# Patient Record
Sex: MALE | Race: WHITE | NOT HISPANIC OR LATINO | Employment: FULL TIME | URBAN - METROPOLITAN AREA
[De-identification: names, ages, dates, MRNs, and addresses within clinical notes are randomized per-mention and may not be internally consistent; named-entity substitution may affect disease eponyms.]

---

## 2022-01-24 ENCOUNTER — OFFICE VISIT (OUTPATIENT)
Dept: FAMILY MEDICINE CLINIC | Facility: CLINIC | Age: 57
End: 2022-01-24
Payer: COMMERCIAL

## 2022-01-24 VITALS
DIASTOLIC BLOOD PRESSURE: 90 MMHG | TEMPERATURE: 98.8 F | HEART RATE: 69 BPM | WEIGHT: 164.4 LBS | BODY MASS INDEX: 23.02 KG/M2 | HEIGHT: 71 IN | OXYGEN SATURATION: 96 % | SYSTOLIC BLOOD PRESSURE: 156 MMHG

## 2022-01-24 DIAGNOSIS — F32.1 CURRENT MODERATE EPISODE OF MAJOR DEPRESSIVE DISORDER WITHOUT PRIOR EPISODE (HCC): ICD-10-CM

## 2022-01-24 DIAGNOSIS — Z11.59 ENCOUNTER FOR HEPATITIS C SCREENING TEST FOR LOW RISK PATIENT: ICD-10-CM

## 2022-01-24 DIAGNOSIS — Z76.89 ENCOUNTER TO ESTABLISH CARE: Primary | ICD-10-CM

## 2022-01-24 DIAGNOSIS — N52.9 ERECTILE DYSFUNCTION, UNSPECIFIED ERECTILE DYSFUNCTION TYPE: ICD-10-CM

## 2022-01-24 DIAGNOSIS — Z13.0 SCREENING FOR DEFICIENCY ANEMIA: ICD-10-CM

## 2022-01-24 DIAGNOSIS — Z12.11 SCREENING FOR COLON CANCER: ICD-10-CM

## 2022-01-24 DIAGNOSIS — Z12.5 SCREENING FOR PROSTATE CANCER: ICD-10-CM

## 2022-01-24 DIAGNOSIS — Z11.4 SCREENING FOR HIV (HUMAN IMMUNODEFICIENCY VIRUS): ICD-10-CM

## 2022-01-24 DIAGNOSIS — Z13.29 SCREENING FOR THYROID DISORDER: ICD-10-CM

## 2022-01-24 DIAGNOSIS — Z13.1 SCREENING FOR DIABETES MELLITUS: ICD-10-CM

## 2022-01-24 DIAGNOSIS — Z13.220 SCREENING FOR HYPERLIPIDEMIA: ICD-10-CM

## 2022-01-24 PROCEDURE — 99203 OFFICE O/P NEW LOW 30 MIN: CPT | Performed by: NURSE PRACTITIONER

## 2022-01-24 RX ORDER — BUPROPION HYDROCHLORIDE 150 MG/1
150 TABLET, EXTENDED RELEASE ORAL 2 TIMES DAILY
Qty: 180 TABLET | Refills: 1 | Status: SHIPPED | OUTPATIENT
Start: 2022-01-24

## 2022-01-24 RX ORDER — BUPROPION HYDROCHLORIDE 150 MG/1
150 TABLET, EXTENDED RELEASE ORAL 2 TIMES DAILY
COMMUNITY
End: 2022-01-24 | Stop reason: SDUPTHER

## 2022-01-24 NOTE — PATIENT INSTRUCTIONS
Blood Pressure Diary    Check blood pressures at home and keep a log  Bring log to your follow up or call if the average home BP is greater than 458 systolic and or 90 diastolic before your follow up  Scott Wilson   Tues  Wed Thurs Fri Sat  COMMENTS                                                                            Keep a log of your blood pressure readings at home  Please take blood pressure at same time of day  Please record date and time blood pressure was taken  Make sure to take your blood pressure when you are seated and resting for about 3 minutes  Please call office if Blood Pressure is <110/<60  You can send these to me via [employer] or by calling the office  This will help me manage your blood pressure better  Erectile Dysfunction   AMBULATORY CARE:   Erectile dysfunction (ED) , or impotence, is when you cannot get or keep an erection for sexual activity  Call your doctor if:   · You have chest pain, dizziness, or nausea after you take ED medicines or during or after sex  · You have an erection for more than 4 hours after you take your ED medicine  · You see blood in your urine  · You have changes in your vision, headaches, or back pain after you take ED medicine  · You have a painful erection  · You have questions or concerns about your condition or care  Treatment  depends on the cause of your ED  You may need any of the following:  · ED medicines  help you have an erection  These medicines are taken before you have sex  Follow instructions on how to use these medicines  You may have a life-threatening reaction if you mix ED medicines with medicines that contain nitrates  Medicines with nitrates include nitroglycerin and other heart medicines  · Testosterone  may be given to increase the levels in your blood and improve your ED  You may need to use a skin cream or wear a patch  Testosterone is also given as an injection      · Penis injections  may be done to help improve your blood flow  · A vacuum device  is a tube that is placed over the penis  A hand pump is connected to the tube and acts as a vacuum  This may help increase blood flow to the penis  · Therapy  may be needed to treat emotional or relationship problems that may be causing your ED  · Surgery  may be recommended if other treatments do not work  Surgery includes a penile implant or prosthesis  Surgery may also be done to improve blood flow  Ask for more information about surgeries for ED  Decrease your risk for ED:   · Do not smoke  Smoking can increase your risk for ED  Nicotine and other chemicals in cigarettes and cigars can also cause lung damage  Ask your healthcare provider for information if you currently smoke and need help to quit  E-cigarettes or smokeless tobacco still contain nicotine  · Control your blood sugar levels if you have diabetes  Over time, high blood sugar levels can increase your risk for ED  · Limit alcohol  Men should limit alcohol to 2 drinks a day  A drink of alcohol is 12 ounces of beer, 5 ounces of wine, or 1½ ounces of liquor  · Manage your medical conditions  Eat a variety of healthy foods and stay physically active  Take your medicines as directed  They can help control conditions that may cause ED  · Manage stress  Learn ways to relax, such as deep breathing, meditation, and listening to music  · Do not use illegal drugs  They increase your risk for ED  Follow up with your doctor as directed:  Write down your questions so you remember to ask them during your visits  © Copyright [employer] 2021 Information is for End User's use only and may not be sold, redistributed or otherwise used for commercial purposes  All illustrations and images included in CareNotes® are the copyrighted property of [employer]  or [employer]   The above information is an  only   It is not intended as medical advice for individual conditions or treatments  Talk to your doctor, nurse or pharmacist before following any medical regimen to see if it is safe and effective for you

## 2022-02-17 ENCOUNTER — APPOINTMENT (OUTPATIENT)
Dept: LAB | Facility: CLINIC | Age: 57
End: 2022-02-17
Payer: COMMERCIAL

## 2022-02-17 DIAGNOSIS — Z12.5 SCREENING FOR PROSTATE CANCER: ICD-10-CM

## 2022-02-17 DIAGNOSIS — Z11.4 SCREENING FOR HIV (HUMAN IMMUNODEFICIENCY VIRUS): ICD-10-CM

## 2022-02-17 DIAGNOSIS — Z13.220 SCREENING FOR HYPERLIPIDEMIA: ICD-10-CM

## 2022-02-17 DIAGNOSIS — N52.9 ERECTILE DYSFUNCTION, UNSPECIFIED ERECTILE DYSFUNCTION TYPE: ICD-10-CM

## 2022-02-17 DIAGNOSIS — Z13.1 SCREENING FOR DIABETES MELLITUS: ICD-10-CM

## 2022-02-17 DIAGNOSIS — Z13.29 SCREENING FOR THYROID DISORDER: ICD-10-CM

## 2022-02-17 DIAGNOSIS — Z13.0 SCREENING FOR DEFICIENCY ANEMIA: ICD-10-CM

## 2022-02-17 LAB
ALBUMIN SERPL BCP-MCNC: 4.1 G/DL (ref 3.5–5)
ALP SERPL-CCNC: 66 U/L (ref 46–116)
ALT SERPL W P-5'-P-CCNC: 114 U/L (ref 12–78)
ANION GAP SERPL CALCULATED.3IONS-SCNC: 4 MMOL/L (ref 4–13)
AST SERPL W P-5'-P-CCNC: 84 U/L (ref 5–45)
BASOPHILS # BLD AUTO: 0.04 THOUSANDS/ΜL (ref 0–0.1)
BASOPHILS NFR BLD AUTO: 1 % (ref 0–1)
BILIRUB SERPL-MCNC: 0.7 MG/DL (ref 0.2–1)
BUN SERPL-MCNC: 15 MG/DL (ref 5–25)
CALCIUM SERPL-MCNC: 9.5 MG/DL (ref 8.3–10.1)
CHLORIDE SERPL-SCNC: 108 MMOL/L (ref 100–108)
CHOLEST SERPL-MCNC: 167 MG/DL
CO2 SERPL-SCNC: 26 MMOL/L (ref 21–32)
CREAT SERPL-MCNC: 0.88 MG/DL (ref 0.6–1.3)
EOSINOPHIL # BLD AUTO: 0.06 THOUSAND/ΜL (ref 0–0.61)
EOSINOPHIL NFR BLD AUTO: 1 % (ref 0–6)
ERYTHROCYTE [DISTWIDTH] IN BLOOD BY AUTOMATED COUNT: 12.2 % (ref 11.6–15.1)
GFR SERPL CREATININE-BSD FRML MDRD: 96 ML/MIN/1.73SQ M
GLUCOSE P FAST SERPL-MCNC: 115 MG/DL (ref 65–99)
HCT VFR BLD AUTO: 44.8 % (ref 36.5–49.3)
HDLC SERPL-MCNC: 61 MG/DL
HGB BLD-MCNC: 15.5 G/DL (ref 12–17)
IMM GRANULOCYTES # BLD AUTO: 0.02 THOUSAND/UL (ref 0–0.2)
IMM GRANULOCYTES NFR BLD AUTO: 0 % (ref 0–2)
LDLC SERPL CALC-MCNC: 89 MG/DL (ref 0–100)
LYMPHOCYTES # BLD AUTO: 2.63 THOUSANDS/ΜL (ref 0.6–4.47)
LYMPHOCYTES NFR BLD AUTO: 39 % (ref 14–44)
MCH RBC QN AUTO: 32.1 PG (ref 26.8–34.3)
MCHC RBC AUTO-ENTMCNC: 34.6 G/DL (ref 31.4–37.4)
MCV RBC AUTO: 93 FL (ref 82–98)
MONOCYTES # BLD AUTO: 0.55 THOUSAND/ΜL (ref 0.17–1.22)
MONOCYTES NFR BLD AUTO: 8 % (ref 4–12)
NEUTROPHILS # BLD AUTO: 3.44 THOUSANDS/ΜL (ref 1.85–7.62)
NEUTS SEG NFR BLD AUTO: 51 % (ref 43–75)
NONHDLC SERPL-MCNC: 106 MG/DL
NRBC BLD AUTO-RTO: 0 /100 WBCS
PLATELET # BLD AUTO: 142 THOUSANDS/UL (ref 149–390)
PMV BLD AUTO: 11.1 FL (ref 8.9–12.7)
POTASSIUM SERPL-SCNC: 4.4 MMOL/L (ref 3.5–5.3)
PROT SERPL-MCNC: 7.5 G/DL (ref 6.4–8.2)
PSA SERPL-MCNC: 1.8 NG/ML (ref 0–4)
RBC # BLD AUTO: 4.83 MILLION/UL (ref 3.88–5.62)
SODIUM SERPL-SCNC: 138 MMOL/L (ref 136–145)
TRIGL SERPL-MCNC: 86 MG/DL
TSH SERPL DL<=0.05 MIU/L-ACNC: 3.2 UIU/ML (ref 0.36–3.74)
WBC # BLD AUTO: 6.74 THOUSAND/UL (ref 4.31–10.16)

## 2022-02-17 PROCEDURE — 87389 HIV-1 AG W/HIV-1&-2 AB AG IA: CPT

## 2022-02-17 PROCEDURE — 36415 COLL VENOUS BLD VENIPUNCTURE: CPT

## 2022-02-17 PROCEDURE — 85025 COMPLETE CBC W/AUTO DIFF WBC: CPT

## 2022-02-17 PROCEDURE — 84403 ASSAY OF TOTAL TESTOSTERONE: CPT

## 2022-02-17 PROCEDURE — G0103 PSA SCREENING: HCPCS

## 2022-02-17 PROCEDURE — 84402 ASSAY OF FREE TESTOSTERONE: CPT

## 2022-02-17 PROCEDURE — 80053 COMPREHEN METABOLIC PANEL: CPT

## 2022-02-17 PROCEDURE — 80061 LIPID PANEL: CPT

## 2022-02-17 PROCEDURE — 84443 ASSAY THYROID STIM HORMONE: CPT

## 2022-02-18 LAB
HIV 1+2 AB+HIV1 P24 AG SERPL QL IA: NORMAL
TESTOST FREE SERPL-MCNC: 5.7 PG/ML (ref 7.2–24)
TESTOST SERPL-MCNC: >1500 NG/DL (ref 264–916)

## 2022-02-28 ENCOUNTER — OFFICE VISIT (OUTPATIENT)
Dept: FAMILY MEDICINE CLINIC | Facility: CLINIC | Age: 57
End: 2022-02-28
Payer: COMMERCIAL

## 2022-02-28 VITALS
BODY MASS INDEX: 23.32 KG/M2 | SYSTOLIC BLOOD PRESSURE: 132 MMHG | WEIGHT: 166.6 LBS | DIASTOLIC BLOOD PRESSURE: 82 MMHG | TEMPERATURE: 98 F | HEIGHT: 71 IN | OXYGEN SATURATION: 98 % | HEART RATE: 68 BPM

## 2022-02-28 DIAGNOSIS — Z12.11 SCREENING FOR COLORECTAL CANCER: ICD-10-CM

## 2022-02-28 DIAGNOSIS — I10 PRIMARY HYPERTENSION: ICD-10-CM

## 2022-02-28 DIAGNOSIS — Z80.0 FAMILY HISTORY OF COLON CANCER IN FATHER: ICD-10-CM

## 2022-02-28 DIAGNOSIS — Z00.00 ANNUAL PHYSICAL EXAM: Primary | ICD-10-CM

## 2022-02-28 DIAGNOSIS — Z12.12 SCREENING FOR COLORECTAL CANCER: ICD-10-CM

## 2022-02-28 DIAGNOSIS — R74.8 ELEVATED LIVER ENZYMES: ICD-10-CM

## 2022-02-28 PROCEDURE — 99396 PREV VISIT EST AGE 40-64: CPT | Performed by: NURSE PRACTITIONER

## 2022-02-28 RX ORDER — LISINOPRIL 10 MG/1
10 TABLET ORAL DAILY
Qty: 30 TABLET | Refills: 1 | Status: SHIPPED | OUTPATIENT
Start: 2022-02-28 | End: 2022-03-28 | Stop reason: SDUPTHER

## 2022-02-28 NOTE — PATIENT INSTRUCTIONS
Blood Pressure Diary    Check blood pressures at home and keep a log  Bring log to your follow up or call if the average home BP is greater than 162 systolic and or 90 diastolic before your follow up  Mirian Slaughter   Tues  Wed Thurs Fri Sat  COMMENTS                                                                            Keep a log of your blood pressure readings at home  Please take blood pressure at same time of day  Please record date and time blood pressure was taken  Make sure to take your blood pressure when you are seated and resting for about 3 minutes  Please call office if Blood Pressure is <110/<60  You can send these to me via [employer] or by calling the office  This will help me manage your blood pressure better  Wellness Visit for Adults   AMBULATORY CARE:   A wellness visit  is when you see your healthcare provider to get screened for health problems  Your healthcare provider will also give you advice on how to stay healthy  Write down your questions so you remember to ask them  Ask your healthcare provider how often you should have a wellness visit  What happens at a wellness visit:  Your healthcare provider will ask about your health, and your family history of health problems  This includes high blood pressure, heart disease, and cancer  He or she will ask if you have symptoms that concern you, if you smoke, and about your mood  You may also be asked about your intake of medicines, supplements, food, and alcohol  Any of the following may be done:  · Your weight  will be checked  Your height may also be checked so your body mass index (BMI) can be calculated  Your BMI shows if you are at a healthy weight  · Your blood pressure  and heart rate will be checked  Your temperature may also be checked  · Blood and urine tests  may be done  Blood tests may be done to check your cholesterol levels  Abnormal cholesterol levels increase your risk for heart disease and stroke   You may also need a blood or urine test to check for diabetes if you are at increased risk  Urine tests may be done to look for signs of an infection or kidney disease  · A physical exam  includes checking your heartbeat and lungs with a stethoscope  Your healthcare provider may also check your skin to look for sun damage  · Screening tests  may be recommended  A screening test is done to check for diseases that may not cause symptoms  The screening tests you may need depend on your age, gender, family history, and lifestyle habits  For example, colorectal screening may be recommended if you are 48years old or older  Screening tests you need if you are a woman:   · A Pap smear  is used to screen for cervical cancer  Pap smears are usually done every 3 to 5 years depending on your age  You may need them more often if you have had abnormal Pap smear test results in the past  Ask your healthcare provider how often you should have a Pap smear  · A mammogram  is an x-ray of your breasts to screen for breast cancer  Experts recommend mammograms every 2 years starting at age 48 years  You may need a mammogram at age 52 years or younger if you have an increased risk for breast cancer  Talk to your healthcare provider about when you should start having mammograms and how often you need them  Vaccines you may need:   · Get an influenza vaccine  every year  The influenza vaccine protects you from the flu  Several types of viruses cause the flu  The viruses change over time, so new vaccines are made each year  · Get a tetanus-diphtheria (Td) booster vaccine  every 10 years  This vaccine protects you against tetanus and diphtheria  Tetanus is a severe infection that may cause painful muscle spasms and lockjaw  Diphtheria is a severe bacterial infection that causes a thick covering in the back of your mouth and throat      · Get a human papillomavirus (HPV) vaccine  if you are female and aged 23 to 32 or male 23 to 24 and never received it  This vaccine protects you from HPV infection  HPV is the most common infection spread by sexual contact  HPV may also cause vaginal, penile, and anal cancers  · Get a pneumococcal vaccine  if you are aged 72 years or older  The pneumococcal vaccine is an injection given to protect you from pneumococcal disease  Pneumococcal disease is an infection caused by pneumococcal bacteria  The infection may cause pneumonia, meningitis, or an ear infection  · Get a shingles vaccine  if you are 60 or older, even if you have had shingles before  The shingles vaccine is an injection to protect you from the varicella-zoster virus  This is the same virus that causes chickenpox  Shingles is a painful rash that develops in people who had chickenpox or have been exposed to the virus  How to eat healthy:  My Plate is a model for planning healthy meals  It shows the types and amounts of foods that should go on your plate  Fruits and vegetables make up about half of your plate, and grains and protein make up the other half  A serving of dairy is included on the side of your plate  The amount of calories and serving sizes you need depends on your age, gender, weight, and height  Examples of healthy foods are listed below:  · Eat a variety of vegetables  such as dark green, red, and orange vegetables  You can also include canned vegetables low in sodium (salt) and frozen vegetables without added butter or sauces  · Eat a variety of fresh fruits , canned fruit in 100% juice, frozen fruit, and dried fruit  · Include whole grains  At least half of the grains you eat should be whole grains  Examples include whole-wheat bread, wheat pasta, brown rice, and whole-grain cereals such as oatmeal     · Eat a variety of protein foods such as seafood (fish and shellfish), lean meat, and poultry without skin (turkey and chicken)   Examples of lean meats include pork leg, shoulder, or tenderloin, and beef round, sirloin, tenderloin, and extra lean ground beef  Other protein foods include eggs and egg substitutes, beans, peas, soy products, nuts, and seeds  · Choose low-fat dairy products such as skim or 1% milk or low-fat yogurt, cheese, and cottage cheese  · Limit unhealthy fats  such as butter, hard margarine, and shortening  Exercise:  Exercise at least 30 minutes per day on most days of the week  Some examples of exercise include walking, biking, dancing, and swimming  You can also fit in more physical activity by taking the stairs instead of the elevator or parking farther away from stores  Include muscle strengthening activities 2 days each week  Regular exercise provides many health benefits  It helps you manage your weight, and decreases your risk for type 2 diabetes, heart disease, stroke, and high blood pressure  Exercise can also help improve your mood  Ask your healthcare provider about the best exercise plan for you  General health and safety guidelines:   · Do not smoke  Nicotine and other chemicals in cigarettes and cigars can cause lung damage  Ask your healthcare provider for information if you currently smoke and need help to quit  E-cigarettes or smokeless tobacco still contain nicotine  Talk to your healthcare provider before you use these products  · Limit alcohol  A drink of alcohol is 12 ounces of beer, 5 ounces of wine, or 1½ ounces of liquor  · Lose weight, if needed  Being overweight increases your risk of certain health conditions  These include heart disease, high blood pressure, type 2 diabetes, and certain types of cancer  · Protect your skin  Do not sunbathe or use tanning beds  Use sunscreen with a SPF 15 or higher  Apply sunscreen at least 15 minutes before you go outside  Reapply sunscreen every 2 hours  Wear protective clothing, hats, and sunglasses when you are outside  · Drive safely  Always wear your seatbelt   Make sure everyone in your car wears a seatbelt  A seatbelt can save your life if you are in an accident  Do not use your cell phone when you are driving  This could distract you and cause an accident  Pull over if you need to make a call or send a text message  · Practice safe sex  Use latex condoms if are sexually active and have more than one partner  Your healthcare provider may recommend screening tests for sexually transmitted infections (STIs)  · Wear helmets, lifejackets, and protective gear  Always wear a helmet when you ride a bike or motorcycle, go skiing, or play sports that could cause a head injury  Wear protective equipment when you play sports  Wear a lifejacket when you are on a boat or doing water sports  © Copyright [employer] 2022 Information is for End User's use only and may not be sold, redistributed or otherwise used for commercial purposes  All illustrations and images included in CareNotes® are the copyrighted property of A D A M , Inc  or [facility] Naseem Reeves   The above information is an  only  It is not intended as medical advice for individual conditions or treatments  Talk to your doctor, nurse or pharmacist before following any medical regimen to see if it is safe and effective for you

## 2022-02-28 NOTE — PROGRESS NOTES
[facility] CARE    NAME: Nedra Westfall  AGE: 64 y o  SEX: male  : 1965     DATE: 2022     Assessment and Plan:     Problem List Items Addressed This Visit     Elevated liver enzymes    Primary hypertension    Relevant Medications    lisinopril (ZESTRIL) 10 mg tablet      Other Visit Diagnoses     Annual physical exam    -  Primary    Screening for colorectal cancer        Relevant Orders    Ambulatory referral to Gastroenterology    Family history of colon cancer in father        Relevant Orders    Ambulatory referral to Gastroenterology          Immunizations and preventive care screenings were discussed with patient today  Appropriate education was printed on patient's after visit summary  Counseling:  Alcohol/drug use: discussed moderation in alcohol intake, the recommendations for healthy alcohol use, and avoidance of illicit drug use  Dental Health: discussed importance of regular tooth brushing, flossing, and dental visits  Injury prevention: discussed safety/seat belts, safety helmets, smoke detectors, carbon dioxide detectors, and smoking near bedding or upholstery  Sexual health: discussed sexually transmitted diseases, partner selection, use of condoms, avoidance of unintended pregnancy, and contraceptive alternatives  · Exercise: the importance of regular exercise/physical activity was discussed  Recommend exercise 3-5 times per week for at least 30 minutes  Return in about 6 months (around 2022)  Chief Complaint:     Chief Complaint   Patient presents with    Annual Exam      History of Present Illness:     Adult Annual Physical   Patient here for a comprehensive physical exam  The patient reports no problems  Diet and Physical Activity  · Diet/Nutrition: well balanced diet, limited junk food and consuming 3-5 servings of fruits/vegetables daily     · Exercise: moderate cardiovascular exercise, 3-4 times a week on average and 30-60 minutes on average  Depression Screening  PHQ-2/9 Depression Screening         General Health  · Sleep: gets 4-6 hours of sleep on average  · Hearing: decreased - bilateral and tinnitus  · Vision: no vision problems and most recent eye exam <1 year ago  · Dental: no dental visits for >1 year   Health  · Symptoms include: erectile dysfunction     Review of Systems:     Review of Systems   Constitutional: Negative  HENT: Negative  Eyes: Negative  Respiratory: Negative  Cardiovascular: Negative  Gastrointestinal: Negative  Endocrine: Negative  Genitourinary: Negative  Musculoskeletal: Negative  Skin: Negative  Allergic/Immunologic: Negative  Neurological: Negative  Hematological: Negative  Psychiatric/Behavioral: Negative         Past Medical History:     Past Medical History:   Diagnosis Date    History of hepatitis C     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[facility] [address] )     Toxoplasmosis chorioretinitis of left eye       Past Surgical History:     Past Surgical History:   Procedure Laterality Date    CARPAL TUNNEL RELEASE Left     CATARACT EXTRACTION Left     HERNIA REPAIR      PLANTAR FASCIA SURGERY Left       Family History:     Family History   Problem Relation Age of Onset    Lung cancer Mother     Kidney disease Mother         drug induced    Dementia Father       Social History:     Social History     Socioeconomic History    Marital status: /Civil Union     Spouse name: None    Number of children: None    Years of education: None    Highest education level: None   Occupational History    None   Tobacco Use    Smoking status: Former Smoker     Years: 10 00     Types: Cigarettes     Quit date: 2020     Years since quittin 1    Smokeless tobacco: Never Used   Vaping Use    Vaping Use: Never used   Substance and Sexual Activity    Alcohol use: Not Currently    Drug use: Not Currently    Sexual activity: None   Other Topics Concern    None   Social History Narrative    None     Social Determinants of Health     Financial Resource Strain: Not on file   Food Insecurity: Not on file   Transportation Needs: Not on file   Physical Activity: Not on file   Stress: Not on file   Social Connections: Not on file   Intimate Partner Violence: Not on file   Housing Stability: Not on file      Current Medications:     Current Outpatient Medications   Medication Sig Dispense Refill    buPROPion (WELLBUTRIN SR) 150 mg 12 hr tablet Take 1 tablet (150 mg total) by mouth 2 (two) times a day 180 tablet 1    lisinopril (ZESTRIL) 10 mg tablet Take 1 tablet (10 mg total) by mouth daily 30 tablet 1     No current facility-administered medications for this visit  Allergies:     No Known Allergies   Physical Exam:     /82 (BP Location: Left arm, Patient Position: Sitting, Cuff Size: Standard)   Pulse 68   Temp 98 °F (36 7 °C) (Tympanic)   Ht 5' 11" (1 803 m)   Wt 75 6 kg (166 lb 9 6 oz) Comment: with work boots  SpO2 98%   BMI 23 24 kg/m²     Physical Exam  Vitals and nursing note reviewed  Constitutional:       Appearance: Normal appearance  He is well-developed  Interventions: Face mask in place  HENT:      Head: Normocephalic and atraumatic  Right Ear: External ear normal       Left Ear: External ear normal       Nose: Nose normal    Eyes:      Conjunctiva/sclera: Conjunctivae normal       Pupils: Pupils are equal, round, and reactive to light  Cardiovascular:      Rate and Rhythm: Normal rate and regular rhythm  Heart sounds: Normal heart sounds  Pulmonary:      Effort: Pulmonary effort is normal       Breath sounds: Normal breath sounds  Abdominal:      General: Bowel sounds are normal       Palpations: Abdomen is soft  Genitourinary:     Comments: Deferred  Musculoskeletal:         General: Normal range of motion        Cervical back: Normal range of motion and neck supple  Skin:     General: Skin is warm and dry  Capillary Refill: Capillary refill takes less than 2 seconds  Neurological:      Mental Status: He is alert and oriented to person, place, and time  Psychiatric:         Behavior: Behavior normal          Thought Content:  Thought content normal          Judgment: Judgment normal           Appointment on 02/17/2022   Component Date Value Ref Range Status    HIV-1/HIV-2 Ab 02/17/2022 Non-Reactive  Non-Reactive Final    WBC 02/17/2022 6 74  4 31 - 10 16 Thousand/uL Final    RBC 02/17/2022 4 83  3 88 - 5 62 Million/uL Final    Hemoglobin 02/17/2022 15 5  12 0 - 17 0 g/dL Final    Hematocrit 02/17/2022 44 8  36 5 - 49 3 % Final    MCV 02/17/2022 93  82 - 98 fL Final    MCH 02/17/2022 32 1  26 8 - 34 3 pg Final    MCHC 02/17/2022 34 6  31 4 - 37 4 g/dL Final    RDW 02/17/2022 12 2  11 6 - 15 1 % Final    MPV 02/17/2022 11 1  8 9 - 12 7 fL Final    Platelets 97/07/2004 142* 149 - 390 Thousands/uL Final    nRBC 02/17/2022 0  /100 WBCs Final    Neutrophils Relative 02/17/2022 51  43 - 75 % Final    Immat GRANS % 02/17/2022 0  0 - 2 % Final    Lymphocytes Relative 02/17/2022 39  14 - 44 % Final    Monocytes Relative 02/17/2022 8  4 - 12 % Final    Eosinophils Relative 02/17/2022 1  0 - 6 % Final    Basophils Relative 02/17/2022 1  0 - 1 % Final    Neutrophils Absolute 02/17/2022 3 44  1 85 - 7 62 Thousands/µL Final    Immature Grans Absolute 02/17/2022 0 02  0 00 - 0 20 Thousand/uL Final    Lymphocytes Absolute 02/17/2022 2 63  0 60 - 4 47 Thousands/µL Final    Monocytes Absolute 02/17/2022 0 55  0 17 - 1 22 Thousand/µL Final    Eosinophils Absolute 02/17/2022 0 06  0 00 - 0 61 Thousand/µL Final    Basophils Absolute 02/17/2022 0 04  0 00 - 0 10 Thousands/µL Final    Sodium 02/17/2022 138  136 - 145 mmol/L Final    Potassium 02/17/2022 4 4  3 5 - 5 3 mmol/L Final    Chloride 02/17/2022 108  100 - 108 mmol/L Final    CO2 02/17/2022 26  21 - 32 mmol/L Final    ANION GAP 02/17/2022 4  4 - 13 mmol/L Final    BUN 02/17/2022 15  5 - 25 mg/dL Final    Creatinine 02/17/2022 0 88  0 60 - 1 30 mg/dL Final    Standardized to IDMS reference method    Glucose, Fasting 02/17/2022 115* 65 - 99 mg/dL Final    Specimen collection should occur prior to Sulfasalazine administration due to the potential for falsely depressed results  Specimen collection should occur prior to Sulfapyridine administration due to the potential for falsely elevated results   Calcium 02/17/2022 9 5  8 3 - 10 1 mg/dL Final    AST 02/17/2022 84* 5 - 45 U/L Final    Specimen collection should occur prior to Sulfasalazine administration due to the potential for falsely depressed results   ALT 02/17/2022 114* 12 - 78 U/L Final    Specimen collection should occur prior to Sulfasalazine and/or Sulfapyridine administration due to the potential for falsely depressed results   Alkaline Phosphatase 02/17/2022 66  46 - 116 U/L Final    Total Protein 02/17/2022 7 5  6 4 - 8 2 g/dL Final    Albumin 02/17/2022 4 1  3 5 - 5 0 g/dL Final    Total Bilirubin 02/17/2022 0 70  0 20 - 1 00 mg/dL Final    Use of this assay is not recommended for patients undergoing treatment with eltrombopag due to the potential for falsely elevated results      eGFR 02/17/2022 96  ml/min/1 73sq m Final    Cholesterol 02/17/2022 167  See Comment mg/dL Final    Cholesterol:         Pediatric <18 Years        Desirable          <170 mg/dL      Borderline High    170-199 mg/dL      High               >=200 mg/dL        Adult >=18 Years            Desirable         <200 mg/dL      Borderline High   200-239 mg/dL      High              >239 mg/dL      Triglycerides 02/17/2022 86  See Comment mg/dL Final    Triglyceride:     0-9Y            <75mg/dL     10Y-17Y         <90 mg/dL       >=18Y     Normal          <150 mg/dL     Borderline High 150-199 mg/dL     High            200-499 mg/dL        Very High       >499 mg/dL    Specimen collection should occur prior to N-Acetylcysteine or Metamizole administration due to the potential for falsely depressed results   HDL, Direct 02/17/2022 61  >=40 mg/dL Final    Specimen collection should occur prior to Metamizole administration due to the potential for falsley depressed results   LDL Calculated 02/17/2022 89  0 - 100 mg/dL Final    LDL Cholesterol:     Optimal           <100 mg/dl     Near Optimal      100-129 mg/dl     Above Optimal       Borderline High 130-159 mg/dl       High            160-189 mg/dl       Very High       >189 mg/dl         This screening LDL is a calculated result  It does not have the accuracy of the Direct Measured LDL in the monitoring of patients with hyperlipidemia and/or statin therapy  Direct Measure LDL (XEU551) must be ordered separately in these patients   Non-HDL-Chol (CHOL-HDL) 02/17/2022 106  mg/dl Final    TSH 3RD GENERATON 02/17/2022 3 200  0 358 - 3 740 uIU/mL Final    PSA 02/17/2022 1 8  0 0 - 4 0 ng/mL Final    American Urological Association Guidelines define biochemical recurrence of prostate cancer as a detectable or rising PSA value post-radical prostatectomy that is greater than or equal to 0 2 ng/mL with a second confirmatory level of greater than or equal to 0 2 ng/mL   Testosterone, Free 02/17/2022 5 7* 7 2 - 24 0 pg/mL Final    TESTOSTERONE TOTAL 02/17/2022 >1500* 264 - 916 ng/dL Final    Adult male reference interval is based on a population of  healthy nonobese males (BMI <30) between 23and 44years old  [address] [facility], [address] [facility] [phone]  PMID: [id number]           Suze Dseir, [address] [place] [address]

## 2022-03-28 ENCOUNTER — OFFICE VISIT (OUTPATIENT)
Dept: FAMILY MEDICINE CLINIC | Facility: CLINIC | Age: 57
End: 2022-03-28
Payer: COMMERCIAL

## 2022-03-28 VITALS
TEMPERATURE: 97.9 F | SYSTOLIC BLOOD PRESSURE: 128 MMHG | BODY MASS INDEX: 22.26 KG/M2 | OXYGEN SATURATION: 98 % | HEIGHT: 71 IN | DIASTOLIC BLOOD PRESSURE: 86 MMHG | WEIGHT: 159 LBS | HEART RATE: 66 BPM

## 2022-03-28 DIAGNOSIS — Z23 ENCOUNTER FOR IMMUNIZATION: Primary | ICD-10-CM

## 2022-03-28 DIAGNOSIS — I10 PRIMARY HYPERTENSION: ICD-10-CM

## 2022-03-28 DIAGNOSIS — N52.9 ERECTILE DYSFUNCTION, UNSPECIFIED ERECTILE DYSFUNCTION TYPE: ICD-10-CM

## 2022-03-28 PROCEDURE — 99214 OFFICE O/P EST MOD 30 MIN: CPT | Performed by: NURSE PRACTITIONER

## 2022-03-28 RX ORDER — SILDENAFIL 50 MG/1
50 TABLET, FILM COATED ORAL DAILY PRN
Qty: 30 TABLET | Refills: 0 | Status: SHIPPED | OUTPATIENT
Start: 2022-03-28

## 2022-03-28 RX ORDER — CHLORAL HYDRATE 500 MG
1000 CAPSULE ORAL DAILY
COMMUNITY

## 2022-03-28 RX ORDER — NIACIN 100 MG
100 TABLET ORAL
COMMUNITY

## 2022-03-28 RX ORDER — ASPIRIN 81 MG/1
81 TABLET, CHEWABLE ORAL DAILY
COMMUNITY

## 2022-03-28 RX ORDER — LISINOPRIL 10 MG/1
10 TABLET ORAL DAILY
Qty: 90 TABLET | Refills: 1 | Status: SHIPPED | OUTPATIENT
Start: 2022-03-28 | End: 2022-04-27 | Stop reason: SDUPTHER

## 2022-03-28 RX ORDER — MULTIVITAMIN
1 TABLET ORAL DAILY
COMMUNITY

## 2022-03-28 RX ORDER — MULTIVITAMIN WITH IRON
1 TABLET ORAL DAILY
COMMUNITY

## 2022-03-28 NOTE — PROGRESS NOTES
Assessment/Plan:    Problem List Items Addressed This Visit     Primary hypertension    Relevant Medications    lisinopril (ZESTRIL) 10 mg tablet      Other Visit Diagnoses     Encounter for immunization    -  Primary    Erectile dysfunction, unspecified erectile dysfunction type        Relevant Medications    sildenafil (VIAGRA) 50 MG tablet           Diagnoses and all orders for this visit:    Encounter for immunization    Primary hypertension  -     lisinopril (ZESTRIL) 10 mg tablet; Take 1 tablet (10 mg total) by mouth daily    Erectile dysfunction, unspecified erectile dysfunction type  -     sildenafil (VIAGRA) 50 MG tablet; Take 1 tablet (50 mg total) by mouth daily as needed for erectile dysfunction    Other orders  -     aspirin 81 mg chewable tablet; Chew 81 mg daily  -     B Complex-C (B-complex with vitamin C) tablet; Take 1 tablet by mouth daily  -     Omega-3 Fatty Acids (fish oil) 1,000 mg; Take 1,000 mg by mouth daily  -     Multiple Vitamin (multivitamin) tablet; Take 1 tablet by mouth daily  -     niacin 100 mg tablet; Take 100 mg by mouth daily with breakfast  -     Magnesium 100 MG CAPS; Take by mouth  -     Glucosamine-Chondroitin (GLUCOSAMINE CHONDR COMPLEX PO); Take by mouth        No problem-specific Assessment & Plan notes found for this encounter  Subjective:      Patient ID: Carrie Kaplan is a 64 y o  male  Patient with past medical history depression and hypertension presents for a blood pressure follow-up  Hypertension  This is a chronic problem  The problem is controlled  There are no associated agents to hypertension  Risk factors for coronary artery disease include male gender  Past treatments include ACE inhibitors  The current treatment provides moderate improvement  There are no compliance problems  Erectile Dysfunction  This is a chronic problem  The current episode started more than 1 month ago  The nature of his difficulty is maintaining erection   He reports no anxiety, decreased libido or performance anxiety  He reports his erection duration to be 5 to 10 minutes  The symptoms are aggravated by stress  Treatments tried: niacin  The treatment provided mild relief  Risk factors include hypertension  The following portions of the patient's history were reviewed and updated as appropriate:   He has a past medical history of History of hepatitis C, Hypertension, 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[address] ), and Toxoplasmosis chorioretinitis of left eye ,  does not have any pertinent problems on file  ,   has a past surgical history that includes Cataract extraction (Left); Hernia repair; Carpal tunnel release (Left); and Plantar fascia surgery (Left)  ,  family history includes COPD in his mother; Dementia in his father; Kidney disease in his mother; Lung cancer in his mother  ,   reports that he quit smoking about 2 years ago  His smoking use included cigarettes  He quit after 10 00 years of use  He has never used smokeless tobacco  He reports previous alcohol use  He reports previous drug use ,  has No Known Allergies     Current Outpatient Medications   Medication Sig Dispense Refill    aspirin 81 mg chewable tablet Chew 81 mg daily      B Complex-C (B-complex with vitamin C) tablet Take 1 tablet by mouth daily      buPROPion (WELLBUTRIN SR) 150 mg 12 hr tablet Take 1 tablet (150 mg total) by mouth 2 (two) times a day 180 tablet 1    Glucosamine-Chondroitin (GLUCOSAMINE CHONDR COMPLEX PO) Take by mouth      lisinopril (ZESTRIL) 10 mg tablet Take 1 tablet (10 mg total) by mouth daily 90 tablet 1    Magnesium 100 MG CAPS Take by mouth      Multiple Vitamin (multivitamin) tablet Take 1 tablet by mouth daily      niacin 100 mg tablet Take 100 mg by mouth daily with breakfast      Omega-3 Fatty Acids (fish oil) 1,000 mg Take 1,000 mg by mouth daily      sildenafil (VIAGRA) 50 MG tablet Take 1 tablet (50 mg total) by mouth daily as needed for erectile dysfunction 30 tablet 0     No current facility-administered medications for this visit  Review of Systems   Constitutional: Negative  HENT: Negative  Eyes: Negative  Respiratory: Negative  Cardiovascular: Negative  Gastrointestinal: Negative  Endocrine: Negative  Genitourinary: Negative  Negative for decreased libido  Musculoskeletal: Negative  Skin: Negative  Allergic/Immunologic: Negative  Neurological: Negative  Hematological: Negative  Psychiatric/Behavioral: Negative  The patient is not nervous/anxious  Objective:  Vitals:    03/28/22 1422   BP: 128/86   BP Location: Left arm   Patient Position: Sitting   Cuff Size: Standard   Pulse: 66   Temp: 97 9 °F (36 6 °C)   TempSrc: Tympanic   SpO2: 98%   Weight: 72 1 kg (159 lb)   Height: 5' 11" (1 803 m)     Body mass index is 22 18 kg/m²  Physical Exam  Vitals and nursing note reviewed  Constitutional:       Appearance: Normal appearance  He is well-developed  Interventions: Face mask in place  HENT:      Head: Normocephalic and atraumatic  Right Ear: Tympanic membrane, ear canal and external ear normal       Left Ear: Tympanic membrane, ear canal and external ear normal       Nose: Nose normal       Mouth/Throat:      Mouth: Mucous membranes are moist       Pharynx: Uvula midline  Eyes:      General: Lids are normal       Conjunctiva/sclera: Conjunctivae normal       Pupils: Pupils are equal, round, and reactive to light  Neck:      Thyroid: No thyroid mass  Vascular: No JVD  Trachea: Trachea and phonation normal    Cardiovascular:      Rate and Rhythm: Normal rate and regular rhythm  Pulses: Normal pulses  Heart sounds: Normal heart sounds, S1 normal and S2 normal  No murmur heard  No friction rub  No gallop  Pulmonary:      Effort: Pulmonary effort is normal       Breath sounds: Normal breath sounds  Abdominal:      General: Bowel sounds are normal       Palpations: Abdomen is soft  Tenderness: There is no abdominal tenderness  Genitourinary:     Comments: Deferred  Musculoskeletal:         General: Normal range of motion  Cervical back: Full passive range of motion without pain, normal range of motion and neck supple  Right lower leg: No edema  Left lower leg: No edema  Lymphadenopathy:      Head:      Right side of head: No submental, submandibular, tonsillar, preauricular, posterior auricular or occipital adenopathy  Left side of head: No submental, submandibular, tonsillar, preauricular, posterior auricular or occipital adenopathy  Cervical: No cervical adenopathy  Skin:     General: Skin is warm and dry  Capillary Refill: Capillary refill takes less than 2 seconds  Neurological:      General: No focal deficit present  Mental Status: He is alert and oriented to person, place, and time  Cranial Nerves: Cranial nerves are intact  Sensory: Sensation is intact  Motor: Motor function is intact  Coordination: Coordination is intact  Gait: Gait is intact  Psychiatric:         Attention and Perception: Attention and perception normal          Mood and Affect: Mood and affect normal          Speech: Speech normal          Behavior: Behavior normal  Behavior is cooperative  Thought Content:  Thought content normal          Cognition and Memory: Cognition normal          Judgment: Judgment normal

## 2022-04-21 ENCOUNTER — APPOINTMENT (OUTPATIENT)
Dept: RADIOLOGY | Facility: CLINIC | Age: 57
End: 2022-04-21
Payer: COMMERCIAL

## 2022-04-21 DIAGNOSIS — M25.551 RIGHT HIP PAIN: ICD-10-CM

## 2022-04-21 PROCEDURE — 72170 X-RAY EXAM OF PELVIS: CPT

## 2022-04-27 DIAGNOSIS — I10 PRIMARY HYPERTENSION: ICD-10-CM

## 2022-04-27 RX ORDER — LISINOPRIL 10 MG/1
10 TABLET ORAL DAILY
Qty: 90 TABLET | Refills: 1 | Status: SHIPPED | OUTPATIENT
Start: 2022-04-27 | End: 2022-07-18 | Stop reason: SDUPTHER

## 2022-04-27 NOTE — TELEPHONE ENCOUNTER
Pt called in  Said he had tingling in his Left arm and was feeling winded doing activities that wouldn't usually make him winded  After speaking with my MA's they advised he go to the ER  I told pt we suggest he go to the ER and said that he was not going today, he has a chiropractor appointment today

## 2022-05-12 ENCOUNTER — TELEPHONE (OUTPATIENT)
Dept: FAMILY MEDICINE CLINIC | Facility: CLINIC | Age: 57
End: 2022-05-12

## 2022-05-12 ENCOUNTER — TELEPHONE (OUTPATIENT)
Dept: GASTROENTEROLOGY | Facility: CLINIC | Age: 57
End: 2022-05-12

## 2022-05-12 DIAGNOSIS — Z12.11 SCREEN FOR COLON CANCER: Primary | ICD-10-CM

## 2022-05-12 DIAGNOSIS — Z12.11 SCREEN FOR COLON CANCER: ICD-10-CM

## 2022-05-12 RX ORDER — SODIUM, POTASSIUM,MAG SULFATES 17.5-3.13G
SOLUTION, RECONSTITUTED, ORAL ORAL
Qty: 354 ML | Refills: 0 | Status: SHIPPED | OUTPATIENT
Start: 2022-05-12 | End: 2022-09-02

## 2022-05-12 RX ORDER — SODIUM, POTASSIUM,MAG SULFATES 17.5-3.13G
SOLUTION, RECONSTITUTED, ORAL ORAL
Qty: 354 ML | Refills: 0 | Status: SHIPPED | OUTPATIENT
Start: 2022-05-12 | End: 2022-05-12 | Stop reason: SDUPTHER

## 2022-05-12 NOTE — TELEPHONE ENCOUNTER
Scheduled date of colonoscopy (as of today):08 05 22  Physician performing colonoscopy:DR Baldwin 26  Location of colonoscopy:[facility]  Bowel prep reviewed with patient:SUPREP (per pt request)  Instructions reviewed with patient by:JOSE EDUARDO VERBALLY/MAILED  Clearances: N/A    05/12/22  Screened by: Karen Farrell    Referring Provider Rui Bowling    Pre- Screening: Body mass index is 22 18 kg/m²  Has patient been referred for a routine screening Colonoscopy? yes  Is the patient between 39-70 years old? yes      Previous Colonoscopy yes   If yes:    Date: [facility] now)                  Pt will get report    Reason:       SCHEDULING STAFF: If the patient is between 45yrs-49yrs, please advise patient to confirm benefits/coverage with their insurance company for a routine screening colonoscopy, some insurance carriers will only cover at [address] or older  If the patient is over 66years old, please schedule an office visit  Does the patient want to see a Gastroenterologist prior to their procedure OR are they having any GI symptoms? no    Has the patient been hospitalized or had abdominal surgery in the past 6 months? no    Does the patient use supplemental oxygen? no    Does the patient take Coumadin, Lovenox, Plavix, Elliquis, Xarelto, or other blood thinning medication? no    Has the patient had a stroke, cardiac event, or stent placed in the past year? no    SCHEDULING STAFF: If patient answers NO to above questions, then schedule procedure  If patient answers YES to above questions, then schedule office appointment  If patient is between 45yrs - 49yrs, please advise patient that we will have to confirm benefits & coverage with their insurance company for a routine screening colonoscopy

## 2022-05-12 NOTE — TELEPHONE ENCOUNTER
Pt called in  He wanted to report he tested pos for COVID this AM  Last night he had a fever and today he just has sinus drainage  He understands the quarantine protocol

## 2022-05-24 ENCOUNTER — HOSPITAL ENCOUNTER (OUTPATIENT)
Dept: RADIOLOGY | Facility: HOSPITAL | Age: 57
Discharge: HOME | End: 2022-05-24
Payer: COMMERCIAL

## 2022-05-24 ENCOUNTER — APPOINTMENT (OUTPATIENT)
Dept: LAB | Facility: CLINIC | Age: 57
End: 2022-05-24
Payer: COMMERCIAL

## 2022-05-24 ENCOUNTER — OFFICE VISIT (OUTPATIENT)
Dept: FAMILY MEDICINE CLINIC | Facility: CLINIC | Age: 57
End: 2022-05-24
Payer: COMMERCIAL

## 2022-05-24 VITALS
SYSTOLIC BLOOD PRESSURE: 138 MMHG | HEIGHT: 71 IN | OXYGEN SATURATION: 98 % | HEART RATE: 76 BPM | WEIGHT: 147 LBS | RESPIRATION RATE: 18 BRPM | DIASTOLIC BLOOD PRESSURE: 84 MMHG | BODY MASS INDEX: 20.58 KG/M2 | TEMPERATURE: 97.7 F

## 2022-05-24 DIAGNOSIS — E83.52 HYPERCALCEMIA: ICD-10-CM

## 2022-05-24 DIAGNOSIS — R25.1 SHAKY: ICD-10-CM

## 2022-05-24 DIAGNOSIS — E83.39 HYPOPHOSPHATEMIA: ICD-10-CM

## 2022-05-24 DIAGNOSIS — R63.4 UNEXPLAINED WEIGHT LOSS: ICD-10-CM

## 2022-05-24 DIAGNOSIS — E16.2 LOW BLOOD SUGAR: ICD-10-CM

## 2022-05-24 DIAGNOSIS — R79.89 LOW SERUM PARATHYROID HORMONE (PTH): ICD-10-CM

## 2022-05-24 DIAGNOSIS — I10 PRIMARY HYPERTENSION: Primary | ICD-10-CM

## 2022-05-24 DIAGNOSIS — R73.01 ELEVATED FASTING GLUCOSE: ICD-10-CM

## 2022-05-24 LAB
ALBUMIN SERPL BCP-MCNC: 4.1 G/DL (ref 3.5–5)
ALP SERPL-CCNC: 66 U/L (ref 46–116)
ALT SERPL W P-5'-P-CCNC: 104 U/L (ref 12–78)
ANION GAP SERPL CALCULATED.3IONS-SCNC: 1 MMOL/L (ref 4–13)
AST SERPL W P-5'-P-CCNC: 78 U/L (ref 5–45)
BASOPHILS # BLD AUTO: 0.06 THOUSANDS/ΜL (ref 0–0.1)
BASOPHILS NFR BLD AUTO: 1 % (ref 0–1)
BILIRUB SERPL-MCNC: 1.04 MG/DL (ref 0.2–1)
BUN SERPL-MCNC: 14 MG/DL (ref 5–25)
CALCIUM SERPL-MCNC: 10.5 MG/DL (ref 8.3–10.1)
CHLORIDE SERPL-SCNC: 102 MMOL/L (ref 100–108)
CO2 SERPL-SCNC: 34 MMOL/L (ref 21–32)
CREAT SERPL-MCNC: 0.89 MG/DL (ref 0.6–1.3)
EOSINOPHIL # BLD AUTO: 0.05 THOUSAND/ΜL (ref 0–0.61)
EOSINOPHIL NFR BLD AUTO: 1 % (ref 0–6)
ERYTHROCYTE [DISTWIDTH] IN BLOOD BY AUTOMATED COUNT: 12.8 % (ref 11.6–15.1)
GFR SERPL CREATININE-BSD FRML MDRD: 94 ML/MIN/1.73SQ M
GLUCOSE SERPL-MCNC: 81 MG/DL (ref 65–140)
HCT VFR BLD AUTO: 46.2 % (ref 36.5–49.3)
HGB BLD-MCNC: 15.7 G/DL (ref 12–17)
IMM GRANULOCYTES # BLD AUTO: 0.06 THOUSAND/UL (ref 0–0.2)
IMM GRANULOCYTES NFR BLD AUTO: 1 % (ref 0–2)
LYMPHOCYTES # BLD AUTO: 2.21 THOUSANDS/ΜL (ref 0.6–4.47)
LYMPHOCYTES NFR BLD AUTO: 26 % (ref 14–44)
MCH RBC QN AUTO: 32.2 PG (ref 26.8–34.3)
MCHC RBC AUTO-ENTMCNC: 34 G/DL (ref 31.4–37.4)
MCV RBC AUTO: 95 FL (ref 82–98)
MONOCYTES # BLD AUTO: 0.83 THOUSAND/ΜL (ref 0.17–1.22)
MONOCYTES NFR BLD AUTO: 10 % (ref 4–12)
NEUTROPHILS # BLD AUTO: 5.22 THOUSANDS/ΜL (ref 1.85–7.62)
NEUTS SEG NFR BLD AUTO: 61 % (ref 43–75)
NRBC BLD AUTO-RTO: 0 /100 WBCS
PHOSPHATE SERPL-MCNC: 2.4 MG/DL (ref 2.7–4.5)
PLATELET # BLD AUTO: 248 THOUSANDS/UL (ref 149–390)
PMV BLD AUTO: 9.6 FL (ref 8.9–12.7)
POTASSIUM SERPL-SCNC: 4.6 MMOL/L (ref 3.5–5.3)
PROT SERPL-MCNC: 8.1 G/DL (ref 6.4–8.2)
RBC # BLD AUTO: 4.88 MILLION/UL (ref 3.88–5.62)
SL AMB POCT GLUCOSE BLD: 197
SL AMB POCT HEMOGLOBIN AIC: 5.5 (ref ?–6.5)
SODIUM SERPL-SCNC: 137 MMOL/L (ref 136–145)
TSH SERPL DL<=0.05 MIU/L-ACNC: 2.64 UIU/ML (ref 0.45–4.5)
URATE SERPL-MCNC: 4.4 MG/DL (ref 4.2–8)
WBC # BLD AUTO: 8.43 THOUSAND/UL (ref 4.31–10.16)

## 2022-05-24 PROCEDURE — 87522 HEPATITIS C REVRS TRNSCRPJ: CPT

## 2022-05-24 PROCEDURE — 85025 COMPLETE CBC W/AUTO DIFF WBC: CPT

## 2022-05-24 PROCEDURE — 83036 HEMOGLOBIN GLYCOSYLATED A1C: CPT | Performed by: NURSE PRACTITIONER

## 2022-05-24 PROCEDURE — 84550 ASSAY OF BLOOD/URIC ACID: CPT

## 2022-05-24 PROCEDURE — 82948 REAGENT STRIP/BLOOD GLUCOSE: CPT | Performed by: NURSE PRACTITIONER

## 2022-05-24 PROCEDURE — 84443 ASSAY THYROID STIM HORMONE: CPT

## 2022-05-24 PROCEDURE — 71046 X-RAY EXAM CHEST 2 VIEWS: CPT

## 2022-05-24 PROCEDURE — 80053 COMPREHEN METABOLIC PANEL: CPT

## 2022-05-24 PROCEDURE — 83735 ASSAY OF MAGNESIUM: CPT

## 2022-05-24 PROCEDURE — 99213 OFFICE O/P EST LOW 20 MIN: CPT | Performed by: NURSE PRACTITIONER

## 2022-05-24 PROCEDURE — 36415 COLL VENOUS BLD VENIPUNCTURE: CPT

## 2022-05-24 PROCEDURE — 83970 ASSAY OF PARATHORMONE: CPT

## 2022-05-24 PROCEDURE — 84100 ASSAY OF PHOSPHORUS: CPT

## 2022-05-24 NOTE — PROGRESS NOTES
Assessment/Plan:    Problem List Items Addressed This Visit     Primary hypertension - Primary      Other Visit Diagnoses     Shaky        Relevant Orders    POCT blood glucose (Completed)    Low blood sugar        Relevant Orders    POCT blood glucose (Completed)    Elevated fasting glucose        Relevant Orders    POCT hemoglobin A1c (Completed)    Unexplained weight loss        Relevant Orders    POCT hemoglobin A1c (Completed)    CBC and differential    TSH, 3rd generation with Free T4 reflex    Occult blood 1-3, stool    UA w Reflex to Microscopic w Reflex to Culture -Lab Collect    Hepatitis C Virus (HCV) RNA, Qualitative, EFRAIN With Reflex to Quantitative PCR    XR chest pa & lateral    Comprehensive metabolic panel    Uric acid    Phosphorus           Diagnoses and all orders for this visit:    Primary hypertension    Shaky  -     POCT blood glucose    Low blood sugar  -     POCT blood glucose    Elevated fasting glucose  -     POCT hemoglobin A1c    Unexplained weight loss  -     POCT hemoglobin A1c  -     CBC and differential; Future  -     TSH, 3rd generation with Free T4 reflex; Future  -     Occult blood 1-3, stool; Future  -     UA w Reflex to Microscopic w Reflex to Culture -Lab Collect; Future  -     Hepatitis C Virus (HCV) RNA, Qualitative, EFRAIN With Reflex to Quantitative PCR; Future  -     XR chest pa & lateral; Future  -     Comprehensive metabolic panel; Future  -     Uric acid; Future  -     Phosphorus; Future      No problem-specific Assessment & Plan notes found for this encounter  Subjective:      Patient ID: Alexander Aparicio is a 62 y o  male  Pt presents with c/o IWL  He reports that in 20 days he lost 30 lbs  Weight reviews at s as follows: 2/2022; 166 lbs with work boots, 3/28 159lbs, and today 5/24 147lbs  Pt also reports feeling "shakey" periodically  Random finger stick 197  A1 c is 5 5  Pt reports last CRC screening was 2011 or 2012  report "a couple polyps"   Recommended F/U in 7 years? ? Never had endoscope  Pt reports his appetite does not feel hungry ns feels that he has to force self to eat      General breakfast includes carnation instant breakfast with 2% milk and english muffin with butter  General lunch includes skips  General dinner includes salad, protein drink  He is not able to eat a lot of meat as it does not argree with stomach  Pt did have Hep C in 1983 and was Tx with interferon  He completed 3/4 of the treatment and had to stopped 2/2 "health reasons"  Bx of liver was done but does not recall the results  Previous PCP [facility] in [place], [place]  Pt has been under a lot of stress recent 2/2 mother passing away  No Rx or OTC medications contribute to suspected of weight loss, or findings today suggest illness or contributing to IWL  Paternal grandfather had colon CA  Dx at age 48 y o  Quit smoking beginning of 2020  Pt will record diet intake until next F/U  Hypertension  This is a chronic problem  The problem is controlled  There are no associated agents to hypertension  Past treatments include ACE inhibitors  The current treatment provides moderate improvement  There are no compliance problems  The following portions of the patient's history were reviewed and updated as appropriate:   He has a past medical history of History of hepatitis C, Hypertension, 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[address] ), and Toxoplasmosis chorioretinitis of left eye ,  does not have any pertinent problems on file  ,   has a past surgical history that includes Cataract extraction (Left); Hernia repair; Carpal tunnel release (Left); and Plantar fascia surgery (Left)  ,  family history includes COPD in his mother; Dementia in his father; Kidney disease in his mother; Lung cancer in his mother  ,   reports that he quit smoking about 2 years ago  His smoking use included cigarettes  He quit after 10 00 years of use  He has never used smokeless tobacco  He reports previous alcohol use   He reports current drug use  Drug: Marijuana  ,  has No Known Allergies     Current Outpatient Medications   Medication Sig Dispense Refill    aspirin 81 mg chewable tablet Chew 81 mg daily      B Complex-C (B-complex with vitamin C) tablet Take 1 tablet by mouth daily      buPROPion (WELLBUTRIN SR) 150 mg 12 hr tablet Take 1 tablet (150 mg total) by mouth 2 (two) times a day (Patient taking differently: Take 150 mg by mouth in the morning and 150 mg in the evening  Takes 2 tabs in the am ) 180 tablet 1    Glucosamine-Chondroitin (GLUCOSAMINE CHONDR COMPLEX PO) Take by mouth      lisinopril (ZESTRIL) 10 mg tablet Take 1 tablet (10 mg total) by mouth daily 90 tablet 1    Magnesium 100 MG CAPS Take by mouth      Multiple Vitamin (multivitamin) tablet Take 1 tablet by mouth daily      Na Sulfate-K Sulfate-Mg Sulf (Suprep Bowel Prep Kit) 17 5-3 13-1 6 GM/177ML SOLN Take as directed by office 354 mL 0    niacin 100 mg tablet Take 100 mg by mouth daily with breakfast      Omega-3 Fatty Acids (fish oil) 1,000 mg Take 1,000 mg by mouth daily      sildenafil (VIAGRA) 50 MG tablet Take 1 tablet (50 mg total) by mouth daily as needed for erectile dysfunction 30 tablet 0     No current facility-administered medications for this visit  Review of Systems   Constitutional: Positive for appetite change and unexpected weight change  HENT: Negative  Eyes: Negative  Respiratory: Negative  Cardiovascular: Negative  Gastrointestinal: Negative  Negative for abdominal pain, blood in stool, constipation, diarrhea, nausea and vomiting  Endocrine: Negative  Genitourinary: Negative  Musculoskeletal: Negative  Skin: Negative  Allergic/Immunologic: Negative  Neurological: Negative  Hematological: Negative  Psychiatric/Behavioral: Negative            Objective:  Vitals:    05/24/22 0705   BP: 138/84   Pulse: 76   Resp: 18   Temp: 97 7 °F (36 5 °C)   SpO2: 98%   Weight: 66 7 kg (147 lb)   Height: 5' 11" (1 803 m)     Body mass index is 20 5 kg/m²  Physical Exam  Vitals and nursing note reviewed  Constitutional:       Appearance: Normal appearance  He is well-developed  Interventions: Face mask in place  HENT:      Head: Normocephalic and atraumatic  Right Ear: Tympanic membrane, ear canal and external ear normal       Left Ear: Tympanic membrane, ear canal and external ear normal       Nose: Nose normal       Mouth/Throat:      Mouth: Mucous membranes are moist       Pharynx: Uvula midline  Eyes:      General: Lids are normal       Conjunctiva/sclera: Conjunctivae normal       Pupils: Pupils are equal, round, and reactive to light  Neck:      Thyroid: No thyroid mass  Vascular: No JVD  Trachea: Trachea and phonation normal    Cardiovascular:      Rate and Rhythm: Normal rate and regular rhythm  Pulses: Normal pulses  Heart sounds: Normal heart sounds, S1 normal and S2 normal  No murmur heard  No friction rub  No gallop  Pulmonary:      Effort: Pulmonary effort is normal       Breath sounds: Normal breath sounds  Abdominal:      General: Bowel sounds are normal       Palpations: Abdomen is soft  Tenderness: There is no abdominal tenderness  Genitourinary:     Comments: Deferred  Musculoskeletal:         General: Normal range of motion  Cervical back: Full passive range of motion without pain, normal range of motion and neck supple  Right lower leg: No edema  Left lower leg: No edema  Lymphadenopathy:      Head:      Right side of head: No submental, submandibular, tonsillar, preauricular, posterior auricular or occipital adenopathy  Left side of head: No submental, submandibular, tonsillar, preauricular, posterior auricular or occipital adenopathy  Cervical: No cervical adenopathy  Skin:     General: Skin is warm and dry  Capillary Refill: Capillary refill takes less than 2 seconds     Neurological:      General: No focal deficit present  Mental Status: He is alert and oriented to person, place, and time  Cranial Nerves: Cranial nerves are intact  Sensory: Sensation is intact  Motor: Motor function is intact  Coordination: Coordination is intact  Gait: Gait is intact  Psychiatric:         Attention and Perception: Attention and perception normal          Mood and Affect: Mood and affect normal          Speech: Speech normal          Behavior: Behavior normal  Behavior is cooperative  Thought Content:  Thought content normal          Cognition and Memory: Cognition normal          Judgment: Judgment normal

## 2022-05-25 ENCOUNTER — APPOINTMENT (OUTPATIENT)
Dept: LAB | Facility: CLINIC | Age: 57
End: 2022-05-25
Payer: COMMERCIAL

## 2022-05-25 DIAGNOSIS — E83.39 HYPOPHOSPHATEMIA: Primary | ICD-10-CM

## 2022-05-25 DIAGNOSIS — E83.52 HYPERCALCEMIA: ICD-10-CM

## 2022-05-25 DIAGNOSIS — R63.4 UNEXPLAINED WEIGHT LOSS: ICD-10-CM

## 2022-05-25 LAB — PTH-INTACT SERPL-MCNC: 17.6 PG/ML (ref 18.4–80.1)

## 2022-05-26 DIAGNOSIS — E83.39 HYPOPHOSPHATEMIA: ICD-10-CM

## 2022-05-26 DIAGNOSIS — E83.52 HYPERCALCEMIA: ICD-10-CM

## 2022-05-26 DIAGNOSIS — R25.1 SHAKY: ICD-10-CM

## 2022-05-26 DIAGNOSIS — R79.89 LOW SERUM PARATHYROID HORMONE (PTH): Primary | ICD-10-CM

## 2022-05-26 LAB — MAGNESIUM SERPL-MCNC: 2.4 MG/DL (ref 1.6–2.6)

## 2022-07-18 ENCOUNTER — CLINICAL SUPPORT (OUTPATIENT)
Dept: FAMILY MEDICINE CLINIC | Facility: CLINIC | Age: 57
End: 2022-07-18

## 2022-07-18 VITALS — BODY MASS INDEX: 20.19 KG/M2 | HEIGHT: 71 IN | WEIGHT: 144.2 LBS

## 2022-07-18 DIAGNOSIS — I10 PRIMARY HYPERTENSION: ICD-10-CM

## 2022-07-18 DIAGNOSIS — R63.4 WEIGHT LOSS: Primary | ICD-10-CM

## 2022-07-18 RX ORDER — LISINOPRIL 10 MG/1
10 TABLET ORAL DAILY
Qty: 90 TABLET | Refills: 1 | Status: SHIPPED | OUTPATIENT
Start: 2022-07-18 | End: 2022-08-03 | Stop reason: SDUPTHER

## 2022-07-25 ENCOUNTER — TELEPHONE (OUTPATIENT)
Dept: GASTROENTEROLOGY | Facility: CLINIC | Age: 57
End: 2022-07-25

## 2022-07-28 ENCOUNTER — OFFICE VISIT (OUTPATIENT)
Dept: FAMILY MEDICINE CLINIC | Facility: CLINIC | Age: 57
End: 2022-07-28
Payer: COMMERCIAL

## 2022-07-28 VITALS
OXYGEN SATURATION: 98 % | HEIGHT: 71 IN | WEIGHT: 145 LBS | HEART RATE: 69 BPM | BODY MASS INDEX: 20.3 KG/M2 | TEMPERATURE: 97 F | SYSTOLIC BLOOD PRESSURE: 142 MMHG | DIASTOLIC BLOOD PRESSURE: 88 MMHG

## 2022-07-28 DIAGNOSIS — I10 PRIMARY HYPERTENSION: ICD-10-CM

## 2022-07-28 DIAGNOSIS — I83.812 VARICOSE VEINS OF LEFT LOWER EXTREMITY WITH PAIN: ICD-10-CM

## 2022-07-28 DIAGNOSIS — R19.09 LUMP IN THE GROIN: Primary | ICD-10-CM

## 2022-07-28 PROCEDURE — 99213 OFFICE O/P EST LOW 20 MIN: CPT | Performed by: NURSE PRACTITIONER

## 2022-07-28 NOTE — PROGRESS NOTES
Assessment/Plan:    Problem List Items Addressed This Visit     Primary hypertension      Other Visit Diagnoses     Lump in the groin    -  Primary    Relevant Orders    US groin/inguinal area    Varicose veins of left lower extremity with pain               Diagnoses and all orders for this visit:    Lump in the groin  -     US groin/inguinal area; Future    Varicose veins of left lower extremity with pain    Primary hypertension      No problem-specific Assessment & Plan notes found for this encounter  Subjective:      Patient ID: Carrie Kaplan is a 62 y o  male  Presents with c/o left groin vein tenderness  H/O varicocele  Was seeing a urologist at the time  Reports vein of the left groin is more prominent during exercise and painful if bumped  Denies other abnormalities of left groin or pain of left testicle    Groin Pain  The patient's pertinent negatives include no penile discharge, penile pain, scrotal swelling or testicular pain  This is a recurrent problem  The problem has been unchanged  Pertinent negatives include no abdominal pain or painful intercourse  There is no reported injury  The symptoms are aggravated by activity  He has tried nothing for the symptoms  He is sexually active  His past medical history is significant for an erectile aid use, erectile dysfunction and varicocele  There is no history of an inguinal hernia  The following portions of the patient's history were reviewed and updated as appropriate:   He has a past medical history of History of hepatitis C, Hypertension, 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[address] ), and Toxoplasmosis chorioretinitis of left eye ,  does not have any pertinent problems on file  ,   has a past surgical history that includes Cataract extraction (Left); Hernia repair; Carpal tunnel release (Left); and Plantar fascia surgery (Left)  ,  family history includes COPD in his mother; Dementia in his father; Kidney disease in his mother; Lung cancer in his mother  , reports that he quit smoking about 2 years ago  His smoking use included cigarettes  He quit after 10 00 years of use  He has never used smokeless tobacco  He reports previous alcohol use  He reports current drug use  Drug: Marijuana  ,  has No Known Allergies     Current Outpatient Medications   Medication Sig Dispense Refill    aspirin 81 mg chewable tablet Chew 81 mg daily      B Complex-C (B-complex with vitamin C) tablet Take 1 tablet by mouth daily      buPROPion (WELLBUTRIN SR) 150 mg 12 hr tablet Take 1 tablet (150 mg total) by mouth 2 (two) times a day (Patient taking differently: Take 150 mg by mouth 2 (two) times a day Takes 2 tabs in the am) 180 tablet 1    Glucosamine-Chondroitin (GLUCOSAMINE CHONDR COMPLEX PO) Take by mouth      lisinopril (ZESTRIL) 10 mg tablet Take 1 tablet (10 mg total) by mouth daily 90 tablet 1    Magnesium 100 MG CAPS Take by mouth      Multiple Vitamin (multivitamin) tablet Take 1 tablet by mouth daily      niacin 100 mg tablet Take 100 mg by mouth daily with breakfast      Omega-3 Fatty Acids (fish oil) 1,000 mg Take 1,000 mg by mouth daily      sildenafil (VIAGRA) 50 MG tablet Take 1 tablet (50 mg total) by mouth daily as needed for erectile dysfunction 30 tablet 0    Na Sulfate-K Sulfate-Mg Sulf (Suprep Bowel Prep Kit) 17 5-3 13-1 6 GM/177ML SOLN Take as directed by office (Patient not taking: Reported on 7/28/2022) 354 mL 0     No current facility-administered medications for this visit  Review of Systems   Gastrointestinal: Negative for abdominal pain  Genitourinary: Negative for penile discharge, penile pain, scrotal swelling and testicular pain  Painful veins of left groin   All other systems reviewed and are negative          Objective:  Vitals:    07/28/22 1026   BP: 142/88   BP Location: Left arm   Patient Position: Sitting   Cuff Size: Standard   Pulse: 69   Temp: (!) 97 °F (36 1 °C)   TempSrc: Tympanic   SpO2: 98%   Weight: 65 8 kg (145 lb)   Height: 5' 11" (1 803 m)     Body mass index is 20 22 kg/m²       Physical Exam

## 2022-08-03 DIAGNOSIS — I10 PRIMARY HYPERTENSION: ICD-10-CM

## 2022-08-03 RX ORDER — LISINOPRIL 10 MG/1
10 TABLET ORAL DAILY
Qty: 90 TABLET | Refills: 1 | Status: ON HOLD | OUTPATIENT
Start: 2022-08-03 | End: 2022-09-01 | Stop reason: SDUPTHER

## 2022-08-05 ENCOUNTER — ANESTHESIA (OUTPATIENT)
Dept: PERIOP | Facility: HOSPITAL | Age: 57
End: 2022-08-05

## 2022-08-05 ENCOUNTER — ANESTHESIA EVENT (OUTPATIENT)
Dept: PERIOP | Facility: HOSPITAL | Age: 57
End: 2022-08-05

## 2022-08-05 ENCOUNTER — HOSPITAL ENCOUNTER (OUTPATIENT)
Dept: PERIOP | Facility: HOSPITAL | Age: 57
Setting detail: OUTPATIENT SURGERY
Discharge: HOME | End: 2022-08-05
Attending: INTERNAL MEDICINE | Admitting: INTERNAL MEDICINE
Payer: COMMERCIAL

## 2022-08-05 VITALS
OXYGEN SATURATION: 100 % | RESPIRATION RATE: 18 BRPM | HEART RATE: 54 BPM | TEMPERATURE: 97.4 F | SYSTOLIC BLOOD PRESSURE: 136 MMHG | DIASTOLIC BLOOD PRESSURE: 95 MMHG

## 2022-08-05 DIAGNOSIS — Z12.11 SCREEN FOR COLON CANCER: ICD-10-CM

## 2022-08-05 PROCEDURE — G0121 COLON CA SCRN NOT HI RSK IND: HCPCS | Performed by: INTERNAL MEDICINE

## 2022-08-05 RX ORDER — ONDANSETRON 2 MG/ML
4 INJECTION INTRAMUSCULAR; INTRAVENOUS ONCE AS NEEDED
Status: DISCONTINUED | OUTPATIENT
Start: 2022-08-05 | End: 2022-08-09 | Stop reason: HOSPADM

## 2022-08-05 RX ORDER — PROPOFOL 10 MG/ML
INJECTION, EMULSION INTRAVENOUS CONTINUOUS PRN
Status: DISCONTINUED | OUTPATIENT
Start: 2022-08-05 | End: 2022-08-05

## 2022-08-05 RX ORDER — PROPOFOL 10 MG/ML
INJECTION, EMULSION INTRAVENOUS AS NEEDED
Status: DISCONTINUED | OUTPATIENT
Start: 2022-08-05 | End: 2022-08-05

## 2022-08-05 RX ORDER — FENTANYL CITRATE/PF 50 MCG/ML
25 SYRINGE (ML) INJECTION
Status: CANCELLED | OUTPATIENT
Start: 2022-08-05

## 2022-08-05 RX ORDER — FENTANYL CITRATE/PF 50 MCG/ML
25 SYRINGE (ML) INJECTION
Status: DISCONTINUED | OUTPATIENT
Start: 2022-08-05 | End: 2022-08-09 | Stop reason: HOSPADM

## 2022-08-05 RX ORDER — LIDOCAINE HYDROCHLORIDE 10 MG/ML
0.5 INJECTION, SOLUTION EPIDURAL; INFILTRATION; INTRACAUDAL; PERINEURAL ONCE AS NEEDED
Status: DISCONTINUED | OUTPATIENT
Start: 2022-08-05 | End: 2022-08-09 | Stop reason: HOSPADM

## 2022-08-05 RX ORDER — LIDOCAINE HYDROCHLORIDE 10 MG/ML
0.5 INJECTION, SOLUTION EPIDURAL; INFILTRATION; INTRACAUDAL; PERINEURAL ONCE AS NEEDED
Status: CANCELLED | OUTPATIENT
Start: 2022-08-05

## 2022-08-05 RX ORDER — HYDROMORPHONE HCL IN WATER/PF 6 MG/30 ML
0.2 PATIENT CONTROLLED ANALGESIA SYRINGE INTRAVENOUS
Status: CANCELLED | OUTPATIENT
Start: 2022-08-05

## 2022-08-05 RX ORDER — LIDOCAINE HYDROCHLORIDE 10 MG/ML
INJECTION, SOLUTION EPIDURAL; INFILTRATION; INTRACAUDAL; PERINEURAL AS NEEDED
Status: DISCONTINUED | OUTPATIENT
Start: 2022-08-05 | End: 2022-08-05

## 2022-08-05 RX ORDER — HYDROMORPHONE HCL IN WATER/PF 6 MG/30 ML
0.2 PATIENT CONTROLLED ANALGESIA SYRINGE INTRAVENOUS
Status: DISCONTINUED | OUTPATIENT
Start: 2022-08-05 | End: 2022-08-09 | Stop reason: HOSPADM

## 2022-08-05 RX ORDER — SODIUM CHLORIDE, SODIUM LACTATE, POTASSIUM CHLORIDE, CALCIUM CHLORIDE 600; 310; 30; 20 MG/100ML; MG/100ML; MG/100ML; MG/100ML
INJECTION, SOLUTION INTRAVENOUS CONTINUOUS PRN
Status: DISCONTINUED | OUTPATIENT
Start: 2022-08-05 | End: 2022-08-05

## 2022-08-05 RX ORDER — ONDANSETRON 2 MG/ML
4 INJECTION INTRAMUSCULAR; INTRAVENOUS ONCE AS NEEDED
Status: CANCELLED | OUTPATIENT
Start: 2022-08-05

## 2022-08-05 RX ADMIN — LIDOCAINE HYDROCHLORIDE 50 MG: 10 INJECTION, SOLUTION EPIDURAL; INFILTRATION; INTRACAUDAL; PERINEURAL at 13:48

## 2022-08-05 RX ADMIN — SODIUM CHLORIDE, SODIUM LACTATE, POTASSIUM CHLORIDE, AND CALCIUM CHLORIDE: .6; .31; .03; .02 INJECTION, SOLUTION INTRAVENOUS at 11:40

## 2022-08-05 RX ADMIN — PROPOFOL 80 MCG/KG/MIN: 10 INJECTION, EMULSION INTRAVENOUS at 13:51

## 2022-08-05 RX ADMIN — PROPOFOL 50 MG: 10 INJECTION, EMULSION INTRAVENOUS at 13:50

## 2022-08-05 RX ADMIN — PROPOFOL 100 MG: 10 INJECTION, EMULSION INTRAVENOUS at 13:48

## 2022-08-05 RX ADMIN — PROPOFOL 50 MG: 10 INJECTION, EMULSION INTRAVENOUS at 13:52

## 2022-08-05 NOTE — ANESTHESIA POSTPROCEDURE EVALUATION
Post-Op Assessment Note    CV Status:  Stable  Pain Score: 0    Pain management: adequate     Mental Status:  Alert and awake   Hydration Status:  Euvolemic   PONV Controlled:  Controlled   Airway Patency:  Patent      Post Op Vitals Reviewed: Yes      Staff: Anesthesiologist, CRNA         No complications documented      /81 (08/05/22 1408)    Temp (!) 97 4 °F (36 3 °C) (08/05/22 1408)    Pulse (!) 54 (08/05/22 1408)   Resp 20 (08/05/22 1408)    SpO2 100 % (08/05/22 1408) none

## 2022-08-05 NOTE — ANESTHESIA PREPROCEDURE EVALUATION
Procedure:  COLONOSCOPY    Relevant Problems   CARDIO   (+) Primary hypertension      CAD/PCI/MI/CHF -- denies  COPD/ASTHMA/KRYSTYNA -- denies  PROBS WITH PRIOR ANESTHESIA -- denies  NPO STATUS CONFIRMED    Lab Results   Component Value Date    SODIUM 137 05/24/2022    K 4 6 05/24/2022    BUN 14 05/24/2022    CREATININE 0 89 05/24/2022    EGFR 94 05/24/2022     Lab Results   Component Value Date    HGBA1C 5 5 05/24/2022       Lab Results   Component Value Date    HGB 15 7 05/24/2022    HGB 15 5 02/17/2022     05/24/2022     (L) 02/17/2022     Lab Results   Component Value Date    WBC 8 43 05/24/2022       Lab Results   Component Value Date    CREATININE 0 89 05/24/2022    CREATININE 0 88 02/17/2022       No results found for: INR  No results found for: PTT    No results found for: LACTATE                        Physical Exam    Airway    Mallampati score: II  TM Distance: >3 FB       Dental   No notable dental hx     Cardiovascular      Pulmonary      Other Findings        Anesthesia Plan  ASA Score- 2     Anesthesia Type- IV sedation with anesthesia with ASA Monitors  Additional Monitors:   Airway Plan:     Comment: LEE Malcolm , have personally seen and evaluated the patient prior to anesthetic care  I have reviewed the pre-anesthetic record, and other medical records if appropriate to the anesthetic care  If a CRNA is involved in the case, I have reviewed the CRNA assessment, if present, and agree  Risks/benefits and alternatives discussed with patient including possible PONV, sore throat, and possibility of rare anesthetic and surgical emergencies          Plan Factors-    Chart reviewed  EKG reviewed  Imaging results reviewed  Existing labs reviewed  Patient summary reviewed  Patient instructed to abstain from smoking on day of procedure  Obstructive sleep apnea risk education given perioperatively      Induction-     Postoperative Plan-     Informed Consent- Anesthetic plan and risks discussed with patient  I personally reviewed this patient with the CRNA  Discussed and agreed on the Anesthesia Plan with the CRNA  Karrie Nissen

## 2022-08-05 NOTE — H&P
History and Physical - [facility]  Marissa Moreau 62 y o  male MRN: [id number]                  HPI: Marissa Moreau is a 62y o  year old male who presents for a family history of colon cancer  REVIEW OF SYSTEMS: Per the HPI, and otherwise unremarkable      Historical Information   Past Medical History:   Diagnosis Date    History of hepatitis C     Hypertension     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[address] )     Toxoplasmosis chorioretinitis of left eye      Past Surgical History:   Procedure Laterality Date    CARPAL TUNNEL RELEASE Left     CATARACT EXTRACTION Left     COLONOSCOPY      HERNIA REPAIR      PLANTAR FASCIA SURGERY Left      Social History   Social History     Substance and Sexual Activity   Alcohol Use Not Currently     Social History     Substance and Sexual Activity   Drug Use Yes    Types: Marijuana     Social History     Tobacco Use   Smoking Status Former Smoker    Years: 10 00    Types: Cigarettes    Quit date: 2020    Years since quittin 5   Smokeless Tobacco Never Used     Family History   Problem Relation Age of Onset    Lung cancer Mother     Kidney disease Mother         drug induced    COPD Mother     Dementia Father        Meds/Allergies       Current Outpatient Medications:     aspirin 81 mg chewable tablet    B Complex-C (B-complex with vitamin C) tablet    buPROPion (WELLBUTRIN SR) 150 mg 12 hr tablet    Glucosamine-Chondroitin (GLUCOSAMINE CHONDR COMPLEX PO)    lisinopril (ZESTRIL) 10 mg tablet    Magnesium 100 MG CAPS    Multiple Vitamin (multivitamin) tablet    niacin 100 mg tablet    sildenafil (VIAGRA) 50 MG tablet    Na Sulfate-K Sulfate-Mg Sulf (Suprep Bowel Prep Kit) 17 5-3 13-1 6 GM/177ML SOLN    Omega-3 Fatty Acids (fish oil) 1,000 mg    Current Facility-Administered Medications:     lidocaine (PF) (XYLOCAINE-MPF) 1 % injection 0 5 mL, 0 5 mL, Infiltration, Once PRN    Facility-Administered Medications Ordered in Other Encounters:   lactated ringers infusion, , Intravenous, Continuous PRN, New Bag at 08/05/22 1140    Allergies   Allergen Reactions    Latex Hives       Objective     /93   Pulse (!) 54   Temp (!) 96 2 °F (35 7 °C) (Tympanic)   Resp 18   SpO2 100%       PHYSICAL EXAM    Gen: NAD  Head: NCAT  CV: RRR  CHEST: Clear  ABD: soft, NT/ND  EXT: no edema      ASSESSMENT/PLAN:  This is a 62y o  year old male here for colonoscopy, and he is stable and optimized for his procedure

## 2022-08-25 ENCOUNTER — HOSPITAL ENCOUNTER (EMERGENCY)
Facility: HOSPITAL | Age: 57
End: 2022-08-26
Attending: EMERGENCY MEDICINE
Payer: COMMERCIAL

## 2022-08-25 DIAGNOSIS — R45.851 SUICIDAL IDEATION: Primary | ICD-10-CM

## 2022-08-25 LAB
ALBUMIN SERPL BCP-MCNC: 4.6 G/DL (ref 3.5–5)
ALP SERPL-CCNC: 51 U/L (ref 34–104)
ALT SERPL W P-5'-P-CCNC: 88 U/L (ref 7–52)
AMPHETAMINES SERPL QL SCN: NEGATIVE
ANION GAP SERPL CALCULATED.3IONS-SCNC: 6 MMOL/L (ref 4–13)
AST SERPL W P-5'-P-CCNC: 82 U/L (ref 13–39)
ATRIAL RATE: 69 BPM
BARBITURATES UR QL: NEGATIVE
BASOPHILS # BLD AUTO: 0.02 THOUSANDS/ΜL (ref 0–0.1)
BASOPHILS NFR BLD AUTO: 0 % (ref 0–1)
BENZODIAZ UR QL: NEGATIVE
BILIRUB SERPL-MCNC: 0.91 MG/DL (ref 0.2–1)
BILIRUB UR QL STRIP: NEGATIVE
BUN SERPL-MCNC: 23 MG/DL (ref 5–25)
CALCIUM SERPL-MCNC: 9.7 MG/DL (ref 8.4–10.2)
CHLORIDE SERPL-SCNC: 94 MMOL/L (ref 96–108)
CLARITY UR: CLEAR
CO2 SERPL-SCNC: 29 MMOL/L (ref 21–32)
COCAINE UR QL: NEGATIVE
COLOR UR: YELLOW
CREAT SERPL-MCNC: 0.86 MG/DL (ref 0.6–1.3)
EOSINOPHIL # BLD AUTO: 0.02 THOUSAND/ΜL (ref 0–0.61)
EOSINOPHIL NFR BLD AUTO: 0 % (ref 0–6)
ERYTHROCYTE [DISTWIDTH] IN BLOOD BY AUTOMATED COUNT: 12.1 % (ref 11.6–15.1)
ETHANOL EXG-MCNC: 0 MG/DL
GFR SERPL CREATININE-BSD FRML MDRD: 96 ML/MIN/1.73SQ M
GLUCOSE SERPL-MCNC: 70 MG/DL (ref 65–140)
GLUCOSE UR STRIP-MCNC: NEGATIVE MG/DL
HCT VFR BLD AUTO: 42.1 % (ref 36.5–49.3)
HGB BLD-MCNC: 14.3 G/DL (ref 12–17)
HGB UR QL STRIP.AUTO: NEGATIVE
IMM GRANULOCYTES # BLD AUTO: 0.03 THOUSAND/UL (ref 0–0.2)
IMM GRANULOCYTES NFR BLD AUTO: 0 % (ref 0–2)
KETONES UR STRIP-MCNC: NEGATIVE MG/DL
LEUKOCYTE ESTERASE UR QL STRIP: NEGATIVE
LYMPHOCYTES # BLD AUTO: 2.01 THOUSANDS/ΜL (ref 0.6–4.47)
LYMPHOCYTES NFR BLD AUTO: 29 % (ref 14–44)
MCH RBC QN AUTO: 33 PG (ref 26.8–34.3)
MCHC RBC AUTO-ENTMCNC: 34 G/DL (ref 31.4–37.4)
MCV RBC AUTO: 97 FL (ref 82–98)
METHADONE UR QL: NEGATIVE
MONOCYTES # BLD AUTO: 0.99 THOUSAND/ΜL (ref 0.17–1.22)
MONOCYTES NFR BLD AUTO: 14 % (ref 4–12)
NEUTROPHILS # BLD AUTO: 3.94 THOUSANDS/ΜL (ref 1.85–7.62)
NEUTS SEG NFR BLD AUTO: 57 % (ref 43–75)
NITRITE UR QL STRIP: NEGATIVE
NRBC BLD AUTO-RTO: 0 /100 WBCS
OPIATES UR QL SCN: NEGATIVE
OXYCODONE+OXYMORPHONE UR QL SCN: NEGATIVE
P AXIS: 74 DEGREES
PCP UR QL: NEGATIVE
PH UR STRIP.AUTO: 6.5 [PH]
PLATELET # BLD AUTO: 150 THOUSANDS/UL (ref 149–390)
PMV BLD AUTO: 8.5 FL (ref 8.9–12.7)
POTASSIUM SERPL-SCNC: 4.1 MMOL/L (ref 3.5–5.3)
PR INTERVAL: 172 MS
PROT SERPL-MCNC: 7.5 G/DL (ref 6.4–8.4)
PROT UR STRIP-MCNC: NEGATIVE MG/DL
QRS AXIS: 48 DEGREES
QRSD INTERVAL: 86 MS
QT INTERVAL: 374 MS
QTC INTERVAL: 400 MS
RBC # BLD AUTO: 4.33 MILLION/UL (ref 3.88–5.62)
SARS-COV-2 RNA RESP QL NAA+PROBE: NEGATIVE
SODIUM SERPL-SCNC: 129 MMOL/L (ref 135–147)
SP GR UR STRIP.AUTO: 1.01 (ref 1–1.03)
T WAVE AXIS: 60 DEGREES
THC UR QL: POSITIVE
UROBILINOGEN UR QL STRIP.AUTO: 0.2 E.U./DL
VENTRICULAR RATE: 69 BPM
WBC # BLD AUTO: 7.01 THOUSAND/UL (ref 4.31–10.16)

## 2022-08-25 PROCEDURE — 80053 COMPREHEN METABOLIC PANEL: CPT | Performed by: EMERGENCY MEDICINE

## 2022-08-25 PROCEDURE — 99285 EMERGENCY DEPT VISIT HI MDM: CPT | Performed by: EMERGENCY MEDICINE

## 2022-08-25 PROCEDURE — 80307 DRUG TEST PRSMV CHEM ANLYZR: CPT | Performed by: EMERGENCY MEDICINE

## 2022-08-25 PROCEDURE — 87635 SARS-COV-2 COVID-19 AMP PRB: CPT | Performed by: EMERGENCY MEDICINE

## 2022-08-25 PROCEDURE — 36415 COLL VENOUS BLD VENIPUNCTURE: CPT | Performed by: EMERGENCY MEDICINE

## 2022-08-25 PROCEDURE — 85025 COMPLETE CBC W/AUTO DIFF WBC: CPT | Performed by: EMERGENCY MEDICINE

## 2022-08-25 PROCEDURE — 93005 ELECTROCARDIOGRAM TRACING: CPT

## 2022-08-25 PROCEDURE — 82075 ASSAY OF BREATH ETHANOL: CPT | Performed by: EMERGENCY MEDICINE

## 2022-08-25 PROCEDURE — 93010 ELECTROCARDIOGRAM REPORT: CPT | Performed by: INTERNAL MEDICINE

## 2022-08-25 PROCEDURE — 81003 URINALYSIS AUTO W/O SCOPE: CPT | Performed by: EMERGENCY MEDICINE

## 2022-08-25 PROCEDURE — 99285 EMERGENCY DEPT VISIT HI MDM: CPT

## 2022-08-25 RX ORDER — CALCIUM CARBONATE 200(500)MG
1000 TABLET,CHEWABLE ORAL 3 TIMES DAILY PRN
Status: DISCONTINUED | OUTPATIENT
Start: 2022-08-25 | End: 2022-08-26 | Stop reason: HOSPADM

## 2022-08-25 RX ORDER — ACETAMINOPHEN 325 MG/1
650 TABLET ORAL ONCE
Status: COMPLETED | OUTPATIENT
Start: 2022-08-25 | End: 2022-08-25

## 2022-08-25 RX ORDER — LISINOPRIL 10 MG/1
10 TABLET ORAL ONCE
Status: COMPLETED | OUTPATIENT
Start: 2022-08-25 | End: 2022-08-25

## 2022-08-25 RX ORDER — CLONIDINE HYDROCHLORIDE 0.1 MG/1
0.2 TABLET ORAL ONCE
Status: COMPLETED | OUTPATIENT
Start: 2022-08-25 | End: 2022-08-25

## 2022-08-25 RX ORDER — IBUPROFEN 600 MG/1
600 TABLET ORAL ONCE
Status: DISCONTINUED | OUTPATIENT
Start: 2022-08-25 | End: 2022-08-26 | Stop reason: HOSPADM

## 2022-08-25 RX ADMIN — LISINOPRIL 10 MG: 10 TABLET ORAL at 17:36

## 2022-08-25 RX ADMIN — CALCIUM CARBONATE 1000 MG: 500 TABLET, CHEWABLE ORAL at 23:57

## 2022-08-25 RX ADMIN — ACETAMINOPHEN 650 MG: 325 TABLET ORAL at 14:28

## 2022-08-25 RX ADMIN — CLONIDINE HYDROCHLORIDE 0.2 MG: 0.1 TABLET ORAL at 20:10

## 2022-08-25 NOTE — ED PROVIDER NOTES
History  Chief Complaint   Patient presents with    Behavior Problem     Arrives via EMS from home as 731 by police  Per pt he had argument w/ his wife asking her for a divorce and made a statement that if she will not give him a divorce ``he will kill himself``  Pt denies SI/HI  69-year-old male with history of bipolar disorder presents to the emergency room with a 302 warrant due to increasing agitation and threats to hurt himself  Patient notes he has been having ongoing arguments with his wife and will eventually be going through divorce  Patient notes that they are no longer the same people and have deviated over the years  Patient notes that there has been significant arguments recently and after their anniversary they started talking about divorce to which he then made suicidal statements  Apparently the patient owns more than 1 firearm and according to documentation from the 302, the patient took a firearm with him in the car today and started driving away  The patient then stop driving and remove the firearm from his car and put it back in his gun safe  Patient denies suicidal ideation and notes he has 2 grandchildren which he needs to be around 4  Police brought the patient to the emergency department for evaluation  Prior to Admission Medications   Prescriptions Last Dose Informant Patient Reported? Taking?    B Complex-C (B-complex with vitamin C) tablet 8/25/2022 at Unknown time  Yes Yes   Sig: Take 1 tablet by mouth daily   Glucosamine-Chondroitin (GLUCOSAMINE CHONDR COMPLEX PO) 8/25/2022 at Unknown time  Yes Yes   Sig: Take by mouth   Magnesium 100 MG CAPS 8/25/2022 at Unknown time  Yes Yes   Sig: Take by mouth   Multiple Vitamin (multivitamin) tablet 8/25/2022 at Unknown time  Yes Yes   Sig: Take 1 tablet by mouth daily   Na Sulfate-K Sulfate-Mg Sulf (Suprep Bowel Prep Kit) 17 5-3 13-1 6 GM/177ML SOLN   No No   Sig: Take as directed by office   Patient not taking: Reported on 2022   Omega-3 Fatty Acids (fish oil) 1,000 mg 2022 at Unknown time  Yes Yes   Sig: Take 1,000 mg by mouth daily   aspirin 81 mg chewable tablet 2022 at Unknown time  Yes Yes   Sig: Chew 81 mg daily   buPROPion (WELLBUTRIN SR) 150 mg 12 hr tablet 2022 at Unknown time  No Yes   Sig: Take 1 tablet (150 mg total) by mouth 2 (two) times a day   Patient taking differently: Take 300 mg by mouth in the morning Takes 2 tabs in the am   lisinopril (ZESTRIL) 10 mg tablet 2022 at Unknown time  No Yes   Sig: Take 1 tablet (10 mg total) by mouth daily   niacin 100 mg tablet   Yes No   Sig: Take 100 mg by mouth daily with breakfast   sildenafil (VIAGRA) 50 MG tablet   No No   Sig: Take 1 tablet (50 mg total) by mouth daily as needed for erectile dysfunction      Facility-Administered Medications: None       Past Medical History:   Diagnosis Date    DJD (degenerative joint disease)     Dyslipidemia     History of hepatitis C     Hypertension     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[facility] [address] )     Toxoplasmosis chorioretinitis of left eye        Past Surgical History:   Procedure Laterality Date    CARPAL TUNNEL RELEASE Left     CATARACT EXTRACTION Left     COLONOSCOPY      HERNIA REPAIR      PLANTAR FASCIA SURGERY Left        Family History   Problem Relation Age of Onset    Lung cancer Mother     Kidney disease Mother         drug induced    COPD Mother     Dementia Father      I have reviewed and agree with the history as documented  E-Cigarette/Vaping    E-Cigarette Use Never User      E-Cigarette/Vaping Substances    Nicotine No     THC No     CBD No     Flavoring No     Other No     Unknown No      Social History     Tobacco Use    Smoking status: Former Smoker     Years: 10 00     Types: Cigarettes     Quit date: 2020     Years since quittin 6    Smokeless tobacco: Never Used   Vaping Use    Vaping Use: Never used   Substance Use Topics    Alcohol use: Not Currently    Drug use:  Yes Types: Marijuana       Review of Systems   Constitutional: Negative for chills and fever  HENT: Negative for ear pain and sore throat  Eyes: Negative for pain and visual disturbance  Respiratory: Negative for cough and shortness of breath  Cardiovascular: Negative for chest pain and palpitations  Gastrointestinal: Negative for abdominal pain and vomiting  Genitourinary: Negative for dysuria and hematuria  Musculoskeletal: Negative for arthralgias and back pain  Skin: Negative for color change and rash  Neurological: Negative for seizures and syncope  Psychiatric/Behavioral: Positive for behavioral problems  All other systems reviewed and are negative  Physical Exam  Physical Exam  Constitutional:       General: He is not in acute distress  Appearance: Normal appearance  He is normal weight  He is not ill-appearing  HENT:      Head: Normocephalic and atraumatic  Right Ear: External ear normal       Left Ear: External ear normal       Nose: Nose normal       Mouth/Throat:      Mouth: Mucous membranes are moist    Eyes:      Conjunctiva/sclera: Conjunctivae normal    Cardiovascular:      Rate and Rhythm: Normal rate and regular rhythm  Pulses: Normal pulses  Heart sounds: Normal heart sounds  Pulmonary:      Effort: Pulmonary effort is normal       Breath sounds: Normal breath sounds  Abdominal:      General: Abdomen is flat  There is no distension  Palpations: Abdomen is soft  There is no mass  Musculoskeletal:         General: No swelling, tenderness or deformity  Normal range of motion  Cervical back: Normal range of motion  Skin:     General: Skin is warm and dry  Capillary Refill: Capillary refill takes 2 to 3 seconds  Coloration: Skin is not pale  Neurological:      General: No focal deficit present  Mental Status: He is alert and oriented to person, place, and time  Mental status is at baseline     Psychiatric:         Mood and Affect: Mood normal          Behavior: Behavior normal          Vital Signs  ED Triage Vitals [08/25/22 1354]   Temperature Pulse Respirations Blood Pressure SpO2   98 °F (36 7 °C) 72 20 (!) 176/111 99 %      Temp Source Heart Rate Source Patient Position - Orthostatic VS BP Location FiO2 (%)   Oral Monitor Lying Left arm --      Pain Score       7           Vitals:    08/25/22 1631 08/25/22 1951 08/26/22 0135 08/26/22 1130   BP: (!) 181/111 (!) 190/112 153/64 (!) 148/103   Pulse: 71 76 65 82   Patient Position - Orthostatic VS:   Lying Sitting         Visual Acuity      ED Medications  Medications   acetaminophen (TYLENOL) tablet 650 mg (650 mg Oral Given 8/25/22 1428)   lisinopril (ZESTRIL) tablet 10 mg (10 mg Oral Given 8/25/22 1736)   cloNIDine (CATAPRES) tablet 0 2 mg (0 2 mg Oral Given 8/25/22 2010)       Diagnostic Studies  Results Reviewed     Procedure Component Value Units Date/Time    COVID only [[id number]]  (Normal) Collected: 08/25/22 1527    Lab Status: Final result Specimen: Nares from Nose Updated: 08/25/22 1625     SARS-CoV-2 Negative    Narrative:      FOR PEDIATRIC PATIENTS - copy/paste COVID Guidelines URL to browser: [email or web address]    SARS-CoV-2 assay is a Nucleic Acid Amplification assay intended for the  qualitative detection of nucleic acid from SARS-CoV-2 in nasopharyngeal  swabs  Results are for the presumptive identification of SARS-CoV-2 RNA  Positive results are indicative of infection with SARS-CoV-2, the virus  causing COVID-19, but do not rule out bacterial infection or co-infection  with other viruses  Laboratories within the [place] and its  territories are required to report all positive results to the appropriate  public health authorities  Negative results do not preclude SARS-CoV-2  infection and should not be used as the sole basis for treatment or other  patient management decisions   Negative results must be combined with  clinical observations, patient history, and epidemiological information  This test has not been FDA cleared or approved  This test has been authorized by FDA under an Emergency Use Authorization  (EUA)  This test is only authorized for the duration of time the  declaration that circumstances exist justifying the authorization of the  emergency use of an in vitro diagnostic tests for detection of SARS-CoV-2  virus and/or diagnosis of COVID-19 infection under section 564(b)(1) of  the Act, 21 U  S C  994ZPZ-8(W)(2), unless the authorization is terminated  or revoked sooner  The test has been validated but independent review by FDA  and CLIA is pending  Test performed using [employer] Gene[employer]pert: This RT-PCR assay targets N2,  a region unique to SARS-CoV-2  A conserved region in the E-gene was chosen  for pan-Sarbecovirus detection which includes SARS-CoV-2      Comprehensive metabolic panel [[id number]]  (Abnormal) Collected: 08/25/22 1527    Lab Status: Final result Specimen: Blood from Arm, Right Updated: 08/25/22 1552     Sodium 129 mmol/L      Potassium 4 1 mmol/L      Chloride 94 mmol/L      CO2 29 mmol/L      ANION GAP 6 mmol/L      BUN 23 mg/dL      Creatinine 0 86 mg/dL      Glucose 70 mg/dL      Calcium 9 7 mg/dL      AST 82 U/L      ALT 88 U/L      Alkaline Phosphatase 51 U/L      Total Protein 7 5 g/dL      Albumin 4 6 g/dL      Total Bilirubin 0 91 mg/dL      eGFR 96 ml/min/1 73sq m     Narrative:      [place] guidelines for Chronic Kidney Disease (CKD):     Stage 1 with normal or high GFR (GFR > 90 mL/min/1 73 square meters)    Stage 2 Mild CKD (GFR = 60-89 mL/min/1 73 square meters)    Stage 3A Moderate CKD (GFR = 45-59 mL/min/1 73 square meters)    Stage 3B Moderate CKD (GFR = 30-44 mL/min/1 73 square meters)    Stage 4 Severe CKD (GFR = 15-29 mL/min/1 73 square meters)    Stage 5 End Stage CKD (GFR <15 mL/min/1 73 square meters)  Note: GFR calculation is accurate only with a steady state creatinine    UA (URINE) with reflex to Scope [[id number]]  (Normal) Collected: 08/25/22 1545    Lab Status: Final result Specimen: Urine Updated: 08/25/22 1547     Color, UA Yellow     Clarity, UA Clear     Specific Gravity, UA 1 010     pH, UA 6 5     Leukocytes, UA Negative     Nitrite, UA Negative     Protein, UA Negative mg/dl      Glucose, UA Negative mg/dl      Ketones, UA Negative mg/dl      Urobilinogen, UA 0 2 E U /dl      Bilirubin, UA Negative     Occult Blood, UA Negative    CBC and differential [[id number]]  (Abnormal) Collected: 08/25/22 1527    Lab Status: Final result Specimen: Blood from Arm, Right Updated: 08/25/22 1536     WBC 7 01 Thousand/uL      RBC 4 33 Million/uL      Hemoglobin 14 3 g/dL      Hematocrit 42 1 %      MCV 97 fL      MCH 33 0 pg      MCHC 34 0 g/dL      RDW 12 1 %      MPV 8 5 fL      Platelets 803 Thousands/uL      nRBC 0 /100 WBCs      Neutrophils Relative 57 %      Immat GRANS % 0 %      Lymphocytes Relative 29 %      Monocytes Relative 14 %      Eosinophils Relative 0 %      Basophils Relative 0 %      Neutrophils Absolute 3 94 Thousands/µL      Immature Grans Absolute 0 03 Thousand/uL      Lymphocytes Absolute 2 01 Thousands/µL      Monocytes Absolute 0 99 Thousand/µL      Eosinophils Absolute 0 02 Thousand/µL      Basophils Absolute 0 02 Thousands/µL     Rapid drug screen, urine [[id number]]  (Abnormal) Collected: 08/25/22 1429    Lab Status: Final result Specimen: Urine, Clean Catch Updated: 08/25/22 1448     Amph/Meth UR Negative     Barbiturate Ur Negative     Benzodiazepine Urine Negative     Cocaine Urine Negative     Methadone Urine Negative     Opiate Urine Negative     PCP Ur Negative     THC Urine Positive     Oxycodone Urine Negative    Narrative:      Presumptive report  If requested, specimen will be sent to reference lab for confirmation  FOR MEDICAL PURPOSES ONLY     IF CONFIRMATION NEEDED PLEASE CONTACT THE LAB WITHIN 5 DAYS     Drug Screen Cutoff Levels:  AMPHETAMINE/METHAMPHETAMINES  1000 ng/mL  BARBITURATES     200 ng/mL  BENZODIAZEPINES     200 ng/mL  COCAINE      300 ng/mL  METHADONE      300 ng/mL  OPIATES      300 ng/mL  PHENCYCLIDINE     25 ng/mL  THC       50 ng/mL  OXYCODONE      100 ng/mL    POCT alcohol breath test [[id number]]  (Normal) Resulted: 08/25/22 1416    Lab Status: Final result Updated: 08/25/22 1416     EXTBreath Alcohol 0 000                 No orders to display              Procedures  ECG 12 Lead Documentation Only    Date/Time: 8/25/2022 3:28 PM  Performed by: Juan F Sorto DO  Authorized by: Juan F Sorto DO     ECG reviewed by me, the ED Provider: yes    Patient location:  ED  Comments:      EKG shows a sinus rhythm at 69 per with normal axis  There is unifocal PVC noted  There is LVH by voltage criteria  There is no other definitive acute ST or T-wave changes appreciated  ED Course  ED Course as of 08/28/22 1001   Thu Aug 25, 2022   1452 [facility] URINE(!): Positive   1725 Behavior health requesting an evaluation by Psychiatry due to high risk situation with firearms in the house  1759 According to nursing, patient apparently is very compliant with his home meds  Patient is also drinking up to 2 gal water per day  This may explain his sodium level of 129  Patient will get another dose of his antihypertensive medicine while in the emergency department  Currently awaiting re-evaluation by Psychiatry  2012 Patient's blood pressure still elevated  Clonidine will be given in the emergency department  SBIRT 22yo+    Flowsheet Row Most Recent Value   SBIRT (25 yo +)    In order to provide better care to our patients, we are screening all of our patients for alcohol and drug use  Would it be okay to ask you these screening questions? No Filed at: 08/25/2022 1430   KAROLINA: How many times in the past year have you        Used an illegal drug or used a prescription medication for non-medical reasons? Never  [pt has medical [place] pres[facility]ption ] Filed at: 08/25/2022 1439                    MDM    Disposition  Final diagnoses:   Suicidal ideation     Time reflects when diagnosis was documented in both MDM as applicable and the Disposition within this note     Time User Action Codes Description Comment    8/25/2022  4:45 PM Alex Klein Add [[id number]] Suicidal ideation       ED Disposition     ED Disposition   Transfer to [address] [place] [address]   --    Date/Time   Thu Aug 25, 2022  9:44 PM    Comment   Toni Gomez been medically cleared for psychiatric evaluation              MD Documentation    Flowsheet Row Most Recent Value   Patient Condition The patient has been stabilized such that within reasonable medical probability, no material deterioration of the patient condition or the condition of the unborn child(dann) is likely to result from the transfer   Reason for Transfer Level of Care needed not available at this facility   Benefits of Transfer Other benefits (Include comment)_______________________  [facility]]   Risks of Transfer Potential for delay in receiving treatment   Accepting Physician [address] Yamil MD   Accepting Facility Name, [address]    (Name & Tel number) [facility] Stark by (Company and Unit #) [facility] Sending MD Dr Raynelle Olive   Provider Certification General risk, such as traffic hazards, adverse weather conditions, rough terrain or turbulence, possible failure of equipment (including vehicle or aircraft), or consequences of actions of persons outside the control of the transport personnel      RN Documentation    [address] Name, [address]    (Name & Tel number) [facility]   Transport Mode Ambulance   Transported by [facility] and Unit #) [facility]   Level of Care Basic life support   Transfer Date 08/26/22   Transfer Time 1500      Follow-up Information    None         Discharge Medication List as of 8/26/2022  3:22 PM      CONTINUE these medications which have NOT CHANGED    Details   aspirin 81 mg chewable tablet Chew 81 mg daily, Historical Med      B Complex-C (B-complex with vitamin C) tablet Take 1 tablet by mouth daily, Historical Med      buPROPion (WELLBUTRIN SR) 150 mg 12 hr tablet Take 1 tablet (150 mg total) by mouth 2 (two) times a day, Starting Mon 1/24/2022, Normal      Glucosamine-Chondroitin (GLUCOSAMINE CHONDR COMPLEX PO) Take by mouth, Historical Med      lisinopril (ZESTRIL) 10 mg tablet Take 1 tablet (10 mg total) by mouth daily, Starting Wed 8/3/2022, Normal      Magnesium 100 MG CAPS Take by mouth, Historical Med      Multiple Vitamin (multivitamin) tablet Take 1 tablet by mouth daily, Historical Med      niacin 100 mg tablet Take 100 mg by mouth daily with breakfast, Historical Med      Omega-3 Fatty Acids (fish oil) 1,000 mg Take 1,000 mg by mouth daily, Historical Med      Na Sulfate-K Sulfate-Mg Sulf (Suprep Bowel Prep Kit) 17 5-3 13-1 6 GM/177ML SOLN Take as directed by office, Normal      sildenafil (VIAGRA) 50 MG tablet Take 1 tablet (50 mg total) by mouth daily as needed for erectile dysfunction, Starting Mon 3/28/2022, Print             No discharge procedures on file      PDMP Review     None          ED Provider  Electronically Signed by           Luanne Islas DO  08/28/22 1001
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
## 2022-08-25 NOTE — ED NOTES
Writer called lab to add JENARO w/sedrick Mason, [facility]  08/25/22 4132

## 2022-08-25 NOTE — ED NOTES
Pt drunk multiple cup of water approximately 8 cups , pt irritable if water not given for him states that he drunk 2 gal of water a day  Provider aware  Pt also offered a sandwich only eat the ham   For he is not eating w/ carb and sugar in it  Will continue to monitor  Cammy Mason RN  08/25/22 0935       Cammy Lemos  08/25/22 6037

## 2022-08-26 ENCOUNTER — HOSPITAL ENCOUNTER (INPATIENT)
Facility: HOSPITAL | Age: 57
LOS: 7 days | Discharge: HOME | DRG: 885 | End: 2022-09-02
Attending: PSYCHIATRY & NEUROLOGY | Admitting: PSYCHIATRY & NEUROLOGY
Payer: COMMERCIAL

## 2022-08-26 VITALS
SYSTOLIC BLOOD PRESSURE: 148 MMHG | RESPIRATION RATE: 17 BRPM | DIASTOLIC BLOOD PRESSURE: 103 MMHG | TEMPERATURE: 97.5 F | OXYGEN SATURATION: 99 % | HEART RATE: 82 BPM

## 2022-08-26 DIAGNOSIS — R45.851 SUICIDAL IDEATION: ICD-10-CM

## 2022-08-26 DIAGNOSIS — I10 PRIMARY HYPERTENSION: ICD-10-CM

## 2022-08-26 DIAGNOSIS — E43 SEVERE PROTEIN-CALORIE MALNUTRITION (HCC): ICD-10-CM

## 2022-08-26 DIAGNOSIS — R74.8 ELEVATED LIVER ENZYMES: ICD-10-CM

## 2022-08-26 DIAGNOSIS — Z87.19 H/O CHRONIC HEPATITIS: ICD-10-CM

## 2022-08-26 DIAGNOSIS — F31.12 BIPOLAR DISORDER, CURR EPISODE MANIC W/O PSYCHOTIC FEATURES, MODERATE (HCC): Primary | ICD-10-CM

## 2022-08-26 PROCEDURE — 99242 OFF/OP CONSLTJ NEW/EST SF 20: CPT | Performed by: PSYCHIATRY & NEUROLOGY

## 2022-08-26 RX ORDER — CALCIUM CARBONATE 200(500)MG
1000 TABLET,CHEWABLE ORAL 3 TIMES DAILY PRN
Status: DISCONTINUED | OUTPATIENT
Start: 2022-08-26 | End: 2022-09-02 | Stop reason: HOSPADM

## 2022-08-26 RX ORDER — NIACIN 100 MG
100 TABLET ORAL
Status: CANCELLED | OUTPATIENT
Start: 2022-08-27

## 2022-08-26 RX ORDER — OLANZAPINE 5 MG/1
5 TABLET ORAL
Status: CANCELLED | OUTPATIENT
Start: 2022-08-26

## 2022-08-26 RX ORDER — CALCIUM CARBONATE 200(500)MG
1000 TABLET,CHEWABLE ORAL 3 TIMES DAILY PRN
Status: CANCELLED | OUTPATIENT
Start: 2022-08-26

## 2022-08-26 RX ORDER — MULTIVITAMIN WITH IRON
1 TABLET ORAL DAILY
Status: CANCELLED | OUTPATIENT
Start: 2022-08-26

## 2022-08-26 RX ORDER — ACETAMINOPHEN 325 MG/1
975 TABLET ORAL EVERY 6 HOURS PRN
Status: DISCONTINUED | OUTPATIENT
Start: 2022-08-26 | End: 2022-09-02 | Stop reason: HOSPADM

## 2022-08-26 RX ORDER — LISINOPRIL 10 MG/1
10 TABLET ORAL DAILY
Status: CANCELLED | OUTPATIENT
Start: 2022-08-26

## 2022-08-26 RX ORDER — ACETAMINOPHEN 325 MG/1
650 TABLET ORAL EVERY 4 HOURS PRN
Status: DISCONTINUED | OUTPATIENT
Start: 2022-08-26 | End: 2022-09-02 | Stop reason: HOSPADM

## 2022-08-26 RX ORDER — DIVALPROEX SODIUM 250 MG/1
250 TABLET, DELAYED RELEASE ORAL EVERY 8 HOURS SCHEDULED
Status: DISCONTINUED | OUTPATIENT
Start: 2022-08-26 | End: 2022-08-26 | Stop reason: HOSPADM

## 2022-08-26 RX ORDER — OLANZAPINE 5 MG/1
5 TABLET ORAL
Status: DISCONTINUED | OUTPATIENT
Start: 2022-08-26 | End: 2022-08-26 | Stop reason: HOSPADM

## 2022-08-26 RX ORDER — SILDENAFIL 50 MG/1
50 TABLET, FILM COATED ORAL DAILY PRN
Status: CANCELLED | OUTPATIENT
Start: 2022-08-26

## 2022-08-26 RX ORDER — ASPIRIN 81 MG/1
81 TABLET, CHEWABLE ORAL DAILY
Status: CANCELLED | OUTPATIENT
Start: 2022-08-26

## 2022-08-26 RX ORDER — ASPIRIN 81 MG/1
81 TABLET, CHEWABLE ORAL DAILY
Status: DISCONTINUED | OUTPATIENT
Start: 2022-08-27 | End: 2022-09-02 | Stop reason: HOSPADM

## 2022-08-26 RX ORDER — OLANZAPINE 5 MG/1
5 TABLET ORAL
Status: DISCONTINUED | OUTPATIENT
Start: 2022-08-26 | End: 2022-08-29

## 2022-08-26 RX ORDER — POLYETHYLENE GLYCOL 3350 17 G/17G
17 POWDER, FOR SOLUTION ORAL DAILY PRN
Status: DISCONTINUED | OUTPATIENT
Start: 2022-08-26 | End: 2022-09-02 | Stop reason: HOSPADM

## 2022-08-26 RX ORDER — CHLORAL HYDRATE 500 MG
1000 CAPSULE ORAL DAILY
Status: CANCELLED | OUTPATIENT
Start: 2022-08-26

## 2022-08-26 RX ORDER — DIVALPROEX SODIUM 250 MG/1
250 TABLET, DELAYED RELEASE ORAL EVERY 8 HOURS SCHEDULED
Status: CANCELLED | OUTPATIENT
Start: 2022-08-26

## 2022-08-26 RX ORDER — LISINOPRIL 10 MG/1
10 TABLET ORAL DAILY
Status: DISCONTINUED | OUTPATIENT
Start: 2022-08-27 | End: 2022-09-02 | Stop reason: HOSPADM

## 2022-08-26 RX ORDER — DIVALPROEX SODIUM 250 MG/1
250 TABLET, DELAYED RELEASE ORAL EVERY 8 HOURS SCHEDULED
Status: DISCONTINUED | OUTPATIENT
Start: 2022-08-26 | End: 2022-08-28

## 2022-08-26 RX ADMIN — DIVALPROEX SODIUM 250 MG: 250 TABLET, DELAYED RELEASE ORAL at 13:53

## 2022-08-26 RX ADMIN — ACETAMINOPHEN 325MG 975 MG: 325 TABLET ORAL at 18:17

## 2022-08-26 RX ADMIN — OLANZAPINE 5 MG: 5 TABLET, FILM COATED ORAL at 01:20

## 2022-08-26 RX ADMIN — OLANZAPINE 5 MG: 5 TABLET, FILM COATED ORAL at 22:11

## 2022-08-26 RX ADMIN — DIVALPROEX SODIUM 250 MG: 250 TABLET, DELAYED RELEASE ORAL at 08:09

## 2022-08-26 RX ADMIN — DIVALPROEX SODIUM 250 MG: 250 TABLET, DELAYED RELEASE ORAL at 22:11

## 2022-08-26 NOTE — ED NOTES
Patient requesting this RN update wife, Sunnie Hammans, with transfer information  Update given to Sunnie Hammans at this time with location and time of transfer       Sagrario Zepeda RN  08/26/22 8740

## 2022-08-26 NOTE — ED NOTES
Patient heard from another room becoming increasingly agitated  Patient stated to this RN that he is frustrated that he is still here and stated to this RN "Do you know that your  could say you want to kill yourself and 302 you even though you never said that  That is what is happening to me right now"  Patient also stated that he has been waiting several hours for psychiatry to see him and that is where his frustration is coming from  This RN explained to patient several times that he is in line to see psychiatry and when he was able to be seen he would be  Patient given a tooth brush and tooth paste per request  Will continue to monitor        Tio Dee RN  08/25/22 0102

## 2022-08-26 NOTE — EMTALA/ACUTE CARE TRANSFER
[address] [facility] [address]  Dept: [phone]      EMTALA TRANSFER CONSENT    NAME Anne Marie Bay                                         1965                              MRN [id number]    I have been informed of my rights regarding examination, treatment, and transfer   by Dr Lorena Antunez MD    Benefits: Other benefits (Include comment)_______________________ (behavioral health)    Risks: Potential for delay in receiving treatment      Consent for Transfer:  I acknowledge that my medical condition has been evaluated and explained to me by the emergency department physician or other qualified medical person and/or my attending physician, who has recommended that I be transferred to the service of  Accepting Physician: Ramu Membreno MD at [address] [place] [address] Name, [address] : [facility]  The above potential benefits of such transfer, the potential risks associated with such transfer, and the probable risks of not being transferred have been explained to me, and I fully understand them  The doctor has explained that, in my case, the benefits of transfer outweigh the risks  I agree to be transferred  I authorize the performance of emergency medical procedures and treatments upon me in both transit and upon arrival at the receiving facility  Additionally, I authorize the release of any and all medical records to the receiving facility and request they be transported with me, if possible  I understand that the safest mode of transportation during a medical emergency is an ambulance and that the Hospital advocates the use of this mode of transport  Risks of traveling to the receiving facility by car, including absence of medical control, life sustaining equipment, such as oxygen, and medical personnel has been explained to me and I fully understand them      (ROSA MARIA CORRECT BOX BELOW)  [  ]  I consent to the stated transfer and to be transported by ambulance/helicopter  [  ]  I consent to the stated transfer, but refuse transportation by ambulance and accept full responsibility for my transportation by car  I understand the risks of non-ambulance transfers and I exonerate the Hospital and its staff from any deterioration in my condition that results from this refusal     X___________________________________________    DATE  22  TIME________  Signature of patient or legally responsible individual signing on patient behalf           RELATIONSHIP TO PATIENT_________________________          Provider Certification    NAME Odessa Fregoso                                         1965                              MRN [id number]    A medical screening exam was performed on the above named patient  Based on the examination:    Condition Necessitating Transfer The encounter diagnosis was Suicidal ideation  Patient Condition: The patient has been stabilized such that within reasonable medical probability, no material deterioration of the patient condition or the condition of the unborn child(dann) is likely to result from the transfer    Reason for Transfer: Level of Care needed not available at this facility    Transfer Requirements: [address]   · Space available and qualified personnel available for treatment as acknowledged by Maryan Rodney  · Agreed to accept transfer and to provide appropriate medical treatment as acknowledged by       [facility], MD  · Appropriate medical records of the examination and treatment of the patient are provided at the time of transfer   [address] _______  · Transfer will be performed by qualified personnel from [facility] WITH [facility]  and appropriate transfer equipment as required, including the use of necessary and appropriate life support measures      Provider Certification: I have examined the patient and explained the following risks and benefits of being transferred/refusing transfer to the patient/family:  General risk, such as traffic hazards, adverse weather conditions, rough terrain or turbulence, possible failure of equipment (including vehicle or aircraft), or consequences of actions of persons outside the control of the transport personnel      Based on these reasonable risks and benefits to the patient and/or the unborn child(dann), and based upon the information available at the time of the patients examination, I certify that the medical benefits reasonably to be expected from the provision of appropriate medical treatments at another medical facility outweigh the increasing risks, if any, to the individuals medical condition, and in the case of labor to the unborn child, from effecting the transfer      X____________________________________________ DATE 08/26/22        TIME_______      ORIGINAL - SEND TO MEDICAL RECORDS   COPY - SEND WITH PATIENT DURING TRANSFER

## 2022-08-26 NOTE — ED NOTES
Escorted patient to bathroom  Patient states he wants to try to go back to sleep  Patient appears to be less anxious        Jayme Ennis RN  08/26/22 2386

## 2022-08-26 NOTE — ED NOTES
Patient friendly and cooperative with this RN  Per nurse report, patient has been cooperative throughout the morning  Patient requesting phone call to wife to check on dying dog and to check on missing bank card  It was explained to the patient that this RN will allow this at this time, however if the patient were to become agitated or aggressive, the phone would be taken  Patient agreeable and given phone with a 5-10 minute time frame       Macario Corbin RN  08/26/22 0715

## 2022-08-26 NOTE — ED CARE HANDOFF
Emergency Department Sign Out Note        Sign out and transfer of care from Dr Lenin Quiroz  See Separate Emergency Department note  The patient, Sd Ny, was evaluated by the previous provider for psych eval     Workup Completed:  Labs, UA, 302 petitioned    ED Course / Workup Pending (followup): Psych eval                                  ED Course as of 08/26/22 0115   Thu Aug 25, 2022   2143 SO: 36, denies SI or HI, wants a divorce, he has weapons in house, he took a gun from house, needs psych consult      Procedures  MDM  Number of Diagnoses or Management Options  Suicidal ideation  Diagnosis management comments: Patient medically cleared for inpatient psych    Per psych, "inpatient psychiatric treatment is indicated up held 302 for provision of precautions, further diagnostic evaluation and treatment stabilization  The patient states he had been taking Wellbutrin  mg daily at home for smoking cessation  This may be contributing to arsh at this time  Recommend replacing it with Depakote 250 mg p o  3 times daily as mood stabilizer  Additionally recommend Zyprexa 5 mg p o  q h s  for additional mood stabilization and possible underlying psychosis  Continue observation precautions are indicated  Re-consult Psychiatry as needed "    Zyprexa and Depakote ordered  Pending placement          Disposition  Final diagnoses:   Suicidal ideation     Time reflects when diagnosis was documented in both MDM as applicable and the Disposition within this note     Time User Action Codes Description Comment    8/25/2022  4:45 PM Marcelle Lewis Add [C06 671] Suicidal ideation       ED Disposition     ED Disposition   Transfer to [address] [place] [address]   --    Date/Time   Thu Aug 25, 2022  9:44 PM    Comment   Sd Ny been medically cleared for psychiatric evaluation              Follow-up Information    None       Patient's Medications   Discharge Prescriptions    No medications on file No discharge procedures on file         ED Provider  Electronically Signed by     Luis Bingham MD  08/26/22 7170

## 2022-08-26 NOTE — ED NOTES
Contacted insurance- spoke with Donyaet and logged precertification  Insurance will return phone call with authorization

## 2022-08-26 NOTE — ED NOTES
Delay in charting due to patient care  Patient seen doing jumping jacks in the hallway  Patient stated to RN that "I am anxious and I have been sitting and want to leave"  This RN explained to patient that we are currently waiting on a psychiatry consult and was unaware of what time this would be happening  Patient moved into room for decreased stimulation  This RN messaged crisis inquiring when the patient would be seen  Per Crisis, there were five people ahead of patient  This information was relayed to the patient  Patient given two pillows and lights dimmed at this time  This RN asked patient is he would like some medication to help with his anxiety  Patient declined at this time and only wishes to have his medical marijuana  Will continue to monitor        Amparo Bermudez RN  08/25/22 0014

## 2022-08-26 NOTE — ED NOTES
Pt sitting in chair in room; pt requesting to go home stating "I've seen my chart and the notes are not accurate    The psychiatrist told me I could go home"; explained to pt he was unable to leave the ED and a 302 had been petitioned and upheld at this time; pt verbalized understanding; pt cooperative; remains on virtual 1:1; will continue to monitor     Saadia Frank RN  08/26/22 [address], JAKI  08/26/22 5739

## 2022-08-26 NOTE — PROGRESS NOTES
Flip Flops  Belt  1 Blue Jeans  1 Blue T-Shirt  [employer]      RN: Liz  Cell Phone no       Please check with Security   for Extra Belongings, Bag # [id number]    Kydenia  And other important items/papers
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
## 2022-08-26 NOTE — ED NOTES
Patient is accepted at [facility]  Patient is accepted by Dr Karlo Martinez per Jordan landry  Transportation is arranged with [employer]  Transportation is scheduled for 1500  Patient may go to the floor after 1315  Nurse report is to be called to [phone] prior to patient transfer      Crisis logged transport request with Ingrid at [facility]

## 2022-08-26 NOTE — ED NOTES
Pt requesting "multiple cups of water because I drink a lot of water"; pt already has two cups of water at bedside; pt will not be provided anymore water at this time; pt's sodium level as documented and continued water consumption not recommended     Mega Casey RN  08/26/22 6866

## 2022-08-26 NOTE — ED NOTES
Patient asking for book for "mental stimulation" as he never sits around  He is bored  Patient informed this is not available at this time       Luis Eduardo Espinosa RN  08/26/22 2605

## 2022-08-26 NOTE — NURSING NOTE
Patient admitted to [facility] from [address] [place] [address] ED under 302 after having made a suicidal statement to his wife and walking out of the house with his gun  Upon arrival to the unit, patient denies suicidal thoughts or history of suicidal thoughts or behaviors  Patient reports this was all a misunderstanding due to him asking his wife for a divorce recently  Patient also reports recent stressors, such as his mother passing away in March and his dog being sick and likely needing to be put to sleep  He does endorse anxiety and depression and states he has PTSD  He denies hallucinations  Denies HI  He reports poor sleep at home and recent weight loss  States he takes Wellbutrin at home but feels it was losing its effectiveness  Was started on Depakote in the ED and states he feels this medication has been very effective in regulating his mood  He does report a history of drug abuse and alcoholism but has been sober since 2007 except for using medical marijuana for his PTSD  Patient cooperative throughout admission  Was talkative and somewhat rambling and tangential but was redirectable  He showered and skin is intact  Will continue monitoring

## 2022-08-26 NOTE — ED NOTES
All needs met at this time  Patient appears to be anxious and is stating he's angry he hasn't been seen by telepsych yet       Missy Bear RN  08/26/22 0003

## 2022-08-27 PROBLEM — F31.12 BIPOLAR DISORDER, CURR EPISODE MANIC W/O PSYCHOTIC FEATURES, MODERATE (HCC): Status: ACTIVE | Noted: 2022-08-27

## 2022-08-27 PROBLEM — Z87.19 H/O CHRONIC HEPATITIS: Status: ACTIVE | Noted: 2022-08-27

## 2022-08-27 LAB
25(OH)D3 SERPL-MCNC: 56.8 NG/ML (ref 30–100)
ALBUMIN SERPL BCP-MCNC: 4.7 G/DL (ref 3.5–5)
ALP SERPL-CCNC: 55 U/L (ref 34–104)
ALT SERPL W P-5'-P-CCNC: 96 U/L (ref 7–52)
ANION GAP SERPL CALCULATED.3IONS-SCNC: 9 MMOL/L (ref 4–13)
AST SERPL W P-5'-P-CCNC: 96 U/L (ref 13–39)
BILIRUB SERPL-MCNC: 0.79 MG/DL (ref 0.2–1)
BUN SERPL-MCNC: 18 MG/DL (ref 5–25)
CALCIUM SERPL-MCNC: 10.2 MG/DL (ref 8.4–10.2)
CHLORIDE SERPL-SCNC: 102 MMOL/L (ref 96–108)
CO2 SERPL-SCNC: 28 MMOL/L (ref 21–32)
CREAT SERPL-MCNC: 0.92 MG/DL (ref 0.6–1.3)
FOLATE SERPL-MCNC: >20 NG/ML (ref 3.1–17.5)
GFR SERPL CREATININE-BSD FRML MDRD: 91 ML/MIN/1.73SQ M
GLUCOSE P FAST SERPL-MCNC: 97 MG/DL (ref 65–99)
GLUCOSE SERPL-MCNC: 97 MG/DL (ref 65–140)
POTASSIUM SERPL-SCNC: 4.2 MMOL/L (ref 3.5–5.3)
PROT SERPL-MCNC: 7.8 G/DL (ref 6.4–8.4)
SODIUM SERPL-SCNC: 139 MMOL/L (ref 135–147)
VIT B12 SERPL-MCNC: 620 PG/ML (ref 100–900)

## 2022-08-27 PROCEDURE — 99221 1ST HOSP IP/OBS SF/LOW 40: CPT | Performed by: PSYCHIATRY & NEUROLOGY

## 2022-08-27 PROCEDURE — 82306 VITAMIN D 25 HYDROXY: CPT

## 2022-08-27 PROCEDURE — 80053 COMPREHEN METABOLIC PANEL: CPT

## 2022-08-27 PROCEDURE — 82746 ASSAY OF FOLIC ACID SERUM: CPT

## 2022-08-27 PROCEDURE — 82607 VITAMIN B-12: CPT

## 2022-08-27 RX ORDER — HYDROXYZINE 50 MG/1
50 TABLET, FILM COATED ORAL EVERY 6 HOURS PRN
Status: DISCONTINUED | OUTPATIENT
Start: 2022-08-27 | End: 2022-09-02 | Stop reason: HOSPADM

## 2022-08-27 RX ORDER — ZINC SULFATE 50(220)MG
220 CAPSULE ORAL DAILY
Status: DISCONTINUED | OUTPATIENT
Start: 2022-08-27 | End: 2022-09-02 | Stop reason: HOSPADM

## 2022-08-27 RX ORDER — NIACIN 100 MG
100 TABLET ORAL
Status: DISCONTINUED | OUTPATIENT
Start: 2022-08-28 | End: 2022-09-02 | Stop reason: HOSPADM

## 2022-08-27 RX ORDER — RISPERIDONE 1 MG/1
1 TABLET, FILM COATED ORAL 3 TIMES DAILY PRN
Status: DISCONTINUED | OUTPATIENT
Start: 2022-08-27 | End: 2022-09-02 | Stop reason: HOSPADM

## 2022-08-27 RX ORDER — TRAZODONE HYDROCHLORIDE 50 MG/1
50 TABLET ORAL
Status: DISCONTINUED | OUTPATIENT
Start: 2022-08-27 | End: 2022-09-02 | Stop reason: HOSPADM

## 2022-08-27 RX ORDER — LORAZEPAM 0.5 MG/1
0.5 TABLET ORAL EVERY 8 HOURS PRN
Status: DISCONTINUED | OUTPATIENT
Start: 2022-08-27 | End: 2022-09-02 | Stop reason: HOSPADM

## 2022-08-27 RX ORDER — RISPERIDONE 0.5 MG/1
0.5 TABLET, FILM COATED ORAL 3 TIMES DAILY PRN
Status: DISCONTINUED | OUTPATIENT
Start: 2022-08-27 | End: 2022-09-02 | Stop reason: HOSPADM

## 2022-08-27 RX ORDER — OLANZAPINE 10 MG/1
5 INJECTION, POWDER, LYOPHILIZED, FOR SOLUTION INTRAMUSCULAR 3 TIMES DAILY PRN
Status: DISCONTINUED | OUTPATIENT
Start: 2022-08-27 | End: 2022-09-02 | Stop reason: HOSPADM

## 2022-08-27 RX ORDER — HYDROXYZINE HYDROCHLORIDE 25 MG/1
25 TABLET, FILM COATED ORAL EVERY 6 HOURS PRN
Status: DISCONTINUED | OUTPATIENT
Start: 2022-08-27 | End: 2022-09-02 | Stop reason: HOSPADM

## 2022-08-27 RX ORDER — CHLORAL HYDRATE 500 MG
1000 CAPSULE ORAL DAILY
Status: DISCONTINUED | OUTPATIENT
Start: 2022-08-27 | End: 2022-09-02 | Stop reason: HOSPADM

## 2022-08-27 RX ADMIN — OLANZAPINE 5 MG: 5 TABLET, FILM COATED ORAL at 21:28

## 2022-08-27 RX ADMIN — OMEGA-3 FATTY ACIDS CAP 1000 MG 1000 MG: 1000 CAP at 14:10

## 2022-08-27 RX ADMIN — LISINOPRIL 10 MG: 10 TABLET ORAL at 09:18

## 2022-08-27 RX ADMIN — ZINC SULFATE 220 MG (50 MG) CAPSULE 220 MG: CAPSULE at 17:05

## 2022-08-27 RX ADMIN — DIVALPROEX SODIUM 250 MG: 250 TABLET, DELAYED RELEASE ORAL at 14:10

## 2022-08-27 RX ADMIN — B-COMPLEX W/ C & FOLIC ACID TAB 1 TABLET: TAB at 09:18

## 2022-08-27 RX ADMIN — DIVALPROEX SODIUM 250 MG: 250 TABLET, DELAYED RELEASE ORAL at 05:47

## 2022-08-27 RX ADMIN — ACETAMINOPHEN 325MG 975 MG: 325 TABLET ORAL at 09:19

## 2022-08-27 RX ADMIN — ASPIRIN 81 MG CHEWABLE TABLET 81 MG: 81 TABLET CHEWABLE at 09:18

## 2022-08-27 RX ADMIN — DIVALPROEX SODIUM 250 MG: 250 TABLET, DELAYED RELEASE ORAL at 21:28

## 2022-08-27 NOTE — TREATMENT PLAN
TREATMENT PLAN REVIEW - [facility] Lalo Suarez 62 y o  1965 male MRN: [id number]    [address] [facility]  Room / Bed: Naye Gomez [facility] Encounter: [id number]          Admit Date/Time:  8/26/2022  3:35 PM    Treatment Team: Attending Provider: Claudia Pope MD; Patient Care Assistant: Liz Chowdary; Patient Care Technician: Danial Mejias; Certified Nursing Assistant: Joycelyn Hackett;  Patient Care Assistant: Jorge Ellison; Registered Nurse: Mariaelena Castano RN; Consulting Physician: Vilma Cote MD    Diagnosis: Principal Problem:    Bipolar disorder, curr episode manic w/o psychotic features, moderate ([address] )  Active Problems:    Primary hypertension    H/O chronic hepatitis      Patient Strengths/Assets: cooperative, good physical health, negotiates basic needs    Patient Barriers/Limitations: marital/family conflict, patient is on an involuntary commitment, poor insight, substance abuse    Short Term Goals: decrease in anxiety symptoms, decrease in paranoid thoughts, decrease in level of agitation, ability to stay safe on the unit, ability to stay free of restraints, improvement in reasoning ability, improvement in self care, sleep improvement, improvement in appetite, mood stabilization, increase in group attendance, increase in socialization with peers on the unit, acceptance of need for psychiatric treatment, acceptance of psychiatric medications    Long Term Goals: improvement in depression, improvement in anxiety, resolution of manic symptoms, stabilization of mood, improvement in reasoning ability, improved insight, acceptance of need for psychiatric medications, acceptance of need for psychiatric treatment, adequate self care, adequate sleep, adequate appetite, adequate oral intake, appropriate interaction with peers    Progress Towards Goals: starting psychiatric medications as prescribed    Recommended Treatment: medication management, patient medication education, group therapy, milieu therapy, continued Behavioral Health psychiatric evaluation/assessment process, medical follow up with medical team    Treatment Frequency: daily medication monitoring, group and milieu therapy daily, monitoring through interdisciplinary rounds, monitoring through weekly patient care conferences    Expected Discharge Date: 7 midnight     Discharge Plan: will be determined    Treatment Plan Created/Updated By: Winston Mays MD
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
## 2022-08-27 NOTE — H&P
Aline Flaherty#  SSU:[id number] Bonnie Suárez  XMS:[id number]    [facility]:[id number]  Adm Date: 8/26/2022 9880  3:35 PM   ATT PHY: Ayesha Madrid, [address]         Chief Complaint:  Worsening depression symptoms along with suicidal statements    History of Presenting Illness: Thi Rendon is a(n) 62y o  year old male who was admitted through emergency department where he presented with worsening depression and anxiety symptoms along with suicidal statements  Patient examined at bedside  Patient denied any active suicidal homicidal ideations      Allergies   Allergen Reactions    Latex Hives       Current Facility-Administered Medications on File Prior to Encounter   Medication Dose Route Frequency Provider Last Rate Last Admin    [DISCONTINUED] calcium carbonate (TUMS) chewable tablet 1,000 mg  1,000 mg Oral TID PRN Gil Serrano MD   1,000 mg at 08/25/22 2357    [DISCONTINUED] divalproex sodium (DEPAKOTE) EC tablet 250 mg  250 mg Oral [facility] Evans Serrano MD   250 mg at 08/26/22 1353    [DISCONTINUED] ibuprofen (MOTRIN) tablet 600 mg  600 mg Oral Once Minh Shipman DO        [DISCONTINUED] OLANZapine (ZyPREXA) tablet 5 mg  5 mg Oral HS Gil Serrano MD   5 mg at 08/26/22 0120     Current Outpatient Medications on File Prior to Encounter   Medication Sig Dispense Refill    aspirin 81 mg chewable tablet Chew 81 mg daily      B Complex-C (B-complex with vitamin C) tablet Take 1 tablet by mouth daily      buPROPion (WELLBUTRIN SR) 150 mg 12 hr tablet Take 1 tablet (150 mg total) by mouth 2 (two) times a day (Patient taking differently: Take 300 mg by mouth in the morning Takes 2 tabs in the am) 180 tablet 1    Glucosamine-Chondroitin (GLUCOSAMINE CHONDR COMPLEX PO) Take by mouth      lisinopril (ZESTRIL) 10 mg tablet Take 1 tablet (10 mg total) by mouth daily 90 tablet 1    Magnesium 100 MG CAPS Take by mouth      Multiple Vitamin (multivitamin) tablet Take 1 tablet by mouth daily      niacin 100 mg tablet Take 100 mg by mouth daily with breakfast      Omega-3 Fatty Acids (fish oil) 1,000 mg Take 1,000 mg by mouth daily      Na Sulfate-K Sulfate-Mg Sulf (Suprep Bowel Prep Kit) 17 5-3 13-1 6 GM/177ML SOLN Take as directed by office (Patient not taking: Reported on 2022) 354 mL 0    sildenafil (VIAGRA) 50 MG tablet Take 1 tablet (50 mg total) by mouth daily as needed for erectile dysfunction 30 tablet 0       Active Ambulatory Problems     Diagnosis Date Noted    Elevated liver enzymes 2022    Primary hypertension 2022     Resolved Ambulatory Problems     Diagnosis Date Noted    No Resolved Ambulatory Problems     Past Medical History:   Diagnosis Date    DJD (degenerative joint disease)     Dyslipidemia     History of hepatitis C     Hypertension     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 [address] [facility]     Toxoplasmosis chorioretinitis of left eye        Past Surgical History:   Procedure Laterality Date    CARPAL TUNNEL RELEASE Left     CATARACT EXTRACTION Left     COLONOSCOPY      HERNIA REPAIR      PLANTAR FASCIA SURGERY Left        Social History:   Social History     Socioeconomic History    Marital status: /Civil Union     Spouse name: None    Number of children: None    Years of education: None    Highest education level: None   Occupational History    None   Tobacco Use    Smoking status: Former Smoker     Years: 10 00     Types: Cigarettes     Quit date: 2020     Years since quittin 6    Smokeless tobacco: Never Used   Vaping Use    Vaping Use: Never used   Substance and Sexual Activity    Alcohol use: Not Currently    Drug use: Yes     Types: Marijuana    Sexual activity: None   Other Topics Concern    None   Social History Narrative    None     Social Determinants of Health     Financial Resource Strain: Not on file   Food Insecurity: Not on file   Transportation Needs: Not on file   Physical Activity: Not on file   Stress: Not on file   Social Connections: Not on file   Intimate Partner Violence: Not on file   Housing Stability: Not on file       Family History:   Family History   Problem Relation Age of Onset    Lung cancer Mother     Kidney disease Mother         drug induced    COPD Mother     Dementia Father        Review of Systems   HENT: Positive for rhinorrhea  Respiratory: Positive for cough  Musculoskeletal: Positive for arthralgias  Neurological: Positive for headaches  All other systems reviewed and are negative  Physical Exam   Vitals: Blood pressure 136/89, pulse 80, temperature 97 5 °F (36 4 °C), temperature source Temporal, resp  rate 18, height 5' 11" (1 803 m), weight 61 2 kg (135 lb), SpO2 98 %  ,Body mass index is 18 83 kg/m²  Constitutional: Awake and Alert  Well-developed and well-nourished  No distress  HENT: PERR,EOMI, conjunctiva normal  Head: Normocephalic and atraumatic  Mouth/Throat: Oropharynx is clear and moist     Eyes: Conjunctivae and EOM are normal  Pupils are equal, round, and reactive to light  Right and left eye exhibits no discharge  Neck: Neck supple  No tracheal deviation present  No thyromegaly present  Cardiovascular: Normal rate, regular rhythm and normal heart sounds  Exam reveals no friction rub  No murmur heard  Pulmonary/Chest: Effort normal and breath sounds normal  No respiratory distress  She has no wheezes  Abdominal: Soft  Bowel sounds are normal  She exhibits no distension  There is no tenderness  There is no rebound and no guarding  Neurological: Cranial Nerves grossly intact  No sensory deficit  Coordination normal    Musculoskeletal:   Nontender spine  Skin: Skin is warm and dry  No rash noted  No diaphoresis  No erythema  No edema  No cyanosis  Assessment     Merrill  is a(n) 62y o  year old male with Bipolar Disorder    1  Cardiac with history of hypertension and dyslipidemia    Patient will be continued on lisinopril 10 mg daily, fish oil 1000 mg daily, niacin 100 mg daily and aspirin 81 mg daily  2  Arthralgia/headache  Patient may get Tylenol p r n  3  Insomnia  Patient may get trazodone p r n  4  Psych with bipolar disorder  Patient is being managed by psych  Prognosis: Fair  Discharge Plan: In progress  Advanced Directives: I have discussed in detail the patient the advanced directives  Patient has not appointed anybody as his POA and has no living will with advanced healthcare directives  Patient's 1st contact is his spouse Darion Hebert and her phone number is [phone]  When discussing cardiac and pulmonary resuscitation efforts with the patient, the patient wishes to be FULL CODE  I have spent more than 50 minutes gathering data, doing physical examination, and discussing the advanced directives, which was witnessed by caring staff

## 2022-08-27 NOTE — NURSING NOTE
Patient appeared to have slept all night  No acute behaviors observed and no concerns voiced  Will CTM  Continuous patient safety checks ongoing

## 2022-08-27 NOTE — NURSING NOTE
Patient reports mild depression and anxiety this morning but continues to report feeling better since beginning Depakote and continues to be positive and hopeful that this medication will help keep his mood level  He denies suicidal thoughts  He is appropriate in conversation  He shows good insight and has good coping skills  Reported having a headache this morning and was given 975mg Tylenol at 0919 and upon reassessment, reported relief  Will continue monitoring

## 2022-08-27 NOTE — PLAN OF CARE
Problem: Depression  Goal: Treatment Goal: Demonstrate behavioral control of depressive symptoms, verbalize feelings of improved mood/affect, and adopt new coping skills prior to discharge  Outcome: Progressing  Goal: Refrain from harming self  Description: Interventions:  - Monitor patient closely, per order   - Supervise medication ingestion, monitor effects and side effects   Outcome: Progressing  Goal: Refrain from isolation  Description: Interventions:  - Develop a trusting relationship   - Encourage socialization   Outcome: Progressing  Goal: Attend and participate in unit activities, including therapeutic, recreational, and educational groups  Description: Interventions:  - Provide therapeutic and educational activities daily, encourage attendance and participation, and document same in the medical record   Outcome: Progressing  Goal: Complete daily ADLs, including personal hygiene independently, as able  Description: Interventions:  - Observe, teach, and assist patient with ADLS  -  Monitor and promote a balance of rest/activity, with adequate nutrition and elimination   Outcome: Progressing     Problem: Anxiety  Goal: Anxiety is at manageable level  Description: Interventions:  - Assess and monitor patient's anxiety level  - Monitor for signs and symptoms (heart palpitations, chest pain, shortness of breath, headaches, nausea, feeling jumpy, restlessness, irritable, apprehensive)  - Collaborate with interdisciplinary team and initiate plan and interventions as ordered    - [place] patient to unit/surroundings  - Explain treatment plan  - Encourage participation in care  - Encourage verbalization of concerns/fears  - Identify coping mechanisms  - Assist in developing anxiety-reducing skills  - Administer/offer alternative therapies  - Limit or eliminate stimulants  Outcome: Progressing
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
## 2022-08-27 NOTE — PLAN OF CARE
Problem: Ineffective Coping  Goal: Participates in unit activities  Description: Interventions:  - Provide therapeutic environment   - Provide required programming   - Redirect inappropriate behaviors   Outcome: Progressing     Problem: Risk for Self Injury/Neglect  Goal: Verbalize thoughts and feelings  Description: Interventions:  - Assess and re-assess patient's lethality and potential for self-injury  - Engage patient in 1:1 interactions, daily, for a minimum of 15 minutes  - Encourage patient to express feelings, fears, frustrations, hopes  - Establish rapport/trust with patient   Outcome: Progressing

## 2022-08-28 LAB — FOLATE SERPL-MCNC: >20 NG/ML (ref 3.1–17.5)

## 2022-08-28 PROCEDURE — 82746 ASSAY OF FOLIC ACID SERUM: CPT | Performed by: FAMILY MEDICINE

## 2022-08-28 PROCEDURE — 99232 SBSQ HOSP IP/OBS MODERATE 35: CPT | Performed by: PSYCHIATRY & NEUROLOGY

## 2022-08-28 RX ORDER — DIVALPROEX SODIUM 250 MG/1
250 TABLET, DELAYED RELEASE ORAL DAILY
Status: DISCONTINUED | OUTPATIENT
Start: 2022-08-29 | End: 2022-09-02 | Stop reason: HOSPADM

## 2022-08-28 RX ADMIN — ACETAMINOPHEN 650 MG: 325 TABLET ORAL at 20:42

## 2022-08-28 RX ADMIN — OLANZAPINE 5 MG: 5 TABLET, FILM COATED ORAL at 21:09

## 2022-08-28 RX ADMIN — CALCIUM CARBONATE (ANTACID) CHEW TAB 500 MG 1000 MG: 500 CHEW TAB at 20:42

## 2022-08-28 RX ADMIN — DIVALPROEX SODIUM 250 MG: 250 TABLET, DELAYED RELEASE ORAL at 05:27

## 2022-08-28 RX ADMIN — OMEGA-3 FATTY ACIDS CAP 1000 MG 1000 MG: 1000 CAP at 08:57

## 2022-08-28 RX ADMIN — B-COMPLEX W/ C & FOLIC ACID TAB 1 TABLET: TAB at 08:57

## 2022-08-28 RX ADMIN — DIVALPROEX SODIUM 750 MG: 250 TABLET, DELAYED RELEASE ORAL at 21:09

## 2022-08-28 RX ADMIN — ASPIRIN 81 MG CHEWABLE TABLET 81 MG: 81 TABLET CHEWABLE at 08:57

## 2022-08-28 RX ADMIN — LISINOPRIL 10 MG: 10 TABLET ORAL at 08:57

## 2022-08-28 RX ADMIN — ZINC SULFATE 220 MG (50 MG) CAPSULE 220 MG: CAPSULE at 08:57

## 2022-08-28 NOTE — NURSING NOTE
Patient is pleasant, calm, and cooperative  Continues to show interest in his treatment plan and is positive and hopeful for the future  Denies feeling depressed, anxious, or suicidal this morning  Denies pain  Compliant with medications  Will continue monitoring

## 2022-08-28 NOTE — PROGRESS NOTES
Progress Note - Behavioral Health   This note was not shared with the patient due to reasonable likelihood of causing patient harm  Odessa Fregoso 62 y o  male MRN: [id number]   Unit/Bed#: [facility] 204-02 Encounter: [id number]    Behavior over the last 24 hours: slowly improving  Sleep: slept better  Appetite: normal  Medication side effects: no  ROS: all other systems are negative    Mental Status Evaluation:    Appearance:  age appropriate, bearded   Behavior:  cooperative   Speech:  normal rate and volume   Mood:  dysphoric   Affect:  appropriate   Thought Process:  goal directed   Associations: circumstantial associations   Thought Content:  no overt delusions   Perceptual Disturbances: denies auditory hallucinations when asked   Risk Potential: Suicidal ideation - None  Homicidal ideation - None   Sensorium:  oriented to person, place and time/date   Memory:  recent and remote memory grossly intact   Consciousness:  alert and awake   Attention: attention span and concentration are age appropriate   Insight:  limited   Judgment: fair   Gait/Station: normal gait/station   Motor Activity: no abnormal movements     Vital signs in last 24 hours:    Temp:  [97 9 °F (36 6 °C)-98 6 °F (37 °C)] 98 6 °F (37 °C)  HR:  [82-84] 84  Resp:  [16-18] 18  BP: (126-137)/(84-97) 137/87    Laboratory results:   I have personally reviewed all pertinent laboratory/tests results    Most Recent Labs:   Lab Results   Component Value Date    WBC 7 01 08/25/2022    RBC 4 33 08/25/2022    HGB 14 3 08/25/2022    HCT 42 1 08/25/2022     08/25/2022    RDW 12 1 08/25/2022    NEUTROABS 3 94 08/25/2022    SODIUM 139 08/27/2022    K 4 2 08/27/2022     08/27/2022    CO2 28 08/27/2022    BUN 18 08/27/2022    CREATININE 0 92 08/27/2022    GLUC 97 08/27/2022    GLUF 97 08/27/2022    CALCIUM 10 2 08/27/2022    AST 96 (H) 08/27/2022    ALT 96 (H) 08/27/2022    ALKPHOS 55 08/27/2022    TP 7 8 08/27/2022    ALB 4 7 08/27/2022    TBILI 0 79 08/27/2022    CHOLESTEROL 167 02/17/2022    HDL 61 02/17/2022    TRIG 86 02/17/2022    LDLCALC 89 02/17/2022    NONHDLC 106 02/17/2022    IXM2ULFCZICD 2 640 05/24/2022    HGBA1C 5 5 05/24/2022       Assessment/Plan   Principal Problem:    Bipolar disorder, curr episode manic w/o psychotic features, moderate (HCC)  Active Problems:    Primary hypertension    H/O chronic hepatitis    Recommended Treatment:     Planned medication and treatment changes:     All current active medications have been reviewed  Encourage group therapy, milieu therapy and occupational therapy  Behavioral Health checks every 7 minutes   Increase Depakote to 250 mg am + 750 mg hs    Current Facility-Administered Medications   Medication Dose Route Frequency Provider Last Rate    acetaminophen  650 mg Oral Q4H PRN Georgina L Dagnall, PA-C      acetaminophen  650 mg Oral Q4H PRN Georgina L Dagnall, PA-C      acetaminophen  975 mg Oral Q6H PRN Georgina L Dagnall, PA-C      aspirin  81 mg Oral Daily Georgina L Dagnall, PA-C      calcium carbonate  1,000 mg Oral TID PRN [facility] MD Krishna Peterson Presume ON 8/29/2022] divalproex sodium  250 mg Oral Daily Trung Blackmon MD      divalproex sodium  750 mg Oral HS Trung Blackmon MD      fish oil  1,000 mg Oral Daily Nick Ansari MD      hydrOXYzine HCL  25 mg Oral Q6H PRN Trung Blackmon MD      hydrOXYzine HCL  50 mg Oral Q6H PRN Trung Blackmon MD      lisinopril  10 mg Oral Daily Georgina Rodriguez, PA-C      LORazepam  0 5 mg Oral Q8H PRN Trung Blackmon MD      multivitamin stress formula  1 tablet Oral Daily Georgina Rodriguez, PA-C      niacin  100 mg Oral Daily With Breakfast Nick Ansari MD      OLANZapine  5 mg Intramuscular TID PRN Trung Blackmon MD      OLANZapine  5 mg Oral HS [facility] Richi Rausch MD      polyethylene glycol  17 g Oral Daily PRN Georgina Rodriguez, PA-C      risperiDONE  0 5 mg Oral TID PRN Trung Blackmon MD      risperiDONE  1 mg Oral TID PRN Trung Blackmon, MD      traZODone  50 mg Oral HS PRN Jose Low MD      zinc sulfate  220 mg Oral Daily Yfn Fenton MD         Risks / Benefits of Treatment:    Risks, benefits, and possible side effects of medications explained to patient and patient verbalizes understanding and agreement for treatment  Counseling / Coordination of Care:    Patient's progress discussed with staff in treatment team meeting  Medications, treatment progress and treatment plan reviewed with patient  Supportive therapy provided to patient  Group attendance encouraged      Sean Milligan MD 08/28/22

## 2022-08-28 NOTE — PLAN OF CARE
Problem: Risk for Self Injury/Neglect  Goal: Treatment Goal: Remain safe during length of stay, learn and adopt new coping skills, and be free of self-injurious ideation, impulses and acts at the time of discharge  Outcome: Progressing     Problem: Risk for Self Injury/Neglect  Goal: Verbalize thoughts and feelings  Description: Interventions:  - Assess and re-assess patient's lethality and potential for self-injury  - Engage patient in 1:1 interactions, daily, for a minimum of 15 minutes  - Encourage patient to express feelings, fears, frustrations, hopes  - Establish rapport/trust with patient   Outcome: Progressing     Problem: Risk for Self Injury/Neglect  Goal: Refrain from harming self  Description: Interventions:  - Monitor patient closely, per order  - Develop a trusting relationship  - Supervise medication ingestion, monitor effects and side effects   Outcome: Progressing     Problem: Risk for Self Injury/Neglect  Goal: Recognize maladaptive responses and adopt new coping mechanisms  Outcome: Progressing     Problem: Risk for Self Injury/Neglect  Goal: Complete daily ADLs, including personal hygiene independently, as able  Description: Interventions:  - Observe, teach, and assist patient with ADLS  - Monitor and promote a balance of rest/activity, with adequate nutrition and elimination  Outcome: Progressing

## 2022-08-28 NOTE — PROGRESS NOTES
Progress Note - Marcela Olszewski 62 y o  male MRN: [id number]    Unit/Bed#: [facility] 204-02 Encounter: [id number]        Subjective:   Patient seen and examined at bedside after reviewing the chart and discussing the case with the caring staff  Patient examined at bedside  Patient has no acute issues  On review of patient's labs it was found that patient's vitamin-D level was 56 8 and B12 level was 620  Physical Exam   Vitals: Blood pressure 132/97, pulse 84, temperature 98 °F (36 7 °C), temperature source Temporal, resp  rate 16, height 5' 11" (1 803 m), weight 61 2 kg (135 lb), SpO2 97 %  ,Body mass index is 18 83 kg/m²  Constitutional: He appears well-developed  HEENT: PERR, EOMI, MMM  Cardiovascular: Normal rate and regular rhythm  Pulmonary/Chest: Effort normal and breath sounds normal    Abdomen: Soft, + BS, NT    Assessment/Plan:  Marcela Olszewski is a(n) 62y o  year old male with Bipolar Disorder     1  Cardiac with history of hypertension and dyslipidemia  Patient will be continued on lisinopril 10 mg daily, fish oil 1000 mg daily, niacin 100 mg daily and aspirin 81 mg daily  2  Arthralgia/headache  Patient may get Tylenol p r n  3  Insomnia  Patient may get trazodone p r n  Henrietta Blend

## 2022-08-28 NOTE — NURSING NOTE
Patient spent the evening out of his room  He is easily engaged and pleasant to interact with  Bright, restricted affect  He denies SI/HI, anxiety, depression, and AH/VH  Reports positive mood  Hyperverbal when conversing with this RN  Expresses gratitude for "All I have at my home"  Compliant with medications  Maintained on Q 7 minute checks

## 2022-08-28 NOTE — PLAN OF CARE
Problem: Depression  Goal: Treatment Goal: Demonstrate behavioral control of depressive symptoms, verbalize feelings of improved mood/affect, and adopt new coping skills prior to discharge  Outcome: Progressing  Goal: Refrain from harming self  Description: Interventions:  - Monitor patient closely, per order   - Supervise medication ingestion, monitor effects and side effects   Outcome: Progressing  Goal: Refrain from isolation  Description: Interventions:  - Develop a trusting relationship   - Encourage socialization   Outcome: Progressing  Goal: Attend and participate in unit activities, including therapeutic, recreational, and educational groups  Description: Interventions:  - Provide therapeutic and educational activities daily, encourage attendance and participation, and document same in the medical record   Outcome: Progressing  Goal: Complete daily ADLs, including personal hygiene independently, as able  Description: Interventions:  - Observe, teach, and assist patient with ADLS  -  Monitor and promote a balance of rest/activity, with adequate nutrition and elimination   Outcome: Progressing

## 2022-08-29 ENCOUNTER — TELEPHONE (OUTPATIENT)
Dept: FAMILY MEDICINE CLINIC | Facility: CLINIC | Age: 57
End: 2022-08-29

## 2022-08-29 PROBLEM — E43 SEVERE PROTEIN-CALORIE MALNUTRITION (HCC): Status: ACTIVE | Noted: 2022-08-29

## 2022-08-29 PROCEDURE — 99232 SBSQ HOSP IP/OBS MODERATE 35: CPT | Performed by: PSYCHIATRY & NEUROLOGY

## 2022-08-29 RX ADMIN — DIVALPROEX SODIUM 250 MG: 250 TABLET, DELAYED RELEASE ORAL at 08:55

## 2022-08-29 RX ADMIN — B-COMPLEX W/ C & FOLIC ACID TAB 1 TABLET: TAB at 08:55

## 2022-08-29 RX ADMIN — CALCIUM CARBONATE (ANTACID) CHEW TAB 500 MG 1000 MG: 500 CHEW TAB at 17:15

## 2022-08-29 RX ADMIN — OLANZAPINE 7.5 MG: 5 TABLET, FILM COATED ORAL at 21:40

## 2022-08-29 RX ADMIN — DIVALPROEX SODIUM 750 MG: 250 TABLET, DELAYED RELEASE ORAL at 21:41

## 2022-08-29 RX ADMIN — Medication 100 MG: at 08:55

## 2022-08-29 RX ADMIN — OMEGA-3 FATTY ACIDS CAP 1000 MG 1000 MG: 1000 CAP at 08:54

## 2022-08-29 RX ADMIN — ZINC SULFATE 220 MG (50 MG) CAPSULE 220 MG: CAPSULE at 08:55

## 2022-08-29 RX ADMIN — ASPIRIN 81 MG CHEWABLE TABLET 81 MG: 81 TABLET CHEWABLE at 08:55

## 2022-08-29 RX ADMIN — LISINOPRIL 10 MG: 10 TABLET ORAL at 08:55

## 2022-08-29 NOTE — PLAN OF CARE
Problem: Ineffective Coping  Goal: Identifies ineffective coping skills  Outcome: Progressing  Goal: Identifies healthy coping skills  Outcome: Progressing  Goal: Free from restraint events  Description: - Utilize least restrictive measures   - Provide behavioral interventions   - Redirect inappropriate behaviors   Outcome: Progressing     Problem: Risk for Self Injury/Neglect  Goal: Verbalize thoughts and feelings  Description: Interventions:  - Assess and re-assess patient's lethality and potential for self-injury  - Engage patient in 1:1 interactions, daily, for a minimum of 15 minutes  - Encourage patient to express feelings, fears, frustrations, hopes  - Establish rapport/trust with patient   Outcome: Progressing  Goal: Refrain from harming self  Description: Interventions:  - Monitor patient closely, per order  - Develop a trusting relationship  - Supervise medication ingestion, monitor effects and side effects   Outcome: Progressing     Problem: Depression  Goal: Refrain from harming self  Description: Interventions:  - Monitor patient closely, per order   - Supervise medication ingestion, monitor effects and side effects   Outcome: Progressing  Goal: Refrain from isolation  Description: Interventions:  - Develop a trusting relationship   - Encourage socialization   Outcome: Progressing

## 2022-08-29 NOTE — ED NOTES
Precertification authorization form was completed and electronically submitted via [email or web address]  Confirmation of same was printed and will be forwarded to [facility] for that Unit

## 2022-08-29 NOTE — MALNUTRITION/BMI
This medical record reflects one or more clinical indicators suggestive of malnutrition and/or morbid obesity  Malnutrition Findings:   Adult Malnutrition type: Chronic illness  Adult Degree of Malnutrition: Other severe protein calorie malnutrition  Malnutrition Characteristics: Fat loss, Muscle loss, Inadequate energy, Weight loss              360 Statement: Malnutrition related to inadequate energy intake as evidenced by 10#(7%) weight loss from 7/28/# to 8/26/# and 31#(19%) weight loss from 2/28/# to 8/26/#, <75% energy intake compared to estimated needs  > 1 month, temporal wasting, sunken orbitals, subcutaneous fat loss orbital/cheek region and extremities  Recommend vanilla ensure enlive TID  BMI Findings: Body mass index is 18 83 kg/m²  See Nutrition note dated 08/29/2022 for additional details  Completed nutrition assessment is viewable in the nutrition documentation

## 2022-08-29 NOTE — PLAN OF CARE
Problem: Ineffective Coping  Goal: Participates in unit activities  Description: Interventions:  - Provide therapeutic environment   - Provide required programming   - Redirect inappropriate behaviors   Outcome: Progressing   Patient is appropriate and interactive with staff and peers  Patient has been working on a list of goals he wants to start working on here at hospital and upon discharge
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
## 2022-08-29 NOTE — PROGRESS NOTES
08/29/22 1030   Activity/Group Checklist   Group Wellness   Attendance Did not attend  (Pt with CM for intake)

## 2022-08-29 NOTE — PROGRESS NOTES
08/29/22 1200   Team Meeting   Meeting Type Tx Team Meeting   Team Members Present   Team Members Present Physician;Nurse;   Physician Team Member Dr Lalit Jacques MD   Nursing Team Member Martin Franklin, RN   Care Management Team Member Roland Quevedo MS, [facility], [facility] [place], [place]   Patient/Family Present   Patient Present Yes   Patient's Family Present No   Reviewed TX plan and goals, all in agreement and signed

## 2022-08-29 NOTE — PROGRESS NOTES
08/29/22 0745   Activity/Group Checklist   Group Community meeting  (Patient check in with coffee/ patients eating and drink in room for covid precautions)   Attendance Attended   Attendance Duration (min) 31-45   Interactions Interacted appropriately   Affect/Mood Appropriate   Goals Achieved Identified feelings; Able to listen to others; Able to engage in interactions; Able to self-disclose; Able to recieve feedback

## 2022-08-29 NOTE — PROGRESS NOTES
Progress Note - Behavioral Health   This note was not shared with the patient due to reasonable likelihood of causing patient harm  Marivel Stable 62 y o  male MRN: [id number]   Unit/Bed#: [facility] 204-02 Encounter: [id number]    Behavior over the last 24 hours: limited improvement  Sleep: slept better  Appetite: normal  Medication side effects: denies  ROS: all other systems are negative    Mental Status Evaluation:    Appearance:  age appropriate, bearded   Behavior:  cooperative   Speech:  increased rate   Mood:  improved   Affect:  increased in intensity   Thought Process:  increased rate of thoughts   Associations: intact associations   Thought Content:  no overt delusions   Perceptual Disturbances: denies auditory hallucinations when asked   Risk Potential: Suicidal ideation - None at present  Homicidal ideation - None at present   Sensorium:  oriented to person, place and time/date   Memory:  recent and remote memory grossly intact   Consciousness:  alert and awake   Attention: attention span and concentration are age appropriate   Insight:  limited   Judgment: limited   Gait/Station: normal gait/station   Motor Activity: no abnormal movements     Vital signs in last 24 hours:    Temp:  [97 6 °F (36 4 °C)-98 6 °F (37 °C)] 97 6 °F (36 4 °C)  HR:  [78-98] 78  Resp:  [18-19] 18  BP: (137-139)/(87-88) 139/87    Laboratory results:   I have personally reviewed all pertinent laboratory/tests results    Most Recent Labs:   Lab Results   Component Value Date    WBC 7 01 08/25/2022    RBC 4 33 08/25/2022    HGB 14 3 08/25/2022    HCT 42 1 08/25/2022     08/25/2022    RDW 12 1 08/25/2022    NEUTROABS 3 94 08/25/2022    SODIUM 139 08/27/2022    K 4 2 08/27/2022     08/27/2022    CO2 28 08/27/2022    BUN 18 08/27/2022    CREATININE 0 92 08/27/2022    GLUC 97 08/27/2022    GLUF 97 08/27/2022    CALCIUM 10 2 08/27/2022    AST 96 (H) 08/27/2022    ALT 96 (H) 08/27/2022    ALKPHOS 55 08/27/2022    TP 7 8 08/27/2022    ALB 4 7 08/27/2022    TBILI 0 79 08/27/2022    CHOLESTEROL 167 02/17/2022    HDL 61 02/17/2022    TRIG 86 02/17/2022    LDLCALC 89 02/17/2022    NONHDLC 106 02/17/2022    PBM0WRBGZPVC 2 640 05/24/2022    HGBA1C 5 5 05/24/2022       Assessment/Plan   Principal Problem:    Bipolar disorder, curr episode manic w/o psychotic features, moderate (HCC)  Active Problems:    Primary hypertension    H/O chronic hepatitis    Recommended Treatment:     Planned medication and treatment changes: All current active medications have been reviewed  Encourage group therapy, milieu therapy and occupational therapy  Behavioral Health checks every 7 minutes   Pt agreed to be adherence to meds, treatment plan and signed 201 status    Increase Zyprexa to 7 5 mg po hs    Current Facility-Administered Medications   Medication Dose Route Frequency Provider Last Rate    acetaminophen  650 mg Oral Q4H PRN Georgina L Dagnall, PA-C      acetaminophen  650 mg Oral Q4H PRN Georgina L Dagnall, PA-C      acetaminophen  975 mg Oral Q6H PRN Georginamelania Leel, PA-C      aspirin  81 mg Oral Daily Georgina Rodriguez, PA-C      calcium carbonate  1,000 mg Oral TID PRN [facility] Angelica Plaza MD      divalproex sodium  250 mg Oral Daily Ute Tsai MD      divalproex sodium  750 mg Oral HS Ute Tsai MD      fish oil  1,000 mg Oral Daily Keila Ball MD      hydrOXYzine HCL  25 mg Oral Q6H PRN Ute Tsai MD      hydrOXYzine HCL  50 mg Oral Q6H PRN Ute Tsai MD      lisinopril  10 mg Oral Daily Georgina Rodriguez, PA-C      LORazepam  0 5 mg Oral Q8H PRN Ute Tsai MD      multivitamin stress formula  1 tablet Oral Daily Georgina Rodriguez, PA-C      niacin  100 mg Oral Daily With Breakfast Keila Ball MD      OLANZapine  5 mg Intramuscular TID PRN Ute Tsai MD      OLANZapine  5 mg Oral HS [facility] Angelica Plaza MD      polyethylene glycol  17 g Oral Daily PRN Georginamelanai Rodriguez, PA-C      risperiDONE  0 5 mg Oral TID PRN Zoran Romo MD      risperiDONE  1 mg Oral TID PRN Zoran Romo MD      traZODone  50 mg Oral HS PRN Zoran Romo MD      zinc sulfate  220 mg Oral Daily Diogo Arias MD         Risks / Benefits of Treatment:    Risks, benefits, and possible side effects of medications explained to patient and patient verbalizes understanding and agreement for treatment  Counseling / Coordination of Care:    Patient's progress discussed with staff in treatment team meeting  Medications, treatment progress and treatment plan reviewed with patient  Supportive therapy provided to patient  Group attendance encouraged      Merary Rocha MD 08/29/22

## 2022-08-29 NOTE — PROGRESS NOTES
Progress Note - Carrie Kaplan 62 y o  male MRN: [id number]    Unit/Bed#: Ger Winters 204-02 Encounter: [id number]        Subjective:   Patient seen and examined at bedside after reviewing the chart and discussing the case with the caring staff  Patient examined at bedside  Patient has no acute issues  Physical Exam   Vitals: Blood pressure 139/87, pulse 78, temperature 97 6 °F (36 4 °C), temperature source Temporal, resp  rate 18, height 5' 11" (1 803 m), weight 61 2 kg (135 lb), SpO2 99 %  ,Body mass index is 18 83 kg/m²  Constitutional: He appears well-developed  HEENT: PERR, EOMI, MMM  Cardiovascular: Normal rate and regular rhythm  Pulmonary/Chest: Effort normal and breath sounds normal    Abdomen: Soft, + BS, NT    Assessment/Plan:  Carrie Kaplan is a(n) 62y o  year old male with Bipolar Disorder     1  Cardiac with history of hypertension and dyslipidemia  Patient will be continued on lisinopril 10 mg daily, fish oil 1000 mg daily, niacin 100 mg daily and aspirin 81 mg daily  2  Arthralgia/headache  Patient may get Tylenol p r n  3  Insomnia  Patient may get trazodone p r n  Adrianna Das
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
## 2022-08-29 NOTE — TELEPHONE ENCOUNTER
Sahil Lucas with [facility] called in  She wanted to let you know pt is currently admitted      Sahil Rota: [phone]
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
## 2022-08-29 NOTE — NURSING NOTE
Presents with euthymia;denies depression,anxiety,SI,HI,AH,VH  Attends groups,visible on unit,pleasant,cooperative,invested in treatment  We discussed the importance and rationale of taking medications as prescribed post DC:stated understanding and affirms he wants to live long and be around for grandchildren  Will continue to educate,monitor,and provide safe,therapeutic milieu

## 2022-08-29 NOTE — PROGRESS NOTES
08/29/22 1015   Activity/Group Checklist   Group Admission/Discharge   Attendance Attended   Attendance Duration (min) 31-45   Interactions Interacted appropriately   Affect/Mood Appropriate;Bright   Goals Achieved Identified feelings; Identified triggers; Identified relapse prevention strategies; Discussed coping strategies; Increased hopefulness; Identified distorted thoughts/beliefs; Identified resources and support systems; Able to listen to others; Able to engage in interactions; Able to reflect/comment on own behavior;Able to manage/cope with feelings; Able to self-disclose     AT met with PT to complete self assessment and relapse prevention plan  PT displayed a bright mood and affect, maintained good eye contact and was pleasant and cooperative throughout conversation  PT displayed fair insight into his illness and recovery and easily shared his thoughts and feelings  PT began the conversation by sharing his "strategy, action, goal" (please see plan attached) plan with AT and spoke in detail about the goals that he has set for himself to be proactive in his recovery journey and to set himself up with the outpatient providers and services that he feels will best suit his needs  PT reported that this is his first inpatient psychiatric stay and that his biggest stressor that led to this admission is that he feels like his medications stopped working as needed for his mood stabilization  His challenges include getting his aftercare plan in place and finding an AA Homegroup and a new sponsor  PT reported being clean and sober for approximately thirteen years now  What pt likes most about life is his family, grandchildren and the process of changing his behavior to feel more stable in his emotions  What pt likes least about life right now is that he tried to go about the grief process alone and that he hasn't been able to process his mother's death   PT reported interest in setting up grief counseling in the outpatient community and stated that many of his family members work in the medical field and his sister has offered him some contacts for grief counseling support groups in his community  PT graduated college and trade school and also obtained a degree in massage therapy that he would 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  PT is currently unemployed and 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  PT reports that he feels numerous losses recently with the passing of his mother and tending to her estate, along with his employment  PT enjoys spending time with his dogs and hiking the trails, reading, music and singing and exercise  PT would like to work on improving self esteem, being more assertive, coping with the symptoms is his illness and community support and resources here in the hospital  PT will know that he is ready for discharge when he has his aftercare plan in place, makes his appointments, gets back to [place] [id number], finds a sponsor and remains active in his recovery  PT also completed the RP plan where he identified his warning signs to reach out for help as not communicating well with others and closing his mind to better options, isolating, and not taking care of himself spiritually, mentally and physically  Pt's positive coping skills include attending AA on a regular basis, calling people and using multiple support groups, and keeping all of his appointments  PT identified [facility] staff, AA and sponsors and family and friends as a healthy support system  PT has no further questions or concerns at this time and AT offered pt support and encouragement for pt to reach out to therapy staff when needed

## 2022-08-29 NOTE — NURSING NOTE
Patient observed visible on unit, social with peers  Calm and cooperative with staff interactions  Medication compliant, with a positive attitude r/t new medication order  Denies anxiety depression SI/HI and hallucinations  Able to make needs known  PRN tylenol given for a headache of 6/10, which appears to have been effective as patient is currently sleeping at this time   Will continue to monitor via q7 minute safety checks

## 2022-08-29 NOTE — NUTRITION
08/29/22 1602   Biochemical Data,Medical Tests, and Procedures   Biochemical Data/Medical Tests/Procedures Meds reviewed;Lab values reviewed   Labs (Comment) 8/27 AST:96, ALT:96, folate:>20 0   Meds (Comment) ASA, tums, depakote, fish oil, zestril, MVI, niacin, zyprexa, risperdal, zincate   Nutrition-Focused Physical Exam   Nutrition-Focused Physical Exam Findings RN skin assessment reviewed; No skin issues documented   Medical-Related Concerns dyslipidemia, hepatitis C, HTN, manic depression   Adequacy of Intake   Nutrition Modality PO   Feeding Route   PO Independent   Current PO Intake   Current Diet Order Regular diet thin liquids   Current Meal Intake %   Estimated calorie intake compared to estimated need Currently meeting nutrient needs  PES Statement   Problem Clinical   Weight (3) Unintended weight loss NC-3 2   Related to Other (Comment); Decreased appetite  (psychiatric illness)   As evidenced by: Weight loss   Recommendations/Interventions   Malnutrition/BMI Present Yes   Adult Malnutrition type Chronic illness   Adult Degree of Malnutrition Other severe protein calorie malnutrition   Malnutrition Characteristics Fat loss;Muscle loss; Inadequate energy;Weight loss   360 Statement Malnutrition related to inadequate energy intake as evidenced by 10#(7%) weight loss from 7/28/# to 8/26/# and 31#(19%) weight loss from 2/28/# to 8/26/#, <75% energy intake compared to estimated needs  > 1 month, temporal wasting, sunken orbitals, subcutaneous fat loss orbital/cheek region and extremities  Summary Weight loss  Regular diet thin liquids  Meal completions 100%  Patient states his appetite is okay  He reports suffering weight loss and have decreased appetite PTA due to stressors in his life  He states he has been trying to make changes to his diet to promote weight gain  He states he follows a high protein diet and consumes 5 small meals per day   He also reports consuming protein shakes QID and drinks 2 gallons of water daily  He states he is very active at home and strives to complete 20,000 steps per day  He reports striving to consume 5,000-6,000 calories daily  8/26/#; 7/28/#, 10#(7%) loss in 1 month; 2/28/#, 31#(19%) weight loss in 6 months  He reports his usual weight is ~165#  Skin intact  Discussed supplements, he is agreeable to vanilla ensure enlive TID  Interventions/Recommendations Continue current diet order;Supplement initiate   Intervention Comments vanilla ensure enlive TID   Education Assessment   Education Education not indicated at this time   Patient Nutrition Goals   Goal Avoid weight loss; Adequate intake   Goal Status Initiated   Timeframe to complete goal by next f/u   Nutrition Complexity Risk   Nutrition complexity level Moderate risk   Follow up date 09/08/22

## 2022-08-30 ENCOUNTER — TELEPHONE (OUTPATIENT)
Dept: PSYCHIATRY | Facility: CLINIC | Age: 57
End: 2022-08-30

## 2022-08-30 PROCEDURE — 99232 SBSQ HOSP IP/OBS MODERATE 35: CPT | Performed by: NURSE PRACTITIONER

## 2022-08-30 RX ORDER — OLANZAPINE 10 MG/1
10 TABLET ORAL
Status: DISCONTINUED | OUTPATIENT
Start: 2022-08-30 | End: 2022-09-02 | Stop reason: HOSPADM

## 2022-08-30 RX ADMIN — B-COMPLEX W/ C & FOLIC ACID TAB 1 TABLET: TAB at 08:34

## 2022-08-30 RX ADMIN — OMEGA-3 FATTY ACIDS CAP 1000 MG 1000 MG: 1000 CAP at 08:35

## 2022-08-30 RX ADMIN — ASPIRIN 81 MG CHEWABLE TABLET 81 MG: 81 TABLET CHEWABLE at 08:34

## 2022-08-30 RX ADMIN — OLANZAPINE 10 MG: 10 TABLET, FILM COATED ORAL at 21:48

## 2022-08-30 RX ADMIN — DIVALPROEX SODIUM 250 MG: 250 TABLET, DELAYED RELEASE ORAL at 08:34

## 2022-08-30 RX ADMIN — LISINOPRIL 10 MG: 10 TABLET ORAL at 08:34

## 2022-08-30 RX ADMIN — Medication 100 MG: at 08:34

## 2022-08-30 RX ADMIN — DIVALPROEX SODIUM 750 MG: 250 TABLET, DELAYED RELEASE ORAL at 21:47

## 2022-08-30 RX ADMIN — ZINC SULFATE 220 MG (50 MG) CAPSULE 220 MG: CAPSULE at 08:35

## 2022-08-30 NOTE — PROGRESS NOTES
08/30/22    Team Meeting   Meeting Type Daily Rounds   Team Members Present   Team Members Present Physician;Nurse;   Physician Team Member Dr Lina Mina MD   Nursing Team Member Marah Washburn, RN   Care Management Team Member Connie Murphy , [facility] [place] [facility], [facility]   Social Work Team Member Grecia Campos, [place]   Patient/Family Present   Patient Present No   Patient's Family Present No   PT reports he is doing well and ready for D/C  Inquired about 67 but agreed to speak with provider first  PT will D/C Friday to home, psych referral to be made  Goal oriented  Depakote level due Thursday am  Medication adjustment

## 2022-08-30 NOTE — PROGRESS NOTES
Progress Note - Tio Carranza 62 y o  male MRN: [id number]    Unit/Bed#: [facility] 204-02 Encounter: [id number]        Subjective:   Patient seen and examined at bedside after reviewing the chart and discussing the case with the caring staff  Patient examined at bedside  Patient has no acute issues  Physical Exam   Vitals: Blood pressure 127/95, pulse 93, temperature (!) 97 3 °F (36 3 °C), temperature source Temporal, resp  rate 18, height 5' 11" (1 803 m), weight 61 2 kg (135 lb), SpO2 97 %  ,Body mass index is 18 83 kg/m²  Constitutional: He appears well-developed  HEENT: PERR, EOMI, MMM  Cardiovascular: Normal rate and regular rhythm  Pulmonary/Chest: Effort normal and breath sounds normal    Abdomen: Soft, + BS, NT    Assessment/Plan:  Tio Carranza is a(n) 62y o  year old male with Bipolar Disorder     1  Cardiac with history of hypertension and dyslipidemia  Patient will be continued on lisinopril 10 mg daily, fish oil 1000 mg daily, niacin 100 mg daily and aspirin 81 mg daily  2  Arthralgia/headache  Patient may get Tylenol p r n  3  Insomnia  Patient may get trazodone p r n  Davonte Barone

## 2022-08-30 NOTE — SOCIAL WORK
Cm received message back via [employer] via [address] [place] [address] outpatient psych indicated that PT is scheduled for 9/15 at 9 am to see Dr Yanira Suárez for medication management appointment at our [place] location; with a 4 week follow up on 10/13 at 9 am

## 2022-08-30 NOTE — PROGRESS NOTES
08/30/22 1000   Activity/Group Checklist   Group   (Creative Expression of Emotions Art Therapy Processing)   Attendance Attended   Attendance Duration (min) Greater than 60   Interactions Interacted appropriately   Affect/Mood Appropriate;Bright;Calm   Goals Achieved Identified feelings; Identified relapse prevention strategies; Discussed coping strategies; Discussed discharge plans; Discussed self-esteem issues; Increased hopefulness; Displayed empathy;Identified resources and support systems; Able to listen to others; Able to engage in interactions; Able to reflect/comment on own behavior;Able to manage/cope with feelings

## 2022-08-30 NOTE — PLAN OF CARE
Problem: Ineffective Coping  Goal: Identifies healthy coping skills  Outcome: Progressing  Goal: Participates in unit activities  Description: Interventions:  - Provide therapeutic environment   - Provide required programming   - Redirect inappropriate behaviors   Outcome: Progressing     Problem: Risk for Self Injury/Neglect  Goal: Verbalize thoughts and feelings  Description: Interventions:  - Assess and re-assess patient's lethality and potential for self-injury  - Engage patient in 1:1 interactions, daily, for a minimum of 15 minutes  - Encourage patient to express feelings, fears, frustrations, hopes  - Establish rapport/trust with patient   Outcome: Progressing  Goal: Refrain from harming self  Description: Interventions:  - Monitor patient closely, per order  - Develop a trusting relationship  - Supervise medication ingestion, monitor effects and side effects   Outcome: Progressing  Goal: Attend and participate in unit activities, including therapeutic, recreational, and educational groups  Description: Interventions:  - Provide therapeutic and educational activities daily, encourage attendance and participation, and document same in the medical record  - Obtain collateral information, encourage visitation and family involvement in care   Outcome: Progressing     Problem: Anxiety  Goal: Anxiety is at manageable level  Description: Interventions:  - Assess and monitor patient's anxiety level  - Monitor for signs and symptoms (heart palpitations, chest pain, shortness of breath, headaches, nausea, feeling jumpy, restlessness, irritable, apprehensive)  - Collaborate with interdisciplinary team and initiate plan and interventions as ordered    - [place] patient to unit/surroundings  - Explain treatment plan  - Encourage participation in care  - Encourage verbalization of concerns/fears  - Identify coping mechanisms  - Assist in developing anxiety-reducing skills  - Administer/offer alternative therapies  - Limit or eliminate stimulants  Outcome: Progressing

## 2022-08-30 NOTE — TELEPHONE ENCOUNTER
Patient discharge date 9/2     Patient has been scheduled w   Dr Anisa Albright for med mgmt appointment on 9/15 @ 9 am w  4 wk f/u appointment 10/ 13 @ 9a

## 2022-08-30 NOTE — SOCIAL WORK
Patient sent message via [employer] to [facility] referral for psych follow up  Pending response

## 2022-08-30 NOTE — PROGRESS NOTES
Progress Note - Behavioral Health     Virgil Bean 62 y o  male MRN: [id number]   Unit/Bed#: [facility] 204-02 Encounter: [id number]    Behavior over the last 24 hours: improved  Zain Bocanegra is seen in the community room present  Presents fairly bright and hyperverbal, but responds to redirection  Some improved insight and reports now he is glad he came to the hospital, now sees that he needed it  Feels the Depakote is helping his arsh  He is for Valproic acid level on Thursday and will increase Zyprexa to 10 mg for adjunct mood stabilizing  Participates appropriately in milieu  Sleep: slept off and on  Appetite: fair  Medication side effects: No   ROS: all other systems are negative    Mental Status Evaluation:    Appearance:  age appropriate   Behavior:  pleasant, cooperative   Speech:  hypertalkative   Mood:  euphoric   Affect:  overbright   Thought Process:  tangential   Associations: circumstantial associations   Thought Content:  no overt delusions   Perceptual Disturbances: none   Risk Potential: Suicidal ideation - None  Homicidal ideation - None  Potential for aggression - No   Sensorium:  oriented to person, place and time/date   Memory:  recent and remote memory grossly intact   Consciousness:  alert and awake   Attention: decreased concentration and decreased attention span   Insight:  limited   Judgment: limited   Gait/Station: normal gait/station, normal balance   Motor Activity: no abnormal movements     Vital signs in last 24 hours:    Temp:  [97 3 °F (36 3 °C)-98 9 °F (37 2 °C)] 97 3 °F (36 3 °C)  HR:  [72-93] 93  Resp:  [18] 18  BP: (127-135)/(84-95) 127/95    Laboratory results: I have personally reviewed all pertinent laboratory/tests results        Progress Toward Goals: continues to improve    Assessment/Plan   Principal Problem:    Bipolar disorder, curr episode manic w/o psychotic features, moderate (HCC)  Active Problems:    Primary hypertension    H/O chronic hepatitis    Severe protein-calorie malnutrition ([facility] [address] )    Recommended Treatment:     Planned medication and treatment changes: All current active medications have been reviewed  Encourage group therapy, milieu therapy and occupational therapy  Behavioral Health checks every 7 minutes    Requires continued inpatient treatment due to chronic illness and high risk of decompensation if discharged before long term stability is achieved      Increase Zyprexa 10 mg  Check Depakote level in 2 days  Current Facility-Administered Medications   Medication Dose Route Frequency Provider Last Rate    acetaminophen  650 mg Oral Q4H PRN Georgina L Dagnall, PA-C      acetaminophen  650 mg Oral Q4H PRN Georgina L Dagnall, PA-C      acetaminophen  975 mg Oral Q6H PRN Georgina L Dagnall, PA-C      aspirin  81 mg Oral Daily Georgina TANI Dagnall, PA-C      calcium carbonate  1,000 mg Oral TID PRN [facility] Cortez Dominguez MD      divalproex sodium  250 mg Oral Daily Karen Hall MD      divalproex sodium  750 mg Oral HS Karen Hall MD      fish oil  1,000 mg Oral Daily Fernanda Thrasher MD      hydrOXYzine HCL  25 mg Oral Q6H PRN Karen Hall MD      hydrOXYzine HCL  50 mg Oral Q6H PRN Karen Hall MD      lisinopril  10 mg Oral Daily Georgina Rodriguez, PA-C      LORazepam  0 5 mg Oral Q8H PRN Karen Hall MD      multivitamin stress formula  1 tablet Oral Daily Georgina Rodriguez, PA-C      niacin  100 mg Oral Daily With Breakfast Fernanda Thrasher MD      OLANZapine  5 mg Intramuscular TID PRN Karen Hall MD      OLANZapine  7 5 mg Oral HS Karen Hall MD      polyethylene glycol  17 g Oral Daily PRN Georgina Rodriguez, PA-C      risperiDONE  0 5 mg Oral TID PRN Karen Hall MD      risperiDONE  1 mg Oral TID PRN Karen Hall MD      traZODone  50 mg Oral HS PRN Karen Hall MD      zinc sulfate  220 mg Oral Daily Fernanda Thrasher MD         Risks / Benefits of Treatment:    Risks, benefits, and possible side effects of medications explained to patient and patient verbalizes understanding and agreement for treatment  Counseling / Coordination of Care:    Patient's progress discussed with staff in treatment team meeting  Medications, treatment progress and treatment plan reviewed with patient      LESLY Zamora 08/30/22

## 2022-08-30 NOTE — NURSING NOTE
Pt was sitting in dayroom interacting with staff and peers  Pt was pleasant, calm, and corporative with care  Pt denies SI/HI, anxiety, and AVH  Pt was medication compliant and able to make needs known  Pt slept all night  Pt was not in acute distress and safety checks were completed and maintained

## 2022-08-30 NOTE — NURSING NOTE
Patient visible in milieu, pleasant and cooperative in interaction  Social with staff and peers  Brightens upon approach  Patient denies anxiety/depression, SI/HI, hallucinations  Remains medication compliant and on 7" checks for safety and behaviors

## 2022-08-30 NOTE — PROGRESS NOTES
08/30/22 0730   Activity/Group Checklist   Group   (Morning Wake-Up and Daily Check-In with Covid Precautions)   Attendance Attended   Attendance Duration (min) 31-45   Interactions Interacted appropriately   Affect/Mood Appropriate;Bright;Calm   Goals Achieved Identified feelings; Identified triggers; Identified relapse prevention strategies; Discussed coping strategies; Increased hopefulness; Identified resources and support systems; Able to listen to others; Able to engage in interactions; Able to reflect/comment on own behavior;Able to manage/cope with feelings

## 2022-08-30 NOTE — SOCIAL WORK
SW met with pt for check in  Pt d/c focused and discussed taking accountability of MH  Pt denies all psych symptoms and states medications are working well  Pt reports feeling calm  Pt does report some future stress related to having to move from [employer]  Pt states sleep has been broken due to not sleeping in his home  Sw reviewed d/c planning with SL OP

## 2022-08-31 PROCEDURE — 99232 SBSQ HOSP IP/OBS MODERATE 35: CPT | Performed by: NURSE PRACTITIONER

## 2022-08-31 RX ADMIN — B-COMPLEX W/ C & FOLIC ACID TAB 1 TABLET: TAB at 08:04

## 2022-08-31 RX ADMIN — DIVALPROEX SODIUM 750 MG: 250 TABLET, DELAYED RELEASE ORAL at 21:46

## 2022-08-31 RX ADMIN — ASPIRIN 81 MG CHEWABLE TABLET 81 MG: 81 TABLET CHEWABLE at 08:04

## 2022-08-31 RX ADMIN — LISINOPRIL 10 MG: 10 TABLET ORAL at 08:04

## 2022-08-31 RX ADMIN — OLANZAPINE 10 MG: 10 TABLET, FILM COATED ORAL at 21:46

## 2022-08-31 RX ADMIN — CALCIUM CARBONATE (ANTACID) CHEW TAB 500 MG 1000 MG: 500 CHEW TAB at 21:51

## 2022-08-31 RX ADMIN — Medication 100 MG: at 08:06

## 2022-08-31 RX ADMIN — OMEGA-3 FATTY ACIDS CAP 1000 MG 1000 MG: 1000 CAP at 08:04

## 2022-08-31 RX ADMIN — DIVALPROEX SODIUM 250 MG: 250 TABLET, DELAYED RELEASE ORAL at 08:03

## 2022-08-31 RX ADMIN — ZINC SULFATE 220 MG (50 MG) CAPSULE 220 MG: CAPSULE at 08:04

## 2022-08-31 NOTE — PLAN OF CARE
Problem: Ineffective Coping  Goal: Participates in unit activities  Description: Interventions:  - Provide therapeutic environment   - Provide required programming   - Redirect inappropriate behaviors   Outcome: Progressing   Patient joins groups and openly shares in groups setting, he has insight and has been focused on achieving his goals toward discharge

## 2022-08-31 NOTE — PROGRESS NOTES
08/31/22 0780   Activity/Group Checklist   Group Community meeting  (Patient check in with coffee/ patients eating and drink in room for covid precautions)   Attendance Attended   Attendance Duration (min) 31-45   Interactions Interacted appropriately   Affect/Mood Appropriate;Bright   Goals Achieved Identified feelings; Able to listen to others; Able to engage in interactions; Able to self-disclose; Able to recieve feedback

## 2022-08-31 NOTE — PROGRESS NOTES
08/31/22 1045   Activity/Group Checklist   Group Personal control   Attendance Attended   Attendance Duration (min) 46-60   Interactions Interacted appropriately   Affect/Mood Appropriate   Goals Achieved Identified feelings; Identified triggers; Discussed coping strategies; Able to listen to others; Able to engage in interactions; Able to reflect/comment on own behavior;Able to manage/cope with feelings; Able to self-disclose; Able to recieve feedback

## 2022-08-31 NOTE — NURSING NOTE
Patient spent the evening out of his room  Easily engaged  Bright affect  Denies SI/HI, depression and AH/VH  Endorses mild anxiety 2/10 related to plans for discharge back to home  Also verbalizes hopefulness about his future and plans to begin marriage counseling  Identifies support that he has from his family  Demonstrates good insight  Compliant with medications  Maintained on Q 7 minute checks

## 2022-08-31 NOTE — PROGRESS NOTES
Progress Note - Agueda Castellanos 62 y o  male MRN: [id number]    Unit/Bed#: [facility] 204-02 Encounter: [id number]        Subjective:   Patient seen and examined at bedside after reviewing the chart and discussing the case with the caring staff  Patient examined at bedside  Patient has no acute complaints  Physical Exam   Vitals: Blood pressure 135/79, pulse 90, temperature 98 2 °F (36 8 °C), temperature source Temporal, resp  rate 16, height 5' 11" (1 803 m), weight 61 2 kg (135 lb), SpO2 99 %  ,Body mass index is 18 83 kg/m²  Constitutional:  Patient in no acute distress  HEENT: PERR, EOMI, MMM  Cardiovascular: Normal rate and regular rhythm  Pulmonary/Chest: Effort normal and breath sounds normal    Abdomen:  Nondistended  Assessment/Plan:  Agueda Castellanos is a(n) 62y o  year old male with bipolar disorder      1  Cardiac with history of HTN, dyslipidemia  Continue lisinopril 10 mg daily, fish oil 1000 mg daily, niacin 100 mg daily and ASA 81 mg daily  2  DJD/OA/headache  Tylenol as needed  3  Insomnia  Trazodone as needed  The patient was discussed with Dr Jonnie Hancock and he is in agreement with the above note

## 2022-08-31 NOTE — PROGRESS NOTES
08/31/22   Team Meeting   Meeting Type Daily Rounds   Team Members Present   Team Members Present Physician;Nurse;;; Occupational Therapist   Physician Team Member Dr Layla Cristobal MD   Nursing Team Member Rose Mary Starks RN   Care Management Team Member Connie Nicholson [facility], [facility], [facility]   Social Work Team Member Queenie Quinonez [facility]   OT Team Member Merlene Hugo [place]   Patient/Family Present   Patient Present No   Patient's Family Present No     Visible on unit; less euphoric than yesterday; denies SI/HI/AH/VH/dep; endorses anx related to DC

## 2022-08-31 NOTE — PLAN OF CARE
Problem: Ineffective Coping  Goal: Patient/Family participate in treatment and DC plans  Description: Interventions:  - Provide therapeutic environment  Outcome: Progressing  Goal: Patient/Family verbalizes awareness of resources  Outcome: Progressing     Problem: Risk for Self Injury/Neglect  Goal: Treatment Goal: Remain safe during length of stay, learn and adopt new coping skills, and be free of self-injurious ideation, impulses and acts at the time of discharge  Outcome: Progressing  Goal: Attend and participate in unit activities, including therapeutic, recreational, and educational groups  Description: Interventions:  - Provide therapeutic and educational activities daily, encourage attendance and participation, and document same in the medical record  - Obtain collateral information, encourage visitation and family involvement in care   Outcome: Progressing  Goal: Recognize maladaptive responses and adopt new coping mechanisms  Outcome: Progressing     Problem: Depression  Goal: Treatment Goal: Demonstrate behavioral control of depressive symptoms, verbalize feelings of improved mood/affect, and adopt new coping skills prior to discharge  Outcome: Progressing  Goal: Attend and participate in unit activities, including therapeutic, recreational, and educational groups  Description: Interventions:  - Provide therapeutic and educational activities daily, encourage attendance and participation, and document same in the medical record   Outcome: Progressing     Problem: Anxiety  Goal: Anxiety is at manageable level  Description: Interventions:  - Assess and monitor patient's anxiety level  - Monitor for signs and symptoms (heart palpitations, chest pain, shortness of breath, headaches, nausea, feeling jumpy, restlessness, irritable, apprehensive)  - Collaborate with interdisciplinary team and initiate plan and interventions as ordered    - [place] patient to unit/surroundings  - Explain treatment plan  - Encourage participation in care  - Encourage verbalization of concerns/fears  - Identify coping mechanisms  - Assist in developing anxiety-reducing skills  - Administer/offer alternative therapies  - Limit or eliminate stimulants  Outcome: Progressing     Problem: DISCHARGE PLANNING - CARE MANAGEMENT  Goal: Discharge to post-acute care or home with appropriate resources  Description: INTERVENTIONS:  - Conduct assessment to determine patient/family and health care team treatment goals, and need for post-acute services based on payer coverage, community resources, and patient preferences, and barriers to discharge  - Address psychosocial, clinical, and financial barriers to discharge as identified in assessment in conjunction with the patient/family and health care team  - Arrange appropriate level of post-acute services according to patients   needs and preference and payer coverage in collaboration with the physician and health care team  - Communicate with and update the patient/family, physician, and health care team regarding progress on the discharge plan  - Arrange appropriate transportation to post-acute venues  Outcome: Progressing

## 2022-08-31 NOTE — NURSING NOTE
Patient is present in milieu for group and meals  Social with peers  Affect bright and mood is anxious  Pt denies SI/HI/AH/VH  No behaviors noted  Q 7 min checks maintained

## 2022-08-31 NOTE — PLAN OF CARE
Problem: Ineffective Coping  Goal: Identifies ineffective coping skills  Outcome: Adequate for Discharge  Goal: Identifies healthy coping skills  Outcome: Adequate for Discharge  Goal: Demonstrates healthy coping skills  Outcome: Adequate for Discharge  Goal: Participates in unit activities  Description: Interventions:  - Provide therapeutic environment   - Provide required programming   - Redirect inappropriate behaviors   Outcome: Adequate for Discharge  Goal: Understands least restrictive measures  Description: Interventions:  - Utilize least restrictive behavior  Outcome: Adequate for Discharge  Goal: Free from restraint events  Description: - Utilize least restrictive measures   - Provide behavioral interventions   - Redirect inappropriate behaviors   Outcome: Adequate for Discharge     Problem: Risk for Self Injury/Neglect  Goal: Verbalize thoughts and feelings  Description: Interventions:  - Assess and re-assess patient's lethality and potential for self-injury  - Engage patient in 1:1 interactions, daily, for a minimum of 15 minutes  - Encourage patient to express feelings, fears, frustrations, hopes  - Establish rapport/trust with patient   Outcome: Adequate for Discharge  Goal: Refrain from harming self  Description: Interventions:  - Monitor patient closely, per order  - Develop a trusting relationship  - Supervise medication ingestion, monitor effects and side effects   Outcome: Adequate for Discharge  Goal: Complete daily ADLs, including personal hygiene independently, as able  Description: Interventions:  - Observe, teach, and assist patient with ADLS  - Monitor and promote a balance of rest/activity, with adequate nutrition and elimination  Outcome: Adequate for Discharge     Problem: Depression  Goal: Refrain from harming self  Description: Interventions:  - Monitor patient closely, per order   - Supervise medication ingestion, monitor effects and side effects   Outcome: Adequate for Discharge  Goal: Refrain from isolation  Description: Interventions:  - Develop a trusting relationship   - Encourage socialization   Outcome: Adequate for Discharge  Goal: Complete daily ADLs, including personal hygiene independently, as able  Description: Interventions:  - Observe, teach, and assist patient with ADLS  -  Monitor and promote a balance of rest/activity, with adequate nutrition and elimination   Outcome: Adequate for Discharge     Problem: Nutrition/Hydration-ADULT  Goal: Nutrient/Hydration intake appropriate for improving, restoring or maintaining nutritional needs  Description: Monitor and assess patient's nutrition/hydration status for malnutrition  Collaborate with interdisciplinary team and initiate plan and interventions as ordered  Monitor patient's weight and dietary intake as ordered or per policy  Utilize nutrition screening tool and intervene as necessary  Determine patient's food preferences and provide high-protein, high-caloric foods as appropriate       INTERVENTIONS:  - Monitor oral intake, urinary output, labs, and treatment plans  - Assess nutrition and hydration status and recommend course of action  - Evaluate amount of meals eaten  - Assist patient with eating if necessary   - Allow adequate time for meals  - Recommend/ encourage appropriate diets, oral nutritional supplements, and vitamin/mineral supplements  - Order, calculate, and assess calorie counts as needed  - Recommend, monitor, and adjust tube feedings and TPN/PPN based on assessed needs  - Assess need for intravenous fluids  - Provide specific nutrition/hydration education as appropriate  - Include patient/family/caregiver in decisions related to nutrition  Outcome: Adequate for Discharge

## 2022-08-31 NOTE — PROGRESS NOTES
Progress Note - Behavioral Health     Tara Rowan 62 y o  male MRN: [id number]   Unit/Bed#: [facility] 204-02 Encounter: [id number]    Behavior over the last 24 hours: unchanged  Jose Lezama is seen in his room eating lunch  He continues overly bright and can be rambling/tangential in conversation  He reports mood continues to stabilize and is grateful for hospitalization  Hypomanic, but cooperative and pleasant  For Valproic Acid level and liver enzymes in AM     Participates appropriately in milieu  Sleep: slept off and on  Appetite: fair  Medication side effects: No   ROS: all other systems are negative    Mental Status Evaluation:    Appearance:  marginal hygiene   Behavior:  cooperative   Speech:  hypertalkative   Mood:  euphoric   Affect:  overbright   Thought Process:  circumstantial   Associations: circumstantial associations   Thought Content:  no overt delusions   Perceptual Disturbances: none   Risk Potential: Suicidal ideation - None  Homicidal ideation - None  Potential for aggression - No   Sensorium:  oriented to person, place and time/date   Memory:  recent and remote memory grossly intact   Consciousness:  alert and awake   Attention: decreased concentration and decreased attention span   Insight:  limited   Judgment: improving   Gait/Station: normal gait/station, normal balance   Motor Activity: no abnormal movements     Vital signs in last 24 hours:    Temp:  [98 2 °F (36 8 °C)-99 3 °F (37 4 °C)] 98 2 °F (36 8 °C)  HR:  [82-90] 90  Resp:  [16-18] 16  BP: (126-143)/(79-83) 135/79    Laboratory results: I have personally reviewed all pertinent laboratory/tests results        Progress Toward Goals: continues to improve, mood is stabilizing    Assessment/Plan   Principal Problem:    Bipolar disorder, curr episode manic w/o psychotic features, moderate (HCC)  Active Problems:    Primary hypertension    H/O chronic hepatitis    Severe protein-calorie malnutrition ([facility] [address] )    Recommended Treatment: Planned medication and treatment changes: All current active medications have been reviewed  Encourage group therapy, milieu therapy and occupational therapy  Behavioral Health checks every 7 minutes    Requires continued inpatient treatment due to chronic illness and high risk of decompensation if discharged before long term stability is achieved      Check Depakote level in the morning  Check CMP in the morning  Continue current medications:  Current Facility-Administered Medications   Medication Dose Route Frequency Provider Last Rate    acetaminophen  650 mg Oral Q4H PRN Georgina L Dagnall, PA-C      acetaminophen  650 mg Oral Q4H PRN Georgina L Dagnall, PA-C      acetaminophen  975 mg Oral Q6H PRN Georgina L Dagnall, PA-C      aspirin  81 mg Oral Daily Georgina L Dagnall, PA-C      calcium carbonate  1,000 mg Oral TID PRN [facility] Maren Rea MD      divalproex sodium  250 mg Oral Daily Anna Wood MD      divalproex sodium  750 mg Oral HS Anna Wood MD      fish oil  1,000 mg Oral Daily Suzanne Dai MD      hydrOXYzine HCL  25 mg Oral Q6H PRN Anna Wood MD      hydrOXYzine HCL  50 mg Oral Q6H PRN Anna Wood MD      lisinopril  10 mg Oral Daily Georgina Rodriguez, PA-C      LORazepam  0 5 mg Oral Q8H PRN Anna Wood MD      multivitamin stress formula  1 tablet Oral Daily Georgina Rodriguez, PA-C      niacin  100 mg Oral Daily With Breakfast Suzanne Dai MD      OLANZapine  5 mg Intramuscular TID PRN Anna Wood MD      OLANZapine  10 mg Oral HS LESLY Man      polyethylene glycol  17 g Oral Daily PRN Georgina Rodriguez, PA-C      risperiDONE  0 5 mg Oral TID PRN Anna Wood MD      risperiDONE  1 mg Oral TID PRN Anna Wood MD      traZODone  50 mg Oral HS PRN Anna Wood MD      zinc sulfate  220 mg Oral Daily Suzanne Dai MD         Risks / Benefits of Treatment:    Risks, benefits, and possible side effects of medications explained to patient and patient verbalizes understanding and agreement for treatment  Counseling / Coordination of Care:    Patient's progress discussed with staff in treatment team meeting  Medications, treatment progress and treatment plan reviewed with patient      LESLY Francois 08/31/22

## 2022-09-01 LAB
ALBUMIN SERPL BCP-MCNC: 4.3 G/DL (ref 3.5–5)
ALP SERPL-CCNC: 55 U/L (ref 34–104)
ALT SERPL W P-5'-P-CCNC: 83 U/L (ref 7–52)
ANION GAP SERPL CALCULATED.3IONS-SCNC: 9 MMOL/L (ref 4–13)
AST SERPL W P-5'-P-CCNC: 77 U/L (ref 13–39)
BILIRUB SERPL-MCNC: 0.73 MG/DL (ref 0.2–1)
BUN SERPL-MCNC: 17 MG/DL (ref 5–25)
CALCIUM SERPL-MCNC: 10 MG/DL (ref 8.4–10.2)
CHLORIDE SERPL-SCNC: 102 MMOL/L (ref 96–108)
CO2 SERPL-SCNC: 28 MMOL/L (ref 21–32)
CREAT SERPL-MCNC: 0.86 MG/DL (ref 0.6–1.3)
GFR SERPL CREATININE-BSD FRML MDRD: 96 ML/MIN/1.73SQ M
GLUCOSE P FAST SERPL-MCNC: 90 MG/DL (ref 65–99)
GLUCOSE SERPL-MCNC: 90 MG/DL (ref 65–140)
POTASSIUM SERPL-SCNC: 4.3 MMOL/L (ref 3.5–5.3)
PROT SERPL-MCNC: 6.7 G/DL (ref 6.4–8.4)
SODIUM SERPL-SCNC: 139 MMOL/L (ref 135–147)
VALPROATE SERPL-MCNC: 104 UG/ML (ref 50–100)

## 2022-09-01 PROCEDURE — 80164 ASSAY DIPROPYLACETIC ACD TOT: CPT | Performed by: NURSE PRACTITIONER

## 2022-09-01 PROCEDURE — 99232 SBSQ HOSP IP/OBS MODERATE 35: CPT | Performed by: PSYCHIATRY & NEUROLOGY

## 2022-09-01 PROCEDURE — 80053 COMPREHEN METABOLIC PANEL: CPT | Performed by: NURSE PRACTITIONER

## 2022-09-01 RX ORDER — MULTIVITAMIN
1 TABLET ORAL DAILY
Qty: 30 TABLET | Refills: 0 | Status: SHIPPED | OUTPATIENT
Start: 2022-09-01

## 2022-09-01 RX ORDER — OLANZAPINE 10 MG/1
10 TABLET ORAL
Qty: 30 TABLET | Refills: 0 | Status: SHIPPED | OUTPATIENT
Start: 2022-09-01 | End: 2022-09-15 | Stop reason: SDUPTHER

## 2022-09-01 RX ORDER — ASPIRIN 81 MG/1
81 TABLET, CHEWABLE ORAL DAILY
Qty: 30 TABLET | Refills: 0 | Status: SHIPPED | OUTPATIENT
Start: 2022-09-01

## 2022-09-01 RX ORDER — DIVALPROEX SODIUM 250 MG/1
750 TABLET, DELAYED RELEASE ORAL
Qty: 90 TABLET | Refills: 0 | Status: SHIPPED | OUTPATIENT
Start: 2022-09-01 | End: 2022-09-15 | Stop reason: SDUPTHER

## 2022-09-01 RX ORDER — DIVALPROEX SODIUM 250 MG/1
250 TABLET, DELAYED RELEASE ORAL DAILY
Qty: 30 TABLET | Refills: 0 | Status: SHIPPED | OUTPATIENT
Start: 2022-09-02 | End: 2022-09-15 | Stop reason: SINTOL

## 2022-09-01 RX ORDER — LISINOPRIL 10 MG/1
10 TABLET ORAL DAILY
Qty: 30 TABLET | Refills: 0 | Status: SHIPPED | OUTPATIENT
Start: 2022-09-01 | End: 2022-09-09 | Stop reason: SDUPTHER

## 2022-09-01 RX ADMIN — B-COMPLEX W/ C & FOLIC ACID TAB 1 TABLET: TAB at 08:30

## 2022-09-01 RX ADMIN — DIVALPROEX SODIUM 250 MG: 250 TABLET, DELAYED RELEASE ORAL at 08:30

## 2022-09-01 RX ADMIN — ACETAMINOPHEN 650 MG: 325 TABLET ORAL at 15:44

## 2022-09-01 RX ADMIN — OLANZAPINE 10 MG: 10 TABLET, FILM COATED ORAL at 21:44

## 2022-09-01 RX ADMIN — OMEGA-3 FATTY ACIDS CAP 1000 MG 1000 MG: 1000 CAP at 08:30

## 2022-09-01 RX ADMIN — Medication 100 MG: at 08:30

## 2022-09-01 RX ADMIN — ZINC SULFATE 220 MG (50 MG) CAPSULE 220 MG: CAPSULE at 08:30

## 2022-09-01 RX ADMIN — ASPIRIN 81 MG CHEWABLE TABLET 81 MG: 81 TABLET CHEWABLE at 08:30

## 2022-09-01 RX ADMIN — LISINOPRIL 10 MG: 10 TABLET ORAL at 08:30

## 2022-09-01 RX ADMIN — DIVALPROEX SODIUM 750 MG: 250 TABLET, DELAYED RELEASE ORAL at 21:44

## 2022-09-01 NOTE — PROGRESS NOTES
Progress Note - Behavioral Health     Carollee Olszewski 62 y o  male MRN: [id number]   Unit/Bed#: [facility] 204-02 Encounter: [id number]    Behavior over the last 24 hours: improved  Kylee Myers continues bright and pleasant, appropriate on the unit  Continues with symptoms of hypomania, but overall significantly improved  States he feels more steady on Depakote and very happy with current medication regimen  Most recent Depakote level is 104  No noted side effects and patient continues with hypomania  Will continue does the same but have instructed patient for repeat level in 1 week and to follow up with outpatient provider  Limited participation in milieu  Sleep: improved  Appetite: fair  Medication side effects: No   ROS: all other systems are negative    Mental Status Evaluation:    Appearance:  age appropriate   Behavior:  cooperative   Speech:  hypertalkative   Mood:  euphoric   Affect:  overbright   Thought Process:  circumstantial   Associations: tangential associations   Thought Content:  no overt delusions   Perceptual Disturbances: none   Risk Potential: Suicidal ideation - None  Homicidal ideation - None  Potential for aggression - No   Sensorium:  oriented to person, place and time/date   Memory:  recent and remote memory grossly intact   Consciousness:  alert and awake   Attention: decreased concentration and decreased attention span   Insight:  improving   Judgment: fair   Gait/Station: normal gait/station, normal balance   Motor Activity: no abnormal movements     Vital signs in last 24 hours:    Temp:  [98 2 °F (36 8 °C)-98 7 °F (37 1 °C)] 98 7 °F (37 1 °C)  HR:  [88-89] 89  Resp:  [16-18] 18  BP: (128-151)/(78-87) 128/78    Laboratory results:   I have personally reviewed all pertinent laboratory/tests results    Most Recent Labs:   Lab Results   Component Value Date    WBC 7 01 08/25/2022    RBC 4 33 08/25/2022    HGB 14 3 08/25/2022    HCT 42 1 08/25/2022     08/25/2022    RDW 12 1 08/25/2022    NEUTROABS 3 94 08/25/2022    SODIUM 139 09/01/2022    K 4 3 09/01/2022     09/01/2022    CO2 28 09/01/2022    BUN 17 09/01/2022    CREATININE 0 86 09/01/2022    GLUC 90 09/01/2022    GLUF 90 09/01/2022    CALCIUM 10 0 09/01/2022    AST 77 (H) 09/01/2022    ALT 83 (H) 09/01/2022    ALKPHOS 55 09/01/2022    TP 6 7 09/01/2022    ALB 4 3 09/01/2022    TBILI 0 73 09/01/2022    CHOLESTEROL 167 02/17/2022    HDL 61 02/17/2022    TRIG 86 02/17/2022    LDLCALC 89 02/17/2022    NONHDLC 106 02/17/2022    VALPROICTOT 104 (H) 09/01/2022    [id number] 2 640 05/24/2022    HGBA1C 5 5 05/24/2022         Progress Toward Goals: progressing    Assessment/Plan   Principal Problem:    Bipolar disorder, curr episode manic w/o psychotic features, moderate (HCC)  Active Problems:    Primary hypertension    H/O chronic hepatitis    Severe protein-calorie malnutrition ([facility] [address] )    Recommended Treatment:     Planned medication and treatment changes: All current active medications have been reviewed  Encourage group therapy, milieu therapy and occupational therapy  Behavioral Health checks every 7 minutes    Requires continued inpatient treatment due to chronic illness and high risk of decompensation if discharged before long term stability is achieved      Continue current medications:  Current Facility-Administered Medications   Medication Dose Route Frequency Provider Last Rate    acetaminophen  650 mg Oral Q4H PRN Georgina L Dagnall, PA-C      acetaminophen  650 mg Oral Q4H PRN Georgina L Dagnall, PA-C      acetaminophen  975 mg Oral Q6H PRN Georgina L Dagnall, PA-C      aspirin  81 mg Oral Daily Georgina L Dagnall, PA-C      calcium carbonate  1,000 mg Oral TID PRN [facility] Ras Cast MD      divalproex sodium  250 mg Oral Daily Clifton Mcduffie MD      divalproex sodium  750 mg Oral HS Clifton Mcduffie MD      fish oil  1,000 mg Oral Daily Marita Dior MD      hydrOXYzine HCL  25 mg Oral Q6H PRN Clifton Mcduffie MD  hydrOXYzine HCL  50 mg Oral Q6H PRN Regla Mejia MD      lisinopril  10 mg Oral Daily Georgina Rodriguez PA-C      LORazepam  0 5 mg Oral Q8H PRN Regla Mejia MD      multivitamin stress formula  1 tablet Oral Daily Georgina Rodriguez PA-C      niacin  100 mg Oral Daily With Breakfast Layton Amador MD      OLANZapine  5 mg Intramuscular TID PRN Regla Mejia MD      OLANZapine  10 mg Oral HS LESLY Zamora      polyethylene glycol  17 g Oral Daily PRN Georgina Rodriguez PA-C      risperiDONE  0 5 mg Oral TID PRN Regla Mejia MD      risperiDONE  1 mg Oral TID PRN Regla Mejia MD      traZODone  50 mg Oral HS PRN Regla Mejia MD      zinc sulfate  220 mg Oral Daily Layton Amador MD         Risks / Benefits of Treatment:    Risks, benefits, and possible side effects of medications explained to patient and patient verbalizes understanding and agreement for treatment  Counseling / Coordination of Care:    Patient's progress discussed with staff in treatment team meeting  Medications, treatment progress and treatment plan reviewed with patient      [place] LESLY Bernardo 09/01/22

## 2022-09-01 NOTE — PROGRESS NOTES
09/01/22 1300   Activity/Group Checklist   Group Pet therapy   Attendance Did not attend  (pt offered grp; pt remained in his room)

## 2022-09-01 NOTE — PROGRESS NOTES
09/01/22 1030   Activity/Group Checklist   Group Life Skills   Attendance Attended   Attendance Duration (min) 31-45  (pt in and out of grp)   Interactions Interacted appropriately   Affect/Mood Appropriate   Goals Achieved Identified feelings; Discussed coping strategies; Able to listen to others; Able to engage in interactions; Able to reflect/comment on own behavior;Able to self-disclose; Able to recieve feedback

## 2022-09-01 NOTE — PLAN OF CARE
Problem: Ineffective Coping  Goal: Participates in unit activities  Description: Interventions:  - Provide therapeutic environment   - Provide required programming   - Redirect inappropriate behaviors   Outcome: Progressing     Problem: Ineffective Coping  Goal: Free from restraint events  Description: - Utilize least restrictive measures   - Provide behavioral interventions   - Redirect inappropriate behaviors   Outcome: Progressing     Problem: Risk for Self Injury/Neglect  Goal: Verbalize thoughts and feelings  Description: Interventions:  - Assess and re-assess patient's lethality and potential for self-injury  - Engage patient in 1:1 interactions, daily, for a minimum of 15 minutes  - Encourage patient to express feelings, fears, frustrations, hopes  - Establish rapport/trust with patient   Outcome: Progressing     Problem: Depression  Goal: Refrain from harming self  Description: Interventions:  - Monitor patient closely, per order   - Supervise medication ingestion, monitor effects and side effects   Outcome: Progressing     Problem: Depression  Goal: Refrain from isolation  Description: Interventions:  - Develop a trusting relationship   - Encourage socialization   Outcome: Progressing

## 2022-09-01 NOTE — NURSING NOTE
Presents with bright,hopeful affect,ready for slated DC tomorrow  Denies depression,anxiety,SI,HI,AH,VH  Attends most groups with active,appropriate interactions  He is visible on the unit,social with both peers and staff  We discussed s/s of impending psychological decompensation and when to seek assistance  Will continue to educate,monitor,and provide safe,therapeutic milieu

## 2022-09-01 NOTE — NURSING NOTE
Patient was observed to be visible in the community this evening  Speech is pressured  Affect continues to be bright  Denies feeling anxious or depressed  Denies SI, HI and hallucinations  Patient was medication compliant at HS  He also requested and received PRN Tums for c/o indigestion  He showered tonight as well  Will CTM  Q7 minute safety checks in progress

## 2022-09-01 NOTE — PLAN OF CARE
Problem: Ineffective Coping  Goal: Identifies ineffective coping skills  Outcome: Progressing  Goal: Identifies healthy coping skills  Outcome: Progressing  Goal: Free from restraint events  Description: - Utilize least restrictive measures   - Provide behavioral interventions   - Redirect inappropriate behaviors   Outcome: Progressing     Problem: Risk for Self Injury/Neglect  Goal: Verbalize thoughts and feelings  Description: Interventions:  - Assess and re-assess patient's lethality and potential for self-injury  - Engage patient in 1:1 interactions, daily, for a minimum of 15 minutes  - Encourage patient to express feelings, fears, frustrations, hopes  - Establish rapport/trust with patient   Outcome: Progressing     Problem: Depression  Goal: Complete daily ADLs, including personal hygiene independently, as able  Description: Interventions:  - Observe, teach, and assist patient with ADLS  -  Monitor and promote a balance of rest/activity, with adequate nutrition and elimination   Outcome: Progressing

## 2022-09-01 NOTE — PROGRESS NOTES
09/01/22    Team Meeting   Meeting Type Daily Rounds   Team Members Present   Team Members Present Physician;Nurse;;Occupational Therapist   Physician Team Member Dr Sami Rice MD   Nursing Team Member Almetta Dancer, RN   Care Management Team Member Connie Harrison , [facility], [facility]   Social Work Team Member Nela Cross [place]   OT Team Member Sue Tang [place]   Patient/Family Present   Patient Present No   Patient's Family Present No   PT scheduled for D/C tomorrow, will return home  Will follow up with [address]  [place]  Reviewed labs  Anxious  No behaviors  Denies depression SI/HI/AH/VH  Showered 2 times yesterday

## 2022-09-01 NOTE — SOCIAL WORK
CM met with PT for PT check in  PT reports he is doing well denies SI/HI/AH/VH anxiety and depression  Reviewed follow up care and supports, PT in agreement with all  PT indicated that his wife will pick him up at 10am tomorrow  PT declined PCP appt but remains agreeable to DC summary being faxed  Reassurance provided

## 2022-09-01 NOTE — BH TRANSITION RECORD
Contact Information: If you have any questions, concerns, pended studies, tests and/or procedures, or emergencies regarding your inpatient behavioral health visit  Please contact [facility] [phone] and ask to speak to a , nurse or physician  A contact is available 24 hours/ 7 days a week at this number  Summary of Procedures Performed During your Stay:  Below is a list of major procedures performed during your hospital stay and a summary of results:  - No major procedures performed  Pending Studies (From admission, onward)     Start     Ordered    09/01/22 0000  Valproic acid level, total        Comments: Results to LESLY Mancera  with [facility]  Fax [phone]      09/01/22 7795              Please follow up on the above pending studies with your PCP and/or referring provider

## 2022-09-01 NOTE — NURSING NOTE
Patient appears to have slept through the overnight hours without issue  No acute behaviors noted  No complaints voiced  Lab work obtained as per order  Continuous safety rounding in progress

## 2022-09-01 NOTE — NURSING NOTE
Patient visible in the milieu  He is calm, cooperative, pleasant  Pt continues to endorse feeling better after taking Depakote  He denies anxiety and depression  Denies SI/HI and hallucinations  He is compliant with medications  His appetite is good  He expresses eagerness to d/c tomorrow  He is able to make his needs known  He offers no complaints/ concerns  Will CTM  Q7 minute safety checks in progress

## 2022-09-01 NOTE — PROGRESS NOTES
Progress Note - Marcela Olszewski 62 y o  male MRN: [id number]    Unit/Bed#: Opal Ruiz 204-02 Encounter: [id number]        Subjective:   Patient seen and examined at bedside after reviewing the chart and discussing the case with the caring staff  Patient examined at bedside  Patient has no acute complaints  Patient is being discharged tomorrow, Friday 09/01/2022  Patient is requesting all his scripts  I have reviewed and reconciled patient's home list of medications  Physical Exam   Vitals: Blood pressure 128/78, pulse 89, temperature 98 7 °F (37 1 °C), temperature source Temporal, resp  rate 18, height 5' 11" (1 803 m), weight 61 2 kg (135 lb), SpO2 98 %  ,Body mass index is 18 83 kg/m²  Constitutional:  Patient in no acute distress  HEENT: PERR, EOMI, MMM  Cardiovascular: Normal rate and regular rhythm  Pulmonary/Chest: Effort normal and breath sounds normal    Abdomen:  Nondistended  Assessment/Plan:  Marcela Olszewski is a(n) 62y o  year old male with bipolar disorder  MEDICAL CLEARANCE:  Patient is medically cleared for discharge  All scripts were sent out for the patient      1  Cardiac with history of HTN, dyslipidemia  Continue lisinopril 10 mg daily, fish oil 1000 mg daily, niacin 100 mg daily and ASA 81 mg daily  2  DJD/OA/headache  Tylenol as needed  3  Insomnia  Trazodone as needed  The patient was discussed with Dr Neeru Malcolm and he is in agreement with the above note

## 2022-09-01 NOTE — DISCHARGE INSTR - OTHER ORDERS
You are being discharged to [facility]   [address], Phone: [phone]  Triggers you have identified during your hospitalization that led to your admission distressed mood, regression in mental health  Coping skills you have identified during your hospitalization include hiking, time with dogs, time with grandchildren, working out  If you are unable to deal with your distressed mood alone please contact your provider at [address] at [phone], or your primary care provider Micaela Sidhu at   If that is not effective and you continue to have (ex: suicidal ideation, homicidal ideation, distressed mood, overwhelmed, in crisis) please contact (Crisis #)[facility] at 1 [phone], dial 847 or go to the nearest emergency center  [facility] Crisis Hotline: [place] Real Suicide Prevention Lifeline:  0-650.107.6630  *Alcohol Anonymous: 162.171.3483  *[place] [facility] & Alcohol Commission: (599) 251-8191  [facility]  on [address] [facility] ([facility]) HELPLINE: 793.851.5395/Website: www akanksha org  *Substance Abuse and [facility]) [employer], which is a confidential, free, 24-hour-a-day, 365-day-a-year, information service for individuals and family members facing mental health and/or substance use disorders  This service provides referrals to local treatment facilities, support groups, and community-based organizations  Callers can also order free publications and other information  Call 9-235.661.6856/Website: www [facility] gov  *[facility] 2-1-1: This is a toll free, confidential, 24-hour-a-day service which connects you to a community  in your area who can help you find services and resources that are available to you locally and provide critical services that can improve and save lives    Call: 211  /Website: https://[facility] net/     You declined drug and alcohol treatment, should you wish to schedule please call [place]  Alcohol Commission: (358) 340-9388  [place] or Lady, delfin Marcelino and Bruna, will be calling you after your discharge, on the phone number that you provided  They will be available as an additional support, if needed  If you wish to speak with one of them, you may contact Savannah Chou at [phone] or Kaylee Worthy at [phone]

## 2022-09-01 NOTE — PLAN OF CARE
Problem: DISCHARGE PLANNING - CARE MANAGEMENT  Goal: Discharge to post-acute care or home with appropriate resources  Description: INTERVENTIONS:  - Conduct assessment to determine patient/family and health care team treatment goals, and need for post-acute services based on payer coverage, community resources, and patient preferences, and barriers to discharge  - Address psychosocial, clinical, and financial barriers to discharge as identified in assessment in conjunction with the patient/family and health care team  - Arrange appropriate level of post-acute services according to patients   needs and preference and payer coverage in collaboration with the physician and health care team  - Communicate with and update the patient/family, physician, and health care team regarding progress on the discharge plan  - Arrange appropriate transportation to post-acute venues  Outcome: Completed   D/C home, will follow up with [facility] outpatient psych

## 2022-09-02 VITALS
OXYGEN SATURATION: 100 % | TEMPERATURE: 98 F | BODY MASS INDEX: 18.9 KG/M2 | RESPIRATION RATE: 18 BRPM | HEIGHT: 71 IN | DIASTOLIC BLOOD PRESSURE: 87 MMHG | SYSTOLIC BLOOD PRESSURE: 135 MMHG | WEIGHT: 135 LBS | HEART RATE: 75 BPM

## 2022-09-02 PROCEDURE — 99238 HOSP IP/OBS DSCHRG MGMT 30/<: CPT | Performed by: PSYCHIATRY & NEUROLOGY

## 2022-09-02 RX ADMIN — DIVALPROEX SODIUM 250 MG: 250 TABLET, DELAYED RELEASE ORAL at 08:53

## 2022-09-02 RX ADMIN — B-COMPLEX W/ C & FOLIC ACID TAB 1 TABLET: TAB at 08:54

## 2022-09-02 RX ADMIN — Medication 100 MG: at 08:54

## 2022-09-02 RX ADMIN — CALCIUM CARBONATE (ANTACID) CHEW TAB 500 MG 1000 MG: 500 CHEW TAB at 07:01

## 2022-09-02 RX ADMIN — ASPIRIN 81 MG CHEWABLE TABLET 81 MG: 81 TABLET CHEWABLE at 08:53

## 2022-09-02 RX ADMIN — ZINC SULFATE 220 MG (50 MG) CAPSULE 220 MG: CAPSULE at 08:54

## 2022-09-02 RX ADMIN — LISINOPRIL 10 MG: 10 TABLET ORAL at 08:54

## 2022-09-02 RX ADMIN — OMEGA-3 FATTY ACIDS CAP 1000 MG 1000 MG: 1000 CAP at 08:53

## 2022-09-02 NOTE — NURSING NOTE
At time of assessment, patient was observed to be resting in bed  He is pleasant and social   Speech continues to be pressured and rambling  He denies feeling anxious and depressed, denies SI, HI and hallucinations  Denies any pain  Patient was medication compliant at   Positive for snack and fluids  He was observed to be visible in the community this evening  Patient states he feels ready for discharge and is looking forward to tomorrow  Will CTM  Q7 minute safety checks in progress

## 2022-09-02 NOTE — NURSING NOTE
Patient reports absence of adverse psychiatric sx, stating his mood has adequately stabilized, and he feels capable of "making decisions normally "  Denies psychotic sx or lethality  Belongings reviewed with patient; all items returned to patient  No medication brought to the hospital at the time of admission  Discharge instructions reviewed with patient who reported understanding and agreement  Copy of summary provided to patient  Patient discharged to home; accompanied, ambulatory, by staff to exit  Transportation provided by spouse

## 2022-09-02 NOTE — DISCHARGE SUMMARY
Discharge Summary - 129 Carrie Dougherty 62 y o  male MRN: [id number]  Unit/Bed#: Zackery Calvin 831-38 Encounter: [id number]     Admission Date: 8/26/2022         Discharge Date: 9/2/2022    Attending Psychiatrist: Kunal Harrell MD    Reason for Admission/HPI: Bipolar disorder [facility]) [F31 9]    Patient is a 62 y o  male presented with symptoms of arsh, paranoia, agitation  According to admission report from Dr Salas Leader:    Martha Lopez is a 62 y o  male with a history of Bipolar Disorder who was admitted to the inpatient adult psychiatric unit on a involuntary 302 commitment basis due to manic symptoms, unstable mood and increased agitation  Patient initially presented to ED via EMS on 302 basis petitioned by wife which alleged that patient has history of bipolar disorder and he has been increasingly agitated, paranoid, showing mood swing  Patient said he would kill himself if his wife did not give him a divorce  He took a handgun to the garage and began driving away when wife called the police  UDS was positive for THC  EKG with no acute changes  On evaluation in the inpatient psychiatric unit Lea Ortiz presents mildly anxious, restless but able to be engaged appropriately  He states that since he arrived to the unit he has been feeling calmer and states "Depakote slow my thinking which is good"  He admits that he has history of bipolar disorder and was taking Wellbutrin up to 300 mg for his cigarette use  He claims that he did not try to kill himself and was trying to placed the gun in the trunk of his car " to be safe" because his wife can get agitated and impulsive at times  Denies any current SI, HI, Paranoia or hallucinations  Admits ongoing marital issues and increased stressors related to recent Covid, death of his mother and his dog being sick  Denies any recne t alcohol misuse but pt has h/o DUI and illicit drug use in the past  Uses medical [facility]   Pt agrees to be adherence to meds and treatment plan     Hospital Course: The patient was admitted to the inpatient psychiatric unit and started on every 7 minutes precautions  During the hospitalization the patient was attending individual therapy, group therapy, milieu therapy and occupational therapy  Psychiatric medications were titrated over the hospital stay  To address mood instability, mood swings, irritability, manic symptoms, aggresive behavior, agitation and psychotic symptoms the patient was started on mood stabilizer Depakote ER and antipsychotic medication Zyprexa  Medication doses were titrated during the hospital course  Prior to beginning of treatment medications risks and benefits and possible side effects including risk of liver impairment related to treatment with Depakote and risk of parkinsonian symptoms, Tardive Dyskinesia and metabolic syndrome related to treatment with antipsychotic medications were reviewed with the patient  The patient verbalized understanding and agreement for treatment  Patient's symptoms improved gradually over the hospital course  At the end of treatment the patient was doing well  Mood was stable at the time of discharge  The patient denied suicidal ideation, intent or plan at the time of discharge and denied homicidal ideation, intent or plan at the time of discharge  There was no overt psychosis at the time of discharge  Sleep and appetite were improved  The patient was tolerating medications and was not reporting any significant side effects at the time of discharge  Since the patient was doing well at the end of the hospitalization, treatment team felt that the patient could be safely discharged to outpatient care  The outpatient follow up with a Nurse Practitioner at [address] [place] [address] E in [facility] was arranged by the unit  upon discharge      Mental Status at time of Discharge:     Appearance:  age appropriate   Behavior:  restless and fidgety   Speech:  pressured Mood:  anxious   Affect:  increased in intensity   Thought Process:  circumstantial   Thought Content:  normal   Perceptual Disturbances: None   Risk Potential: Suicidal Ideations none, Homicidal Ideations none and Potential for Aggression No   Sensorium:  person, place, time/date and situation   Cognition:  recent and remote memory grossly intact   Consciousness:  alert and awake    Attention: attention span and concentration were age appropriate   Insight:  limited   Judgment: limited   Gait/Station: normal gait/station and normal balance   Motor Activity: no abnormal movements     [facility] ) [F31 9]    Discharge Diagnosis:   Principal Problem:    Bipolar disorder, curr episode manic w/o psychotic features, moderate (HCC)  Active Problems:    Primary hypertension    H/O chronic hepatitis    Severe protein-calorie malnutrition (HCC)  Resolved Problems:    * No resolved hospital problems   *        Lab results:  Admission on 08/26/2022   Component Date Value    Folate 08/27/2022 >20 0 (A)    Vit D, 25-Hydroxy 08/27/2022 56 8     Vitamin B-12 08/27/2022 620     Sodium 08/27/2022 139     Potassium 08/27/2022 4 2     Chloride 08/27/2022 102     CO2 08/27/2022 28     ANION GAP 08/27/2022 9     BUN 08/27/2022 18     Creatinine 08/27/2022 0 92     Glucose 08/27/2022 97     Glucose, Fasting 08/27/2022 97     Calcium 08/27/2022 10 2     AST 08/27/2022 96 (A)    ALT 08/27/2022 96 (A)    Alkaline Phosphatase 08/27/2022 55     Total Protein 08/27/2022 7 8     Albumin 08/27/2022 4 7     Total Bilirubin 08/27/2022 0 79     eGFR 08/27/2022 91     Folate 08/28/2022 >20 0 (A)    Valproic Acid, Total 09/01/2022 104 (A)    Sodium 09/01/2022 139     Potassium 09/01/2022 4 3     Chloride 09/01/2022 102     CO2 09/01/2022 28     ANION GAP 09/01/2022 9     BUN 09/01/2022 17     Creatinine 09/01/2022 0 86     Glucose 09/01/2022 90     Glucose, Fasting 09/01/2022 90     Calcium 09/01/2022 10 0     AST 09/01/2022 77 (A)    ALT 09/01/2022 83 (A)    Alkaline Phosphatase 09/01/2022 55     Total Protein 09/01/2022 6 7     Albumin 09/01/2022 4 3     Total Bilirubin 09/01/2022 0 73     eGFR 09/01/2022 96        Discharge Medications:  Current Discharge Medication List      START taking these medications    Details   !! divalproex sodium (DEPAKOTE) 250 mg EC tablet Take 1 tablet (250 mg total) by mouth daily  Qty: 30 tablet, Refills: 0    Associated Diagnoses: Bipolar disorder, curr episode manic w/o psychotic features, moderate ([address] )      ! ! divalproex sodium (DEPAKOTE) 250 mg EC tablet Take 3 tablets (750 mg total) by mouth daily at bedtime  Qty: 90 tablet, Refills: 0    Associated Diagnoses: Bipolar disorder, curr episode manic w/o psychotic features, moderate (HCC)      OLANZapine (ZyPREXA) 10 mg tablet Take 1 tablet (10 mg total) by mouth daily at bedtime  Qty: 30 tablet, Refills: 0    Associated Diagnoses: Bipolar disorder, curr episode manic w/o psychotic features, moderate ([address] )       ! ! - Potential duplicate medications found  Please discuss with provider           Current Discharge Medication List      STOP taking these medications       buPROPion (WELLBUTRIN SR) 150 mg 12 hr tablet Comments:   Reason for Stopping:         Na Sulfate-K Sulfate-Mg Sulf (Suprep Bowel Prep Kit) 17 5-3 13-1 6 GM/177ML SOLN Comments:   Reason for Stopping:              Current Discharge Medication List      CONTINUE these medications which have CHANGED    Details   aspirin 81 mg chewable tablet Chew 1 tablet (81 mg total) daily  Qty: 30 tablet, Refills: 0    Associated Diagnoses: Primary hypertension      lisinopril (ZESTRIL) 10 mg tablet Take 1 tablet (10 mg total) by mouth daily  Qty: 30 tablet, Refills: 0    Associated Diagnoses: Primary hypertension      Multiple Vitamin (multivitamin) tablet Take 1 tablet by mouth daily  Qty: 30 tablet, Refills: 0    Associated Diagnoses: Severe protein-calorie malnutrition ([facility] )            Current Discharge Medication List      CONTINUE these medications which have NOT CHANGED    Details   B Complex-C (B-complex with vitamin C) tablet Take 1 tablet by mouth daily      Glucosamine-Chondroitin (GLUCOSAMINE CHONDR COMPLEX PO) Take by mouth      Magnesium 100 MG CAPS Take by mouth      niacin 100 mg tablet Take 100 mg by mouth daily with breakfast      Omega-3 Fatty Acids (fish oil) 1,000 mg Take 1,000 mg by mouth daily      sildenafil (VIAGRA) 50 MG tablet Take 1 tablet (50 mg total) by mouth daily as needed for erectile dysfunction  Qty: 30 tablet, Refills: 0    Comments: Pt will pay with [employer]  Associated Diagnoses: Erectile dysfunction, unspecified erectile dysfunction type              Discharge instructions/Information to patient and family:   See after visit summary for information provided to patient and family  Provisions for Follow-Up Care:  See after visit summary for information related to follow-up care and any pertinent home health orders  Discharge Statement     I spent 35 minutes discharging the patient  This time was spent on the day of discharge  I had direct contact with the patient on the day of discharge  Additional documentation is required if more than 30 minutes were spent on discharge:    I reviewed with Daniel Abdul importance of compliance with medications and outpatient treatment after discharge  I discussed the medication regimen and possible side effects of the medications with Daniel Abdul prior to discharge  At the time of discharge he was tolerating psychiatric medications  I discussed outpatient follow up with Daniel Abdul  I reviewed with Daniel Abdul crisis plan and safety plan upon discharge  I discussed with Daniel Abdul recommendation to follow up with outpatient drug and alcohol counseling and AA meetings  Daniel Abdul agreed to abstain from drug and alcohol use after discharge    Valproic Acid level and liver enzymes in one week

## 2022-09-02 NOTE — PLAN OF CARE
Problem: Ineffective Coping  Goal: Identifies healthy coping skills  Outcome: Progressing     Problem: Ineffective Coping  Goal: Participates in unit activities  Description: Interventions:  - Provide therapeutic environment   - Provide required programming   - Redirect inappropriate behaviors   Outcome: Progressing     Problem: Ineffective Coping  Goal: Free from restraint events  Description: - Utilize least restrictive measures   - Provide behavioral interventions   - Redirect inappropriate behaviors   Outcome: Progressing     Problem: Risk for Self Injury/Neglect  Goal: Verbalize thoughts and feelings  Description: Interventions:  - Assess and re-assess patient's lethality and potential for self-injury  - Engage patient in 1:1 interactions, daily, for a minimum of 15 minutes  - Encourage patient to express feelings, fears, frustrations, hopes  - Establish rapport/trust with patient   Outcome: Progressing

## 2022-09-02 NOTE — PROGRESS NOTES
Pt discharged with list of belongings:    Flip flops belt  Jeans  Blue t-shirt  Red bandana  Cell phone  [employer] t-shirt  Boxers x4  Light blue t-shirt  Items returned from hospital safe    Pt donated list of clothing:  Kimberly Fallow t-shirt  Green and black shorts  Purple t-shirt      Pt signed and agreed to belongings on list

## 2022-09-02 NOTE — PROGRESS NOTES
Progress Note - Viviane Dennis 62 y o  male MRN: [id number]    Unit/Bed#: Girma Berman 204-02 Encounter: [id number]        Subjective:   Patient seen and examined at bedside after reviewing the chart and discussing the case with the caring staff  Patient examined at bedside  Patient has no acute complaints  Patient is being discharged today, Friday 09/01/2022  Patient is requesting all his scripts  I have reviewed and reconciled patient's home list of medications  Physical Exam   Vitals: Blood pressure 135/87, pulse 75, temperature 98 °F (36 7 °C), temperature source Temporal, resp  rate 18, height 5' 11" (1 803 m), weight 61 2 kg (135 lb), SpO2 100 %  ,Body mass index is 18 83 kg/m²  Constitutional:  Patient in no acute distress  HEENT: PERR, EOMI, MMM  Cardiovascular: Normal rate and regular rhythm  Pulmonary/Chest: Effort normal and breath sounds normal    Abdomen:  Nondistended  Assessment/Plan:  Viviane Dennis is a(n) 62y o  year old male with bipolar disorder  MEDICAL CLEARANCE:  Patient is medically cleared for discharge  All scripts were sent out for the patient      1  Cardiac with history of HTN, dyslipidemia  Continue lisinopril 10 mg daily, fish oil 1000 mg daily, niacin 100 mg daily and ASA 81 mg daily  2  DJD/OA/headache  Tylenol as needed  3  Insomnia  Trazodone as needed  The patient was discussed with Dr Roger Godinez and he is in agreement with the above note

## 2022-09-02 NOTE — NURSING NOTE
Patient appears to have slept through the night with out issue  No acute behaviors noted  No complaints voiced   Will continue to monitor via q 7 minute safety checks

## 2022-09-02 NOTE — PROGRESS NOTES
09/02/22 0730   Activity/Group Checklist   Group Community meeting  (Patient check in with coffee/ patients eating and drink in room for covid precautions)   Attendance Attended   Attendance Duration (min) 31-45   Interactions Interacted appropriately   Affect/Mood Appropriate   Goals Achieved Identified feelings; Able to listen to others; Able to engage in interactions; Able to self-disclose; Able to recieve feedback

## 2022-09-02 NOTE — PROGRESS NOTES
09/02/22   Team Meeting   Meeting Type Daily Rounds   Team Members Present   Team Members Present Physician;Nurse;;; Occupational Therapist   Physician Team Member Dr Kelly Oliver MD   Nursing Team Member Katy Baez RN   Care Management Team Member Connie Soares , [facility], [facility]   Social Work Team Member [facility]   OT Team Member Daryle Lovilia, [place]   Patient/Family Present   Patient Present No   Patient's Family Present No     DC today at 10am; c/o floaters in eye last night

## 2022-09-06 ENCOUNTER — HOSPITAL ENCOUNTER (OUTPATIENT)
Dept: ULTRASOUND IMAGING | Facility: HOSPITAL | Age: 57
Discharge: HOME | End: 2022-09-06
Payer: COMMERCIAL

## 2022-09-06 DIAGNOSIS — R19.09 LUMP IN THE GROIN: ICD-10-CM

## 2022-09-06 PROCEDURE — 76705 ECHO EXAM OF ABDOMEN: CPT

## 2022-09-09 ENCOUNTER — OFFICE VISIT (OUTPATIENT)
Dept: FAMILY MEDICINE CLINIC | Facility: CLINIC | Age: 57
End: 2022-09-09
Payer: COMMERCIAL

## 2022-09-09 ENCOUNTER — APPOINTMENT (OUTPATIENT)
Dept: LAB | Facility: CLINIC | Age: 57
End: 2022-09-09
Payer: COMMERCIAL

## 2022-09-09 VITALS
TEMPERATURE: 96.6 F | HEIGHT: 71 IN | OXYGEN SATURATION: 96 % | HEART RATE: 75 BPM | BODY MASS INDEX: 21.34 KG/M2 | DIASTOLIC BLOOD PRESSURE: 76 MMHG | WEIGHT: 152.4 LBS | SYSTOLIC BLOOD PRESSURE: 134 MMHG

## 2022-09-09 DIAGNOSIS — T70.0XXS OTITIC BAROTRAUMA, SEQUELA: ICD-10-CM

## 2022-09-09 DIAGNOSIS — Z87.19 H/O CHRONIC HEPATITIS: ICD-10-CM

## 2022-09-09 DIAGNOSIS — Z13.29 SCREENING FOR THYROID DISORDER: ICD-10-CM

## 2022-09-09 DIAGNOSIS — Z13.0 SCREENING FOR DEFICIENCY ANEMIA: ICD-10-CM

## 2022-09-09 DIAGNOSIS — I83.893 SYMPTOMATIC VARICOSE VEINS, BILATERAL: Primary | ICD-10-CM

## 2022-09-09 DIAGNOSIS — Z13.220 SCREENING FOR HYPERLIPIDEMIA: ICD-10-CM

## 2022-09-09 DIAGNOSIS — Z87.828: ICD-10-CM

## 2022-09-09 DIAGNOSIS — Z12.5 SCREENING FOR PROSTATE CANCER: ICD-10-CM

## 2022-09-09 DIAGNOSIS — R73.01 ELEVATED FASTING GLUCOSE: ICD-10-CM

## 2022-09-09 DIAGNOSIS — H93.8X2 PRESSURE SENSATION IN LEFT EAR: ICD-10-CM

## 2022-09-09 DIAGNOSIS — R74.8 ELEVATED LIVER ENZYMES: ICD-10-CM

## 2022-09-09 DIAGNOSIS — H93.12 TINNITUS OF LEFT EAR: ICD-10-CM

## 2022-09-09 DIAGNOSIS — Z13.1 SCREENING FOR DIABETES MELLITUS: ICD-10-CM

## 2022-09-09 DIAGNOSIS — F31.12 BIPOLAR DISORDER, CURR EPISODE MANIC W/O PSYCHOTIC FEATURES, MODERATE (HCC): ICD-10-CM

## 2022-09-09 DIAGNOSIS — I10 PRIMARY HYPERTENSION: ICD-10-CM

## 2022-09-09 LAB
ALBUMIN SERPL BCP-MCNC: 3.6 G/DL (ref 3.5–5)
ALP SERPL-CCNC: 82 U/L (ref 46–116)
ALT SERPL W P-5'-P-CCNC: 124 U/L (ref 12–78)
AST SERPL W P-5'-P-CCNC: 86 U/L (ref 5–45)
BILIRUB DIRECT SERPL-MCNC: 0.17 MG/DL (ref 0–0.2)
BILIRUB SERPL-MCNC: 0.41 MG/DL (ref 0.2–1)
PROT SERPL-MCNC: 7.4 G/DL (ref 6.4–8.4)
VALPROATE SERPL-MCNC: 84 UG/ML (ref 50–100)

## 2022-09-09 PROCEDURE — 80076 HEPATIC FUNCTION PANEL: CPT

## 2022-09-09 PROCEDURE — 99214 OFFICE O/P EST MOD 30 MIN: CPT | Performed by: NURSE PRACTITIONER

## 2022-09-09 PROCEDURE — 80164 ASSAY DIPROPYLACETIC ACD TOT: CPT

## 2022-09-09 PROCEDURE — 36415 COLL VENOUS BLD VENIPUNCTURE: CPT

## 2022-09-09 RX ORDER — LISINOPRIL 10 MG/1
10 TABLET ORAL DAILY
Qty: 90 TABLET | Refills: 1 | Status: SHIPPED | OUTPATIENT
Start: 2022-09-09 | End: 2022-10-19 | Stop reason: SDUPTHER
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
## 2022-09-12 ENCOUNTER — TELEPHONE (OUTPATIENT)
Dept: FAMILY MEDICINE CLINIC | Facility: CLINIC | Age: 57
End: 2022-09-12

## 2022-09-12 DIAGNOSIS — K08.89 PAIN, DENTAL: Primary | ICD-10-CM

## 2022-09-12 NOTE — TELEPHONE ENCOUNTER
Pt called in  He is asking for a referral to an oral surgeon with [facility]  He said this was discussed at his last visit with Jacy Alonzo  He said this is for a consultation for dental implants   Can this referral be placed for pt?

## 2022-09-15 ENCOUNTER — OFFICE VISIT (OUTPATIENT)
Dept: PSYCHIATRY | Facility: CLINIC | Age: 57
End: 2022-09-15
Payer: COMMERCIAL

## 2022-09-15 ENCOUNTER — TELEPHONE (OUTPATIENT)
Dept: PSYCHIATRY | Facility: CLINIC | Age: 57
End: 2022-09-15

## 2022-09-15 DIAGNOSIS — F31.12 BIPOLAR DISORDER, CURR EPISODE MANIC W/O PSYCHOTIC FEATURES, MODERATE (HCC): Primary | ICD-10-CM

## 2022-09-15 PROCEDURE — 90792 PSYCH DIAG EVAL W/MED SRVCS: CPT

## 2022-09-15 RX ORDER — OLANZAPINE 10 MG/1
10 TABLET ORAL
Qty: 30 TABLET | Refills: 0 | Status: SHIPPED | OUTPATIENT
Start: 2022-09-15 | End: 2022-10-13 | Stop reason: SDUPTHER

## 2022-09-15 RX ORDER — OLANZAPINE 2.5 MG/1
2.5 TABLET ORAL
Qty: 30 TABLET | Refills: 0 | Status: SHIPPED | OUTPATIENT
Start: 2022-09-15 | End: 2022-10-13 | Stop reason: SDUPTHER

## 2022-09-15 RX ORDER — DIVALPROEX SODIUM 250 MG/1
750 TABLET, DELAYED RELEASE ORAL
Qty: 90 TABLET | Refills: 0 | Status: SHIPPED | OUTPATIENT
Start: 2022-09-15 | End: 2022-10-13 | Stop reason: SDDI

## 2022-09-15 NOTE — PSYCH
Outpatient Psychiatry Intake Exam       PCP: LESLY Mckeon        Identifying information:  Luca Gomez is a 62 y o  male with a history of Bipolar 1 here for evaluation and determination of mental health management needs  Sources of information:   Information for this evaluation was gathered through direct interview with the patient  Additionally EMR was reviewed  Confidentiality discussion: Limits of confidentiality in cases of safety concerns involving self and others as well as this physician's role as a mandatory  of abuse  They voiced understanding and a desire to continue with the evaluation  SUBJECTIVE     Chief Complaint / reason for visit: " Follow up after hospital d/c "    History of Present Illness:    Deniz Delarosa is a 45-year-old  male presenting for initial evaluation with past medical history of bipolar one disorder, anxiety, chronic hepatitis, elevated liver enzymes, history of alcohol abuse  [facility]/2022 for manic symptoms and unstable mood  He allegedly took a hand gun to his garage and which prompted his wife to call the police  Patient and wife were getting into arguments and pt was 302ed  Stabilized on Depakote 250 mg morning, 750 mg p m  and Zyprexa 10 mg daily  Patient appears to be doing well overall since medication adjustments and does not appear manic during today's encounter  Long history of arsh and symptoms in recent past include getting into screaming matches with his wife, moderate agitation, making threats to kill himself, impulsivity, flight of ideas and racing thoughts, sleeping 3-4 hours per night, and hyper talkativeness  Patient states the Depakote has really helped and calls Depakote "the shut up pill" referencing that it makes him less hyper talkative and feel more even with his mood  Patient is somewhat restless and circumstantial but overall cooperative and goal-directed today   He displays limited insight and believes the cause of hospital admission was mostly due to his wife picking arguments with him  Reports  decrease in depressive symptoms and currently describes 2/10 depression symptoms with periods of dysphoria regarding an up and down relationship with his wife, irritability, and fatigue  Reports 5/10 fluctuating anxiety levels depending on life circumstances with symptoms of frequent worry, inability to control thoughts, and worrying about his future  Denies recent panic attacks  Main stressor causing symptoms appears to be a difficult relationship with his wife and her lack of interest in intimacy with patient  Describes how frustrated this can be and his desire on wanting to feel appreciated by her  Further stressors include his mother passing away in March who he was very close to and  going through the grieving process  Furthermore reports his dog is sick and worries about her passing away  Describes how he graduated from technical school and has been a  most of his life  Recently unemployed but reports being in the process of getting hired to be a   Talks about his love for his two young grandchildren and states he could never commit suicide and leave them behind  Denies all suicidal and homicidal ideation  Denies a history of psychosis  Denies any history of inpatient psychiatric hospitalizations besides his recent discharge at [facility] on 09/02  Patient was unable to remember past outpatient psychiatric treatment, only past medication he recalls is Seroquel  Believes current medication regimen of Depakote and Zyprexa is more effective and tolerating with no side effects  Recent labs show chronic elevated liver enzymes, ALT:86, AST: 124 on 9/9/22 in the context of chronic hepatitis   Continues to follow-up closely with his PCP for management and Depakote dosage decreased from 1000 mg daily to 750 mg daily and complete pending labs before next visit  He agreed to increase Zyprexa 10 mg to 12 5 mg daily to aid with mood stabilization  Patient denies a history of suicide attempts or self-harm behavior in his lifetime  Admits to past severe alcohol abuse, last use in 2007 has been clean since  DUI in 2005  Attended [facility] 77 meetings in the past  Reports past heroin use, last use in 2007 and has remained clean since according to patient  Continue to refrain from drug and alcohol abuse going forward  Does have his medical marijuana card and uses daily for anxiety, encourage patient to cut down as possible  Patient was interested in psychotherapy and added to the wait list today  Onset of symptoms was 20's a few years ago with gradually worsening course since that time  Stressors:  Marital issues, dog being sick, mother passed away in March    HPI ROS:  Medication Side Effects: denies  Depression: 2 /10 (10 worst)  Anxiety: 5 /10 (10 worst)  Safety (SI, HI, other): denies si and hi  Sleep: 10pm-4am  Energy: fair to low  Appetite: fair  Weight Change: denies      In terms of depression, the patient endorses loss of interests/pleasure, depressed mood   In terms of bipolar disorder, the patient endorses past manic episodes, past manic symptoms  Symptoms include inflated self-esteem or grandiosity , Irritability, decreased sleep , more talkativeness/pressured speech , flight of ideas/racing thoughts , distractibility, increased goal-directed behavior, psychomotor agitation and increased energy    MARZENA symptoms: excessive worry more days than not for longer than 3 months, difficulty concentrating and fatigue  Panic Disorder symptoms: no symptoms suggestive of panic disorder  Social Anxiety symptoms: no symptoms suggestive of social anxiety  OCD Symptoms: No significant symptoms supportive of OCD  Eating Disorder symptoms: no historical or current eating disorder  no binge eating disorder; no anorexia nervosa   no symptoms of bulimia    In terms of PTSD, the patient endorses exposure to trauma involving: denies; intrusive symptoms including (1+): no intrusive symptoms; avoidance symptoms including (1+): no avoidance symptoms; Negative alterations including (2+): no negative alteration symptoms; hyperarousal symptoms including (2+): no arousal symptoms  Symptoms have been present for greater than 1 month    In terms of psychotic symptoms, the patient reports no psychotic symptoms now or in the past     Past Psychiatric History  Previous diagnoses include Bipolar 1    Prior outpatient psychiatric treatment: denies    Prior therapy: denies    Prior inpatient psychiatric treatment: yes d/[facility] on 22    Prior suicide attempts: denies    Prior self harm: denies    Prior violence or aggression: pt denies    Social History:    The patient grew up in [place]  Childhood was described as "I enjoyed it, my mother took great care of me"  During childhood, parents were mother and step-dad very supportive  They have 2 sister(s) and 1  brother(s)  Abuse/neglect: none    As far as the patient (or present family member) is aware, overall childhood development: Patient does ascribe to normal developmental milestones such as walking, talking, potty training and making childhood friends  Education level: technical school   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  Marital status:   Children: 2  Current Living Situation: the patient currently lives with his wife   Social support: wife and dogs    Scientology Affiliation: denies   experience: denies  Legal history: hx of DUI in   Access to Guns: denies    Substance use and treatment:  Tobacco use: former smoker  Caffeine Use: 2-3 cups coffee  ETOH use: denies, hx alcohol abuse, last drink in  according to patient  Other substance use: Has medical marijuana card and uses daily  Endorses previous experimentation with:  Reports a past history of heroin and alcohol abuse, last use     Has attended [facility] 77 meetings in the past     Longest clean time: since 2007  History of Inpatient/Outpatient rehabilitation program: yes      Traumatic History:     Abuse: none  Other Traumatic Events: none     Family Psychiatric History:     Psychiatric Illness:  Brother - bipolar disorder  Substance Abuse:  no family history of substance abuse  Suicide Attempts:  patient denies    Denies     Family History   Problem Relation Age of Onset    Lung cancer Mother     Kidney disease Mother         drug induced    COPD Mother     Dementia Father             Past Medical / Surgical History:    Current PCP: LESLY Mcarthur   Other providers include:     Patient Active Problem List   Diagnosis    Elevated liver enzymes    Primary hypertension    Bipolar disorder, curr episode manic w/o psychotic features, moderate ([facility] [address] )    H/O chronic hepatitis    Severe protein-calorie malnutrition (HCC)    Symptomatic varicose veins, bilateral       Past Medical History-  Past Medical History:   Diagnosis Date    DJD (degenerative joint disease)     Dyslipidemia     History of hepatitis C     Hypertension     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[facility] [address] [facility]     Toxoplasmosis chorioretinitis of left eye         Denies     History of Seizures: no  History of Head injury-LOC-Concussion: no    Past Surgical History-  Past Surgical History:   Procedure Laterality Date    CARPAL TUNNEL RELEASE Left     CATARACT EXTRACTION Left     COLONOSCOPY      HERNIA REPAIR      PLANTAR FASCIA SURGERY Left           Allergies:    Allergies   Allergen Reactions    Latex Hives       Recent labs:  Appointment on 09/09/2022   Component Date Value    Valproic Acid, Total 09/09/2022 84     Total Bilirubin 09/09/2022 0 41     Bilirubin, Direct 09/09/2022 0 17     Alkaline Phosphatase 09/09/2022 82     AST 09/09/2022 86 (A)    ALT 09/09/2022 124 (A)    Total Protein 09/09/2022 7 4     Albumin 09/09/2022 3 6    Admission on 08/26/2022, Discharged on 09/02/2022 Component Date Value    Folate 08/27/2022 >20 0 (A)    Vit D, 25-Hydroxy 08/27/2022 56 8     Vitamin B-12 08/27/2022 620     Sodium 08/27/2022 139     Potassium 08/27/2022 4 2     Chloride 08/27/2022 102     CO2 08/27/2022 28     ANION GAP 08/27/2022 9     BUN 08/27/2022 18     Creatinine 08/27/2022 0 92     Glucose 08/27/2022 97     Glucose, Fasting 08/27/2022 97     Calcium 08/27/2022 10 2     AST 08/27/2022 96 (A)    ALT 08/27/2022 96 (A)    Alkaline Phosphatase 08/27/2022 55     Total Protein 08/27/2022 7 8     Albumin 08/27/2022 4 7     Total Bilirubin 08/27/2022 0 79     eGFR 08/27/2022 91     Folate 08/28/2022 >20 0 (A)    Valproic Acid, Total 09/01/2022 104 (A)    Sodium 09/01/2022 139     Potassium 09/01/2022 4 3     Chloride 09/01/2022 102     CO2 09/01/2022 28     ANION GAP 09/01/2022 9     BUN 09/01/2022 17     Creatinine 09/01/2022 0 86     Glucose 09/01/2022 90     Glucose, Fasting 09/01/2022 90     Calcium 09/01/2022 10 0     AST 09/01/2022 77 (A)    ALT 09/01/2022 83 (A)    Alkaline Phosphatase 09/01/2022 55     Total Protein 09/01/2022 6 7     Albumin 09/01/2022 4 3     Total Bilirubin 09/01/2022 0 73     eGFR 09/01/2022 96    Admission on 08/25/2022, Discharged on 08/26/2022   Component Date Value    Amph/Meth UR 08/25/2022 Negative     Barbiturate Ur 08/25/2022 Negative     Benzodiazepine Urine 08/25/2022 Negative     Cocaine Urine 08/25/2022 Negative     Methadone Urine 08/25/2022 Negative     Opiate Urine 08/25/2022 Negative     PCP Ur 08/25/2022 Negative     THC Urine 08/25/2022 Positive (A)    Oxycodone Urine 08/25/2022 Negative     EXTBreath Alcohol 08/25/2022 0 000     WBC 08/25/2022 7 01     RBC 08/25/2022 4 33     Hemoglobin 08/25/2022 14 3     Hematocrit 08/25/2022 42 1     MCV 08/25/2022 97     MCH 08/25/2022 33 0     MCHC 08/25/2022 34 0     RDW 08/25/2022 12 1     MPV 08/25/2022 8 5 (A)    Platelets 80/41/8211 150     nRBC 08/25/2022 0     Neutrophils Relative 08/25/2022 57     Immat GRANS % 08/25/2022 0     Lymphocytes Relative 08/25/2022 29     Monocytes Relative 08/25/2022 14 (A)    Eosinophils Relative 08/25/2022 0     Basophils Relative 08/25/2022 0     Neutrophils Absolute 08/25/2022 3 94     Immature Grans Absolute 08/25/2022 0 03     Lymphocytes Absolute 08/25/2022 2 01     Monocytes Absolute 08/25/2022 0 99     Eosinophils Absolute 08/25/2022 0 02     Basophils Absolute 08/25/2022 0 02     Sodium 08/25/2022 129 (A)    Potassium 08/25/2022 4 1     Chloride 08/25/2022 94 (A)    CO2 08/25/2022 29     ANION GAP 08/25/2022 6     BUN 08/25/2022 23     Creatinine 08/25/2022 0 86     Glucose 08/25/2022 70     Calcium 08/25/2022 9 7     AST 08/25/2022 82 (A)    ALT 08/25/2022 88 (A)    Alkaline Phosphatase 08/25/2022 51     Total Protein 08/25/2022 7 5     Albumin 08/25/2022 4 6     Total Bilirubin 08/25/2022 0 91     eGFR 08/25/2022 96     SARS-CoV-2 08/25/2022 Negative     Color, UA 08/25/2022 Yellow     Clarity, UA 08/25/2022 Clear     Specific Gravity, UA 08/25/2022 1 010     pH, UA 08/25/2022 6 5     Leukocytes, UA 08/25/2022 Negative     Nitrite, UA 08/25/2022 Negative     Protein, UA 08/25/2022 Negative     Glucose, UA 08/25/2022 Negative     Ketones, UA 08/25/2022 Negative     Urobilinogen, UA 08/25/2022 0 2     Bilirubin, UA 08/25/2022 Negative     Occult Blood, UA 08/25/2022 Negative     Ventricular Rate 08/25/2022 69     Atrial Rate 08/25/2022 69     MT Interval 08/25/2022 172     QRSD Interval 08/25/2022 86     QT Interval 08/25/2022 374     QTC Interval 08/25/2022 400     P Axis 08/25/2022 74     QRS Axis 08/25/2022 48     T Wave Axis 08/25/2022 60      Labs were reviewed and discussed with patient, Labs were reviewed    Medical Review Of Systems:    Patient admits to chronic hepatitis, elevated liver enzymes, hypertension; otherwise Pertinent items are noted in HPI       Medications:  Current Outpatient Medications on File Prior to Visit   Medication Sig Dispense Refill    aspirin 81 mg chewable tablet Chew 1 tablet (81 mg total) daily 30 tablet 0    B Complex-C (B-complex with vitamin C) tablet Take 1 tablet by mouth daily      divalproex sodium (DEPAKOTE) 250 mg EC tablet Take 1 tablet (250 mg total) by mouth daily 30 tablet 0    divalproex sodium (DEPAKOTE) 250 mg EC tablet Take 3 tablets (750 mg total) by mouth daily at bedtime 90 tablet 0    Glucosamine-Chondroitin (GLUCOSAMINE CHONDR COMPLEX PO) Take by mouth      lisinopril (ZESTRIL) 10 mg tablet Take 1 tablet (10 mg total) by mouth daily 90 tablet 1    Magnesium 100 MG CAPS Take by mouth      Multiple Vitamin (multivitamin) tablet Take 1 tablet by mouth daily 30 tablet 0    niacin 100 mg tablet Take 100 mg by mouth daily with breakfast      OLANZapine (ZyPREXA) 10 mg tablet Take 1 tablet (10 mg total) by mouth daily at bedtime 30 tablet 0    Omega-3 Fatty Acids (fish oil) 1,000 mg Take 1,000 mg by mouth daily      sildenafil (VIAGRA) 50 MG tablet Take 1 tablet (50 mg total) by mouth daily as needed for erectile dysfunction 30 tablet 0     No current facility-administered medications on file prior to visit  Medication Compliant? yes    All current active medications have been reviewed  Objective     OBJECTIVE     There were no vitals taken for this visit      MENTAL STATUS EXAM  Appearance:  dressed casually, underweight   Behavior:  Pleasant & cooperative, fair eye contact, restless and fidgety   Speech:  Regular rate and rhythm   Mood:  anxious   Affect:  mood congruent   Language: intact and appropriate for age, education, and intellect   Thought Process:  circumstantial   Associations: circumstantial associations   Thought Content:  normal and appropriate   Perceptual Disturbances: no auditory or visual hallcunations   Risk Potential / Abnormal Thoughts: Suicidal ideation - None  Homicidal ideation - None  Potential for aggression - No       Consciousness:  Alert & Awake   Sensorium:  Grossly oriented   Attention: attention span and concentration appear shorter than expected for age   Intellect: within normal limits   Fund of Knowledge:  Memory: awareness of current events: yes  recent and remote memory grossly intact   Insight:  improving   Judgment: improving   Muscle Strength Muscle Tone: normal  normal   Gait/Station: normal gait/station with good balance   Motor Activity: no abnormal movements     Pain none   Pain Scale 0     IMPRESSIONS/FORMULATION          There are no diagnoses linked to this encounter  No diagnosis found  Viviane Dennis is a 62 y o  male who presents with symptoms supporting the aforementioned  Suicide / Homicide / Safety risk assessment: At this time Alena Grady is at low risk for harm of self or others  Plan:       Education about diagnosis and treatment modalities, patient voiced understanding and agreement with the following plan:    Discussed medication risks, beneftis, alternatives  Patient was informed and had time to ask questions  They agreed to treatment below    Controlled Medication Discussion:     Not applicable    Initial treatment plan:   1) MEDS:    - increase Zyprexa to 12 5 mg daily it HS PARQ discussed with patient included sedation, dizziness, weight gain, dry mouth, constipation, hypotension, dyslipidemia, EPS, metabolic syndrome, peripheral edema, dyspepsia, joint and body pains, tachycardia, orthostatic hypotension, rash with sunlight exposure, hyperglycemia, seizures, skin rash, rare NMS, and increased risk of CVA in patient's with dementia  - decrease Depakote from 1,000 mg daily to 750mg due to long history of elevated liver enzymes, being followed by his PCP  ALT:86, AST: 124 on 9/9/22    Patient has chronic hepatitis and liver enzymes elevated consistently for many years, he continues to follow closely with PCP and recheck levels before next visit, patient verbalizes understanding and agrees to plan  Depakote PARQ completed including GI distress, tremor, weight gain, and hepatic risks, blood dyscrasias/bone marrow effects, SIADH, pancreatitis, andrenergic effects, PCOS, allergic reactions, worsened depression/suicidality, others including need for blood testing and monitoring  2) Labs:  Recheck of hepatic function, CBC, CMP, valproic acid to be completed before next visit  3) Therapy:    - added to wait list today    4) Medical:  Follow-up closely with PCP regarding chronic elevated liver enzymes   - Pt will f/u with other providers as needed    5) Other: Support as needed   - use crisis as needed        6) Follow up:   - Follow up in 4 weeks or sooner    - Patient will call if issues or concerns     7) Treatment Plan:    - Enacted on 9/15/22 by alejandra thorpe, due 3/15/23     Discussed self monitoring of symptoms, and symptom monitoring tools  Patient has been informed of 24 hours and weekend coverage for urgent situations accessed by calling the main clinic phone number

## 2022-09-15 NOTE — BH TREATMENT PLAN
TREATMENT PLAN (Medication Management Only)        [facility]    Name and Date of Birth:  Agueda Castellanos 62 y o  1965  Date of Treatment Plan: September 15, 2022  Diagnosis/Diagnoses:    1  Bipolar disorder, curr episode manic w/o psychotic features, moderate ([address] )      Strengths/Personal Resources for Self-Care: family ties  Area/Areas of need (in own words): mood instability  1  Long Term Goal: continue improvement in mood stability  Target Date:6 months - 3/15/2023  Person/Persons responsible for completion of goal: Alka Louis  2  Short Term Objective (s) - How will we reach this goal?:   A  Provider new recommended medication/dosage changes and/or continue medication(s): decrease depakote to 750mg daily and increase zyprexa to 12 5   B  Attend medication management appointments regularly  C  Take psychiatric medications responsibly  Target Date:6 months - 3/15/2023  Person/Persons Responsible for Completion of Goal: Alka Louis and alejandra thorpe  Progress Towards Goals: starting treatment  Treatment Modality: medication management every 4 weeks  Review due 180 days from date of this plan: 6 months - 3/15/2023  Expected length of service: maintenance  My Physician/PA/NP and I have developed this plan together and I agree to work on the goals and objectives  I understand the treatment goals that were developed for my treatment

## 2022-10-13 ENCOUNTER — OFFICE VISIT (OUTPATIENT)
Dept: PSYCHIATRY | Facility: CLINIC | Age: 57
End: 2022-10-13
Payer: COMMERCIAL

## 2022-10-13 DIAGNOSIS — F32.89 OTHER DEPRESSION: ICD-10-CM

## 2022-10-13 DIAGNOSIS — F31.12 BIPOLAR DISORDER, CURR EPISODE MANIC W/O PSYCHOTIC FEATURES, MODERATE (HCC): Primary | ICD-10-CM

## 2022-10-13 PROCEDURE — 99213 OFFICE O/P EST LOW 20 MIN: CPT

## 2022-10-13 RX ORDER — BUPROPION HYDROCHLORIDE 150 MG/1
150 TABLET ORAL DAILY
Qty: 30 TABLET | Refills: 1 | Status: SHIPPED | OUTPATIENT
Start: 2022-10-13

## 2022-10-13 RX ORDER — OLANZAPINE 2.5 MG/1
2.5 TABLET ORAL
Qty: 30 TABLET | Refills: 0 | Status: SHIPPED | OUTPATIENT
Start: 2022-10-13

## 2022-10-13 RX ORDER — OLANZAPINE 10 MG/1
10 TABLET ORAL
Qty: 30 TABLET | Refills: 0 | Status: SHIPPED | OUTPATIENT
Start: 2022-10-13

## 2022-10-13 NOTE — PSYCH
Regular Visit    Problem List Items Addressed This Visit        Other    Bipolar disorder, curr episode manic w/o psychotic features, moderate (HCC) - Primary    Relevant Medications    buPROPion (Wellbutrin XL) 150 mg 24 hr tablet    OLANZapine (ZyPREXA) 2 5 mg tablet    OLANZapine (ZyPREXA) 10 mg tablet      Other Visit Diagnoses     Other depression        Relevant Medications    buPROPion (Wellbutrin XL) 150 mg 24 hr tablet    OLANZapine (ZyPREXA) 2 5 mg tablet    OLANZapine (ZyPREXA) 10 mg tablet             Encounter provider LESLY Isbell    Provider located at    [address] [place] [address]  [address] [facility] [address] [phone]    Recent Visits  No visits were found meeting these conditions  Showing recent visits within past 7 days and meeting all other requirements  Today's Visits  Date Type Provider Dept   10/13/22 Office Visit LESLY Isbell  [facility]   Showing today's visits and meeting all other requirements  Future Appointments  No visits were found meeting these conditions    Showing future appointments within next 150 days and meeting all other requirements       HPI     Current Outpatient Medications   Medication Sig Dispense Refill   • buPROPion (Wellbutrin XL) 150 mg 24 hr tablet Take 1 tablet (150 mg total) by mouth daily 30 tablet 1   • OLANZapine (ZyPREXA) 10 mg tablet Take 1 tablet (10 mg total) by mouth daily at bedtime 30 tablet 0   • OLANZapine (ZyPREXA) 2 5 mg tablet Take 1 tablet (2 5 mg total) by mouth daily at bedtime 30 tablet 0   • aspirin 81 mg chewable tablet Chew 1 tablet (81 mg total) daily 30 tablet 0   • B Complex-C (B-complex with vitamin C) tablet Take 1 tablet by mouth daily     • Glucosamine-Chondroitin (GLUCOSAMINE CHONDR COMPLEX PO) Take by mouth     • lisinopril (ZESTRIL) 10 mg tablet Take 1 tablet (10 mg total) by mouth daily 90 tablet 1   • Magnesium 100 MG CAPS Take by mouth     • Multiple Vitamin (multivitamin) tablet Take 1 tablet by mouth daily 30 tablet 0   • niacin 100 mg tablet Take 100 mg by mouth daily with breakfast     • Omega-3 Fatty Acids (fish oil) 1,000 mg Take 1,000 mg by mouth daily     • sildenafil (VIAGRA) 50 MG tablet Take 1 tablet (50 mg total) by mouth daily as needed for erectile dysfunction 30 tablet 0     No current facility-administered medications for this visit  Review of Systems        MEDICATION MANAGEMENT NOTE        [address] [place] [address]    Name and Date of Birth:  Nicola Choi 62 y o  1965 MRN: [id number]    Date of Visit: October 13, 2022    Allergies   Allergen Reactions   • Latex Hives     SUBJECTIVE:    Daniel Abdul is seen today for a follow up for depression and Bipolar Disorder  He continues to do relatively well since the last visit  Daniel Abdul presents for a 4 week follow-up after initial visit with provider, he shares that he has since discontinued Depakote 750 mg daily which was recently initiated during his inpatient psychiatric hospital stay on 8/26/22  Describes how the Depakote caused joint pain and swelling which is why he discontinued medication and will not take a going forward  Also expressed concern regarding his liver function as he is dx with chronic hepatitis  This provider explained the risks of discontinuing medications on his own and that he needs to make this provider aware instead of making changes on his own, patient verbalized understanding  Will continue to encourage medication compliance  He remains on Zyprexa 12 5 mg daily at  for mood stabilization and reports his moods have remained under control  Patient does appear euthymic during today's encounter, he is soft spoken and pleasant during conversation  Thought process appears linear and goal oriented  No evidence of mood elevation, grandiosity, impulsivity, hyper talkativeness present today   Denies any risky behaviors or shopping sprees  Describes how his wife recently moved out of the house as they are taking a break and working on themselves before possibly reconnecting down the line  He is goal oriented in terms of finding an occupation, attended a job fair yesterday and received a call regarding an opportunity at [employer]  He is hopeful for an in-person interview shortly and 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  Discusses how he is looking forward to spending time with his grand children and family  Reports taking Wellbutrin  mg daily prescribed by a previous provider and reports medication is helpful in terms of decreasing depressive thoughts and he requested a refill medication  States the Wellbutrin is important for him as its helps with smoking cessation as well  Provider explained side effects of Wellbutrin and potentially contributing to manic episodes, patient verbalized understanding  Due to his reported effectiveness of medication, will continue Wellbutrin  mg daily and monitor closely for symptoms of mood elevation, patient agreed to plan  Patient instructed to call the office if symptoms of mood elevation occur and make an appointment as soon as possible, he agreed to this plan  Denies all evidence of suicidal and homicidal ideation  Agrees to go to the ER Denies drug and alcohol use   He is interested in psychotherapy has been added to the wait list          PLAN:    -Continue Zyprexa 12 5 mg daily at HS for mood stabilization PARQ discussed with patient included sedation, dizziness, weight gain, dry mouth, constipation, hypotension, dyslipidemia, EPS, metabolic syndrome, peripheral edema, dyspepsia, joint and body pains, tachycardia, orthostatic hypotension, rash with sunlight exposure, hyperglycemia, seizures, skin rash, rare NMS, and increased risk of CVA in patient's with dementia     -Continue Wellbutrin 150mg XL for depressive and dysphoric symptoms PARQ completed including induction of arsh, decreased seizure threshold and risk with alcohol or electrolyte disturbances, headaches, hypertension and cardiovascular effects, GI distress, weight loss, agitation/activation, dizziness, tremor, anxiety, potential for drug interactions, and others     -Patient has since discontinued Depakote 750 mg daily completely on his own since last visit due to side effects of swelling in his joints and worrying about his liver function enzymes  ALT:86, AST: 124 on 9/9/22  Patient has chronic hepatitis and liver enzymes elevated consistently for many years, he continues to follow closely with PCP             Aware of 24 hour and weekend coverage for urgent situations accessed by calling [facility] main practice number  Referral for individual psychotherapy    Diagnoses and all orders for this visit:    Bipolar disorder, curr episode manic w/o psychotic features, moderate (HCC)  -     buPROPion (Wellbutrin XL) 150 mg 24 hr tablet; Take 1 tablet (150 mg total) by mouth daily  -     OLANZapine (ZyPREXA) 2 5 mg tablet; Take 1 tablet (2 5 mg total) by mouth daily at bedtime  -     OLANZapine (ZyPREXA) 10 mg tablet; Take 1 tablet (10 mg total) by mouth daily at bedtime    Other depression  -     buPROPion (Wellbutrin XL) 150 mg 24 hr tablet;  Take 1 tablet (150 mg total) by mouth daily        Current Outpatient Medications on File Prior to Visit   Medication Sig Dispense Refill   • aspirin 81 mg chewable tablet Chew 1 tablet (81 mg total) daily 30 tablet 0   • B Complex-C (B-complex with vitamin C) tablet Take 1 tablet by mouth daily     • Glucosamine-Chondroitin (GLUCOSAMINE CHONDR COMPLEX PO) Take by mouth     • lisinopril (ZESTRIL) 10 mg tablet Take 1 tablet (10 mg total) by mouth daily 90 tablet 1   • Magnesium 100 MG CAPS Take by mouth     • Multiple Vitamin (multivitamin) tablet Take 1 tablet by mouth daily 30 tablet 0   • niacin 100 mg tablet Take 100 mg by mouth daily with breakfast     • Omega-3 Fatty Acids (fish oil) 1,000 mg Take 1,000 mg by mouth daily     • sildenafil (VIAGRA) 50 MG tablet Take 1 tablet (50 mg total) by mouth daily as needed for erectile dysfunction 30 tablet 0   • [DISCONTINUED] divalproex sodium (DEPAKOTE) 250 mg EC tablet Take 3 tablets (750 mg total) by mouth daily at bedtime 90 tablet 0   • [DISCONTINUED] OLANZapine (ZyPREXA) 10 mg tablet Take 1 tablet (10 mg total) by mouth daily at bedtime 30 tablet 0   • [DISCONTINUED] OLANZapine (ZyPREXA) 2 5 mg tablet Take 1 tablet (2 5 mg total) by mouth daily at bedtime 30 tablet 0     No current facility-administered medications on file prior to visit  HPI ROS Appetite Changes and Sleep:     He reports normal sleep, adequate appetite, adequate energy level, low energy   Denies homicidal ideation, denies suicidal ideation    Review Of Systems:      HPI ROS:               Medication Side Effects:  reported joint pain and swelling with Depakote, he discontinued    Depression (10 worst): 2/10    Anxiety (10 worst): 3/10    Safety concerns (SI, HI, etc): Denies si and hi    Sleep: Reports 6-7 hrs nightly    Energy: Fair to low    Appetite: 2-3meals daily    Weight Change: denies        Mental Status Evaluation:    Appearance Adequate hygiene and grooming   Behavior restless and fidgety   Mood anxious and depressed  Depression Scale - 2 of 10 (0 = No depression)  Anxiety Scale - 3 of 10 (0 = No anxiety)   Speech Normal rate and volume   Affect mood-congruent   Thought Processes Goal directed and coherent   Thought Content Does not verbalize delusional material   Associations Tightly connected   Perceptual Disturbances Denies hallucinations and does not appear to be responding to internal stimuli   Risk Potential Suicidal/Homicidal Ideation - No evidence of suicidal or homicidal ideation and patient does not verbalize suicidal or homicidal ideation  Risk of Violence - No evidence of risk for violence found on assessment  Risk of Self Mutilation - No evidence of risk for self mutilation found on assessment   Orientation oriented to person, place, time/date and situation   Memory recent and remote memory grossly intact   Consciousness alert and awake   Attention/Concentration attention span and concentration appear shorter than expected for age   Insight partial   Judgement intact   Muscle Strength and Gait normal muscle strength and normal muscle tone, normal gait/station and normal balance   Motor Activity no abnormal movements   Language no difficulty naming common objects, no difficulty repeating a phrase, no difficulty writing a sentence   Fund of Knowledge adequate knowledge of current events  adequate fund of knowledge regarding past history  adequate fund of knowledge regarding vocabulary      Past Psychiatric History Update:     Inpatient Psychiatric Admission Since Last Encounter:   no  Changes to Outpatient Psychiatric Treatment Team:    no  Suicide Attempt Or Self Mutilation Since Last Encounter:   no  Incidence of Violent Behavior Since Last Encounter:   no    Traumatic History Update:     New Onset of Abuse Since Last Encounter:   no  Traumatic Events Since Last Encounter:   no    Past Medical History:    Past Medical History:   Diagnosis Date   • DJD (degenerative joint disease)    • Dyslipidemia    • History of hepatitis C    • Hypertension    • 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[facility] [address] )    • Toxoplasmosis chorioretinitis of left eye         Past Surgical History:   Procedure Laterality Date   • CARPAL TUNNEL RELEASE Left    • CATARACT EXTRACTION Left    • COLONOSCOPY     • HERNIA REPAIR     • PLANTAR FASCIA SURGERY Left      Allergies   Allergen Reactions   • Latex Hives     Substance Abuse History:    Social History     Substance and Sexual Activity   Alcohol Use Not Currently     Social History     Substance and Sexual Activity   Drug Use Yes   • Types: Marijuana     Social History:    Social History Socioeconomic History   • Marital status: /Civil Union     Spouse name: Not on file   • Number of children: Not on file   • Years of education: Not on file   • Highest education level: Not on file   Occupational History   • Not on file   Tobacco Use   • Smoking status: Former Smoker     Years: 10 00     Types: Cigarettes     Quit date: 2020     Years since quittin 7   • Smokeless tobacco: Never Used   Vaping Use   • Vaping Use: Never used   Substance and Sexual Activity   • Alcohol use: Not Currently   • Drug use: Yes     Types: Marijuana   • Sexual activity: Not on file   Other Topics Concern   • Not on file   Social History Narrative   • Not on file     Social Determinants of Health     Financial Resource Strain: Not on file   Food Insecurity: Not on file   Transportation Needs: Not on file   Physical Activity: Not on file   Stress: Not on file   Social Connections: Not on file   Intimate Partner Violence: Not on file   Housing Stability: Not on file     Family Psychiatric History:     Family History   Problem Relation Age of Onset   • Lung cancer Mother    • Kidney disease Mother         drug induced   • COPD Mother    • Dementia Father      History Review: The following portions of the patient's history were reviewed and updated as appropriate: allergies, current medications, past family history, past medical history, past social history, past surgical history and problem list     OBJECTIVE:     Vital signs in last 24 hours: There were no vitals filed for this visit    Laboratory Results:   Recent Labs (last 2 months):   Appointment on 2022   Component Date Value   • Valproic Acid, Total 2022 84    • Total Bilirubin 2022 0 41    • Bilirubin, Direct 2022 0 17    • Alkaline Phosphatase 2022 82    • AST 2022 86 (A)   • ALT 2022 124 (A)   • Total Protein 2022 7 4    • Albumin 2022 3 6    Admission on 2022, Discharged on 2022 Component Date Value   • Folate 08/27/2022 >20 0 (A)   • Vit D, 25-Hydroxy 08/27/2022 56 8    • Vitamin B-12 08/27/2022 620    • Sodium 08/27/2022 139    • Potassium 08/27/2022 4 2    • Chloride 08/27/2022 102    • CO2 08/27/2022 28    • ANION GAP 08/27/2022 9    • BUN 08/27/2022 18    • Creatinine 08/27/2022 0 92    • Glucose 08/27/2022 97    • Glucose, Fasting 08/27/2022 97    • Calcium 08/27/2022 10 2    • AST 08/27/2022 96 (A)   • ALT 08/27/2022 96 (A)   • Alkaline Phosphatase 08/27/2022 55    • Total Protein 08/27/2022 7 8    • Albumin 08/27/2022 4 7    • Total Bilirubin 08/27/2022 0 79    • eGFR 08/27/2022 91    • Folate 08/28/2022 >20 0 (A)   • Valproic Acid, Total 09/01/2022 104 (A)   • Sodium 09/01/2022 139    • Potassium 09/01/2022 4 3    • Chloride 09/01/2022 102    • CO2 09/01/2022 28    • ANION GAP 09/01/2022 9    • BUN 09/01/2022 17    • Creatinine 09/01/2022 0 86    • Glucose 09/01/2022 90    • Glucose, Fasting 09/01/2022 90    • Calcium 09/01/2022 10 0    • AST 09/01/2022 77 (A)   • ALT 09/01/2022 83 (A)   • Alkaline Phosphatase 09/01/2022 55    • Total Protein 09/01/2022 6 7    • Albumin 09/01/2022 4 3    • Total Bilirubin 09/01/2022 0 73    • eGFR 09/01/2022 96    Admission on 08/25/2022, Discharged on 08/26/2022   Component Date Value   • Amph/Meth UR 08/25/2022 Negative    • Barbiturate Ur 08/25/2022 Negative    • Benzodiazepine Urine 08/25/2022 Negative    • Cocaine Urine 08/25/2022 Negative    • Methadone Urine 08/25/2022 Negative    • Opiate Urine 08/25/2022 Negative    • PCP Ur 08/25/2022 Negative    • THC Urine 08/25/2022 Positive (A)   • Oxycodone Urine 08/25/2022 Negative    • EXTBreath Alcohol 08/25/2022 0 000    • WBC 08/25/2022 7 01    • RBC 08/25/2022 4 33    • Hemoglobin 08/25/2022 14 3    • Hematocrit 08/25/2022 42 1    • MCV 08/25/2022 97    • MCH 08/25/2022 33 0    • MCHC 08/25/2022 34 0    • RDW 08/25/2022 12 1    • MPV 08/25/2022 8 5 (A)   • Platelets 57/20/1664 150    • nRBC 08/25/2022 0    • Neutrophils Relative 08/25/2022 57    • Immat GRANS % 08/25/2022 0    • Lymphocytes Relative 08/25/2022 29    • Monocytes Relative 08/25/2022 14 (A)   • Eosinophils Relative 08/25/2022 0    • Basophils Relative 08/25/2022 0    • Neutrophils Absolute 08/25/2022 3 94    • Immature Grans Absolute 08/25/2022 0 03    • Lymphocytes Absolute 08/25/2022 2 01    • Monocytes Absolute 08/25/2022 0 99    • Eosinophils Absolute 08/25/2022 0 02    • Basophils Absolute 08/25/2022 0 02    • Sodium 08/25/2022 129 (A)   • Potassium 08/25/2022 4 1    • Chloride 08/25/2022 94 (A)   • CO2 08/25/2022 29    • ANION GAP 08/25/2022 6    • BUN 08/25/2022 23    • Creatinine 08/25/2022 0 86    • Glucose 08/25/2022 70    • Calcium 08/25/2022 9 7    • AST 08/25/2022 82 (A)   • ALT 08/25/2022 88 (A)   • Alkaline Phosphatase 08/25/2022 51    • Total Protein 08/25/2022 7 5    • Albumin 08/25/2022 4 6    • Total Bilirubin 08/25/2022 0 91    • eGFR 08/25/2022 96    • SARS-CoV-2 08/25/2022 Negative    • Color, UA 08/25/2022 Yellow    • Clarity, UA 08/25/2022 Clear    • Specific Gravity, UA 08/25/2022 1 010    • pH, UA 08/25/2022 6 5    • Leukocytes, UA 08/25/2022 Negative    • Nitrite, UA 08/25/2022 Negative    • Protein, UA 08/25/2022 Negative    • Glucose, UA 08/25/2022 Negative    • Ketones, UA 08/25/2022 Negative    • Urobilinogen, UA 08/25/2022 0 2    • Bilirubin, UA 08/25/2022 Negative    • Occult Blood, UA 08/25/2022 Negative    • Ventricular Rate 08/25/2022 69    • Atrial Rate 08/25/2022 69    • OH Interval 08/25/2022 172    • QRSD Interval 08/25/2022 86    • QT Interval 08/25/2022 374    • QTC Interval 08/25/2022 400    • P Axis 08/25/2022 74    • QRS Axis 08/25/2022 48    • T Wave Axis 08/25/2022 60      I have personally reviewed all pertinent laboratory/tests results      Suicide/Homicide Risk Assessment:    Risk of Harm to Self:  Protective Factors: no current suicidal ideation, access to mental health treatment, being , compliant with medications, compliant with mental health treatment, connection to community  Based on today's assessment, Rocio Mai presents the following risk of harm to self: minimal    Risk of Harm to Others:  Based on today's assessment, Rocio Mai presents the following risk of harm to others: minimal    The following interventions are recommended: referral for psychotherapy    Medications Risks/Benefits:      Risks, Benefits And Possible Side Effects Of Medications:    Discussed risks and benefits of treatment with patient including risk of suicidality, serotonin syndrome, increased QTc interval and SIADH related to treatment with antidepressants; Risk of induction of manic symptoms in certain patient populations, risk of parkinsonian symptoms, metabolic syndrome, tardive dyskinesia and neuroleptic malignant syndrome related to treatment with antipsychotic medications and Reduction in seizure threshold related to treatment with bupropion      Controlled Medication Discussion:     Not applicable    Treatment Plan:    Due for update/Updated:   no  Last treatment plan done 9/15/22 by alejandra thorpe  Treatment Plan due on 3/15/23      LESLY Handy 10/13/22

## 2022-10-19 DIAGNOSIS — I10 PRIMARY HYPERTENSION: ICD-10-CM

## 2022-10-19 RX ORDER — LISINOPRIL 10 MG/1
10 TABLET ORAL DAILY
Qty: 90 TABLET | Refills: 0 | Status: SHIPPED | OUTPATIENT
Start: 2022-10-19

## 2022-10-19 NOTE — TELEPHONE ENCOUNTER
Medication Refill Request     Name lisinopril (ZESTRIL)   Dose/Frequency 10MG/Take 1 tablet (10 mg total) by mouth daily  Quantity 90 tablet  Verified pharmacy   [x]  Verified ordering Provider   [x]  Does patient have enough for the next 3 days?  Yes [x] No []

## 2022-11-01 ENCOUNTER — CONSULT (OUTPATIENT)
Dept: VASCULAR SURGERY | Facility: CLINIC | Age: 57
End: 2022-11-01

## 2022-11-01 VITALS
BODY MASS INDEX: 21.34 KG/M2 | HEIGHT: 71 IN | WEIGHT: 152.4 LBS | HEART RATE: 76 BPM | DIASTOLIC BLOOD PRESSURE: 84 MMHG | TEMPERATURE: 97.6 F | OXYGEN SATURATION: 96 % | SYSTOLIC BLOOD PRESSURE: 146 MMHG

## 2022-11-01 DIAGNOSIS — I83.893 SYMPTOMATIC VARICOSE VEINS, BILATERAL: Primary | ICD-10-CM

## 2022-11-01 DIAGNOSIS — I86.1 VARICOCELE: ICD-10-CM

## 2022-11-01 DIAGNOSIS — N52.9 ERECTILE DYSFUNCTION, UNSPECIFIED ERECTILE DYSFUNCTION TYPE: ICD-10-CM

## 2022-11-01 RX ORDER — ZINC GLUCONATE 50 MG
50 TABLET ORAL DAILY
COMMUNITY

## 2022-11-01 NOTE — PROGRESS NOTES
Assessment/Plan:    Pt is a 61 yo M w/ HTN, hx EtOH abuse, hx hepatitis, ED, bipolar, hx tobacco abuse, current medical marijuana use, varicocele, pelvic varicosities    Symptomatic varicose veins, bilateral  Erectile dysfunction, unspecified erectile dysfunction type  Varicocele  -     Ambulatory Referral to Vascular Surgery  -     Ambulatory Referral to Urology; Future  -B groin/pelvic prominent veins which are largely asymptomatic  -reviewed US from OSH '21 which shows B varicocele  -his groin varicosities are asymptomatic and do not need any further treatment/removal; they are likely as symptom of pelvic venous insufficiency which is also related to his B varicocele; this may or may not be related to his ED, which is his primary complaint at this time; also etiologies could include mental illness/anxiety/depression/PTSD, or arterial insufficiency/IIA disease, given his hx of smoking, but I think this is less likely given that the remainder of his pulse exam is normal  -referred to urology for further workup of his ED and varicocele; if his varicocele was thought to be causing his symptoms, he could have further workup with CTV to look for testicular vein insufficiency and could potentially be a candidate for embolization, although I think it is unlikely that he will require this    Subjective:      Patient ID: Christian Alas is a 62 y o  male  New patient referred by Smitha GRACE for bilateral symptomatic varicose veins  PT c/o of buldging veins to both groin areas R>L has no pain swelling or ache to groin  Pt denies pain, heaviness, aching, itching to legs  both  No previous testing  Pt is taking ASA 81mg  Pt is a former smoker  HPI:    Patient presents for evaluation of pelvic veins  About a year ago, he noticed varicose veins in his B groin areas  He also noticed a significant sudden decrease in sexual function where he is not as able to achieve and maintain an erection      Has some spider veins R>L but no varicose veins in the legs  Denies swelling, heaviness, aching, burning, itching in the legs  Denies pelvic pain or discomfort including sitting or standing for long periods  Denies pain with intercourse  During the last year, his mother passed away and patient had COVID  He lost 40lbs over a short period of time but has been able to gain most of this back  He is very active and exercises  Prior smoker, quit '20  Was 1PPD smoker  Using medical marijuana (vapes) for anxiety and PTSD  He is seeing someone for his mental health and is taking wellbutrin  He has been prescribed depakote, seroquel, etc in the past but could not tolerate the side effects  The following portions of the patient's history were reviewed and updated as appropriate: allergies, current medications, past family history, past medical history, past social history, past surgical history and problem list     Review of Systems   Constitutional: Negative  HENT: Negative  Eyes: Negative  Respiratory: Negative  Cardiovascular: Negative  Negative for leg swelling  Gastrointestinal: Negative  Endocrine: Negative  Genitourinary: Negative  Musculoskeletal: Negative  Negative for gait problem  Skin: Negative  Negative for wound  Allergic/Immunologic: Negative  Neurological: Positive for light-headedness  Hematological: Bruises/bleeds easily  Psychiatric/Behavioral: Negative  Objective:      /84 (BP Location: Left arm, Patient Position: Sitting, Cuff Size: Standard)   Pulse 76   Temp 97 6 °F (36 4 °C) (Temporal)   Ht 5' 11" (1 803 m)   Wt 69 1 kg (152 lb 6 4 oz)   SpO2 96%   BMI 21 26 kg/m²          Physical Exam  Vitals and nursing note reviewed  Constitutional:       Appearance: He is well-developed  HENT:      Head: Normocephalic and atraumatic     Eyes:      Conjunctiva/sclera: Conjunctivae normal    Cardiovascular:      Rate and Rhythm: Normal rate and regular rhythm  Pulses:           Dorsalis pedis pulses are 2+ on the right side and 2+ on the left side  Posterior tibial pulses are 2+ on the right side and 2+ on the left side  Heart sounds: Normal heart sounds  No murmur heard  Pulmonary:      Effort: Pulmonary effort is normal  No respiratory distress  Breath sounds: Normal breath sounds  No wheezing or rales  Abdominal:      General: There is no distension  Palpations: Abdomen is soft  Tenderness: There is no abdominal tenderness  There is no rebound  Genitourinary:     Testes:         Left: Varicocele (B, L>R) present  Musculoskeletal:         General: Normal range of motion  Cervical back: Normal range of motion and neck supple  Right lower leg: No edema  Left lower leg: No edema  Skin:     General: Skin is warm and dry  Comments: Scattered spider veins over the B ankles mainly, R>L  No chronic stasis changes   Neurological:      Mental Status: He is alert and oriented to person, place, and time  Psychiatric:         Behavior: Behavior normal            I have reviewed and made appropriate changes to the review of systems input by the medical assistant      Vitals:    11/01/22 0933   BP: 146/84   BP Location: Left arm   Patient Position: Sitting   Cuff Size: Standard   Pulse: 76   Temp: 97 6 °F (36 4 °C)   TempSrc: Temporal   SpO2: 96%   Weight: 69 1 kg (152 lb 6 4 oz)   Height: 5' 11" (1 803 m)       Patient Active Problem List   Diagnosis   • Elevated liver enzymes   • Primary hypertension   • Bipolar disorder, curr episode manic w/o psychotic features, moderate (HCC)   • H/O chronic hepatitis   • Severe protein-calorie malnutrition (HCC)   • Symptomatic varicose veins, bilateral   • Severe alcohol dependence in sustained remission [facility])       Past Surgical History:   Procedure Laterality Date   • CARPAL TUNNEL RELEASE Left    • CATARACT EXTRACTION Left    • COLONOSCOPY     • HERNIA REPAIR     • PLANTAR FASCIA SURGERY Left        Family History   Problem Relation Age of Onset   • Lung cancer Mother    • Kidney disease Mother         drug induced   • COPD Mother    • Dementia Father        Social History     Socioeconomic History   • Marital status: /Civil Union     Spouse name: Not on file   • Number of children: Not on file   • Years of education: Not on file   • Highest education level: Not on file   Occupational History   • Not on file   Tobacco Use   • Smoking status: Former Smoker     Years: 10 00     Types: Cigarettes     Quit date: 2020     Years since quittin 8   • Smokeless tobacco: Never Used   Vaping Use   • Vaping Use: Never used   Substance and Sexual Activity   • Alcohol use: Not Currently   • Drug use: Yes     Types: Marijuana   • Sexual activity: Not on file   Other Topics Concern   • Not on file   Social History Narrative   • Not on file     Social Determinants of Health     Financial Resource Strain: Not on file   Food Insecurity: Not on file   Transportation Needs: Not on file   Physical Activity: Not on file   Stress: Not on file   Social Connections: Not on file   Intimate Partner Violence: Not on file   Housing Stability: Not on file       Allergies   Allergen Reactions   • Latex Hives         Current Outpatient Medications:   •  aspirin 81 mg chewable tablet, Chew 1 tablet (81 mg total) daily, Disp: 30 tablet, Rfl: 0  •  B Complex-C (B-complex with vitamin C) tablet, Take 1 tablet by mouth daily, Disp: , Rfl:   •  buPROPion (Wellbutrin XL) 150 mg 24 hr tablet, Take 1 tablet (150 mg total) by mouth daily, Disp: 30 tablet, Rfl: 1  •  Glucosamine-Chondroitin (GLUCOSAMINE CHONDR COMPLEX PO), Take by mouth, Disp: , Rfl:   •  lisinopril (ZESTRIL) 10 mg tablet, Take 1 tablet (10 mg total) by mouth daily, Disp: 90 tablet, Rfl: 0  •  Magnesium 100 MG CAPS, Take by mouth, Disp: , Rfl:   •  Multiple Vitamin (multivitamin) tablet, Take 1 tablet by mouth daily, Disp: 30 tablet, Rfl: 0  •  niacin 100 mg tablet, Take 100 mg by mouth daily with breakfast, Disp: , Rfl:   •  Omega-3 Fatty Acids (fish oil) 1,000 mg, Take 1,000 mg by mouth daily, Disp: , Rfl:   •  sildenafil (VIAGRA) 50 MG tablet, Take 1 tablet (50 mg total) by mouth daily as needed for erectile dysfunction, Disp: 30 tablet, Rfl: 0  •  zinc gluconate 50 mg tablet, Take 50 mg by mouth daily, Disp: , Rfl:   •  OLANZapine (ZyPREXA) 10 mg tablet, Take 1 tablet (10 mg total) by mouth daily at bedtime (Patient not taking: Reported on 11/1/2022), Disp: 30 tablet, Rfl: 0  •  OLANZapine (ZyPREXA) 2 5 mg tablet, Take 1 tablet (2 5 mg total) by mouth daily at bedtime (Patient not taking: Reported on 11/1/2022), Disp: 30 tablet, Rfl: 0

## 2022-11-01 NOTE — PATIENT INSTRUCTIONS
1) Varicose veins  -you have some varicose veins in the groin area; this in itself is not a problem but probably a sign of some venous congestion in the pelvis area; this is supported by the fact that you have varicocele (the varicose veins that were seen on your ultrasound of your scrotum in '21)  -I think that you should see a urologist, both for the varicocele as well as your sexual function questions; they can help figure out why you had this sudden change  -continue working with your mental health provider as this can also affect sexual function

## 2022-11-02 ENCOUNTER — TELEPHONE (OUTPATIENT)
Dept: PSYCHIATRY | Facility: CLINIC | Age: 57
End: 2022-11-02

## 2022-11-04 ENCOUNTER — TELEMEDICINE (OUTPATIENT)
Dept: PSYCHIATRY | Facility: CLINIC | Age: 57
End: 2022-11-04

## 2022-11-04 DIAGNOSIS — F31.12 BIPOLAR DISORDER, CURR EPISODE MANIC W/O PSYCHOTIC FEATURES, MODERATE (HCC): Primary | ICD-10-CM
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
## 2022-11-18 ENCOUNTER — TELEMEDICINE (OUTPATIENT)
Dept: PSYCHIATRY | Facility: CLINIC | Age: 57
End: 2022-11-18

## 2022-11-18 DIAGNOSIS — F31.12 BIPOLAR DISORDER, CURR EPISODE MANIC W/O PSYCHOTIC FEATURES, MODERATE (HCC): Primary | ICD-10-CM

## 2022-11-18 NOTE — PSYCH
Psychotherapy Provided: Individual Psychotherapy 50 minutes     Length of time in session: 50 minutes, follow up in 2 week    Encounter Diagnosis     ICD-10-CM    1  Bipolar disorder, curr episode manic w/o psychotic features, moderate ([facility])  F31 12           Goals addressed in session: Goal 1     Pain:      none    0    Current suicide risk : Low     If client experiences suicidal ideation and feels in danger of acting on urges, they will call [phone] or go to the nearest emergency room  Behavioral Health Treatment Plan [facility]: Diagnosis and Treatment Plan explained to Taylor Vieyra relates understanding diagnosis and is agreeable to Treatment Plan  Yes     Visit start and stop times:    11/18/22       Virtual Regular Visit    Verification of patient location:    Patient is located in the following state in which I hold an active license [place]      Assessment/Plan:    Problem List Items Addressed This Visit        Other    Bipolar disorder, curr episode manic w/o psychotic features, moderate ([facility] [address] ) - Primary       Goals addressed in session: Goal 1          Reason for visit is   Chief Complaint   Patient presents with   • Virtual Regular Visit        Encounter provider Claudia Gonzalez LCSW    Provider located at [address] [address] [phone]      Recent Visits  No visits were found meeting these conditions  Showing recent visits within past 7 days and meeting all other requirements  Future Appointments  No visits were found meeting these conditions  Showing future appointments within next 150 days and meeting all other requirements       The patient was identified by name and date of birth  Barbylazara Olszewski was informed that this is a telemedicine visit and [facility] the [facility]  He agrees to proceed     My office door was closed   No one else was in the room   He acknowledged consent and understanding of privacy and security of the video platform  The patient has agreed to participate and understands they can discontinue the visit at any time  Patient is aware this is a billable service  Subjective  Eleni Carrington is a 62 y o  male   D:  Ashwin New shared he was feeling good, and took PHQ and MARZENA screenings and scored minimal for anxiety and depression  Ashwinyvrose Callespastorjerson shared that he has had a job interview for a behavioral health position with  [employer] and will have another interview soon  Ashwin New and therapist wrote a treatment plan this session    A:  Ashwin New appeared calm and cooperative during session and was able to write a treatment plan during session  P:  Ashwin New will meet with therapist in 2 weeks to start to learn DBT skills      HPI     Past Medical History:   Diagnosis Date   • DJD (degenerative joint disease)    • Dyslipidemia    • History of hepatitis C    • Hypertension    • 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[facility] [address] )    • Toxoplasmosis chorioretinitis of left eye        Past Surgical History:   Procedure Laterality Date   • CARPAL TUNNEL RELEASE Left    • CATARACT EXTRACTION Left    • COLONOSCOPY     • HERNIA REPAIR     • PLANTAR FASCIA SURGERY Left        Current Outpatient Medications   Medication Sig Dispense Refill   • aspirin 81 mg chewable tablet Chew 1 tablet (81 mg total) daily 30 tablet 0   • B Complex-C (B-complex with vitamin C) tablet Take 1 tablet by mouth daily     • buPROPion (Wellbutrin XL) 150 mg 24 hr tablet Take 1 tablet (150 mg total) by mouth daily 30 tablet 1   • Glucosamine-Chondroitin (GLUCOSAMINE CHONDR COMPLEX PO) Take by mouth     • lisinopril (ZESTRIL) 10 mg tablet Take 1 tablet (10 mg total) by mouth daily 90 tablet 0   • Magnesium 100 MG CAPS Take by mouth     • Multiple Vitamin (multivitamin) tablet Take 1 tablet by mouth daily 30 tablet 0   • niacin 100 mg tablet Take 100 mg by mouth daily with breakfast     • OLANZapine (ZyPREXA) 10 mg tablet Take 1 tablet (10 mg total) by mouth daily at bedtime (Patient not taking: Reported on 11/1/2022) 30 tablet 0   • OLANZapine (ZyPREXA) 2 5 mg tablet Take 1 tablet (2 5 mg total) by mouth daily at bedtime (Patient not taking: Reported on 11/1/2022) 30 tablet 0   • Omega-3 Fatty Acids (fish oil) 1,000 mg Take 1,000 mg by mouth daily     • sildenafil (VIAGRA) 50 MG tablet Take 1 tablet (50 mg total) by mouth daily as needed for erectile dysfunction 30 tablet 0   • zinc gluconate 50 mg tablet Take 50 mg by mouth daily       No current facility-administered medications for this visit  Allergies   Allergen Reactions   • Latex Hives       Review of Systems    Video Exam    There were no vitals filed for this visit      Physical Exam     Visit Time    Visit Start Time: 8:00am  Visit Stop Time: 8:52  Total Visit Duration: 52 minutes

## 2022-11-18 NOTE — BH TREATMENT PLAN
Nedra Westfall  1965       Date of Initial Treatment Plan: 11/18/2022  Date of Current Treatment Plan: 11/18/22    Treatment Plan Number 1    Strengths/Personal Resources for Self Care: Strengths: empathetic, understanding, insightful, energetic, spreading positive energy and love, optimistic, see the good in people  Self-Care: Working out, eating healthy diet, lacking some emotional connection    Diagnosis:   1  Bipolar disorder, curr episode manic w/o psychotic features, moderate ([address] )            Area of Needs: Emotional connections, figure myself out more, why I do things that I do, understanding myself and my past       Long Term Goal 1:  "I want to have a better understanding of myself and my character defects and manage my character defects so that they don't become my shortcomings  Target Date: 11/18/2023  Completion Date: to be determined         Short Term Objectives for Goal 1:   1  Therapist will teach DBT skills to client to better manage his moods and thoughts to improve his self understanding through learning DBT concepts and skills  Client will learn DBT concepts and skills including wise mind, core mindfulness, lovingkindness, middle path",SRIRAM, GIVE FAST, building/ending relationships, behavior chain analysis, opposite action, understanding and naming emotions, Pros and cons, PLEASE skills, TIP skills, Radical acceptance, ACCEPTS, Turning the Mind and IMPROVE the moment  Client will practice skills and assess effectiveness with therapist biweekly and adjust skills acquisition as needed  Client will demonstrate successful use of DBT skills in at least 8 out of 10 challenging situations  2  Therapist will teach Velvet George CBT skills to better manage moods and thoughts    Client will engage in cognitive behavioral therapy including identifying emotions, learning about ANTs, identifying situations, Identifying thoughts, learning about cognitive distortions and challenging cognitive distortions  Client will report success and challenges in using CBT methods  Therapist will adjust therapy interventions as needed  Long Term Goal 2: N/A    Target Date: N/A  Completion Date: N/A    Short Term Objectives for Goal 2: N/A         Long Term Goal # 3: N/A     Target Date: N/A  Completion Date: N/A    Short Term Objectives for Goal 3: N/A    GOAL 1: Modality: Individual 2x per month   Completion Date to be determined , Medication Management and The person(s) responsible for carrying out the plan is  Deniz Delarosa and therapist   Prescriber for medication    GOAL 2: Modality:     GOAL 3: Modality:       Behavioral Health Treatment Plan [facility]: Diagnosis and Treatment Plan explained to Jo Bower relates understanding diagnosis and is agreeable to Treatment Plan  Client Comments : Please share your thoughts, feelings, need and/or experiences regarding your treatment plan: none noted    This treatment plan was created between this clinician and Luca WALKER 1965 on date2022 at time 8:50am   This treatment plan was created during a Virtual Visit using the [address] [place], [place] [address]e  Luca Gomez provided verbal consent at the time of the actual session  The treatment plan was transcribed into the Electronic Health Record at a later date

## 2022-11-21 ENCOUNTER — CONSULT (OUTPATIENT)
Dept: UROLOGY | Facility: CLINIC | Age: 57
End: 2022-11-21

## 2022-11-21 VITALS
OXYGEN SATURATION: 97 % | SYSTOLIC BLOOD PRESSURE: 128 MMHG | DIASTOLIC BLOOD PRESSURE: 78 MMHG | HEIGHT: 71 IN | HEART RATE: 65 BPM | BODY MASS INDEX: 21.42 KG/M2 | WEIGHT: 153 LBS

## 2022-11-21 DIAGNOSIS — I86.1 VARICOCELE: Primary | ICD-10-CM

## 2022-11-21 DIAGNOSIS — R79.89 ELEVATED TESTOSTERONE LEVEL: ICD-10-CM

## 2022-11-21 DIAGNOSIS — N52.9 ERECTILE DYSFUNCTION, UNSPECIFIED ERECTILE DYSFUNCTION TYPE: ICD-10-CM

## 2022-11-21 PROBLEM — Z12.5 PROSTATE CANCER SCREENING: Status: ACTIVE | Noted: 2022-11-21

## 2022-11-21 LAB
POST-VOID RESIDUAL VOLUME, ML POC: 124 ML
SL AMB  POCT GLUCOSE, UA: NORMAL
SL AMB LEUKOCYTE ESTERASE,UA: NORMAL
SL AMB POCT BILIRUBIN,UA: NORMAL
SL AMB POCT BLOOD,UA: NORMAL
SL AMB POCT CLARITY,UA: CLEAR
SL AMB POCT COLOR,UA: YELLOW
SL AMB POCT KETONES,UA: NORMAL
SL AMB POCT NITRITE,UA: NORMAL
SL AMB POCT PH,UA: 5
SL AMB POCT SPECIFIC GRAVITY,UA: 1.01
SL AMB POCT URINE PROTEIN: NORMAL
SL AMB POCT UROBILINOGEN: 0.2

## 2022-11-21 RX ORDER — SILDENAFIL 50 MG/1
50 TABLET, FILM COATED ORAL DAILY PRN
Qty: 30 TABLET | Refills: 12 | Status: SHIPPED | OUTPATIENT
Start: 2022-11-21

## 2022-11-21 NOTE — PATIENT INSTRUCTIONS
Varicocele   WHAT YOU NEED TO KNOW:   What is a varicocele? A varicocele is a condition that causes the veins in your scrotum to become enlarged and dilated  The scrotum is the sac that holds the testicles  A varicocele can cause infertility because it prevents sperm from flowing out of the testicles  What causes a varicocele? A varicocele occurs when the valves within the veins in the scrotum do not work properly  Valves open and close to keep the blood flowing in one direction  When the valves do not work properly, blood backs up and causes the veins to dilate and swell  What are the signs and symptoms of a varicocele? A mass or swelling that is like a bag of worms    Veins that are swollen and twisted    Mild discomfort    How is a varicocele diagnosed? Your healthcare provider will examine your scrotum while you stand  He may ask you to take a deep breath, hold it, and bear down like you are having a bowel movement  You may also need the following: An ultrasound  may show the varicocele  A spermatic venography  may show the position of the veins in your scrotum  You may be given contrast dye to help the veins show up better in the pictures  Tell a healthcare provider if you have ever had an allergic reaction to contrast dye  How is a varicocele treated? NSAIDs , such as ibuprofen, help decrease swelling, pain, and fever  This medicine is available with or without a doctor's order  NSAIDs can cause stomach bleeding or kidney problems in certain people  If you take blood thinner medicine, always ask your healthcare provider if NSAIDs are safe for you  Always read the medicine label and follow directions  An athletic supporter  may reduce pressure and treat your varicocele  Percutaneous embolization  is a procedure to create scarring in your veins  The scars form a blockage that causes the blood to flow around the varicocele  Surgery  may be needed to cut or tie off the blocked veins   This will cause the blood to flow around the blockage and heal the varicocele  When should I contact my healthcare provider? You have pain in your scrotum  You have a lump on your scrotum  You have questions or concerns about your condition or care  CARE AGREEMENT:   You have the right to help plan your care  Learn about your health condition and how it may be treated  Discuss treatment options with your healthcare providers to decide what care you want to receive  You always have the right to refuse treatment  The above information is an  only  It is not intended as medical advice for individual conditions or treatments  Talk to your doctor, nurse or pharmacist before following any medical regimen to see if it is safe and effective for you  © Copyright [employer] 2022 Information is for End User's use only and may not be sold, redistributed or otherwise used for commercial purposes  All illustrations and images included in CareNotes® are the copyrighted property of A D A M , Inc  or [employer]   Erectile Dysfunction   WHAT YOU NEED TO KNOW:   What is erectile dysfunction (ED)?  ED, or impotence, is when you cannot get or keep an erection for sexual activity  What causes ED? Conditions that lead to nerve problems, such as a spinal cord injury, diabetes, or stroke    Hormone imbalances, such as low testosterone, high prolactin, or a thyroid disorder    Medical conditions that decrease blood flow, such as high blood pressure and arteriosclerosis    Multiple sclerosis or Parkinson disease    Medicines, such as those used to treat depression and high blood pressure    Injury to the testicles from radiation therapy to the testicles    What increases my risk for ED?    Obesity    Diabetes that is not controlled or heart disease    Smoking, or drug or alcohol abuse    An enlarged prostate    Increasing age    Spine or groin surgeries    Stress, depression, relationship problems, and anxiety    How is ED diagnosed? Your healthcare provider will ask about your symptoms, medical history, and medicines  He or she will examine your abdomen, penis, and testicles  A rectal exam may also be done to check for an enlarged prostate  Blood and urine tests are done to check for medical conditions that may have caused your ED  You may also need tests to check your blood flow and nerve function  How is ED treated? Treatment depends on the cause of your ED  You may need any of the following:  ED medicines  help you have an erection  These medicines are taken before you have sex  Follow instructions on how to use these medicines  You may have a life-threatening reaction if you mix ED medicines with medicines that contain nitrates  Medicines with nitrates include nitroglycerin and other heart medicines  Testosterone  may be given to increase the levels in your blood and improve your ED  You may need to use a skin cream or wear a patch  Testosterone is also given as an injection  Penis injections  may be done to help improve your blood flow  A vacuum device  is a tube that is placed over the penis  A hand pump is connected to the tube and acts as a vacuum  This may help increase blood flow to the penis  Therapy  may be needed to treat emotional or relationship problems that may be causing your ED  Surgery  may be recommended if other treatments do not work  Surgery includes a penile implant or prosthesis  Surgery may also be done to improve blood flow  Ask for more information about surgeries for ED  How can I decrease my risk for ED? Do not smoke  Smoking can increase your risk for ED  Nicotine and other chemicals in cigarettes and cigars can also cause lung damage  Ask your healthcare provider for information if you currently smoke and need help to quit  E-cigarettes or smokeless tobacco still contain nicotine  Control your blood sugar levels if you have diabetes    Over time, high blood sugar levels can increase your risk for ED  Limit alcohol  Men should limit alcohol to 2 drinks a day  A drink of alcohol is 12 ounces of beer, 5 ounces of wine, or 1½ ounces of liquor  Manage your medical conditions  Eat a variety of healthy foods and stay physically active  Take your medicines as directed  They can help control conditions that may cause ED  Manage stress  Learn ways to relax, such as deep breathing, meditation, and listening to music  Do not use illegal drugs  They increase your risk for ED  When should I call my doctor? You have chest pain, dizziness, or nausea after you take ED medicines or during or after sex  You have an erection for more than 4 hours after you take your ED medicine  You see blood in your urine  You have changes in your vision, headaches, or back pain after you take ED medicine  You have a painful erection  You have questions or concerns about your condition or care  CARE AGREEMENT:   You have the right to help plan your care  Learn about your health condition and how it may be treated  Discuss treatment options with your healthcare providers to decide what care you want to receive  You always have the right to refuse treatment  The above information is an  only  It is not intended as medical advice for individual conditions or treatments  Talk to your doctor, nurse or pharmacist before following any medical regimen to see if it is safe and effective for you  © Copyright [employer] 2022 Information is for End User's use only and may not be sold, redistributed or otherwise used for commercial purposes   All illustrations and images included in CareNotes® are the copyrighted property of [employer]  or [address] [place] [address] [employer]

## 2022-11-21 NOTE — PROGRESS NOTES
Assessment and plan:     Varicocele  · Noted on US previously  · Scrotal support  · Elevation  · nsaids for discomfort  · Follow up prn    Erectile dysfunction  · Continue sildenafil 50 mg and titrate up to 100mg  · Recommend use of silicone penile rings and vacuum assisted devices  · Follow up 1 year    Prostate cancer screening  · PSA 1 8  · Completed by PCP    Elevated testosterone level  · Testosterone >1500 ml  · Check testosterone level    LESLY Moreira    History of Present Illness     Amy Hernandez is a 62 y o  new patient presents for erectile dysfunction that has been progressing over the past year  he takes prn sildenafil with varying effects  He takes wellbutrin since 2022 for smoking cessation and his bipolar disorder  Testosterone level >1500, free 5 7 in February 2022  Denies supplements or medications for this  Obtain/Maintain: both    Nocturnal/AM erections:yes  Penetration keep ability: varies  Last successful intercourse: friday  Libido: denies  Relationship problems:  from his wife of 32 years, recently lost his mother  Performance anxiety/stress: since this started  HTN/HLD/DM/Vascular/Spinal disease:htn  Chronic renal failure /chronic liver failure/alcoholism/ thyroid disorder: former alcoholic and drug addict, uses medical marijuana  Trauma ( pelvic fracture, penile fracture, perineal trauma, priapism, spinal cord injury, neuropathy from bicycle riding, pelvic radiation):denies  Antihypertensives/ antidepressants/ antipsychotics ( tricyclic antidepressants MAOI, lithium): wellbutrin, lisinopril  No abdominopelvic/spinal surgeries:no  Cardiovascular risk assessment:low  Prior treatment:sildenafil      we reviewed that the ability to attain and maintain an erection requires multi factors including proper blood flow and nerve innervation    We discussed the prevalence of erectile dysfunction increases with age; more than 50% of min older than 40 has some degree of erectile dysfunction  We also discussed how erectile dysfunction can be psychogenic related to depression, stress and anxiety  We discussed how certain endocrine issues can lead to erectile dysfunction such as hyperprolactinemia, thyroid disorders and hypogonadism etc  We discussed that his medications can be a factor in his inability to obtain and maintain erection  This includes antihypertensive medications  Patient understands that medical comorbidities including obesity, high blood pressure, high cholesterol, diabetes, other neurologic concerns as well as lifestyle habits including smoking and alcohol use can contribute to erectile dysfunction  Our goal was to first assess for general health changes that can improve erectile function  Described noninvasive options which can improve erectile function including vacuum erection devices with or without occlusive silicone penile rings  We discussed phosphodiesterase 5 inhibitors including medications like Viagra, Cialis, Levitra, and generic sildenafil  We gave a brief overview of the administration instructions and side effect profile  We discussed more invasive options including urethral suppositories, penile injections, or ultimately prosthetic device implantation should all other options fail  At this point time the patient is interested in trying sildenafil on an empty stomach    He used to have frequent urination and nocturia with incomplete bladder emptying  This has resolved  His PVR slightly elevated  He is not interested in any medications at this time  We will continue to monitor his symptoms yearly  If symptoms worsen can discuss options at that time  He has a history of hypertension SERA, varicocele, elevated liver bipolar, hepatitis severe alcohol dependence remission erectile dysfunction, DJD dyslipidemia  He lost a lot of weight in May 2022 after having covid    Laboratory     Lab Results   Component Value Date    BUN 17 09/01/2022 CREATININE 0 86 09/01/2022       No components found for: GFR    Lab Results   Component Value Date    CALCIUM 10 0 09/01/2022    K 4 3 09/01/2022    CO2 28 09/01/2022     09/01/2022       Lab Results   Component Value Date    WBC 7 01 08/25/2022    HGB 14 3 08/25/2022    HCT 42 1 08/25/2022    MCV 97 08/25/2022     08/25/2022       Lab Results   Component Value Date    PSA 1 8 02/17/2022       Recent Results (from the past 1 hour(s))   POCT urine dip    Collection Time: 11/21/22  8:06 AM   Result Value Ref Range    LEUKOCYTE ESTERASE,UA trace     NITRITE,UA -     SL AMB POCT UROBILINOGEN 0 2     POCT URINE PROTEIN -      PH,UA 5 0     BLOOD,UA -     SPECIFIC GRAVITY,UA 1 010     KETONES,UA -     BILIRUBIN,UA -     GLUCOSE, UA -      COLOR,UA yellow     CLARITY,UA clear    POCT Measure PVR    Collection Time: 11/21/22  8:06 AM   Result Value Ref Range    POST-VOID RESIDUAL VOLUME, ML  mL       @RESULT(URINEMICROSCOPIC)@    @RESULT(URINECULTURE)@    Radiology     Jona Gayle MD - 03/10/2021   Formatting of this note might be different from the original      INDICATION: Scrotal region pain N50 9 - Disorder of male genital organs, unspecified     TECHNIQUE: Real time triplex gray scale, color, and spectral waveform doppler ultrasound evaluation scrotum was performed  40 images submitted for review  Technologist reports adequate study  COMPARISON: None     FINDINGS:     Testicular echogenicity : Bilaterally symmetrical   Testicular color flow: Bilaterally symmetrical     Right     Testicle: 4 8 x 3 5 x 2 3 cm   Testicular lesion: Scattered punctate echogenic reflectors  No focal mass  Epididymis : Unremarkable   Epididymal color flow: No hypervascularity   Hydrocele: No   Scrotalith: No   Varicocele: Present   Bowel containing hernia: No       Left:       Testicle: 4 7 x 2 8 x 2 3 cm   Testicular lesion: Scattered punctate echogenic reflectors  No mass     Epididymis : Unremarkable   Epididymal color flow: No hypervascularity   Hydrocele: No   Scrotalith: No   Varicocele: Present   Bowel containing hernia: No       Skin thickening: No               IMPRESSION:     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  In the absence of any risk factors for testicular cancer no further imaging or biochemical follow-up is required  Recommend routine monthly testicular self examination  If the patient has risk factors for testicular cancer, urology consultation for further evaluation and determination of follow-up strategy is recommended  Bilateral varicoceles     Please see report for multiple other findings  Review of Systems     Review of Systems   Constitutional: Negative for activity change, appetite change, chills, fatigue, fever and unexpected weight change  HENT: Negative for facial swelling  Eyes: Negative for discharge  Respiratory: Negative  Negative for cough and shortness of breath  Cardiovascular: Negative for chest pain and leg swelling  Gastrointestinal: Negative  Negative for abdominal distention, abdominal pain, constipation, diarrhea, nausea and vomiting  Endocrine: Negative  Genitourinary: Negative  Negative for decreased urine volume, difficulty urinating, dysuria, enuresis, flank pain, frequency, genital sores, hematuria and urgency  Musculoskeletal: Negative for back pain and myalgias  Skin: Negative for pallor and rash  Allergic/Immunologic: Negative  Negative for immunocompromised state  Neurological: Negative for facial asymmetry and speech difficulty  Psychiatric/Behavioral: Negative for agitation and confusion  Urinary Incontinence Screening    Flowsheet Row Most Recent Value   Urinary Incontinence    Urinary Incontinence? No   Incomplete emptying? No   Urinary frequency? No   Urinary urgency? No   Urinary hesitancy? No   Dysuria (painful difficult urination)? No   Nocturia (waking up to use the bathroom)?  No Straining (having to push to go)? No   Weak stream? No   Intermittent stream? No   Post void dribbling? Yes            Allergies     Allergies   Allergen Reactions   • Latex Hives       Physical Exam     Physical Exam  Vitals reviewed  Constitutional:       General: He is not in acute distress  Appearance: Normal appearance  He is normal weight  He is not ill-appearing, toxic-appearing or diaphoretic  HENT:      Head: Normocephalic and atraumatic  Eyes:      General: No scleral icterus  Cardiovascular:      Rate and Rhythm: Normal rate  Pulmonary:      Effort: Pulmonary effort is normal  No respiratory distress  Abdominal:      General: Abdomen is flat  There is no distension  Palpations: Abdomen is soft  Tenderness: There is no abdominal tenderness  There is no guarding or rebound  Genitourinary:     Penis: Circumcised  No hypospadias, erythema, tenderness, discharge, swelling or lesions  Testes:         Right: Mass, tenderness or swelling not present  Right testis is descended  Left: Mass, tenderness or swelling not present  Left testis is descended  Epididymis:      Right: Not inflamed  No tenderness  Left: Not inflamed  No tenderness  Comments: Prostate smooth nontender without appreciable nodules or indurations  Musculoskeletal:         General: No swelling  Cervical back: Normal range of motion  Skin:     General: Skin is warm and dry  Coloration: Skin is not jaundiced or pale  Findings: No rash  Neurological:      General: No focal deficit present  Mental Status: He is alert and oriented to person, place, and time  Gait: Gait normal    Psychiatric:         Mood and Affect: Mood normal          Behavior: Behavior normal          Thought Content:  Thought content normal          Judgment: Judgment normal          Vital Signs     Vitals:    11/21/22 0802   BP: 128/78   BP Location: Left arm   Patient Position: Sitting   Cuff Size: Adult   Pulse: 65   SpO2: 97%   Weight: 69 4 kg (153 lb)   Height: 5' 11" (1 803 m)       Current Medications       Current Outpatient Medications:   •  aspirin 81 mg chewable tablet, Chew 1 tablet (81 mg total) daily, Disp: 30 tablet, Rfl: 0  •  B Complex-C (B-complex with vitamin C) tablet, Take 1 tablet by mouth daily, Disp: , Rfl:   •  buPROPion (Wellbutrin XL) 150 mg 24 hr tablet, Take 1 tablet (150 mg total) by mouth daily, Disp: 30 tablet, Rfl: 1  •  Glucosamine-Chondroitin (GLUCOSAMINE CHONDR COMPLEX PO), Take by mouth, Disp: , Rfl:   •  lisinopril (ZESTRIL) 10 mg tablet, Take 1 tablet (10 mg total) by mouth daily, Disp: 90 tablet, Rfl: 0  •  Magnesium 100 MG CAPS, Take by mouth, Disp: , Rfl:   •  Multiple Vitamin (multivitamin) tablet, Take 1 tablet by mouth daily, Disp: 30 tablet, Rfl: 0  •  niacin 100 mg tablet, Take 100 mg by mouth daily with breakfast, Disp: , Rfl:   •  Omega-3 Fatty Acids (fish oil) 1,000 mg, Take 1,000 mg by mouth daily, Disp: , Rfl:   •  sildenafil (VIAGRA) 50 MG tablet, Take 1 tablet (50 mg total) by mouth daily as needed for erectile dysfunction Take 1 tablet 2 hours after meal and 1 hour before sexual intercourse  If this is not effective you can increase by half a tablet per encounter up to a maximum of 2 tablets or 100 mg   Do not take more than once in 24 hours and limit to 2-3 occurrences per week, Disp: 30 tablet, Rfl: 12  •  zinc gluconate 50 mg tablet, Take 50 mg by mouth daily, Disp: , Rfl:   •  OLANZapine (ZyPREXA) 10 mg tablet, Take 1 tablet (10 mg total) by mouth daily at bedtime (Patient not taking: Reported on 11/1/2022), Disp: 30 tablet, Rfl: 0  •  OLANZapine (ZyPREXA) 2 5 mg tablet, Take 1 tablet (2 5 mg total) by mouth daily at bedtime (Patient not taking: Reported on 11/1/2022), Disp: 30 tablet, Rfl: 0    Active Problems     Patient Active Problem List   Diagnosis   • Elevated liver enzymes   • Primary hypertension   • Bipolar disorder, curr episode manic w/o psychotic features, moderate (HCC)   • H/O chronic hepatitis   • Severe protein-calorie malnutrition (HCC)   • Symptomatic varicose veins, bilateral   • Severe alcohol dependence in sustained remission (HCC)   • Varicocele   • Erectile dysfunction   • Prostate cancer screening   • Elevated testosterone level       Past Medical History     Past Medical History:   Diagnosis Date   • DJD (degenerative joint disease)    • Dyslipidemia    • History of hepatitis C    • Hypertension    • 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[address] )    • Toxoplasmosis chorioretinitis of left eye        Surgical History     Past Surgical History:   Procedure Laterality Date   • CARPAL TUNNEL RELEASE Left    • CATARACT EXTRACTION Left    • COLONOSCOPY     • HERNIA REPAIR     • PLANTAR FASCIA SURGERY Left        Family History     Family History   Problem Relation Age of Onset   • Lung cancer Mother    • Kidney disease Mother         drug induced   • COPD Mother    • Dementia Father        Social History     Social History     Social History     Tobacco Use   Smoking Status Former   • Years: 10    • Types: Cigarettes   • Quit date: 2020   • Years since quittin 8   Smokeless Tobacco Never       Past Surgical History:   Procedure Laterality Date   • CARPAL TUNNEL RELEASE Left    • CATARACT EXTRACTION Left    • COLONOSCOPY     • HERNIA REPAIR     • PLANTAR FASCIA SURGERY Left          The following portions of the patient's history were reviewed and updated as appropriate: allergies, current medications, past family history, past medical history, past social history, past surgical history and problem list    Please note :  Voice dictation software has been used to create this document  There may be inadvertent transcription errors      [id number] [address]

## 2022-11-21 NOTE — ASSESSMENT & PLAN NOTE
· Continue sildenafil 50 mg and titrate up to 100mg  · Recommend use of silicone penile rings and vacuum assisted devices  · Follow up 1 year

## 2022-11-22 ENCOUNTER — OFFICE VISIT (OUTPATIENT)
Dept: PSYCHIATRY | Facility: CLINIC | Age: 57
End: 2022-11-22

## 2022-11-22 DIAGNOSIS — F32.89 OTHER DEPRESSION: ICD-10-CM

## 2022-11-22 DIAGNOSIS — F31.62 BIPOLAR DISORDER, CURRENT EPISODE MIXED, MODERATE (HCC): Primary | ICD-10-CM

## 2022-11-22 RX ORDER — BUPROPION HYDROCHLORIDE 150 MG/1
300 TABLET ORAL DAILY
Qty: 60 TABLET | Refills: 1 | Status: SHIPPED | OUTPATIENT
Start: 2022-11-22

## 2022-11-22 NOTE — PSYCH
Regular Visit    Problem List Items Addressed This Visit    None  Visit Diagnoses     Bipolar disorder, current episode mixed, moderate (HCC)    -  Primary    Relevant Medications    buPROPion (Wellbutrin XL) 150 mg 24 hr tablet    Other depression        Relevant Medications    buPROPion (Wellbutrin XL) 150 mg 24 hr tablet             Encounter provider LESLY Armando    Provider located at    [address] [place] [id number] [phone]    Recent Visits  No visits were found meeting these conditions  Showing recent visits within past 7 days and meeting all other requirements  Today's Visits  Date Type Provider Dept   11/22/22 Office Visit LESLY Armando  [facility]   Showing today's visits and meeting all other requirements  Future Appointments  No visits were found meeting these conditions    Showing future appointments within next 150 days and meeting all other requirements       HPI     Current Outpatient Medications   Medication Sig Dispense Refill   • buPROPion (Wellbutrin XL) 150 mg 24 hr tablet Take 2 tablets (300 mg total) by mouth daily 60 tablet 1   • aspirin 81 mg chewable tablet Chew 1 tablet (81 mg total) daily 30 tablet 0   • B Complex-C (B-complex with vitamin C) tablet Take 1 tablet by mouth daily     • Glucosamine-Chondroitin (GLUCOSAMINE CHONDR COMPLEX PO) Take by mouth     • lisinopril (ZESTRIL) 10 mg tablet Take 1 tablet (10 mg total) by mouth daily 90 tablet 0   • Magnesium 100 MG CAPS Take by mouth     • Multiple Vitamin (multivitamin) tablet Take 1 tablet by mouth daily 30 tablet 0   • niacin 100 mg tablet Take 100 mg by mouth daily with breakfast     • OLANZapine (ZyPREXA) 10 mg tablet Take 1 tablet (10 mg total) by mouth daily at bedtime (Patient not taking: Reported on 11/1/2022) 30 tablet 0   • OLANZapine (ZyPREXA) 2 5 mg tablet Take 1 tablet (2 5 mg total) by mouth daily at bedtime (Patient not taking: Reported on 11/1/2022) 30 tablet 0   • Omega-3 Fatty Acids (fish oil) 1,000 mg Take 1,000 mg by mouth daily     • sildenafil (VIAGRA) 50 MG tablet Take 1 tablet (50 mg total) by mouth daily as needed for erectile dysfunction Take 1 tablet 2 hours after meal and 1 hour before sexual intercourse  If this is not effective you can increase by half a tablet per encounter up to a maximum of 2 tablets or 100 mg  Do not take more than once in 24 hours and limit to 2-3 occurrences per week 30 tablet 12   • zinc gluconate 50 mg tablet Take 50 mg by mouth daily       No current facility-administered medications for this visit  Review of Systems        MEDICATION MANAGEMENT NOTE        [address] [place] [address]    Name and Date of Birth:  Miguel Gan 62 y o  1965 MRN: [id number]    Date of Visit: November 22, 2022    Allergies   Allergen Reactions   • Latex Hives     SUBJECTIVE:    Francine Mckeon is seen today for a follow up for Bipolar Disorder  He continues to do relatively well since the last visit  Patient presents as calm, cooperative, forward thinking, somewhat hyper talkative  Discusses how he recently initiated with a [facility] psychotherapist and feels he is able to connect with her, working on mindfulness and other techniques to manage periods of irritability and anxiety  States he is working on being a positive person and focusing on the good in his life  He is looking forward to visiting his daughter in [place] this upcoming weekend and spending his holiday with his wife  Reports having a strong relationship with his wife although they are currently living  at this time  Has an upcoming job interview and his feeling optimistic about it, has a goal to work in mental health    In terms of bipolar symptoms-somewhat controlled, remains hyper talkative, speech not pressured during today's encounter, irritability appears controlled, no evidence of grandiosity or risky behaviors  Does appear to have energy fluctuations from week to week but is able to manage them  Patient previously admitted to inpatient psychiatric hospital for manic episode on 8/26/22, was initiated on Depakote and Zyprexa inpatient as noted in previous notes  Previously discontinued Depakote on his own due to worrying about his elevated liver enzymes-and declines another mood stabilizing agent  He is also somewhat noncompliant with Zyprexa 12 5 mg daily, is only taking medication on an occasional basis  Provider explained risks of unmedicated bipolar one disorder including risk for readmission to the psychiatric unit  Provider highly encouraged patient to be compliant with Zyprexa on a daily basis to help medicate symptoms of bipolar disorder, patient verbalized understanding  Denies side effects from Zyprexa and declines wanting to trial another mood stabilizer  He does appear to be doing quite well at this time, instructed patient to call provider if any symptoms of bipolar disorder persist and are of concern, patient agreed to plan  Patient denies all suicidal ideation, homicidal ideation, psychosis, hallucinations, paranoia  Continue regular psychotherapy  Complete pending blood work  He denies any side effects from medications      PLAN:    -Encouraged compliance with Zyprexa 12 5 mg daily for bipolar one disorder-patient only taking medication on occasional basis, explained the risks of untreated bipolar disorder including re-admission to inpatient unit and patient verbalized understanding     -Continue Zyprexa 12 5 mg daily at HS for mood stabilization PARQ discussed with patient included sedation, dizziness, weight gain, dry mouth, constipation, hypotension, dyslipidemia, EPS, metabolic syndrome, peripheral edema, dyspepsia, joint and body pains, tachycardia, orthostatic hypotension, rash with sunlight exposure, hyperglycemia, seizures, skin rash, rare NMS, and increased risk of CVA in patient's with dementia      -Continue Wellbutrin 300mg XL for depressive and dysphoric symptoms PARQ completed including induction of arsh, decreased seizure threshold and risk with alcohol or electrolyte disturbances, headaches, hypertension and cardiovascular effects, GI distress, weight loss, agitation/activation, dizziness, tremor, anxiety, potential for drug interactions, and others          Aware of 24 hour and weekend coverage for urgent situations accessed by calling [facility] main practice number  Continue psychotherapy with therapist    Diagnoses and all orders for this visit:    Bipolar disorder, current episode mixed, moderate (HCC)    Other depression  -     buPROPion (Wellbutrin XL) 150 mg 24 hr tablet;  Take 2 tablets (300 mg total) by mouth daily        Current Outpatient Medications on File Prior to Visit   Medication Sig Dispense Refill   • aspirin 81 mg chewable tablet Chew 1 tablet (81 mg total) daily 30 tablet 0   • B Complex-C (B-complex with vitamin C) tablet Take 1 tablet by mouth daily     • Glucosamine-Chondroitin (GLUCOSAMINE CHONDR COMPLEX PO) Take by mouth     • lisinopril (ZESTRIL) 10 mg tablet Take 1 tablet (10 mg total) by mouth daily 90 tablet 0   • Magnesium 100 MG CAPS Take by mouth     • Multiple Vitamin (multivitamin) tablet Take 1 tablet by mouth daily 30 tablet 0   • niacin 100 mg tablet Take 100 mg by mouth daily with breakfast     • OLANZapine (ZyPREXA) 10 mg tablet Take 1 tablet (10 mg total) by mouth daily at bedtime (Patient not taking: Reported on 11/1/2022) 30 tablet 0   • OLANZapine (ZyPREXA) 2 5 mg tablet Take 1 tablet (2 5 mg total) by mouth daily at bedtime (Patient not taking: Reported on 11/1/2022) 30 tablet 0   • Omega-3 Fatty Acids (fish oil) 1,000 mg Take 1,000 mg by mouth daily     • sildenafil (VIAGRA) 50 MG tablet Take 1 tablet (50 mg total) by mouth daily as needed for erectile dysfunction Take 1 tablet 2 hours after meal and 1 hour before sexual intercourse  If this is not effective you can increase by half a tablet per encounter up to a maximum of 2 tablets or 100 mg  Do not take more than once in 24 hours and limit to 2-3 occurrences per week 30 tablet 12   • zinc gluconate 50 mg tablet Take 50 mg by mouth daily     • [DISCONTINUED] buPROPion (Wellbutrin XL) 150 mg 24 hr tablet Take 1 tablet (150 mg total) by mouth daily 30 tablet 1     No current facility-administered medications on file prior to visit  HPI ROS Appetite Changes and Sleep:     He reports normal sleep, adequate appetite, fluctuating energy levels   Denies homicidal ideation, denies suicidal ideation    Review Of Systems:      HPI ROS:               Medication Side Effects:  denies    Depression (10 worst): 2/10    Anxiety (10 worst): 2/10    Safety concerns (SI, HI, etc): Denies si and hi    Sleep: 6-7 hours    Energy: Fluctuates from week to week    Appetite: 2-3 meals daily    Weight Change: denies        Mental Status Evaluation:    Appearance Adequate hygiene and grooming   Behavior restless and fidgety   Mood anxious  Depression Scale - 2 of 10 (0 = No depression)  Anxiety Scale - 2 of 10 (0 = No anxiety)   Speech hyper-verbal   Affect mood-congruent   Thought Processes Circumstantial   Thought Content Does not verbalize delusional material   Associations Tightly connected   Perceptual Disturbances Denies hallucinations and does not appear to be responding to internal stimuli   Risk Potential Suicidal/Homicidal Ideation - No evidence of suicidal or homicidal ideation and patient does not verbalize suicidal or homicidal ideation  Risk of Violence - No evidence of risk for violence found on assessment  Risk of Self Mutilation - No evidence of risk for self mutilation found on assessment   Orientation oriented to person, place, time/date and situation   Memory recent and remote memory grossly intact   Consciousness alert and awake Attention/Concentration attention span and concentration appear shorter than expected for age   Insight partial   Judgement intact   Muscle Strength and Gait normal muscle strength and normal muscle tone, normal gait/station and normal balance   Motor Activity no abnormal movements   Language no difficulty naming common objects, no difficulty repeating a phrase, no difficulty writing a sentence   Fund of Knowledge adequate knowledge of current events  adequate fund of knowledge regarding past history  adequate fund of knowledge regarding vocabulary      Past Psychiatric History Update:     Inpatient Psychiatric Admission Since Last Encounter:   no  Changes to Outpatient Psychiatric Treatment Team:    no  Suicide Attempt Or Self Mutilation Since Last Encounter:   no  Incidence of Violent Behavior Since Last Encounter:   no    Traumatic History Update:     New Onset of Abuse Since Last Encounter:   no  Traumatic Events Since Last Encounter:   no    Past Medical History:    Past Medical History:   Diagnosis Date   • DJD (degenerative joint disease)    • Dyslipidemia    • History of hepatitis C    • Hypertension    • 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[facility] [address] )    • Toxoplasmosis chorioretinitis of left eye         Past Surgical History:   Procedure Laterality Date   • CARPAL TUNNEL RELEASE Left    • CATARACT EXTRACTION Left    • COLONOSCOPY     • HERNIA REPAIR     • PLANTAR FASCIA SURGERY Left      Allergies   Allergen Reactions   • Latex Hives     Substance Abuse History:    Social History     Substance and Sexual Activity   Alcohol Use Not Currently     Social History     Substance and Sexual Activity   Drug Use Yes   • Types: Marijuana     Social History:    Social History     Socioeconomic History   • Marital status: /Civil Union     Spouse name: Not on file   • Number of children: Not on file   • Years of education: Not on file   • Highest education level: Not on file   Occupational History   • Not on file   Tobacco Use   • Smoking status: Former     Years: 10 00     Types: Cigarettes     Quit date: 2020     Years since quittin 8   • Smokeless tobacco: Never   Vaping Use   • Vaping Use: Never used   Substance and Sexual Activity   • Alcohol use: Not Currently   • Drug use: Yes     Types: Marijuana   • Sexual activity: Not on file   Other Topics Concern   • Not on file   Social History Narrative   • Not on file     Social Determinants of Health     Financial Resource Strain: Not on file   Food Insecurity: Not on file   Transportation Needs: Not on file   Physical Activity: Not on file   Stress: Not on file   Social Connections: Not on file   Intimate Partner Violence: Not on file   Housing Stability: Not on file     Family Psychiatric History:     Family History   Problem Relation Age of Onset   • Lung cancer Mother    • Kidney disease Mother         drug induced   • COPD Mother    • Dementia Father      History Review: The following portions of the patient's history were reviewed and updated as appropriate: allergies, current medications, past family history, past medical history, past social history, past surgical history and problem list     OBJECTIVE:     Vital signs in last 24 hours: There were no vitals filed for this visit  Laboratory Results:   Recent Labs (last 2 months):   Consult on 2022   Component Date Value   • LEUKOCYTE ESTERASE,UA 2022 trace    • NITRITE,UA 2022 -    • SL AMB POCT UROBILINOGEN 2022 0 2    • POCT URINE PROTEIN 2022 -    •  PH,UA 2022 5 0    • BLOOD,UA 2022 -    • SPECIFIC GRAVITY,UA 2022 1 010    • KETONES,UA 2022 -    • BILIRUBIN,UA 2022 -    • GLUCOSE, UA 2022 -    •  COLOR,UA 2022 yellow    • CLARITY,UA 2022 clear    • POST-VOID RESIDUAL VOLUM* 2022 124      I have personally reviewed all pertinent laboratory/tests results      Suicide/Homicide Risk Assessment:    Risk of Harm to Self:  Protective Factors: no current suicidal ideation, access to mental health treatment, being a parent, being , compliant with medications, compliant with mental health treatment, connection to community, connection to own children, contact with caregivers, cultural beliefs discouraging suicide, effective coping skills, good self-esteem, having a sense of purpose or meaning in life  Based on today's assessment, Gissell Delgado presents the following risk of harm to self: minimal    Risk of Harm to Others:  Based on today's assessment, Gissell Delgado presents the following risk of harm to others: minimal    The following interventions are recommended: therapy appointment in 1 weeks    Medications Risks/Benefits:      Risks, Benefits And Possible Side Effects Of Medications:    Discussed risks and benefits of treatment with patient including risk of suicidality, serotonin syndrome, increased QTc interval and SIADH related to treatment with antidepressants; Risk of induction of manic symptoms in certain patient populations, risk of parkinsonian symptoms, metabolic syndrome, tardive dyskinesia and neuroleptic malignant syndrome related to treatment with antipsychotic medications and Reduction in seizure threshold related to treatment with bupropion      Controlled Medication Discussion:     Not applicable    Treatment Plan:    Due for update/Updated:   no  Last treatment plan done 11/18/22 by aleajndra thorpe  Treatment Plan due on 4/18/23      LESLY Osborne 11/22/22  Visit Time    Visit Start Time: 9am  Visit Stop Time: 930am  Total Visit Duration: 30 minutes

## 2022-12-02 ENCOUNTER — TELEMEDICINE (OUTPATIENT)
Dept: PSYCHIATRY | Facility: CLINIC | Age: 57
End: 2022-12-02

## 2022-12-02 DIAGNOSIS — F31.12 BIPOLAR DISORDER, CURR EPISODE MANIC W/O PSYCHOTIC FEATURES, MODERATE (HCC): Primary | ICD-10-CM

## 2022-12-02 NOTE — PSYCH
Psychotherapy Provided: Individual Psychotherapy 50 minutes     Length of time in session: 50 minutes, follow up in 2week    Encounter Diagnosis     ICD-10-CM    1  Bipolar disorder, curr episode manic w/o psychotic features, moderate ([facility])  F31 12           Goals addressed in session: Goal 1     Pain:      none    0    Current suicide risk : Low     If client experiences suicidal ideation and feels in danger of acting on urges, they will call [phone] or go to the nearest emergency room  Behavioral Health Treatment Plan [facility]: Diagnosis and Treatment Plan explained to Vivi Garcia relates understanding diagnosis and is agreeable to Treatment Plan  Yes     Visit start and stop times:    12/02/22       Virtual Regular Visit    Verification of patient location:    Patient is located in the following state in which I hold an active license [place]      Assessment/Plan:    Problem List Items Addressed This Visit        Other    Bipolar disorder, curr episode manic w/o psychotic features, moderate ([facility] [address] ) - Primary       Goals addressed in session: Goal 1          Reason for visit is   Chief Complaint   Patient presents with   • Virtual Regular Visit        Encounter provider Bry Rose LCSW    Provider located at [address] [place] [id number] [phone]      Recent Visits  No visits were found meeting these conditions  Showing recent visits within past 7 days and meeting all other requirements  Future Appointments  No visits were found meeting these conditions  Showing future appointments within next 150 days and meeting all other requirements       The patient was identified by name and date of birth  Federico Cynthia was informed that this is a telemedicine visit and that the visit is being conducted throughthe [facility]  He agrees to proceed     My office door was closed  No one else was in the room  He acknowledged consent and understanding of privacy and security of the video platform  The patient has agreed to participate and understands they can discontinue the visit at any time  Patient is aware this is a billable service  Subjective  Dena Cintron is a 62 y o  male   D: Marla Malagon shared that he was feeling "good" and that he has been feeling "good" and "steady" right now and is taking his medication daily and is trying to be compassionate with others even though he sometimes feels anxious but takes a minute to stop, breathe, ask how important it is  He shared that he had interviews for a mental health position, but that he is no longer considering this job because he would have to shave his beard and wear a mask and he is not okay with this at this time  Therapist talked with Marla Malagon about the STOP skill and went over each step and how it can help him especially in his relationship  Therapist also went over an overview of DBT and mindfulness, and sent emails of these things  Marla Malagon said he would read these things in the next two weeks  A:  Marla Malagon appeared calm and cooperative during this session  Marla Malagon was able to openly communicate thoughts and feelings during session  Marla Malagon learned about mindfulness and overview of DBT    P:  Marla Malagon will utilize STOP skill as needed and meet with therapist in 2 weeks to continue therapy      HPI     Past Medical History:   Diagnosis Date   • DJD (degenerative joint disease)    • Dyslipidemia    • History of hepatitis C    • Hypertension    • 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[facility] [address] )    • Toxoplasmosis chorioretinitis of left eye        Past Surgical History:   Procedure Laterality Date   • CARPAL TUNNEL RELEASE Left    • CATARACT EXTRACTION Left    • COLONOSCOPY     • HERNIA REPAIR     • PLANTAR FASCIA SURGERY Left        Current Outpatient Medications   Medication Sig Dispense Refill   • aspirin 81 mg chewable tablet Chew 1 tablet (81 mg total) daily 30 tablet 0   • B Complex-C (B-complex with vitamin C) tablet Take 1 tablet by mouth daily     • buPROPion (Wellbutrin XL) 150 mg 24 hr tablet Take 2 tablets (300 mg total) by mouth daily 60 tablet 1   • Glucosamine-Chondroitin (GLUCOSAMINE CHONDR COMPLEX PO) Take by mouth     • lisinopril (ZESTRIL) 10 mg tablet Take 1 tablet (10 mg total) by mouth daily 90 tablet 0   • Magnesium 100 MG CAPS Take by mouth     • Multiple Vitamin (multivitamin) tablet Take 1 tablet by mouth daily 30 tablet 0   • niacin 100 mg tablet Take 100 mg by mouth daily with breakfast     • OLANZapine (ZyPREXA) 10 mg tablet Take 1 tablet (10 mg total) by mouth daily at bedtime 30 tablet 0   • OLANZapine (ZyPREXA) 2 5 mg tablet Take 1 tablet (2 5 mg total) by mouth daily at bedtime 30 tablet 0   • Omega-3 Fatty Acids (fish oil) 1,000 mg Take 1,000 mg by mouth daily     • sildenafil (VIAGRA) 50 MG tablet Take 1 tablet (50 mg total) by mouth daily as needed for erectile dysfunction Take 1 tablet 2 hours after meal and 1 hour before sexual intercourse  If this is not effective you can increase by half a tablet per encounter up to a maximum of 2 tablets or 100 mg  Do not take more than once in 24 hours and limit to 2-3 occurrences per week 30 tablet 12   • zinc gluconate 50 mg tablet Take 50 mg by mouth daily       No current facility-administered medications for this visit  Allergies   Allergen Reactions   • Latex Hives       Review of Systems    Video Exam    There were no vitals filed for this visit      Physical Exam     Visit Time    Visit Start Time: 8:00am  Visit Stop Time: 8:52  Total Visit Duration: 52 minutes

## 2022-12-15 ENCOUNTER — APPOINTMENT (OUTPATIENT)
Dept: LAB | Facility: CLINIC | Age: 57
End: 2022-12-15

## 2022-12-15 DIAGNOSIS — R79.89 ELEVATED TESTOSTERONE LEVEL: ICD-10-CM

## 2022-12-15 DIAGNOSIS — F31.12 BIPOLAR DISORDER, CURR EPISODE MANIC W/O PSYCHOTIC FEATURES, MODERATE (HCC): ICD-10-CM

## 2022-12-15 LAB
ALBUMIN SERPL BCP-MCNC: 4.2 G/DL (ref 3.5–5)
ALP SERPL-CCNC: 65 U/L (ref 46–116)
ALT SERPL W P-5'-P-CCNC: 105 U/L (ref 12–78)
ANION GAP SERPL CALCULATED.3IONS-SCNC: 4 MMOL/L (ref 4–13)
AST SERPL W P-5'-P-CCNC: 86 U/L (ref 5–45)
BASOPHILS # BLD AUTO: 0.05 THOUSANDS/ÂΜL (ref 0–0.1)
BASOPHILS NFR BLD AUTO: 1 % (ref 0–1)
BILIRUB DIRECT SERPL-MCNC: 0.22 MG/DL (ref 0–0.2)
BILIRUB SERPL-MCNC: 0.9 MG/DL (ref 0.2–1)
BUN SERPL-MCNC: 16 MG/DL (ref 5–25)
CALCIUM SERPL-MCNC: 9.4 MG/DL (ref 8.3–10.1)
CHLORIDE SERPL-SCNC: 102 MMOL/L (ref 96–108)
CO2 SERPL-SCNC: 29 MMOL/L (ref 21–32)
CREAT SERPL-MCNC: 0.96 MG/DL (ref 0.6–1.3)
EOSINOPHIL # BLD AUTO: 0.05 THOUSAND/ÂΜL (ref 0–0.61)
EOSINOPHIL NFR BLD AUTO: 1 % (ref 0–6)
ERYTHROCYTE [DISTWIDTH] IN BLOOD BY AUTOMATED COUNT: 12.1 % (ref 11.6–15.1)
GFR SERPL CREATININE-BSD FRML MDRD: 87 ML/MIN/1.73SQ M
GLUCOSE P FAST SERPL-MCNC: 119 MG/DL (ref 65–99)
HCT VFR BLD AUTO: 42 % (ref 36.5–49.3)
HGB BLD-MCNC: 14.3 G/DL (ref 12–17)
IMM GRANULOCYTES # BLD AUTO: 0.02 THOUSAND/UL (ref 0–0.2)
IMM GRANULOCYTES NFR BLD AUTO: 0 % (ref 0–2)
LYMPHOCYTES # BLD AUTO: 2.69 THOUSANDS/ÂΜL (ref 0.6–4.47)
LYMPHOCYTES NFR BLD AUTO: 36 % (ref 14–44)
MCH RBC QN AUTO: 32.1 PG (ref 26.8–34.3)
MCHC RBC AUTO-ENTMCNC: 34 G/DL (ref 31.4–37.4)
MCV RBC AUTO: 94 FL (ref 82–98)
MONOCYTES # BLD AUTO: 0.76 THOUSAND/ÂΜL (ref 0.17–1.22)
MONOCYTES NFR BLD AUTO: 10 % (ref 4–12)
NEUTROPHILS # BLD AUTO: 3.92 THOUSANDS/ÂΜL (ref 1.85–7.62)
NEUTS SEG NFR BLD AUTO: 52 % (ref 43–75)
NRBC BLD AUTO-RTO: 0 /100 WBCS
PLATELET # BLD AUTO: 177 THOUSANDS/UL (ref 149–390)
PMV BLD AUTO: 9.9 FL (ref 8.9–12.7)
POTASSIUM SERPL-SCNC: 4.5 MMOL/L (ref 3.5–5.3)
PROT SERPL-MCNC: 7.9 G/DL (ref 6.4–8.4)
RBC # BLD AUTO: 4.46 MILLION/UL (ref 3.88–5.62)
SODIUM SERPL-SCNC: 135 MMOL/L (ref 135–147)
VALPROATE SERPL-MCNC: <3 UG/ML (ref 50–100)
WBC # BLD AUTO: 7.49 THOUSAND/UL (ref 4.31–10.16)

## 2022-12-16 ENCOUNTER — TELEMEDICINE (OUTPATIENT)
Dept: PSYCHIATRY | Facility: CLINIC | Age: 57
End: 2022-12-16

## 2022-12-16 DIAGNOSIS — F31.12 BIPOLAR DISORDER, CURR EPISODE MANIC W/O PSYCHOTIC FEATURES, MODERATE (HCC): Primary | ICD-10-CM

## 2022-12-16 NOTE — PSYCH
Virtual Regular Visit    Verification of patient location:  Psychotherapy Provided: Individual Psychotherapy 50 minutes     Length of time in session: 50 minutes, follow up in 2 week    Encounter Diagnosis     ICD-10-CM    1  Bipolar disorder, curr episode manic w/o psychotic features, moderate ([facility])  F31 12           Goals addressed in session: Goal 1     Pain:      none    0    Current suicide risk : Low     If client experiences suicidal ideation and feels in danger of acting on urges, they will call [phone] or go to the nearest emergency room  Behavioral Health Treatment Plan [facility]: Diagnosis and Treatment Plan explained to Abiola Margaux relates understanding diagnosis and is agreeable to Treatment Plan  Yes     Visit start and stop times:    12/16/22       Patient is located in the following state in which I hold an active license [place]      Assessment/Plan:    Problem List Items Addressed This Visit        Other    Bipolar disorder, curr episode manic w/o psychotic features, moderate ([facility] [address] ) - Primary       Goals addressed in session: Goal 1          Reason for visit is   Chief Complaint   Patient presents with   • Virtual Regular Visit        Encounter provider Bonita Duffy LCSW    Provider located at [address] [place] [id number] [phone]      Recent Visits  No visits were found meeting these conditions  Showing recent visits within past 7 days and meeting all other requirements  Future Appointments  No visits were found meeting these conditions  Showing future appointments within next 150 days and meeting all other requirements       The patient was identified by name and date of birth  Virl [place] was informed that this is a telemedicine visit and that the visit is being conducted throughthe [employer] platform  He agrees to proceed     My office door was closed  No one else was in the room  He acknowledged consent and understanding of privacy and security of the video platform  The patient has agreed to participate and understands they can discontinue the visit at any time  Patient is aware this is a billable service  Subjective  Carollee Olszewski is a 62 y o  male   D:  Kylee Myers and therapist had difficulty connecting because of some connectivity issues with [employer]  Therapist sent a link to [address] [place] [address] Now for connecting  He was able to connect in this way  He shared that this week has been "challenging" for him because of snow and because he has to get out of his house January 27th and the apartment he wants comes open on January 15th  He shared that he had a good time over the weekend with his wife and the family  Kylee Myers shared that he may have to put his dogs down because he is moving to a new apartment  Therapist wrote an emotional support animal letter for him so he might be able to keep his dogs with him  He has had them for 14 years and they help him to maintain his emotional stability  Kylee Myers gave verbal approval to write a letter for this letter  A:  Kylee Myers appeared calm and cooperative and was able to openly communicate thoughts and feelings   P: Kylee Myers will meet with therapist in 2 weeks to continue working on treatment goals        HPI     Past Medical History:   Diagnosis Date   • DJD (degenerative joint disease)    • Dyslipidemia    • History of hepatitis C    • Hypertension    • 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[facility] [address] )    • Toxoplasmosis chorioretinitis of left eye        Past Surgical History:   Procedure Laterality Date   • CARPAL TUNNEL RELEASE Left    • CATARACT EXTRACTION Left    • COLONOSCOPY     • HERNIA REPAIR     • PLANTAR FASCIA SURGERY Left        Current Outpatient Medications   Medication Sig Dispense Refill   • aspirin 81 mg chewable tablet Chew 1 tablet (81 mg total) daily 30 tablet 0   • B Complex-C (B-complex with vitamin C) tablet Take 1 tablet by mouth daily     • buPROPion (Wellbutrin XL) 150 mg 24 hr tablet Take 2 tablets (300 mg total) by mouth daily 60 tablet 1   • Glucosamine-Chondroitin (GLUCOSAMINE CHONDR COMPLEX PO) Take by mouth     • lisinopril (ZESTRIL) 10 mg tablet Take 1 tablet (10 mg total) by mouth daily 90 tablet 0   • Magnesium 100 MG CAPS Take by mouth     • Multiple Vitamin (multivitamin) tablet Take 1 tablet by mouth daily 30 tablet 0   • niacin 100 mg tablet Take 100 mg by mouth daily with breakfast     • OLANZapine (ZyPREXA) 10 mg tablet Take 1 tablet (10 mg total) by mouth daily at bedtime 30 tablet 0   • OLANZapine (ZyPREXA) 2 5 mg tablet Take 1 tablet (2 5 mg total) by mouth daily at bedtime 30 tablet 0   • Omega-3 Fatty Acids (fish oil) 1,000 mg Take 1,000 mg by mouth daily     • sildenafil (VIAGRA) 50 MG tablet Take 1 tablet (50 mg total) by mouth daily as needed for erectile dysfunction Take 1 tablet 2 hours after meal and 1 hour before sexual intercourse  If this is not effective you can increase by half a tablet per encounter up to a maximum of 2 tablets or 100 mg  Do not take more than once in 24 hours and limit to 2-3 occurrences per week 30 tablet 12   • zinc gluconate 50 mg tablet Take 50 mg by mouth daily       No current facility-administered medications for this visit  Allergies   Allergen Reactions   • Latex Hives       Review of Systems    Video Exam    There were no vitals filed for this visit      Physical Exam     Visit Time    Visit Start Time: 8:00 am  Visit Stop Time: 8:50 am  Total Visit Duration: 50 minutes

## 2022-12-19 LAB
TESTOST FREE SERPL-MCNC: 5.7 PG/ML (ref 7.2–24)
TESTOST SERPL-MCNC: 1079 NG/DL (ref 264–916)

## 2022-12-20 DIAGNOSIS — R79.89 ELEVATED TESTOSTERONE LEVEL: Primary | ICD-10-CM

## 2022-12-28 ENCOUNTER — CONSULT (OUTPATIENT)
Dept: ENDOCRINOLOGY | Facility: CLINIC | Age: 57
End: 2022-12-28

## 2022-12-28 VITALS
SYSTOLIC BLOOD PRESSURE: 130 MMHG | HEART RATE: 68 BPM | HEIGHT: 71 IN | BODY MASS INDEX: 21 KG/M2 | WEIGHT: 150 LBS | OXYGEN SATURATION: 98 % | DIASTOLIC BLOOD PRESSURE: 80 MMHG

## 2022-12-28 DIAGNOSIS — N52.9 ERECTILE DYSFUNCTION, UNSPECIFIED ERECTILE DYSFUNCTION TYPE: Primary | ICD-10-CM

## 2022-12-28 DIAGNOSIS — R74.8 ELEVATED LIVER ENZYMES: ICD-10-CM

## 2022-12-28 DIAGNOSIS — E83.52 HYPERCALCEMIA: ICD-10-CM

## 2022-12-28 DIAGNOSIS — R79.89 ELEVATED TESTOSTERONE LEVEL: ICD-10-CM

## 2022-12-28 NOTE — PROGRESS NOTES
Angy Malagon is a 62 y o  male who is here for new patient visit for evaluation/ management of hypogonadism  He was evaluated for erectile dysfunction by urology for which Testosterone level was checked and high total testosterone and low free testosterone  Total testosterone was more than 1500 ng/dL and repeated was 1079 mg/dL  Free testosterone was 5 7 pg/mL  He reports difficulty getting erection and maintaining erection started almost a year ago he denies low sex drive,  He started Sildenafil which helped him,   He started using Testosterone booster, for 4 months and last dose was in November,     Puberty:  had a normal puberty   Fathered children Yes  Shaving normally Yes     Gonadal ROS:  Fatigue No  Change in strength No  Loss of axillary/pubic hairNo  Decreased libidoNo  Decreased erections Yes  Headaches No  Vision change No  Breast development No     Modifying factors:  Risk factors for hypogonadism:  Traumatic brain injury No  Testicular trauma No  Radiation therapy No  Chemotherapy No  Obesity {No  Diabetes No  HIV/AIDSNo  Steroid useNo  Narcotic use No     Review of Systems   Constitutional: Negative for activity change, appetite change, chills, diaphoresis, fatigue, fever and unexpected weight change  HENT: Negative for congestion, drooling, ear discharge, ear pain, trouble swallowing and voice change  Eyes: Negative for photophobia, pain, discharge, redness, itching and visual disturbance  Respiratory: Negative for cough, chest tightness, shortness of breath and wheezing  Cardiovascular: Negative for chest pain, palpitations and leg swelling  Gastrointestinal: Negative for abdominal distention, abdominal pain, blood in stool, diarrhea, nausea and vomiting  Endocrine: Negative for cold intolerance, heat intolerance, polydipsia, polyphagia and polyuria  Genitourinary: Negative for dysuria, flank pain, frequency and hematuria     Musculoskeletal: Negative for back pain, gait problem, joint swelling, myalgias, neck pain and neck stiffness  Skin: Negative for color change, pallor, rash and wound  Neurological: Negative for dizziness, tremors, seizures, syncope, speech difficulty, weakness, numbness and headaches  Psychiatric/Behavioral: Negative for agitation     All other systems reviewed and are negative          Physical Examination:  Vitals:    12/28/22 1023   BP: 130/80   Pulse: 68   SpO2: 98%       General appearance - alert, well appearing, and in no distress  Mental status - normal mood, behavior, speech, dress, motor activity, and thought processes  Eyes - pupils equal, extraocular eye movements intact, sclera anicteric;visual fields intact to confrontation bilaterally  Neck - supple, no significant adenopathy  Chest - clear to auscultation, no wheezes  Heart - normal rate, regular rhythm, normal S1, S2, no murmurs  Abdomen - soft, nontender, nondistended  Neurological -motor and sensory grossly normal bilaterally   Musculoskeletal - no joint tenderness, deformity or swelling  Extremities - peripheral pulses normal, no pedal edema, no clubbing or cyanosis  Skin - normal coloration and turgor, no rashes, no suspicious skin lesions noted   Male - deferred        Component      Latest Ref Rng & Units 11/21/2022 12/15/2022   WBC      4 31 - 10 16 Thousand/uL  7 49   Red Blood Cell Count      3 88 - 5 62 Million/uL  4 46   Hemoglobin      12 0 - 17 0 g/dL  14 3   HCT      36 5 - 49 3 %  42 0   MCV      82 - 98 fL  94   MCH      26 8 - 34 3 pg  32 1   MCHC      31 4 - 37 4 g/dL  34 0   RDW      11 6 - 15 1 %  12 1   MPV      8 9 - 12 7 fL  9 9   Platelet Count      432 - 390 Thousands/uL  177   nRBC      /100 WBCs  0   Neutrophils %      43 - 75 %  52   Immat GRANS %      0 - 2 %  0   Lymphocytes Relative      14 - 44 %  36   Monocytes Relative      4 - 12 %  10   Eosinophils      0 - 6 %  1   Basophils Relative      0 - 1 %  1   Absolute Neutrophils      1 85 - 7 62 Thousands/µL  3 92   Immature Grans Absolute      0 00 - 0 20 Thousand/uL  0 02   Lymphocytes Absolute      0 60 - 4 47 Thousands/µL  2 69   Absolute Monocytes      0 17 - 1 22 Thousand/µL  0 76   Absolute Eosinophils      0 00 - 0 61 Thousand/µL  0 05   Basophils Absolute      0 00 - 0 10 Thousands/µL  0 05   Sodium      135 - 147 mmol/L  135   Potassium      3 5 - 5 3 mmol/L  4 5   Chloride      96 - 108 mmol/L  102   CO2      21 - 32 mmol/L  29   Anion Gap      4 - 13 mmol/L  4   BUN      5 - 25 mg/dL  16   Creatinine      0 60 - 1 30 mg/dL  0 96   GLUCOSE FASTING      65 - 99 mg/dL  119 (H)   Calcium      8 3 - 10 1 mg/dL  9 4   AST      5 - 45 U/L  86 (H)   ALT      12 - 78 U/L  105 (H)   Alkaline Phosphatase      46 - 116 U/L  65   Total Protein      6 4 - 8 4 g/dL  7 9   Albumin      3 5 - 5 0 g/dL  4 2   TOTAL BILIRUBIN      0 20 - 1 00 mg/dL  0 90   eGFR      ml/min/1 73sq m  87   Leukocytes, UA       trace    Nitrite, UA       -    SL AMB POCT UROBILINOGEN       0 2    POCT URINE PROTEIN       -    pH, UA       5 0    Blood, UA       -    SL AMB SPECIFIC GRAVITY-URINE       1 010    Ketones, UA       -    BILIRUBIN,UA       -    Glucose, UA       -    Color, UA       yellow    Clarity, UA       clear    TESTOSTERONE FREE      7 2 - 24 0 pg/mL  5 7 (L)   Testosterone, Total, LC/MS      264 - 916 ng/dL  1079 (H)   POST-VOID RESIDUAL VOLUME, ML POC      mL 124    VALPROIC ACID TOTAL      50 - 100 ug/mL  <3 (L)     LEFT GROIN ULTRASOUND     INDICATION:   R19 09: OTHER INTRA-ABDOMINAL AND PELVIC SWELLING, MASS AND LUMP  LEFT GROIN PALPABLE ABNORMALITY      COMPARISON:  None     TECHNIQUE:   Real-time ultrasound of the left groin was performed with a linear transducer with both volumetric sweeps and still imaging techniques      FINDINGS:  Few small subcutaneous varicose veins are noted which appear more prominent when standing         IMPRESSION:     Targeted sonography of the left groin at the area of palpable abnormality demonstrates few small underlying subcutaneous varicose veins which appear more prominent when standing  Allergies, medications, past medical, surgical, family and social history were reviewed and are noted in medical record      Previous records including pertinent laboratory and radiographic results were reviewed and are summarized in the plan below      ASSESSMENT/PLAN:              Elevated total testosterone  Low free testosterone  Erectile dysfunction  Elevated liver enzyme  History of hepatitis C  Hypercalcemia        Recommend the following for now:     I reviewed the patient is results which is elevated total testosterone and low free testosterone, patient has erectile dysfunction however he has normal sex drive and no other features of hypogonadism  Given history of Hepatitis C and elevated liver enzymes contribute to elevated serum sex hormone  binding globin causing elevated total testosterone  He also was taking testosterone booster which she has stopped month ago and level was checked while patient was on supplement, which I did recommended stop taking it  We will repeat total testosterone and free testosterone with Equilibrium dialysis along with sex hormone binding globulin  Level should be checked while fasting and in the morning  He was instructed to hold taking biotin 4 days prior to lab work  Discussed lab findings with patient  Discussed hypogonadism pathophysiology and differential diagnosis with patient  Patient had hypercalcemia of 10 5 in May 2022 with suppressed PTH and calcium has been within normal range ever since,  Which is PTH independent, patient reports taking Tums daily for acid reflux which was recommended to stop taking Tums for now  He was referred to GI for elevated liver enzymes, evaluation for hepatitis C and GERD      I have spent 40 minutes with Patient  today in which greater than 50% of this time was spent in counseling/coordination of care regarding Diagnostic results, Risks and benefits of tx options, Intructions for management, Patient and family education, Risk factor reductions and Impressions

## 2023-01-06 ENCOUNTER — TELEMEDICINE (OUTPATIENT)
Dept: PSYCHIATRY | Facility: CLINIC | Age: 58
End: 2023-01-06

## 2023-01-06 DIAGNOSIS — F31.12 BIPOLAR DISORDER, CURR EPISODE MANIC W/O PSYCHOTIC FEATURES, MODERATE (HCC): Primary | ICD-10-CM

## 2023-01-06 NOTE — PSYCH
Psychotherapy Provided: Individual Psychotherapy 50 minutes     Length of time in session: 50 minutes, follow up in 2 week    Encounter Diagnosis     ICD-10-CM    1  Bipolar disorder, curr episode manic w/o psychotic features, moderate ([facility])  F31 12           Goals addressed in session: Goal 1     Pain:      none    0    Current suicide risk : Low     If client experiences suicidal ideation and feels in danger of acting on urges, they will call 65 or go to the nearest emergency room  Behavioral Health Treatment Plan [facility]: Diagnosis and Treatment Plan explained to Tenisha Grammes relates understanding diagnosis and is agreeable to Treatment Plan  Yes     Visit start and stop times:    01/06/23       Virtual Regular Visit    Verification of patient location:    Patient is located in the following state in which I hold an active license [place]      Assessment/Plan:    Problem List Items Addressed This Visit        Other    Bipolar disorder, curr episode manic w/o psychotic features, moderate ([facility] [address] ) - Primary       Goals addressed in session: Goal 1          Reason for visit is   Chief Complaint   Patient presents with   • Virtual Regular Visit        Encounter provider Quincy Ramos LCSW    Provider located at [address] [place] [id number] [phone]      Recent Visits  No visits were found meeting these conditions  Showing recent visits within past 7 days and meeting all other requirements  Today's Visits  Date Type Provider Dept   01/06/23 Telemedicine Quincy Ramos LCSW [facility]   Showing today's visits and meeting all other requirements  Future Appointments  No visits were found meeting these conditions  Showing future appointments within next 150 days and meeting all other requirements       The patient was identified by name and date of birth   Thi Rendon was informed that this is a telemedicine visit and that the [facility]  He agrees to proceed     My office door was closed  No one else was in the room  He acknowledged consent and understanding of privacy and security of the video platform  The patient has agreed to participate and understands they can discontinue the visit at any time  Patient is aware this is a billable service  Subjective  Toni Gomez is a 62 y o  male   D:  Kenisha Austin shared that he will hopefully be able to keep his dogs with him when he moves to a new apartment in the middle of the month  He shares he may have someone to take the dogs and rehome them who lives close to him  He shared that he is doing well with his patience, sharing that there was a trigger for his anger and he was able to step back and have some patience with that situation  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 and is happy about it  He would like to be a team member and not overshare because he has done that in the past and it has not worked out well  He is learning to "keep my mouth shut" so as to not get in trouble with others  Has been working his "steps" and understands that he cannot drink and that he needs to keep himself "in check "  He shared he is more confident recently especially when he is dancing on the dance floor in clubs and is living "plur" peace love unity and respect  He shares he is working out and is reaching a plateau in his workout  He shares that he feels good about living in the same apartment complex as his wife but different apartment  A:  Kenisha Austin appeared calm and cooperative in session and openly communicated his thoughts and feelings and gave feedback on working on his patience    P:  Kenisha Austin will continue to use the STOP skill and work on his patience      HPI     Past Medical History:   Diagnosis Date   • DJD (degenerative joint disease)    • Dyslipidemia    • History of hepatitis C    • Hypertension    • 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[facility] [address] )    • Toxoplasmosis chorioretinitis of left eye        Past Surgical History:   Procedure Laterality Date   • CARPAL TUNNEL RELEASE Left    • CATARACT EXTRACTION Left    • COLONOSCOPY     • HERNIA REPAIR     • PLANTAR FASCIA SURGERY Left        Current Outpatient Medications   Medication Sig Dispense Refill   • aspirin 81 mg chewable tablet Chew 1 tablet (81 mg total) daily 30 tablet 0   • B Complex-C (B-complex with vitamin C) tablet Take 1 tablet by mouth daily     • buPROPion (Wellbutrin XL) 150 mg 24 hr tablet Take 2 tablets (300 mg total) by mouth daily 60 tablet 1   • Glucosamine-Chondroitin (GLUCOSAMINE CHONDR COMPLEX PO) Take by mouth     • lisinopril (ZESTRIL) 10 mg tablet Take 1 tablet (10 mg total) by mouth daily 90 tablet 0   • Magnesium 100 MG CAPS Take by mouth     • Multiple Vitamin (multivitamin) tablet Take 1 tablet by mouth daily 30 tablet 0   • niacin 100 mg tablet Take 100 mg by mouth daily with breakfast     • OLANZapine (ZyPREXA) 10 mg tablet Take 1 tablet (10 mg total) by mouth daily at bedtime (Patient not taking: Reported on 12/28/2022) 30 tablet 0   • OLANZapine (ZyPREXA) 2 5 mg tablet Take 1 tablet (2 5 mg total) by mouth daily at bedtime (Patient not taking: Reported on 12/28/2022) 30 tablet 0   • Omega-3 Fatty Acids (fish oil) 1,000 mg Take 1,000 mg by mouth daily     • sildenafil (VIAGRA) 50 MG tablet Take 1 tablet (50 mg total) by mouth daily as needed for erectile dysfunction Take 1 tablet 2 hours after meal and 1 hour before sexual intercourse  If this is not effective you can increase by half a tablet per encounter up to a maximum of 2 tablets or 100 mg  Do not take more than once in 24 hours and limit to 2-3 occurrences per week 30 tablet 12   • zinc gluconate 50 mg tablet Take 50 mg by mouth daily       No current facility-administered medications for this visit          Allergies   Allergen Reactions   • Latex Hives       Review of Systems    Video Exam    There were no vitals filed for this visit      Physical Exam     Visit Time    Visit Start Time: 0700  Visit Stop Time: 3286  Total Visit Duration: 50 minutes

## 2023-01-11 ENCOUNTER — OCCMED (OUTPATIENT)
Dept: URGENT CARE | Facility: CLINIC | Age: 58
End: 2023-01-11

## 2023-01-11 ENCOUNTER — OFFICE VISIT (OUTPATIENT)
Dept: URGENT CARE | Facility: CLINIC | Age: 58
End: 2023-01-11

## 2023-01-11 VITALS
SYSTOLIC BLOOD PRESSURE: 140 MMHG | BODY MASS INDEX: 20.86 KG/M2 | DIASTOLIC BLOOD PRESSURE: 70 MMHG | OXYGEN SATURATION: 99 % | WEIGHT: 149 LBS | TEMPERATURE: 98 F | HEART RATE: 72 BPM | RESPIRATION RATE: 16 BRPM | HEIGHT: 71 IN

## 2023-01-11 DIAGNOSIS — Z02.1 PRE-EMPLOYMENT EXAMINATION: Primary | ICD-10-CM

## 2023-01-11 DIAGNOSIS — J01.80 OTHER ACUTE SINUSITIS, RECURRENCE NOT SPECIFIED: Primary | ICD-10-CM

## 2023-01-11 DIAGNOSIS — H00.015 HORDEOLUM EXTERNUM OF LEFT LOWER EYELID: ICD-10-CM

## 2023-01-11 LAB
MEV IGG SER QL IA: NORMAL
MUV IGG SER QL IA: NORMAL
RUBV IGG SERPL IA-ACNC: >175 IU/ML
VZV IGG SER QL IA: NORMAL

## 2023-01-11 RX ORDER — DOXYCYCLINE 100 MG/1
100 CAPSULE ORAL 2 TIMES DAILY
Qty: 14 CAPSULE | Refills: 0 | Status: SHIPPED | OUTPATIENT
Start: 2023-01-11 | End: 2023-01-18

## 2023-01-11 RX ORDER — ERYTHROMYCIN 5 MG/G
0.5 OINTMENT OPHTHALMIC EVERY 6 HOURS SCHEDULED
Qty: 3.5 G | Refills: 0 | Status: SHIPPED | OUTPATIENT
Start: 2023-01-11 | End: 2023-01-18

## 2023-01-11 NOTE — PROGRESS NOTES
[address] [facility]        NAME: Julian Masterson is a 62 y o  male  : 1965    MRN: [id number]  DATE: 2023  TIME: 1:21 PM    Assessment and Plan   Other acute sinusitis, recurrence not specified [J01 80]  1  Other acute sinusitis, recurrence not specified  doxycycline monohydrate (MONODOX) 100 mg capsule      2  Hordeolum externum of left lower eyelid  erythromycin (ILOTYCIN) ophthalmic ointment            Patient Instructions       Follow up with PCP in 3-5 days  Proceed to  ER if symptoms worsen  You have been prescribed doxycycline for sinus infection and erythromycin eye ointment for stye  Use as prescribed  Follow up with your PCP in 3-5 days  Go to the ED if symptoms worsen          Chief Complaint     Chief Complaint   Patient presents with   • Eye Problem     Left eye redness x 2 days    • Nasal Congestion     Nasal congestion x 14 days with green discharge ( increase discharge in left than right nares)         History of Present Illness       This is a 62year old male who states has had sinus congestion with yellow thick nasal secretions since  and now seems to have a developing stye at the left lower eye lid and left eye redness  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  He states his wife has been ill with same  Denies fevers, chills, n/v/d  Review of Systems   Review of Systems   Constitutional: Negative  HENT: Positive for congestion, sinus pressure and sinus pain  Eyes: Positive for redness  Respiratory: Negative  Cardiovascular: Negative  Gastrointestinal: Negative  Endocrine: Negative  Genitourinary: Negative  Musculoskeletal: Negative  Skin: Negative  Allergic/Immunologic: Negative  Neurological: Negative  Hematological: Negative  Psychiatric/Behavioral: Negative            Current Medications       Current Outpatient Medications:   •  doxycycline monohydrate (MONODOX) 100 mg capsule, Take 1 capsule (100 mg total) by mouth 2 (two) times a day for 7 days, Disp: 14 capsule, Rfl: 0  •  erythromycin (ILOTYCIN) ophthalmic ointment, Administer 0 5 inches into the left eye every 6 (six) hours for 7 days, Disp: 3 5 g, Rfl: 0  •  aspirin 81 mg chewable tablet, Chew 1 tablet (81 mg total) daily, Disp: 30 tablet, Rfl: 0  •  B Complex-C (B-complex with vitamin C) tablet, Take 1 tablet by mouth daily, Disp: , Rfl:   •  buPROPion (Wellbutrin XL) 150 mg 24 hr tablet, Take 2 tablets (300 mg total) by mouth daily, Disp: 60 tablet, Rfl: 1  •  Glucosamine-Chondroitin (GLUCOSAMINE CHONDR COMPLEX PO), Take by mouth, Disp: , Rfl:   •  lisinopril (ZESTRIL) 10 mg tablet, Take 1 tablet (10 mg total) by mouth daily, Disp: 90 tablet, Rfl: 0  •  Magnesium 100 MG CAPS, Take by mouth, Disp: , Rfl:   •  Multiple Vitamin (multivitamin) tablet, Take 1 tablet by mouth daily, Disp: 30 tablet, Rfl: 0  •  niacin 100 mg tablet, Take 100 mg by mouth daily with breakfast, Disp: , Rfl:   •  OLANZapine (ZyPREXA) 10 mg tablet, Take 1 tablet (10 mg total) by mouth daily at bedtime (Patient not taking: Reported on 12/28/2022), Disp: 30 tablet, Rfl: 0  •  OLANZapine (ZyPREXA) 2 5 mg tablet, Take 1 tablet (2 5 mg total) by mouth daily at bedtime (Patient not taking: Reported on 12/28/2022), Disp: 30 tablet, Rfl: 0  •  Omega-3 Fatty Acids (fish oil) 1,000 mg, Take 1,000 mg by mouth daily, Disp: , Rfl:   •  sildenafil (VIAGRA) 50 MG tablet, Take 1 tablet (50 mg total) by mouth daily as needed for erectile dysfunction Take 1 tablet 2 hours after meal and 1 hour before sexual intercourse  If this is not effective you can increase by half a tablet per encounter up to a maximum of 2 tablets or 100 mg   Do not take more than once in 24 hours and limit to 2-3 occurrences per week, Disp: 30 tablet, Rfl: 12  •  zinc gluconate 50 mg tablet, Take 50 mg by mouth daily, Disp: , Rfl:     Current Allergies     Allergies as of 01/11/2023 - Reviewed 01/11/2023   Allergen Reaction Noted   • Latex Hives 08/05/2022            The following portions of the patient's history were reviewed and updated as appropriate: allergies, current medications, past family history, past medical history, past social history, past surgical history and problem list      Past Medical History:   Diagnosis Date   • DJD (degenerative joint disease)    • Dyslipidemia    • History of hepatitis C    • Hypertension    • 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[address] )    • Toxoplasmosis chorioretinitis of left eye        Past Surgical History:   Procedure Laterality Date   • CARPAL TUNNEL RELEASE Left    • CATARACT EXTRACTION Left    • COLONOSCOPY     • HERNIA REPAIR     • PLANTAR FASCIA SURGERY Left        Family History   Problem Relation Age of Onset   • Lung cancer Mother    • Kidney disease Mother         drug induced   • COPD Mother    • Dementia Father          Medications have been verified  Objective   /70   Pulse 72   Temp 98 °F (36 7 °C)   Resp 16   Ht 5' 11" (1 803 m)   Wt 67 6 kg (149 lb)   SpO2 99%   BMI 20 78 kg/m²   No LMP for male patient  Physical Exam     Physical Exam  Vitals and nursing note reviewed  Constitutional:       General: He is not in acute distress  Appearance: Normal appearance  He is normal weight  He is not ill-appearing, toxic-appearing or diaphoretic  HENT:      Head: Normocephalic and atraumatic  Right Ear: Tympanic membrane and ear canal normal       Left Ear: Tympanic membrane and ear canal normal       Nose: Congestion present  No rhinorrhea  Comments: B/l nasal turbinates are red inflamed      Mouth/Throat:      Mouth: Mucous membranes are moist       Pharynx: Oropharynx is clear  No oropharyngeal exudate or posterior oropharyngeal erythema  Eyes:      General:         Right eye: No discharge  Left eye: No discharge  Extraocular Movements: Extraocular movements intact  Pupils: Pupils are equal, round, and reactive to light  Cardiovascular:      Rate and Rhythm: Normal rate and regular rhythm  Pulses: Normal pulses  Heart sounds: Normal heart sounds  Pulmonary:      Effort: Pulmonary effort is normal       Breath sounds: Normal breath sounds  Musculoskeletal:         General: Normal range of motion  Cervical back: Normal range of motion and neck supple  Skin:     General: Skin is warm and dry  Capillary Refill: Capillary refill takes less than 2 seconds  Neurological:      General: No focal deficit present  Mental Status: He is alert and oriented to person, place, and time  Psychiatric:         Mood and Affect: Mood normal          Behavior: Behavior normal          Thought Content:  Thought content normal          Judgment: Judgment normal

## 2023-01-11 NOTE — PATIENT INSTRUCTIONS
You have been prescribed doxycycline for sinus infection and erythromycin eye ointment for stye    Use as prescribed  Follow up with your PCP in 3-5 days  Go to the ED if symptoms worsen

## 2023-01-12 LAB
GAMMA INTERFERON BACKGROUND BLD IA-ACNC: 0.04 IU/ML
M TB IFN-G BLD-IMP: NEGATIVE
M TB IFN-G CD4+ BCKGRND COR BLD-ACNC: 0 IU/ML
M TB IFN-G CD4+ BCKGRND COR BLD-ACNC: 0.01 IU/ML
MITOGEN IGNF BCKGRD COR BLD-ACNC: 7.22 IU/ML

## 2023-01-20 ENCOUNTER — TELEMEDICINE (OUTPATIENT)
Dept: PSYCHIATRY | Facility: CLINIC | Age: 58
End: 2023-01-20

## 2023-01-20 DIAGNOSIS — F31.12 BIPOLAR DISORDER, CURR EPISODE MANIC W/O PSYCHOTIC FEATURES, MODERATE (HCC): Primary | ICD-10-CM

## 2023-01-20 PROBLEM — Z12.5 PROSTATE CANCER SCREENING: Status: RESOLVED | Noted: 2022-11-21 | Resolved: 2023-01-20

## 2023-01-20 NOTE — PSYCH
Behavioral Health Psychotherapy Progress Note    Psychotherapy Provided: Individual Psychotherapy     1  Bipolar disorder, curr episode manic w/o psychotic features, moderate ([address] )            Goals addressed in session: Goal 1     DATA: Bernadette Guzman shared that he was able to have his dogs live with him because of emotional support animal letter that therapist wrote  Bernadette Guzman also shared that he is going through pre employment process which is frustrating for him  Therapist recommended that he do some deep breathing and stretching to reduce anxiety before the appointment  Therapist and Bernadette Guzman went over [facility] for him to practice assertiveness with Bernadette Guzman for his appointment  He shared that he had an interaction with a woman he finds attractive as a person, stopping in to talk to [place] and that he left a plant for her and said he admired her  He shared he is proud of himself for this interaction  He will be moving to a new apartment soon and is excited about moving to it  He said he is going to write out what he will say to the nurse practitioner when he calls to help him with his employment situation and the clearances  During this session, this clinician used the following therapeutic modalities: Dialectical Behavior Therapy, Mindfulness-based Strategies and Supportive Psychotherapy    Substance Abuse was not addressed during this session  If the client is diagnosed with a co-occurring substance use disorder, please indicate any changes in the frequency or amount of use:   Stage of change for addressing substance use diagnoses: No substance use/Not applicable    ASSESSMENT:  Jaimie Pascual presents with a Euthymic/ normal mood  his affect is Normal range and intensity, which is congruent, with his mood and the content of the session  The client has made progress on their goals  Jaimie Pascual presents with a none risk of suicide, none risk of self-harm, and none risk of harm to others      For any risk assessment that surpasses a "low" rating, a safety plan must be developed  A safety plan was indicated: no  If yes, describe in detail     PLAN: Between sessions, Alban Opitz will use [facility] and relaxation skills  At the next session, the therapist will use Dialectical Behavior Therapy, Mindfulness-based Strategies and Supportive Psychotherapy to address communication skills and anxiety  Behavioral Health Treatment Plan and Discharge Planning: Alban Opitz is aware of and agrees to continue to work on their treatment plan  They have identified and are working toward their discharge goals  yes    Visit start and stop times:    01/20/23       Virtual Regular Visit    Verification of patient location:    Patient is located in the following state in which I hold an active license [place]      Assessment/Plan:    Problem List Items Addressed This Visit        Other    Bipolar disorder, curr episode manic w/o psychotic features, moderate ([facility] [address] ) - Primary       Goals addressed in session: Goal 1          Reason for visit is   Chief Complaint   Patient presents with   • Virtual Regular Visit        Encounter provider Joe Rider LCSW    Provider located at [address] [place], [place] [id number] [phone]      Recent Visits  No visits were found meeting these conditions  Showing recent visits within past 7 days and meeting all other requirements  Future Appointments  No visits were found meeting these conditions  Showing future appointments within next 150 days and meeting all other requirements       The patient was identified by name and date of birth  Alban Opitz was informed that this is a telemedicine visit and [facility]  He agrees to proceed     My office door was closed  No one else was in the room    He acknowledged consent and understanding of privacy and security of the video platform  The patient has agreed to participate and understands they can discontinue the visit at any time  Patient is aware this is a billable service  Subjective  Eloy Woods is a 62 y o  male    HPI     Past Medical History:   Diagnosis Date   • DJD (degenerative joint disease)    • Dyslipidemia    • History of hepatitis C    • Hypertension    • 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[facility] [address] )    • Toxoplasmosis chorioretinitis of left eye        Past Surgical History:   Procedure Laterality Date   • CARPAL TUNNEL RELEASE Left    • CATARACT EXTRACTION Left    • COLONOSCOPY     • HERNIA REPAIR     • PLANTAR FASCIA SURGERY Left        Current Outpatient Medications   Medication Sig Dispense Refill   • aspirin 81 mg chewable tablet Chew 1 tablet (81 mg total) daily 30 tablet 0   • B Complex-C (B-complex with vitamin C) tablet Take 1 tablet by mouth daily     • buPROPion (Wellbutrin XL) 150 mg 24 hr tablet Take 2 tablets (300 mg total) by mouth daily 60 tablet 1   • Glucosamine-Chondroitin (GLUCOSAMINE CHONDR COMPLEX PO) Take by mouth     • lisinopril (ZESTRIL) 10 mg tablet Take 1 tablet (10 mg total) by mouth daily 90 tablet 0   • Magnesium 100 MG CAPS Take by mouth     • Multiple Vitamin (multivitamin) tablet Take 1 tablet by mouth daily 30 tablet 0   • niacin 100 mg tablet Take 100 mg by mouth daily with breakfast     • OLANZapine (ZyPREXA) 10 mg tablet Take 1 tablet (10 mg total) by mouth daily at bedtime (Patient not taking: Reported on 12/28/2022) 30 tablet 0   • OLANZapine (ZyPREXA) 2 5 mg tablet Take 1 tablet (2 5 mg total) by mouth daily at bedtime (Patient not taking: Reported on 12/28/2022) 30 tablet 0   • Omega-3 Fatty Acids (fish oil) 1,000 mg Take 1,000 mg by mouth daily     • sildenafil (VIAGRA) 50 MG tablet Take 1 tablet (50 mg total) by mouth daily as needed for erectile dysfunction Take 1 tablet 2 hours after meal and 1 hour before sexual intercourse    If this is not effective you can increase by half a tablet per encounter up to a maximum of 2 tablets or 100 mg  Do not take more than once in 24 hours and limit to 2-3 occurrences per week 30 tablet 12   • zinc gluconate 50 mg tablet Take 50 mg by mouth daily       No current facility-administered medications for this visit  Allergies   Allergen Reactions   • Latex Hives       Review of Systems    Video Exam    There were no vitals filed for this visit      Physical Exam     Visit Time    Visit Start Time: 0800  Visit Stop Time: 4383  Total Visit Duration: 50 minutes

## 2023-01-23 DIAGNOSIS — Z87.19 H/O CHRONIC HEPATITIS: Primary | ICD-10-CM

## 2023-01-30 ENCOUNTER — OFFICE VISIT (OUTPATIENT)
Dept: PSYCHIATRY | Facility: CLINIC | Age: 58
End: 2023-01-30

## 2023-01-30 DIAGNOSIS — F32.89 OTHER DEPRESSION: ICD-10-CM

## 2023-01-30 DIAGNOSIS — F31.12 BIPOLAR DISORDER, CURR EPISODE MANIC W/O PSYCHOTIC FEATURES, MODERATE (HCC): ICD-10-CM

## 2023-01-30 DIAGNOSIS — F31.62 BIPOLAR DISORDER, CURRENT EPISODE MIXED, MODERATE (HCC): Primary | ICD-10-CM

## 2023-01-30 RX ORDER — OLANZAPINE 2.5 MG/1
2.5 TABLET ORAL
Qty: 90 TABLET | Refills: 0 | Status: SHIPPED | OUTPATIENT
Start: 2023-01-30

## 2023-01-30 RX ORDER — BUPROPION HYDROCHLORIDE 150 MG/1
300 TABLET ORAL DAILY
Qty: 60 TABLET | Refills: 1 | Status: SHIPPED | OUTPATIENT
Start: 2023-01-30

## 2023-01-30 RX ORDER — OLANZAPINE 10 MG/1
10 TABLET ORAL
Qty: 90 TABLET | Refills: 0 | Status: SHIPPED | OUTPATIENT
Start: 2023-01-30

## 2023-01-31 ENCOUNTER — LAB (OUTPATIENT)
Dept: LAB | Facility: HOSPITAL | Age: 58
End: 2023-01-31

## 2023-01-31 DIAGNOSIS — R79.89 ELEVATED TESTOSTERONE LEVEL: ICD-10-CM

## 2023-01-31 DIAGNOSIS — E83.52 HYPERCALCEMIA: ICD-10-CM

## 2023-01-31 DIAGNOSIS — Z87.19 H/O CHRONIC HEPATITIS: ICD-10-CM

## 2023-01-31 LAB
ALBUMIN SERPL BCP-MCNC: 4.3 G/DL (ref 3.5–5)
ALP SERPL-CCNC: 48 U/L (ref 34–104)
ALT SERPL W P-5'-P-CCNC: 72 U/L (ref 7–52)
ANION GAP SERPL CALCULATED.3IONS-SCNC: 6 MMOL/L (ref 4–13)
AST SERPL W P-5'-P-CCNC: 77 U/L (ref 13–39)
BILIRUB SERPL-MCNC: 0.62 MG/DL (ref 0.2–1)
BUN SERPL-MCNC: 18 MG/DL (ref 5–25)
CALCIUM SERPL-MCNC: 9.6 MG/DL (ref 8.4–10.2)
CHLORIDE SERPL-SCNC: 105 MMOL/L (ref 96–108)
CO2 SERPL-SCNC: 27 MMOL/L (ref 21–32)
CREAT SERPL-MCNC: 0.82 MG/DL (ref 0.6–1.3)
FSH SERPL-ACNC: 16 MIU/ML (ref 0.7–10.8)
GFR SERPL CREATININE-BSD FRML MDRD: 98 ML/MIN/1.73SQ M
GLUCOSE P FAST SERPL-MCNC: 111 MG/DL (ref 65–99)
LH SERPL-ACNC: 19.5 MIU/ML (ref 1.2–10.6)
POTASSIUM SERPL-SCNC: 4.5 MMOL/L (ref 3.5–5.3)
PROLACTIN SERPL-MCNC: 9.7 NG/ML (ref 2.5–17.4)
PROT SERPL-MCNC: 7.1 G/DL (ref 6.4–8.4)
PTH-INTACT SERPL-MCNC: 22.6 PG/ML (ref 18.4–80.1)
SODIUM SERPL-SCNC: 138 MMOL/L (ref 135–147)
TSH SERPL DL<=0.05 MIU/L-ACNC: 4.11 UIU/ML (ref 0.45–4.5)

## 2023-02-01 ENCOUNTER — TELEPHONE (OUTPATIENT)
Dept: UROLOGY | Facility: CLINIC | Age: 58
End: 2023-02-01

## 2023-02-01 LAB
SHBG SERPL-SCNC: 173 NMOL/L (ref 19.3–76.4)
TESTOST FREE SERPL-MCNC: 9.3 PG/ML (ref 7.2–24)
TESTOST SERPL-MCNC: 1346 NG/DL (ref 264–916)

## 2023-02-01 NOTE — TELEPHONE ENCOUNTER
Spoke with patient, advised results, no further questions from patient  He will follow with Endo as discussed

## 2023-02-01 NOTE — RESULT ENCOUNTER NOTE
Please let patient know his LH and [address] are abnormal and I do recommend he continue to follow with endocrinology as previously ordered

## 2023-02-01 NOTE — TELEPHONE ENCOUNTER
----- Message from [facility] [address] sent at 2/1/2023 10:54 AM EST -----  Please let patient know his  and [facility] are abnormal and I do recommend he continue to follow with endocrinology as previously ordered

## 2023-02-01 NOTE — PSYCH
Regular Visit    Problem List Items Addressed This Visit        Other    Bipolar disorder, curr episode manic w/o psychotic features, moderate (HCC)    Relevant Medications    OLANZapine (ZyPREXA) 10 mg tablet    OLANZapine (ZyPREXA) 2 5 mg tablet    buPROPion (Wellbutrin XL) 150 mg 24 hr tablet   Other Visit Diagnoses     Bipolar disorder, current episode mixed, moderate (HCC)    -  Primary    Relevant Medications    OLANZapine (ZyPREXA) 10 mg tablet    OLANZapine (ZyPREXA) 2 5 mg tablet    buPROPion (Wellbutrin XL) 150 mg 24 hr tablet    Other depression        Relevant Medications    OLANZapine (ZyPREXA) 10 mg tablet    OLANZapine (ZyPREXA) 2 5 mg tablet    buPROPion (Wellbutrin XL) 150 mg 24 hr tablet             Encounter provider LESLY Rojas    Provider located at    [address] [place] [address] E  [address] [facility] [address] [phone]    Recent Visits  Date Type Provider Dept   01/30/23 Office Visit LESLY Rojas [facility]   Showing recent visits within past 7 days and meeting all other requirements  Future Appointments  No visits were found meeting these conditions    Showing future appointments within next 150 days and meeting all other requirements       HPI     Current Outpatient Medications   Medication Sig Dispense Refill   • buPROPion (Wellbutrin XL) 150 mg 24 hr tablet Take 2 tablets (300 mg total) by mouth daily 60 tablet 1   • OLANZapine (ZyPREXA) 10 mg tablet Take 1 tablet (10 mg total) by mouth daily at bedtime 90 tablet 0   • OLANZapine (ZyPREXA) 2 5 mg tablet Take 1 tablet (2 5 mg total) by mouth daily at bedtime 90 tablet 0   • aspirin 81 mg chewable tablet Chew 1 tablet (81 mg total) daily 30 tablet 0   • B Complex-C (B-complex with vitamin C) tablet Take 1 tablet by mouth daily     • Glucosamine-Chondroitin (GLUCOSAMINE CHONDR COMPLEX PO) Take by mouth     • lisinopril (ZESTRIL) 10 mg tablet Take 1 tablet (10 mg total) by mouth daily 90 tablet 0   • Magnesium 100 MG CAPS Take by mouth     • Multiple Vitamin (multivitamin) tablet Take 1 tablet by mouth daily 30 tablet 0   • niacin 100 mg tablet Take 100 mg by mouth daily with breakfast     • Omega-3 Fatty Acids (fish oil) 1,000 mg Take 1,000 mg by mouth daily     • sildenafil (VIAGRA) 50 MG tablet Take 1 tablet (50 mg total) by mouth daily as needed for erectile dysfunction Take 1 tablet 2 hours after meal and 1 hour before sexual intercourse  If this is not effective you can increase by half a tablet per encounter up to a maximum of 2 tablets or 100 mg  Do not take more than once in 24 hours and limit to 2-3 occurrences per week 30 tablet 12   • zinc gluconate 50 mg tablet Take 50 mg by mouth daily       No current facility-administered medications for this visit  Review of Systems        MEDICATION MANAGEMENT NOTE        Norberto Madrigal    Name and Date of Birth:  Magui Grandchild 62 y o  1965 MRN: [id number]    Date of Visit: February 1, 2023    Allergies   Allergen Reactions   • Latex Hives     SUBJECTIVE:    Adrienne Tabor is seen today for a follow up for Bipolar Disorder  He continues to do fairly well since the last visit  Patient shares that he moved out of his prior living situation and now has his own apartment which he is really enjoying  Living separate from wife but reports still having positive communication with her and has a goal to live with her in the future  He is attempting to find work, appears to have a strong desire to find a suitable job and work full-time  Brought in forms for provider to fill out in terms of his mental health history, forms filled out and faxed in his records  Patient is somewhat hyperverbal during conversation, mood presents as euthymic with no evidence of grandiosity, agitation, irritability   Describes some stress and anxiety related to the preemployment process, working with his therapist to utilize his coping skills  Denies feeling depressed at this time and does not appear so  This provider continues to encourage compliance with his daily Zyprexa 12 5 mg for mood control, appears to be slightly more consistent with medication regimen as he requests refills during today's appointment  Explained the need to take medication on a daily basis to avoid symptoms of bipolar disorder and the risk of readmission to the hospital, patient verbalized understanding  Continue weekly psychotherapy  Denies hallucinations, paranoia, delusions  Denies SI and HI  He denies any side effects from medications  PLAN:       -Continue Zyprexa 12 5 mg daily at  for mood stabilization PARQ discussed with patient included sedation, dizziness, weight gain, dry mouth, constipation, hypotension, dyslipidemia, EPS, metabolic syndrome, peripheral edema, dyspepsia, joint and body pains, tachycardia, orthostatic hypotension, rash with sunlight exposure, hyperglycemia, seizures, skin rash, rare NMS, and increased risk of CVA in patient's with dementia      --Continue to encourage compliance with Zyprexa for bipolar one disorder-patient taking medication on inconsistent basis , explained the risks of untreated bipolar disorder including re-admission to inpatient unit and patient verbalized understanding      -Continue Wellbutrin 300mg XL for depressive and dysphoric symptoms PARQ completed including induction of arsh, decreased seizure threshold and risk with alcohol or electrolyte disturbances, headaches, hypertension and cardiovascular effects, GI distress, weight loss, agitation/activation, dizziness, tremor, anxiety, potential for drug interactions, and others            Aware of 24 hour and weekend coverage for urgent situations accessed by calling [facility] main practice number  Referral for individual psychotherapy    Diagnoses and all orders for this visit:    Bipolar disorder, current episode mixed, moderate (HCC)    Bipolar disorder, curr episode manic w/o psychotic features, moderate (HCC)  -     OLANZapine (ZyPREXA) 10 mg tablet; Take 1 tablet (10 mg total) by mouth daily at bedtime  -     OLANZapine (ZyPREXA) 2 5 mg tablet; Take 1 tablet (2 5 mg total) by mouth daily at bedtime    Other depression  -     buPROPion (Wellbutrin XL) 150 mg 24 hr tablet; Take 2 tablets (300 mg total) by mouth daily        Current Outpatient Medications on File Prior to Visit   Medication Sig Dispense Refill   • aspirin 81 mg chewable tablet Chew 1 tablet (81 mg total) daily 30 tablet 0   • B Complex-C (B-complex with vitamin C) tablet Take 1 tablet by mouth daily     • Glucosamine-Chondroitin (GLUCOSAMINE CHONDR COMPLEX PO) Take by mouth     • lisinopril (ZESTRIL) 10 mg tablet Take 1 tablet (10 mg total) by mouth daily 90 tablet 0   • Magnesium 100 MG CAPS Take by mouth     • Multiple Vitamin (multivitamin) tablet Take 1 tablet by mouth daily 30 tablet 0   • niacin 100 mg tablet Take 100 mg by mouth daily with breakfast     • Omega-3 Fatty Acids (fish oil) 1,000 mg Take 1,000 mg by mouth daily     • sildenafil (VIAGRA) 50 MG tablet Take 1 tablet (50 mg total) by mouth daily as needed for erectile dysfunction Take 1 tablet 2 hours after meal and 1 hour before sexual intercourse  If this is not effective you can increase by half a tablet per encounter up to a maximum of 2 tablets or 100 mg  Do not take more than once in 24 hours and limit to 2-3 occurrences per week 30 tablet 12   • zinc gluconate 50 mg tablet Take 50 mg by mouth daily       No current facility-administered medications on file prior to visit  HPI ROS Appetite Changes and Sleep:     He reports fluctuating sleep pattern, adequate appetite, adequate energy level   Denies homicidal ideation, denies suicidal ideation    Review Of Systems:      HPI ROS:               Medication Side Effects:  denies Depression (10 worst): 2/10    Anxiety (10 worst): 3/10    Safety concerns (SI, HI, etc): Denies si and hi    Sleep: 6-8 hrs    Energy: Fair but fluctuates    Appetite: 2-3 meals    Weight Change: denies        Mental Status Evaluation:    Appearance Adequate hygiene and grooming   Behavior restless and fidgety   Mood anxious and depressed  Depression Scale - 2 of 10 (0 = No depression)  Anxiety Scale - 3 of 10 (0 = No anxiety)   Speech hyperverbal   Affect constricted   Thought Processes Goal directed and coherent   Thought Content Does not verbalize delusional material   Associations Tightly connected   Perceptual Disturbances Denies hallucinations and does not appear to be responding to internal stimuli   Risk Potential Suicidal/Homicidal Ideation - No evidence of suicidal or homicidal ideation and patient does not verbalize suicidal or homicidal ideation  Risk of Violence - No evidence of risk for violence found on assessment  Risk of Self Mutilation - No evidence of risk for self mutilation found on assessment   Orientation oriented to person, place, time/date and situation   Memory recent and remote memory grossly intact   Consciousness alert and awake   Attention/Concentration attention span and concentration appear shorter than expected for age   Insight intact   Judgement intact   Muscle Strength and Gait normal muscle strength and normal muscle tone, normal gait/station and normal balance   Motor Activity no abnormal movements   Language no difficulty naming common objects, no difficulty repeating a phrase, no difficulty writing a sentence   Fund of Knowledge adequate knowledge of current events  adequate fund of knowledge regarding past history  adequate fund of knowledge regarding vocabulary      Past Psychiatric History Update:     Inpatient Psychiatric Admission Since Last Encounter:   no  Changes to Outpatient Psychiatric Treatment Team:    no  Suicide Attempt Or Self Mutilation Since Last Encounter:   no  Incidence of Violent Behavior Since Last Encounter:   no    Traumatic History Update:     New Onset of Abuse Since Last Encounter:   no  Traumatic Events Since Last Encounter:   no    Past Medical History:    Past Medical History:   Diagnosis Date   • DJD (degenerative joint disease)    • Dyslipidemia    • History of hepatitis C    • Hypertension    • 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[facility] [address] )    • Toxoplasmosis chorioretinitis of left eye         Past Surgical History:   Procedure Laterality Date   • CARPAL TUNNEL RELEASE Left    • CATARACT EXTRACTION Left    • COLONOSCOPY     • HERNIA REPAIR     • PLANTAR FASCIA SURGERY Left      Allergies   Allergen Reactions   • Latex Hives     Substance Abuse History:    Social History     Substance and Sexual Activity   Alcohol Use Not Currently     Social History     Substance and Sexual Activity   Drug Use Yes   • Types: Marijuana     Social History:    Social History     Socioeconomic History   • Marital status: /Civil Union     Spouse name: Not on file   • Number of children: Not on file   • Years of education: Not on file   • Highest education level: Not on file   Occupational History   • Not on file   Tobacco Use   • Smoking status: Former     Years: 10 00     Types: Cigarettes     Quit date: 01/2020     Years since quitting: 3 0   • Smokeless tobacco: Never   Vaping Use   • Vaping Use: Never used   Substance and Sexual Activity   • Alcohol use: Not Currently   • Drug use: Yes     Types: Marijuana   • Sexual activity: Not on file   Other Topics Concern   • Not on file   Social History Narrative   • Not on file     Social Determinants of Health     Financial Resource Strain: Not on file   Food Insecurity: Not on file   Transportation Needs: Not on file   Physical Activity: Not on file   Stress: Not on file   Social Connections: Not on file   Intimate Partner Violence: Not on file   Housing Stability: Not on file     Family Psychiatric History:     Family History Problem Relation Age of Onset   • Lung cancer Mother    • Kidney disease Mother         drug induced   • COPD Mother    • Dementia Father      History Review: The following portions of the patient's history were reviewed and updated as appropriate: allergies, current medications, past family history, past medical history, past social history, past surgical history and problem list     OBJECTIVE:     Vital signs in last 24 hours: There were no vitals filed for this visit    Laboratory Results:   Recent Labs (last 2 months):   [facility]  on 01/11/2023   Component Date Value   • QFT Nil 01/11/2023 0 04    • QFT TB1-NIL 01/11/2023 0 01    • QFT TB2-NIL 01/11/2023 0 00    • QFT Mitogen-NIL 01/11/2023 7 22    • QFT Final Interpretation 01/11/2023 Negative    • Rubella IgG Quant 01/11/2023 >175 0    • Varicella IgG 01/11/2023 IMMUNE    • Rubeola IgG 01/11/2023 IMMUNE    • Mumps IgG 01/11/2023 IMMUNE    Appointment on 12/15/2022   Component Date Value   • Valproic Acid, Total 12/15/2022 <3 (L)    • WBC 12/15/2022 7 49    • RBC 12/15/2022 4 46    • Hemoglobin 12/15/2022 14 3    • Hematocrit 12/15/2022 42 0    • MCV 12/15/2022 94    • MCH 12/15/2022 32 1    • MCHC 12/15/2022 34 0    • RDW 12/15/2022 12 1    • MPV 12/15/2022 9 9    • Platelets 83/04/0383 177    • nRBC 12/15/2022 0    • Neutrophils Relative 12/15/2022 52    • Immat GRANS % 12/15/2022 0    • Lymphocytes Relative 12/15/2022 36    • Monocytes Relative 12/15/2022 10    • Eosinophils Relative 12/15/2022 1    • Basophils Relative 12/15/2022 1    • Neutrophils Absolute 12/15/2022 3 92    • Immature Grans Absolute 12/15/2022 0 02    • Lymphocytes Absolute 12/15/2022 2 69    • Monocytes Absolute 12/15/2022 0 76    • Eosinophils Absolute 12/15/2022 0 05    • Basophils Absolute 12/15/2022 0 05    • Sodium 12/15/2022 135    • Potassium 12/15/2022 4 5    • Chloride 12/15/2022 102    • CO2 12/15/2022 29    • ANION GAP 12/15/2022 4    • BUN 12/15/2022 16    • Creatinine 12/15/2022 0 96    • Glucose, Fasting 12/15/2022 119 (H)    • Calcium 12/15/2022 9 4    • AST 12/15/2022 86 (H)    • ALT 12/15/2022 105 (H)    • Alkaline Phosphatase 12/15/2022 65    • Total Protein 12/15/2022 7 9    • Albumin 12/15/2022 4 2    • Total Bilirubin 12/15/2022 0 90    • eGFR 12/15/2022 87    • Testosterone, Free 12/15/2022 5 7 (L)    • TESTOSTERONE TOTAL 12/15/2022 1079 (H)    • Bilirubin, Direct 12/15/2022 0 22 (H)      I have personally reviewed all pertinent laboratory/tests results  Suicide/Homicide Risk Assessment:    Risk of Harm to Self:  Protective Factors: no current suicidal ideation, access to mental health treatment, being , compliant with medications, compliant with mental health treatment, good self-esteem, having a desire to be alive, having a sense of purpose or meaning in life  Based on today's assessment, Bob Velez presents the following risk of harm to self: minimal    Risk of Harm to Others:  Based on today's assessment, Bob Velez presents the following risk of harm to others: minimal    The following interventions are recommended: referral for psychotherapy    Medications Risks/Benefits:      Risks, Benefits And Possible Side Effects Of Medications:    Discussed risks and benefits of treatment with patient including risk of parkinsonian symptoms, metabolic syndrome, tardive dyskinesia and neuroleptic malignant syndrome related to treatment with antipsychotic medications and Reduction in seizure threshold related to treatment with bupropion      Controlled Medication Discussion:     Not applicable    Treatment Plan:    Due for update/Updated:   no  Last treatment plan done 11/18/22 by alejandra thorpe  Treatment Plan due on 5/18/23      LESLY Siegel 02/01/23  Visit Time    Visit Start Time: 6  Visit Stop Time: 207  Total Visit Duration: 30 minutes

## 2023-02-02 LAB
HCV RNA SERPL NAA+PROBE-ACNC: NORMAL IU/ML
HCV RNA SERPL NAA+PROBE-ACNC: NORMAL IU/ML
HCV RNA SERPL NAA+PROBE-LOG IU: 7.11 LOG10 IU/ML
TEST INFORMATION: NORMAL

## 2023-02-09 NOTE — NURSING NOTE
IV diuresis, will watch her urine output closely    12/27/21 [facility] get chest xray today, stop spironolactone and start lasix due to elevated potassium, continue oxygen get abg and monitor    12/28/21 [facility] see chest xray above, continue lasix and bipap intermittent and q hs    12/29/21 [facility] improving with lasix, continue to monitor.     12/30/21 [facility] stable   ----- Message from Satnam Ford MD sent at 2/9/2023  7:33 AM CST -----  Inform patient cholesterol much improved.  AST and CPK are normal.  There fore no evidence of side effects from medication.  Continue same medication   Patient visible in the milieu  He is calm, cooperative  Bright  Engages in conversation  Social with peers  He states "I know that I really needed to be here and I am starting to feel much better now, the Depakote is really working for me"  He denies anxiety and depression  Denies SI/HI and hallucinations  He offers no complaints/ concerns at this time  Will CTM  Q7 minute safety checks in progress

## 2023-02-10 ENCOUNTER — TELEMEDICINE (OUTPATIENT)
Dept: PSYCHIATRY | Facility: CLINIC | Age: 58
End: 2023-02-10

## 2023-02-10 DIAGNOSIS — F31.12 BIPOLAR DISORDER, CURR EPISODE MANIC W/O PSYCHOTIC FEATURES, MODERATE (HCC): Primary | ICD-10-CM

## 2023-02-10 NOTE — PSYCH
Behavioral Health Psychotherapy Progress Note    Psychotherapy Provided: Individual Psychotherapy     1  Bipolar disorder, curr episode manic w/o psychotic features, moderate ([facility] [address] )            Goals addressed in session: Goal 1     DATA: Adrienne Johnson shared that he has gotten a job in a [employer] being a  at a high school  He shared he is nervous about starting because he is worried about talking too much there and he also worries about it being a union and that he is worried some people there might be burned out or miserable  He shared that he might use his earbuds and music to help him stay in the moment and in his own space  He shares that he has been feeling more anxious and he is thinking about things from the past and is coming to terms with his mortality  Adrienne Johnson shared that he has moved into his new apartment and enjoys his new space  Therapist asked how he is doing with meditation and he shared that he has difficulty with focusing  Therapist suggested that he do active meditations such as walking, doing dishes, folding clothes, cleaning etc   He said that he would try this  Adrienne Johnson shared that he worries about his wife's health and expresses frustration with her not wanting to improve her health  During this session, this clinician used the following therapeutic modalities: Mindfulness-based Strategies and Supportive Psychotherapy    Substance Abuse was not addressed during this session  If the client is diagnosed with a co-occurring substance use disorder, please indicate any changes in the frequency or amount of use:   Stage of change for addressing substance use diagnoses: No substance use/Not applicable    ASSESSMENT:  Vikki Jaimes presents with a Euthymic/ normal mood  his affect is Normal range and intensity and Overbright, which is congruent, with his mood and the content of the session  The client has made progress on their goals       Vikki Jaimes presents with a none risk of suicide, none risk of self-harm, and none risk of harm to others  For any risk assessment that surpasses a "low" rating, a safety plan must be developed  A safety plan was indicated: no  If yes, describe in detail     PLAN: Between sessions, America Galicia will do mindfulness to the moment  At the next session, the therapist will use Mindfulness-based Strategies and Supportive Psychotherapy to address anxiety  Behavioral Health Treatment Plan and Discharge Planning: America Galicia is aware of and agrees to continue to work on their treatment plan  They have identified and are working toward their discharge goals  yes    Visit start and stop times:    02/10/23       Virtual Regular Visit    Verification of patient location:    Patient is located in the following state in which I hold an active license [place]      Assessment/Plan:    Problem List Items Addressed This Visit        Other    Bipolar disorder, curr episode manic w/o psychotic features, moderate ([address] ) - Primary       Goals addressed in session: Goal 1          Reason for visit is   Chief Complaint   Patient presents with   • Virtual Regular Visit        Encounter provider Lei Connors LCSW    Provider located at [address] [place] [id number] [phone]      Recent Visits  No visits were found meeting these conditions  Showing recent visits within past 7 days and meeting all other requirements  Future Appointments  No visits were found meeting these conditions  Showing future appointments within next 150 days and meeting all other requirements       The patient was identified by name and date of birth  America Galicia was informed that this is a telemedicine visit and that [facility]  He agrees to proceed     My office door was closed  No one else was in the room    He acknowledged consent and understanding of privacy and security of the video platform  The patient has agreed to participate and understands they can discontinue the visit at any time  Patient is aware this is a billable service  Charlie Lee is a 62 y o  male    HPI     Past Medical History:   Diagnosis Date   • DJD (degenerative joint disease)    • Dyslipidemia    • History of hepatitis C    • Hypertension    • 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[address] )    • Toxoplasmosis chorioretinitis of left eye        Past Surgical History:   Procedure Laterality Date   • CARPAL TUNNEL RELEASE Left    • CATARACT EXTRACTION Left    • COLONOSCOPY     • HERNIA REPAIR     • PLANTAR FASCIA SURGERY Left        Current Outpatient Medications   Medication Sig Dispense Refill   • aspirin 81 mg chewable tablet Chew 1 tablet (81 mg total) daily 30 tablet 0   • B Complex-C (B-complex with vitamin C) tablet Take 1 tablet by mouth daily     • buPROPion (Wellbutrin XL) 150 mg 24 hr tablet Take 2 tablets (300 mg total) by mouth daily 60 tablet 1   • Glucosamine-Chondroitin (GLUCOSAMINE CHONDR COMPLEX PO) Take by mouth     • lisinopril (ZESTRIL) 10 mg tablet Take 1 tablet (10 mg total) by mouth daily 90 tablet 0   • Magnesium 100 MG CAPS Take by mouth     • Multiple Vitamin (multivitamin) tablet Take 1 tablet by mouth daily 30 tablet 0   • niacin 100 mg tablet Take 100 mg by mouth daily with breakfast     • OLANZapine (ZyPREXA) 10 mg tablet Take 1 tablet (10 mg total) by mouth daily at bedtime 90 tablet 0   • OLANZapine (ZyPREXA) 2 5 mg tablet Take 1 tablet (2 5 mg total) by mouth daily at bedtime 90 tablet 0   • Omega-3 Fatty Acids (fish oil) 1,000 mg Take 1,000 mg by mouth daily     • sildenafil (VIAGRA) 50 MG tablet Take 1 tablet (50 mg total) by mouth daily as needed for erectile dysfunction Take 1 tablet 2 hours after meal and 1 hour before sexual intercourse    If this is not effective you can increase by half a tablet per encounter up to a maximum of 2 tablets or 100 mg  Do not take more than once in 24 hours and limit to 2-3 occurrences per week 30 tablet 12   • zinc gluconate 50 mg tablet Take 50 mg by mouth daily       No current facility-administered medications for this visit  Allergies   Allergen Reactions   • Latex Hives       Review of Systems    Video Exam    There were no vitals filed for this visit      Physical Exam     Visit Time    Visit Start Time: 0700  Visit Stop Time: 4717  Total Visit Duration: 50 minutes

## 2023-02-24 ENCOUNTER — TELEMEDICINE (OUTPATIENT)
Dept: PSYCHIATRY | Facility: CLINIC | Age: 58
End: 2023-02-24

## 2023-02-24 DIAGNOSIS — F31.12 BIPOLAR DISORDER, CURR EPISODE MANIC W/O PSYCHOTIC FEATURES, MODERATE (HCC): Primary | ICD-10-CM

## 2023-02-24 NOTE — PSYCH
Behavioral Health Psychotherapy Progress Note    Psychotherapy Provided: Individual Psychotherapy     1  Bipolar disorder, curr episode manic w/o psychotic features, moderate ([facility] [address] )            Goals addressed in session: Goal 1     DATA: Therapist and Aaron Perez had some difficulty connecting due to technical problems and started session at 7:12am   He shared he started his job and said it's going well and he is limiting his time talking to others  He said that he finds places that are dirty and shared he can't understand how it's so dirty and says it doesn't take him much time to clean  He shared he met a neighbor Chuy Franks who is going through some challenges with her child  He shared he has been having anxiety around communication and meeting new people and overtalking and oversharing  Therapist suggested he stick to some small talk topics at first when he meets people  He shared that sometimes he gets off topic and then talks too much  Therapist encouraged him to practice mindfulness to focus on what the other person is saying by practicing mindfulness to the breath daily  He said he would do so  Aaron Perez shared that he might have to get rid of his dogs because of a new manager in his building who is telling him he has to get rid of them  He is anxious about this  Therapist encouraged him to do some networking to see if he can find someone to take them  He said he would do so  During this session, this clinician used the following therapeutic modalities: Mindfulness-based Strategies and Supportive Psychotherapy    Substance Abuse was not addressed during this session  If the client is diagnosed with a co-occurring substance use disorder, please indicate any changes in the frequency or amount of use:   Stage of change for addressing substance use diagnoses: No substance use/Not applicable    ASSESSMENT:  Lalo Suarez presents with a Euthymic/ normal mood       his affect is Normal range and intensity, which is congruent, with his mood and the content of the session  The client has made progress on their goals  Carolynn Cervantes presents with a none risk of suicide, none risk of self-harm, and none risk of harm to others  For any risk assessment that surpasses a "low" rating, a safety plan must be developed  A safety plan was indicated: no  If yes, describe in detail     PLAN: Between sessions, Carolynn Cervantes will use daily mindfulness  At the next session, the therapist will use Mindfulness-based Strategies and Supportive Psychotherapy to address impulsive verbal communication, anxiety  Behavioral Health Treatment Plan and Discharge Planning: Carolynn Cervantes is aware of and agrees to continue to work on their treatment plan  They have identified and are working toward their discharge goals  yes    Visit start and stop times:    02/24/23       Virtual Regular Visit    Verification of patient location:    Patient is located in the following state in which I hold an active license [place]      Assessment/Plan:    Problem List Items Addressed This Visit        Other    Bipolar disorder, curr episode manic w/o psychotic features, moderate ([facility] [address] ) - Primary       Goals addressed in session: Goal 1          Reason for visit is   Chief Complaint   Patient presents with   • Virtual Regular Visit        Encounter provider Sveta Ann LCSW    Provider located at [address] [place] [id number] [phone]      Recent Visits  No visits were found meeting these conditions  Showing recent visits within past 7 days and meeting all other requirements  Future Appointments  No visits were found meeting these conditions  Showing future appointments within next 150 days and meeting all other requirements       The patient was identified by name and date of birth   Carolynn Cervantes was informed that this is a telemedicine visit and that the visit is being conducted throughthe [facility] platform  He agrees to proceed     My office door was closed  No one else was in the room  He acknowledged consent and understanding of privacy and security of the video platform  The patient has agreed to participate and understands they can discontinue the visit at any time  Patient is aware this is a billable service  Subjective  Katelynn Chahal is a 62 y o  male          HPI     Past Medical History:   Diagnosis Date   • DJD (degenerative joint disease)    • Dyslipidemia    • History of hepatitis C    • Hypertension    • 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[facility] [address] )    • Toxoplasmosis chorioretinitis of left eye        Past Surgical History:   Procedure Laterality Date   • CARPAL TUNNEL RELEASE Left    • CATARACT EXTRACTION Left    • COLONOSCOPY     • HERNIA REPAIR     • PLANTAR FASCIA SURGERY Left        Current Outpatient Medications   Medication Sig Dispense Refill   • aspirin 81 mg chewable tablet Chew 1 tablet (81 mg total) daily 30 tablet 0   • B Complex-C (B-complex with vitamin C) tablet Take 1 tablet by mouth daily     • buPROPion (Wellbutrin XL) 150 mg 24 hr tablet Take 2 tablets (300 mg total) by mouth daily 60 tablet 1   • Glucosamine-Chondroitin (GLUCOSAMINE CHONDR COMPLEX PO) Take by mouth     • lisinopril (ZESTRIL) 10 mg tablet Take 1 tablet (10 mg total) by mouth daily 90 tablet 0   • Magnesium 100 MG CAPS Take by mouth     • Multiple Vitamin (multivitamin) tablet Take 1 tablet by mouth daily 30 tablet 0   • niacin 100 mg tablet Take 100 mg by mouth daily with breakfast     • OLANZapine (ZyPREXA) 10 mg tablet Take 1 tablet (10 mg total) by mouth daily at bedtime 90 tablet 0   • OLANZapine (ZyPREXA) 2 5 mg tablet Take 1 tablet (2 5 mg total) by mouth daily at bedtime 90 tablet 0   • Omega-3 Fatty Acids (fish oil) 1,000 mg Take 1,000 mg by mouth daily     • sildenafil (VIAGRA) 50 MG tablet Take 1 tablet (50 mg total) by mouth daily as needed for erectile dysfunction Take 1 tablet 2 hours after meal and 1 hour before sexual intercourse  If this is not effective you can increase by half a tablet per encounter up to a maximum of 2 tablets or 100 mg  Do not take more than once in 24 hours and limit to 2-3 occurrences per week 30 tablet 12   • zinc gluconate 50 mg tablet Take 50 mg by mouth daily       No current facility-administered medications for this visit  Allergies   Allergen Reactions   • Latex Hives       Review of Systems    Video Exam    There were no vitals filed for this visit      Physical Exam     Visit Time    Visit Start Time: 1706  Visit Stop Time: 2574  Total Visit Duration: 39 minutes

## 2023-02-28 ENCOUNTER — TELEPHONE (OUTPATIENT)
Dept: PSYCHIATRY | Facility: CLINIC | Age: 58
End: 2023-02-28

## 2023-03-21 ENCOUNTER — TELEPHONE (OUTPATIENT)
Dept: OTHER | Facility: OTHER | Age: 58
End: 2023-03-21

## 2023-03-21 ENCOUNTER — OFFICE VISIT (OUTPATIENT)
Dept: URGENT CARE | Facility: CLINIC | Age: 58
End: 2023-03-21

## 2023-03-21 VITALS
HEART RATE: 62 BPM | BODY MASS INDEX: 20.58 KG/M2 | OXYGEN SATURATION: 96 % | TEMPERATURE: 98.5 F | DIASTOLIC BLOOD PRESSURE: 72 MMHG | RESPIRATION RATE: 18 BRPM | WEIGHT: 147 LBS | HEIGHT: 71 IN | SYSTOLIC BLOOD PRESSURE: 144 MMHG

## 2023-03-21 DIAGNOSIS — J01.00 ACUTE NON-RECURRENT MAXILLARY SINUSITIS: Primary | ICD-10-CM

## 2023-03-21 RX ORDER — AMOXICILLIN AND CLAVULANATE POTASSIUM 875; 125 MG/1; MG/1
1 TABLET, FILM COATED ORAL EVERY 12 HOURS SCHEDULED
Qty: 14 TABLET | Refills: 0 | Status: SHIPPED | OUTPATIENT
Start: 2023-03-21 | End: 2023-03-28

## 2023-03-21 NOTE — TELEPHONE ENCOUNTER
Placed call to patient, he is currently in the [facility]  Scheduled him for an appt this Thursday with Aleta Rowan to establish care, he is transferring from [facility]

## 2023-03-21 NOTE — TELEPHONE ENCOUNTER
Deniz Delarosa is calling to establish care as a new patient with Dr Yadira Muhammad  He would like to be seen at the soonest possible appointment due to some issues he is having with his sinuses

## 2023-03-21 NOTE — PATIENT INSTRUCTIONS
Take antibiotic as prescribed  Continue with supportive measures, OTC Tylenol/Ibuprofen, nasal decongestants, and cough suppressants   Cool mist humidifiers, increased fluid intake and rest   Follow up with Ophthalmology if continued eye symptoms following antibiotic completion   Follow up with PCP in 3-5 days  Present to ER if symptoms worsen    Sinusitis   AMBULATORY CARE:   Sinusitis  is inflammation or infection of your sinuses  Sinusitis is most often caused by a virus  Acute sinusitis may last up to 12 weeks  Chronic sinusitis lasts longer than 12 weeks  Recurrent sinusitis means you have 4 or more infections in 1 year  Common signs and symptoms:   Fever    Pain, pressure, redness, or swelling around the forehead, cheeks, or eyes    Thick yellow or green discharge from your nose    Tenderness when you touch your face over your sinuses    Dry cough that happens mostly at night or when you lie down    Headache and face pain that is worse when you lean forward    Tooth pain, or pain when you chew    Seek care immediately if:   You have trouble breathing or wheezing that is getting worse  You have a stiff neck, a fever, or a bad headache  You cannot open your eye  Your eyeball bulges out or you cannot move your eye  You are more sleepy than normal, or you notice changes in your ability to think, move, or talk  You have swelling of your forehead or scalp  Call your doctor if:   You have vision changes, such as double vision  Your eye and eyelid are red, swollen, and painful  Your symptoms do not improve or go away after 10 days  You have nausea and are vomiting  Your nose is bleeding  You have questions or concerns about your condition or care  Medicines: Your symptoms may go away on their own  Your healthcare provider may recommend watchful waiting for up to 10 days before starting antibiotics   You may need any of the following:  Acetaminophen  decreases pain and fever  It is available without a doctor's order  Ask how much to take and how often to take it  Follow directions  Read the labels of all other medicines you are using to see if they also contain acetaminophen, or ask your doctor or pharmacist  Acetaminophen can cause liver damage if not taken correctly  NSAIDs , such as ibuprofen, help decrease swelling, pain, and fever  This medicine is available with or without a doctor's order  NSAIDs can cause stomach bleeding or kidney problems in certain people  If you take blood thinner medicine, always ask your healthcare provider if NSAIDs are safe for you  Always read the medicine label and follow directions  Nasal steroid sprays  may help decrease inflammation in your nose and sinuses  Decongestants  help reduce swelling and drain mucus in the nose and sinuses  They may help you breathe easier  Antihistamines  help dry mucus in the nose and relieve sneezing  Antibiotics  help treat or prevent a bacterial infection  Self-care:   Rinse your sinuses as directed  Use a sinus rinse device to rinse your nasal passages with a saline (salt water) solution or distilled water  Do not use tap water  This will help thin the mucus in your nose and rinse away pollen and dirt  It will also help reduce swelling so you can breathe normally  Use a humidifier  to increase air moisture in your home  This may make it easier for you to breathe and help decrease your cough  Sleep with your head elevated  Place an extra pillow under your head before you go to sleep to help your sinuses drain  Drink liquids as directed  Ask your healthcare provider how much liquid to drink each day and which liquids are best for you  Liquids will thin the mucus in your nose and help it drain  Avoid drinks that contain alcohol or caffeine  Do not smoke, and avoid secondhand smoke  Nicotine and other chemicals in cigarettes and cigars can make your symptoms worse   Ask your healthcare provider for information if you currently smoke and need help to quit  E-cigarettes or smokeless tobacco still contain nicotine  Talk to your healthcare provider before you use these products  Prevent the spread of germs:   Wash your hands often with soap and water  Wash your hands after you use the bathroom, change a child's diaper, or sneeze  Wash your hands before you prepare or eat food  Stay away from people who are sick  Some germs spread easily and quickly through contact  Follow up with your doctor as directed: You may be referred to an ear, nose, and throat specialist  Write down your questions so you remember to ask them during your visits  © Copyright [employer] 2022 Information is for End User's use only and may not be sold, redistributed or otherwise used for commercial purposes  The above information is an  only  It is not intended as medical advice for individual conditions or treatments  Talk to your doctor, nurse or pharmacist before following any medical regimen to see if it is safe and effective for you

## 2023-03-21 NOTE — PROGRESS NOTES
[facility]        NAME: Agueda Castellanos is a 62 y o  male  : 1965    MRN: [id number]  DATE: 2023  TIME: 10:36 AM    Assessment and Plan   Acute non-recurrent maxillary sinusitis [J01 00]  1  Acute non-recurrent maxillary sinusitis  amoxicillin-clavulanate (AUGMENTIN) 875-125 mg per tablet        VA performed by nursing staff: Right Eye 20/20, Bilateral Eyes 20/25, Left Eye unable to be completed  Clinical findings correlate with sinusitis and given symptom duration x2 weeks, will treat with Augmentin  Encouraged continued supportive measures  Follow up with PCP in 3-5 days or proceed to emergency department for worsening symptoms  Follow up with Ophthalmology if continued visual disturbance following ABX completion  Patient verbalized understanding of instructions given  Patient Instructions        Patient Instructions     Take antibiotic as prescribed  Continue with supportive measures, OTC Tylenol/Ibuprofen, nasal decongestants, and cough suppressants   Cool mist humidifiers, increased fluid intake and rest   Follow up with Ophthalmology if continued eye symptoms following antibiotic completion   Follow up with PCP in 3-5 days  Present to ER if symptoms worsen    Sinusitis   AMBULATORY CARE:   Sinusitis  is inflammation or infection of your sinuses  Sinusitis is most often caused by a virus  Acute sinusitis may last up to 12 weeks  Chronic sinusitis lasts longer than 12 weeks  Recurrent sinusitis means you have 4 or more infections in 1 year          Common signs and symptoms:   • Fever    • Pain, pressure, redness, or swelling around the forehead, cheeks, or eyes    • Thick yellow or green discharge from your nose    • Tenderness when you touch your face over your sinuses    • Dry cough that happens mostly at night or when you lie down    • Headache and face pain that is worse when you lean forward    • Tooth pain, or pain when you chew    Seek care immediately if:   • You have trouble breathing or wheezing that is getting worse  • You have a stiff neck, a fever, or a bad headache  • You cannot open your eye  • Your eyeball bulges out or you cannot move your eye  • You are more sleepy than normal, or you notice changes in your ability to think, move, or talk  • You have swelling of your forehead or scalp  Call your doctor if:   • You have vision changes, such as double vision  • Your eye and eyelid are red, swollen, and painful  • Your symptoms do not improve or go away after 10 days  • You have nausea and are vomiting  • Your nose is bleeding  • You have questions or concerns about your condition or care  Medicines: Your symptoms may go away on their own  Your healthcare provider may recommend watchful waiting for up to 10 days before starting antibiotics  You may need any of the following:  • Acetaminophen  decreases pain and fever  It is available without a doctor's order  Ask how much to take and how often to take it  Follow directions  Read the labels of all other medicines you are using to see if they also contain acetaminophen, or ask your doctor or pharmacist  Acetaminophen can cause liver damage if not taken correctly  • NSAIDs , such as ibuprofen, help decrease swelling, pain, and fever  This medicine is available with or without a doctor's order  NSAIDs can cause stomach bleeding or kidney problems in certain people  If you take blood thinner medicine, always ask your healthcare provider if NSAIDs are safe for you  Always read the medicine label and follow directions  • Nasal steroid sprays  may help decrease inflammation in your nose and sinuses  • Decongestants  help reduce swelling and drain mucus in the nose and sinuses  They may help you breathe easier  • Antihistamines  help dry mucus in the nose and relieve sneezing  • Antibiotics  help treat or prevent a bacterial infection      Self-care:   • Rinse your sinuses as directed  Use a sinus rinse device to rinse your nasal passages with a saline (salt water) solution or distilled water  Do not use tap water  This will help thin the mucus in your nose and rinse away pollen and dirt  It will also help reduce swelling so you can breathe normally  • Use a humidifier  to increase air moisture in your home  This may make it easier for you to breathe and help decrease your cough  • Sleep with your head elevated  Place an extra pillow under your head before you go to sleep to help your sinuses drain  • Drink liquids as directed  Ask your healthcare provider how much liquid to drink each day and which liquids are best for you  Liquids will thin the mucus in your nose and help it drain  Avoid drinks that contain alcohol or caffeine  • Do not smoke, and avoid secondhand smoke  Nicotine and other chemicals in cigarettes and cigars can make your symptoms worse  Ask your healthcare provider for information if you currently smoke and need help to quit  E-cigarettes or smokeless tobacco still contain nicotine  Talk to your healthcare provider before you use these products  Prevent the spread of germs:   • Wash your hands often with soap and water  Wash your hands after you use the bathroom, change a child's diaper, or sneeze  Wash your hands before you prepare or eat food  • Stay away from people who are sick  Some germs spread easily and quickly through contact  Follow up with your doctor as directed: You may be referred to an ear, nose, and throat specialist  Write down your questions so you remember to ask them during your visits  © [employer] 2022 Information is for End User's use only and may not be sold, redistributed or otherwise used for commercial purposes  The above information is an  only  It is not intended as medical advice for individual conditions or treatments   Talk to your doctor, nurse or pharmacist before following any medical regimen to see if it is safe and effective for you  Chief Complaint     Chief Complaint   Patient presents with   • Cold Like Symptoms     Sinus congestion, left earache, and left eye blurred starting 2 weeks ago; hx toxoplasmosis          History of Present Illness       49-year-old male with a past medical history significant for hypertension, bipolar disorder, and 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 presents with complaints of ongoing nasal congestion, sinus pressure, postnasal drip, and cough and now left-sided earache and blurry vision x2 weeks  States that vision changes usually occur with any sinusitis and will improve with appropriate treatment  He states it is like "looking through wax paper "  States positive sick contact/exposure as 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  He has been taking OTC Mucinex for his symptoms  Denies fever, chills, vomiting, or diarrhea  Review of Systems   Review of Systems   Constitutional: Negative for chills and fever  HENT: Positive for congestion, ear pain, postnasal drip, rhinorrhea, sinus pressure and sinus pain  Negative for ear discharge, sore throat, trouble swallowing and voice change  Eyes: Positive for visual disturbance  Respiratory: Positive for cough  Negative for shortness of breath  Cardiovascular: Negative for chest pain  Gastrointestinal: Negative for abdominal pain, diarrhea, nausea and vomiting  Musculoskeletal: Negative for myalgias  Skin: Negative for rash           Current Medications       Current Outpatient Medications:   •  amoxicillin-clavulanate (AUGMENTIN) 875-125 mg per tablet, Take 1 tablet by mouth every 12 (twelve) hours for 7 days, Disp: 14 tablet, Rfl: 0  •  aspirin 81 mg chewable tablet, Chew 1 tablet (81 mg total) daily, Disp: 30 tablet, Rfl: 0  •  B Complex-C (B-complex with vitamin C) tablet, Take 1 tablet by mouth daily, Disp: , Rfl:   • buPROPion (Wellbutrin XL) 150 mg 24 hr tablet, Take 2 tablets (300 mg total) by mouth daily, Disp: 60 tablet, Rfl: 1  •  Glucosamine-Chondroitin (GLUCOSAMINE CHONDR COMPLEX PO), Take by mouth, Disp: , Rfl:   •  lisinopril (ZESTRIL) 10 mg tablet, Take 1 tablet (10 mg total) by mouth daily, Disp: 90 tablet, Rfl: 0  •  Magnesium 100 MG CAPS, Take by mouth, Disp: , Rfl:   •  Omega-3 Fatty Acids (fish oil) 1,000 mg, Take 1,000 mg by mouth daily, Disp: , Rfl:   •  sildenafil (VIAGRA) 50 MG tablet, Take 1 tablet (50 mg total) by mouth daily as needed for erectile dysfunction Take 1 tablet 2 hours after meal and 1 hour before sexual intercourse  If this is not effective you can increase by half a tablet per encounter up to a maximum of 2 tablets or 100 mg   Do not take more than once in 24 hours and limit to 2-3 occurrences per week, Disp: 30 tablet, Rfl: 12  •  zinc gluconate 50 mg tablet, Take 50 mg by mouth daily, Disp: , Rfl:   •  Multiple Vitamin (multivitamin) tablet, Take 1 tablet by mouth daily (Patient not taking: Reported on 3/21/2023), Disp: 30 tablet, Rfl: 0  •  niacin 100 mg tablet, Take 100 mg by mouth daily with breakfast (Patient not taking: Reported on 3/21/2023), Disp: , Rfl:   •  OLANZapine (ZyPREXA) 10 mg tablet, Take 1 tablet (10 mg total) by mouth daily at bedtime (Patient not taking: Reported on 3/21/2023), Disp: 90 tablet, Rfl: 0  •  OLANZapine (ZyPREXA) 2 5 mg tablet, Take 1 tablet (2 5 mg total) by mouth daily at bedtime (Patient not taking: Reported on 3/21/2023), Disp: 90 tablet, Rfl: 0    Current Allergies     Allergies as of 03/21/2023 - Reviewed 03/21/2023   Allergen Reaction Noted   • Latex Hives 08/05/2022            The following portions of the patient's history were reviewed and updated as appropriate: allergies, current medications, past family history, past medical history, past social history, past surgical history and problem list      Past Medical History:   Diagnosis Date   • DJD (degenerative joint disease)    • Dyslipidemia    • History of hepatitis C    • Hypertension    • 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[address] )    • Toxoplasmosis chorioretinitis of left eye        Past Surgical History:   Procedure Laterality Date   • CARPAL TUNNEL RELEASE Left    • CATARACT EXTRACTION Left    • COLONOSCOPY     • HERNIA REPAIR     • PLANTAR FASCIA SURGERY Left        Family History   Problem Relation Age of Onset   • Lung cancer Mother    • Kidney disease Mother         drug induced   • COPD Mother    • Dementia Father          Medications have been verified  Objective   /72   Pulse 62   Temp 98 5 °F (36 9 °C)   Resp 18   Ht 5' 11" (1 803 m)   Wt 66 7 kg (147 lb)   SpO2 96%   BMI 20 50 kg/m²   No LMP for male patient  Physical Exam     Physical Exam  Vitals and nursing note reviewed  Constitutional:       General: He is not in acute distress  Appearance: He is not toxic-appearing  HENT:      Head: Normocephalic  Right Ear: Tympanic membrane, ear canal and external ear normal       Left Ear: Tympanic membrane, ear canal and external ear normal       Nose: Congestion present  Right Sinus: No maxillary sinus tenderness or frontal sinus tenderness  Left Sinus: Maxillary sinus tenderness present  No frontal sinus tenderness  Mouth/Throat:      Mouth: Mucous membranes are moist       Pharynx: Posterior oropharyngeal erythema present  Comments: Erythematous pharynx with mucoid drainage  Eyes:      Conjunctiva/sclera: Conjunctivae normal       Pupils: Pupils are equal, round, and reactive to light  Cardiovascular:      Rate and Rhythm: Normal rate and regular rhythm  Heart sounds: Normal heart sounds  Pulmonary:      Effort: Pulmonary effort is normal  No respiratory distress  Breath sounds: Normal breath sounds  No stridor  No wheezing, rhonchi or rales  Lymphadenopathy:      Cervical: No cervical adenopathy  Skin:     General: Skin is warm and dry  Neurological:      Mental Status: He is alert and oriented to person, place, and time  Gait: Gait is intact     Psychiatric:         Mood and Affect: Mood normal          Behavior: Behavior normal

## 2023-03-23 ENCOUNTER — OFFICE VISIT (OUTPATIENT)
Dept: FAMILY MEDICINE CLINIC | Facility: CLINIC | Age: 58
End: 2023-03-23

## 2023-03-23 VITALS
TEMPERATURE: 97.6 F | HEIGHT: 71 IN | HEART RATE: 63 BPM | BODY MASS INDEX: 20.66 KG/M2 | SYSTOLIC BLOOD PRESSURE: 148 MMHG | OXYGEN SATURATION: 99 % | DIASTOLIC BLOOD PRESSURE: 76 MMHG | WEIGHT: 147.6 LBS

## 2023-03-23 DIAGNOSIS — G89.29 CHRONIC RIGHT SHOULDER PAIN: Primary | ICD-10-CM

## 2023-03-23 DIAGNOSIS — I10 BENIGN ESSENTIAL HTN: ICD-10-CM

## 2023-03-23 DIAGNOSIS — M25.511 CHRONIC RIGHT SHOULDER PAIN: Primary | ICD-10-CM

## 2023-03-23 DIAGNOSIS — Z12.5 PROSTATE CANCER SCREENING: ICD-10-CM

## 2023-03-23 DIAGNOSIS — L84 CORN OF FOOT: ICD-10-CM

## 2023-03-23 DIAGNOSIS — F32.89 OTHER DEPRESSION: ICD-10-CM

## 2023-03-23 PROBLEM — E43 SEVERE PROTEIN-CALORIE MALNUTRITION (HCC): Status: RESOLVED | Noted: 2022-08-29 | Resolved: 2023-03-23

## 2023-03-23 PROBLEM — N52.9 ERECTILE DYSFUNCTION: Status: RESOLVED | Noted: 2022-11-01 | Resolved: 2023-03-23

## 2023-03-23 RX ORDER — FOLIC ACID 0.8 MG
TABLET ORAL
Qty: 90 CAPSULE | Refills: 0
Start: 2023-03-23

## 2023-03-23 RX ORDER — BUPROPION HYDROCHLORIDE 150 MG/1
TABLET ORAL
Qty: 60 TABLET | Refills: 1 | Status: SHIPPED | OUTPATIENT
Start: 2023-03-23

## 2023-03-23 NOTE — PROGRESS NOTES
Assessment/Plan:       1  Chronic right shoulder pain  Comments:  ref to PT  Orders:  -     Ambulatory Referral to Physical Therapy; Future    2  Corn of foot  Comments:  ref to podiatry  Orders:  -     Ambulatory Referral to Podiatry; Future    3  Prostate cancer screening  -     PSA, Total Screen; Future    4  Benign essential HTN  Comments:  continue lisinopril  Orders:  -     CBC and differential; Future  -     Comprehensive metabolic panel; Future  -     Lipid panel; Future  -     Magnesium 500 MG CAPS; 1 po qd    5  Other depression  Comments:  wean off wellbutrin    Orders:  -     buPROPion (Wellbutrin XL) 150 mg 24 hr tablet; 1 po qd          Subjective:      Patient ID: Rolo Warren is a 62 y o  male  Eldalazara Lanier is here for his initial visit  He is a healthy and pleasant 26-year-old man  He lives in [place]  He works in maintenance  He has a history of hypertension well controlled on 10 mg of lisinopril  He also has a history of depression and/or bipolar disorder working to wean him off Wellbutrin  He initially started after smoking cessation and he has successfully quit smoking as well as alcohol  He has not drank in many years  He has an issue with a history of a right shoulder injury and/or rotator cuff problem  It is holding him back from current exercise weight lifting and building mass  Working to refer to physical therapy for that  He also has corns on the soles of both feet unguinal refer him to a podiatrist to address that  He recently was started on Augmentin for a sinus infection  The following portions of the patient's history were reviewed and updated as appropriate: allergies, current medications, past family history, past medical history, past social history, past surgical history, and problem list     Review of Systems   Respiratory: Negative  Cardiovascular: Negative  Gastrointestinal: Negative  All other systems reviewed and are negative  Objective:      /76 (BP Location: Left arm, Patient Position: Sitting, Cuff Size: Adult)   Pulse 63   Temp 97 6 °F (36 4 °C)   Ht 5' 11" (1 803 m)   Wt 67 kg (147 lb 9 6 oz)   SpO2 99%   BMI 20 59 kg/m²          Physical Exam  Vitals and nursing note reviewed  Constitutional:       Appearance: Normal appearance  HENT:      Head: Normocephalic and atraumatic  Nose: Nose normal       Mouth/Throat:      Mouth: Mucous membranes are moist    Eyes:      Extraocular Movements: Extraocular movements intact  Pupils: Pupils are equal, round, and reactive to light  Cardiovascular:      Rate and Rhythm: Normal rate and regular rhythm  Pulses: Normal pulses  Pulmonary:      Effort: Pulmonary effort is normal       Breath sounds: Normal breath sounds  Abdominal:      General: Bowel sounds are normal       Palpations: Abdomen is soft  Musculoskeletal:      Cervical back: Normal range of motion  Skin:     General: Skin is warm and dry  Capillary Refill: Capillary refill takes less than 2 seconds  Neurological:      General: No focal deficit present  Mental Status: He is alert     Psychiatric:         Mood and Affect: Mood normal

## 2023-03-29 ENCOUNTER — EVALUATION (OUTPATIENT)
Dept: PHYSICAL THERAPY | Facility: CLINIC | Age: 58
End: 2023-03-29

## 2023-03-29 VITALS — SYSTOLIC BLOOD PRESSURE: 130 MMHG | OXYGEN SATURATION: 98 % | HEART RATE: 68 BPM | DIASTOLIC BLOOD PRESSURE: 82 MMHG

## 2023-03-29 DIAGNOSIS — G89.29 CHRONIC RIGHT SHOULDER PAIN: ICD-10-CM

## 2023-03-29 DIAGNOSIS — M25.511 CHRONIC RIGHT SHOULDER PAIN: ICD-10-CM

## 2023-03-29 NOTE — PROGRESS NOTES
PT Evaluation     Today's date: 3/29/2023  Patient name: Rean Srivastava  : 1965  MRN: [id number]  Referring provider: Ave Mosqueda MD  Dx:   Encounter Diagnosis     ICD-10-CM    1  Chronic right shoulder pain  M25 511 Ambulatory Referral to Physical Therapy    G89 29     ref to PT                     Assessment  Assessment details: 63 yo male referred to PT for care of R shoulder with dx of chronic pain presenting with + Yergsons test and pain LHB, negative clunk, + O Paresh's test,  limited FIR ROM and adduction of R shoulder and pain w/ eccentric lowering at mid arc of elevation with scapular malpositioning, decreased strength of  R shoulder and elbow flexion, atrophy of bicep, and hx of trauma  Patient states he does not wish to pursue further dx testing or surgery  Patient denies any instability of R shoulder, clunk test negative and negative relocation test  R shoulder  Patient evaluation, pt education,  treatment plan  explanation as well as treatment initiation completed today  Patient was educated in self managements of cryotherapy and activity managements to avoid pain with function  Patient is good candidate for conservative approach to impairments and goal achievements with skilled PT  A shoulder musculoskeletal pathology which does not respond to conservative care, past hx of injury, chronic pain  may impact ability to fully achieve patient goals which include heavy weight lifting chest press and overhead  Patient was educated in this as well  Impairments: abnormal or restricted ROM, impaired physical strength, lacks appropriate home exercise program, pain with function and scapular dyskinesis    Symptom irritability: moderateUnderstanding of Dx/Px/POC: good   Prognosis: good    Goals  STG  2-4 weeks  1  Establish independence with HEP for R shoulder strength, self managements  2   R shoulder abduction, ER , bicep and  scapular stabilizer muscle strength to increase by  1/4 to 1/2 MMT grade  3  Pt to develop pain control and strength to allow return to  [facility] to T12 with no to minimal pain to improve function behind back  4  Patient to develop  Improved scapular stabilizer strength to allow reach overhead and return to side  with 3-5 # with no to minimal pain R shoulder to improve overhead function  LTG   4-6 weeks  1  R shoulder and scapular stabilizer muscle strength 4+/5 to 5/5 to allow for painfree overhead reach 10#  R shoulder  2  R shoulder and scapular stabilizer strength  4+/5 to 5/5 for return to ADLs, homekeeping with no to little pain R shoulder    Plan  Patient would benefit from: skilled physical therapy  Planned modality interventions: cryotherapy  Planned therapy interventions: therapeutic exercise, graded exercise, strengthening, patient education, neuromuscular re-education, manual therapy, activity modification and home exercise program  Frequency: 2x week  Duration in weeks: 6  Plan of Care beginning date: 3/29/2023  Plan of Care expiration date: 5/10/2023  Treatment plan discussed with: patient        Subjective Evaluation    History of Present Illness  Mechanism of injury: trauma  Mechanism of injury: Patient reports chronic pain in R shoulder which he does relate to injury sustained when ladder he was on was moved while installing score board and he caught himself from falling with RUE described as abducted and externally rotated  He notes he did seek care for injury and had PT which improved  He notes he  Has continued with pain in R shoulder and decreased strength which has persisted and recently worsened  He has self treated with CP, HP, OTC meds and self exercise  He notes pain in R shoulder is present daily and is worse with  Lifting, carrying, reaching overhead, behind back and across body  He notes typically pushing through pain but recently  has modified approach    Patient notes working full time as  of  and is able to complete work but with pain reported  Reports sees psychologist for depression/bipolar  Recurrent probem    Quality of life: good    Pain  Current pain ratin  At best pain rating: 3  At worst pain ratin  Location: superior anterior shoulder and bicep  Quality: grinding, dull ache, discomfort, sharp, radiating and burning  Relieving factors: rest, ice, heat and medications  Aggravating factors: lifting and overhead activity  Progression: worsening    Social Support  Steps to enter house: yes  Stairs in house: no   Lives in: apartment  Lives with: alone    Employment status: working  Hand dominance: right  Exercise history: exercises daily and reports healthy lifestyle  Life stress: alot reported and has professional help    Treatments  Previous treatment: physical therapy  Current treatment: medication  Patient Goals  Patient goals for therapy: decreased pain and increased strength  Patient goal: to return to weight lifting without pain, to return to functional reach, lift , push, pull  and behind back without difficulty        Objective     Postural Observations  Seated posture: good  Standing posture: good        Observations     Right Shoulder  Positive for atrophy  Additional Observation Details  Moderate through biceps, hypertonic R UT, medial scapular winging, no swelling in R shoulder, bony hypertrophy of B AC joints    Palpation     Right   No palpable tenderness to the biceps and upper trapezius  Hypertonic in the pectoralis major, pectoralis minor and upper trapezius  Hypotonic in the biceps  Tenderness of the supraspinatus  Trigger point to pectoralis minor  Tenderness     Right Shoulder  Tenderness in the acromion, biceps tendon (proximal) and supraspinatus tendon       Additional Tenderness Details  Tender about posterior  And anterior acromion, supraspinatous insertion, LHB    Cervical/Thoracic Screen   Cervical range of motion within normal limits  Thoracic range of motion within normal limits    Neurological Testing     Sensation     Shoulder   Left Shoulder   Intact: light touch    Right Shoulder   Intact: light touch    Active Range of Motion   Left Shoulder   Normal active range of motion  Internal rotation BTB: T4     Right Shoulder   Flexion: 170 degrees   Extension: 30 degrees with pain  Abduction: 170 (pain with endrange  and eccentric lowering 90-45) degrees with pain  Adduction: 10 degrees with pain  External rotation 0°: 70 degrees with pain  External rotation 90°: 73 degreeswith pain  External rotation BTH: T1 with pain  Internal rotation 90°: 80 degrees   Internal rotation BTB: L3 with pain    Additional Active Range of Motion Details  Fair scapular control with eccentric lowering from elevated posture with medial scapular winging and elevation  Distal UE strength at forearm, wrist and hand WNLS painfree RUE    Passive Range of Motion   Left Shoulder   Normal passive range of motion    Right Shoulder   Normal passive range of motion    Joint Play     Right Shoulder  Joints within functional limits are the anterior capsule, posterior capsule, inferior capsule, SC joint, cervical spine and thoracic spine  Hypermobile in the [facility] joint  Strength/Myotome Testing     Left Shoulder     Planes of Motion   External rotation at 0°: 4   External rotation at 90°: 4     Isolated Muscles   Biceps: 4+     Right Shoulder     Planes of Motion   Flexion: 4   Extension: 4+   Abduction: 4   Adduction: 4   External rotation at 0°: 4   External rotation at 90°: 4-   Internal rotation at 0°: 4+   Internal rotation at 90°: 4+     Isolated Muscles   Biceps: 4   Subscapularis: 4+   Supraspinatus: 4   Triceps: 5   Upper trapezius: 5     Tests     Right Shoulder   Positive painful arc and Yergason's  Negative drop arm, external rotation lag sign, full can, laxity (step off), passive horizontal adduction and Devyn's sign                Precautions:  Hx of bipolar,  PMH of shoulder injury and chronic pain "  Daily Treatment Diary:      Initial Evaluation Date: 03/29/23  Compliance 3/29                     Visit Number 1                    Re-Eval  IE                 [address]   Foto Captured Y                           3/29                     Manual                      TPR/STM to  LHB 2m                                                                 Ther-Ex                      Scapular retr B 10x10\"                     Wall push up plus 10x                     AROM shldr flex, scapt, ER at 90 deg cues posture 5x                     RTC samantha                      Bicep curl                                                                                                                                    Neuro Re-Ed                                                                                                Ther-Act                                                               Modalities                      CP R shoulder -                                                             "

## 2023-03-31 ENCOUNTER — OFFICE VISIT (OUTPATIENT)
Dept: PHYSICAL THERAPY | Facility: CLINIC | Age: 58
End: 2023-03-31

## 2023-03-31 DIAGNOSIS — G89.29 CHRONIC RIGHT SHOULDER PAIN: Primary | ICD-10-CM

## 2023-03-31 DIAGNOSIS — M25.511 CHRONIC RIGHT SHOULDER PAIN: Primary | ICD-10-CM

## 2023-03-31 NOTE — PROGRESS NOTES
"Daily Note     Today's date: 3/31/2023  Patient name: Ludy Carlson  : 1965  MRN: [id number]  Referring provider: Jessi Manning MD  Dx:   Encounter Diagnosis     ICD-10-CM    1  Chronic right shoulder pain  M25 511     G89 29                      Subjective: \" Shoulder is sore, achy this am  I have been using CP  To help with pain\"      Objective: See treatment diary below      Assessment: Tolerated treatment well including progression to RTC isometrics, pulleys, towel Assisted behind back stretch and HEP education today  Pt treatment ended with CP to R shoulder x 10 m  He noted muscle soreness following care but no increase in pain  Patient exhibited good technique with therapeutic exercises and would benefit from continued PT      Plan: Continue per plan of care  Precautions:  Hx of bipolar,  PMH of shoulder injury and chronic pain     Daily Treatment Diary:      Initial Evaluation Date: 23  Compliance 3/29  3/31                   Visit Number 1 2                   Re-Eval  IE -                MC   Foto Captured Y -                          3/29  3/31                   Manual                      TPR/STM to  LHB 2m  -                   PROM R shoulder flex, scap, IR at 90 deg   abd   10m                                         Ther-Ex                      Scapular retr B 10x10\"  10x10\"                   Wall push up plus 10x  -                   AROM shldr flex, scapt, ER at 90 deg cues posture 5x  5x                   RTC samantha   10x5\"                   Bicep curl   -                   Pulleys flex/ scapt   10x 3-5\"ea                   UBE  Alt 1F/1B   4m                                                               HEP  Pt education    PC                   Neuro Re-Ed                                                                                                Ther-Act                                                               Modalities                      CP R shoulder - 10m        " Access Code: [email or web address]  URL: [email or web address]  Date: 03/31/2023  Prepared by: Cheng Shields    Exercises  - Seated Scapular Retraction  - 2 x daily - 7 x weekly - 2 sets - 10 reps - 3 hold  - Standing Shoulder Internal Rotation Stretch Behind Back  - 2 x daily - 7 x weekly - 1 sets - 5 reps - 5-10 hold  - Isometric Shoulder Flexion at Wall  - 1 x daily - 7 x weekly - 1 sets - 10 reps - 5 hold  - Isometric Shoulder External Rotation at Wall  - 1 x daily - 7 x weekly - 1 sets - 10 reps - 5 hold  - Isometric Shoulder Abduction at Wall  - 1 x daily - 7 x weekly - 1 sets - 10 reps - 5 hold  - Standing Isometric Shoulder Internal Rotation at Doorway  - 1 x daily - 7 x weekly - 1 sets - 10 reps - 5 hold

## 2023-04-05 ENCOUNTER — OFFICE VISIT (OUTPATIENT)
Dept: PHYSICAL THERAPY | Facility: CLINIC | Age: 58
End: 2023-04-05

## 2023-04-05 ENCOUNTER — OFFICE VISIT (OUTPATIENT)
Dept: PODIATRY | Facility: CLINIC | Age: 58
End: 2023-04-05

## 2023-04-05 VITALS
SYSTOLIC BLOOD PRESSURE: 126 MMHG | BODY MASS INDEX: 21.28 KG/M2 | HEART RATE: 55 BPM | HEIGHT: 71 IN | DIASTOLIC BLOOD PRESSURE: 74 MMHG | WEIGHT: 152 LBS

## 2023-04-05 DIAGNOSIS — M79.671 BILATERAL FOOT PAIN: Primary | ICD-10-CM

## 2023-04-05 DIAGNOSIS — M25.511 CHRONIC RIGHT SHOULDER PAIN: Primary | ICD-10-CM

## 2023-04-05 DIAGNOSIS — M79.672 BILATERAL FOOT PAIN: Primary | ICD-10-CM

## 2023-04-05 DIAGNOSIS — G89.29 CHRONIC RIGHT SHOULDER PAIN: Primary | ICD-10-CM

## 2023-04-05 DIAGNOSIS — L84 CORN OF FOOT: ICD-10-CM

## 2023-04-05 NOTE — PROGRESS NOTES
"Daily Note     Today's date: 2023  Patient name: Elayne Winston  : 1965  MRN: [id number]  Referring provider: Laquita Mirza MD  Dx:   Encounter Diagnosis     ICD-10-CM    1  Chronic right shoulder pain  M25 511     G89 29                      Subjective: \" My shoulder is less painful and I have been using cold pack a few times per day which helps\"      Objective: See treatment diary below      Assessment: Tolerated treatment well  including progression with strength exercises for R RTC  Patient noted no pain sx with exercises  He did require cueing initially for proper techqniue and form  Patient demonstrated fatigue post treatment, exhibited good technique with therapeutic exercises and would benefit from continued PT  Discharge planning initiated today  Plan: Continue per plan of care  Precautions:  Hx of bipolar,  PMH of shoulder injury and chronic pain     Daily Treatment Diary:      Initial Evaluation Date: 23  Compliance 3/29  3/31  4/5                 Visit Number 1 2  3                 Re-Eval  IE -  -              MC   Foto Captured Y -  -                        3/29  3/31  4/5                 Manual                      TPR/STM to  LHB 2m  -                   PROM R shoulder flex, scap, IR at 90 deg   abd   10m  10m                                       Ther-Ex                      Scapular retr B 10x10\"  10x10\"                   Wall push up plus 10x  -                   AROM shldr flex, scapt, ER at 90 deg cues posture 5x  5x  2x10 2#                 RTC samantha   10x5\"  -                 Bicep curl   -                   Pulleys flex/ scapt   10x 3-5\"ea  -                 UBE  Alt 1F/1B   4m  -                 tband IR/ER, Ext,   forwd press    15x green                                       HEP  Pt education    PC  PC                 Neuro Re-Ed                      scap retr    green TB 20x3\"                                                                     Ther-Act       " Modalities                      CP R shoulder - 10m 10m                                                  Access Code: [email or web address]  URL: [email or web address]  Date: 04/05/2023  Prepared by: Stephen King    Exercises  - Seated Scapular Retraction  - 2 x daily - 7 x weekly - 2 sets - 10 reps - 3 hold  - Standing Shoulder Internal Rotation Stretch Behind Back  - 2 x daily - 7 x weekly - 1 sets - 5 reps - 5-10 hold  - Isometric Shoulder Flexion at Wall  - 1 x daily - 7 x weekly - 1 sets - 10 reps - 5 hold  - Isometric Shoulder External Rotation at Wall  - 1 x daily - 7 x weekly - 1 sets - 10 reps - 5 hold  - Isometric Shoulder Abduction at Wall  - 1 x daily - 7 x weekly - 1 sets - 10 reps - 5 hold  - Standing Isometric Shoulder Internal Rotation at Doorway  - 1 x daily - 7 x weekly - 1 sets - 10 reps - 5 hold  - Shoulder External Rotation with Anchored Resistance  - 1 x daily - 7 x weekly - 1-3 sets - 15 reps  - Shoulder Internal Rotation with Resistance  - 1 x daily - 7 x weekly - 1-3 sets - 15 reps  - Shoulder Extension with Resistance  - 1 x daily - 5 x weekly - 1-3 sets - 15 reps  - Sidelying Shoulder External Rotation  - 1 x daily - 5 x weekly - 1-3 sets - 10 reps  - Scapular Retraction with Resistance  - 1 x daily - 5 x weekly - 3 sets - 10 reps  - Wall Push Up  - 1 x daily - 5 x weekly - 3 sets - 10 reps  - Scaption with Dumbbells  - 1 x daily - 5 x weekly - 3 sets - 10 reps

## 2023-04-05 NOTE — PROGRESS NOTES
Assessment/Plan:    No problem-specific Assessment & Plan notes found for this encounter  Diagnoses and all orders for this visit:    Bilateral foot pain    Corn of foot  Comments:  ref to podiatry  Orders:  -     Ambulatory Referral to Podiatry      Plan:    -  Patient was counseled and educated on the condition and the diagnosis  The diagnosis, treatment options and prognosis were discussed in detail    -Discussed bilateral foot pain likely due to abnormal biomechanics and as a result hyperkeratotic painful lesions  Hyperkeratotic lesions were sharply trimmed to normal epithelium with a 15 blade without incident  Discussed off-loading devices to decrease the growth of the calluses such as callus pads and foot ware modification  Patient was instructed to use a pumice stone at home to reduce the growth of the callus as well as OTC lotion with urea and massaging or to their feet  - Return as needed or in 2-3 months     Lab Results   Component Value Date    HGBA1C 5 5 05/24/2022         Subjective:      Patient ID: Yael Hernandez is a 62 y o  male  68-year-old male with past medical history as below presents for an evaluation of bilateral foot pain secondary to hyperkeratotic lesions bilateral submet head 5  Patient reports he started noticing foot pain and felt like there was something in his left foot since last October  Patient has been wearing different type of shoes including flip-flops barefoot sneakers and work boots  He has been using over-the-counter callus pads which helped  Not recall any trauma or foreign body in his foot  No other complaints at this time  The following portions of the patient's history were reviewed and updated as appropriate:   He  has a past medical history of DJD (degenerative joint disease), Dyslipidemia, History of hepatitis C, Hypertension, 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[facility] [address] ), and Toxoplasmosis chorioretinitis of left eye    He   Patient Active Problem List    Diagnosis "Date Noted   • Elevated testosterone level 11/21/2022   • Varicocele 11/01/2022   • Symptomatic varicose veins, bilateral 09/09/2022   • Bipolar disorder, curr episode manic w/o psychotic features, moderate ([facility] ) 08/27/2022   • H/O chronic hepatitis 08/27/2022   • Elevated liver enzymes 02/28/2022   • Primary hypertension 02/28/2022   • Severe alcohol dependence in sustained remission ([facility] ) 08/28/2015     He  has a past surgical history that includes Cataract extraction (Left); Hernia repair; Carpal tunnel release (Left); Plantar fascia surgery (Left); and Colonoscopy       Review of Systems   All other systems reviewed and are negative  Objective:      /74   Pulse 55   Ht 5' 11\" (1 803 m)   Wt 68 9 kg (152 lb)   BMI 21 20 kg/m²          Physical Exam  Vitals reviewed  Cardiovascular:      Rate and Rhythm: Normal rate  Pulses: Normal pulses  Dorsalis pedis pulses are 2+ on the right side and 2+ on the left side  Posterior tibial pulses are 2+ on the right side and 2+ on the left side  Pulmonary:      Effort: Pulmonary effort is normal    Musculoskeletal:         General: Tenderness present  Feet:    Feet:      Right foot:      Protective Sensation: 10 sites tested  10 sites sensed  Skin integrity: Callus present  Left foot:      Protective Sensation: 10 sites tested  10 sites sensed  Skin integrity: Callus present  Comments: Lateral submet head 5 hyperkeratotic lesion with central core noted  Discomfort with palpation noted  Foot and ankle range of motion within normal limits without any discomfort otherwise  Skin:     General: Skin is warm  Neurological:      General: No focal deficit present  Mental Status: He is alert     Psychiatric:         Mood and Affect: Mood normal          "

## 2023-04-12 ENCOUNTER — APPOINTMENT (OUTPATIENT)
Dept: PHYSICAL THERAPY | Facility: CLINIC | Age: 58
End: 2023-04-12

## 2023-04-26 ENCOUNTER — OFFICE VISIT (OUTPATIENT)
Dept: PHYSICAL THERAPY | Facility: CLINIC | Age: 58
End: 2023-04-26

## 2023-04-26 DIAGNOSIS — M25.511 CHRONIC RIGHT SHOULDER PAIN: Primary | ICD-10-CM

## 2023-04-26 DIAGNOSIS — G89.29 CHRONIC RIGHT SHOULDER PAIN: Primary | ICD-10-CM

## 2023-04-26 NOTE — PROGRESS NOTES
"Daily Note     Today's date: 2023  Patient name: Елена Gupta  : 1965  MRN: [id number]  Referring provider: Chapin uRbin MD  Dx:   Encounter Diagnosis     ICD-10-CM    1  Chronic right shoulder pain  M25 511     G89 29                      Subjective: \"My shoulder feels better but is still painful and irritated\" \" I did rest it over the weekend\"      Objective: See treatment diary below      Assessment: Tolerated treatment  Improved as compared to last tx with improved ability to complete exercises but tx did have to be modified to avoid aggravation of pain sx  He has full passive ROM Of R shoulder into all planes of motion  He notes pain with active flexion, ER, abduction and cross body adduction rated 3-5/10 described as sharp located in anterior R shoulder  Patient required cues to avoid motions aggravating his sx consistently through out treatment  Care ended with CP to reight shoulder  Patient was instructed to contact his physician if sx irritability does remain high as he notes some difficulty with completion of work duties involving repetitive lifting and he verbalized agreement  HEP and self mangements were reviewed with pt  Plan: Continue per plan of care  Precautions:  Hx of bipolar,  PMH of shoulder injury and chronic pain     Daily Treatment Diary:      Initial Evaluation Date: 23  Compliance 3/29  3/31  4/5  4/14 4/19  4/21  4/26         Visit Number 1 2  3  4  5  6  7         Re-Eval  IE -  -  -  --          MC   Foto Captured Y -  -  Y     -                3/29  3/31  4/5  4/14 4/19  4/21  4/26         Manual                      TPR/STM to  LHB 2m  -                   PROM R shoulder flex, scap, IR at 90 deg   abd   10m  10m  10m -  20m  10m         Post capsule stretch supine       3m -  3m  3m         Ther-Ex                      Scapular retr B 10x10\"  10x10\"    prone scap retr2#, GH ext 2#, scapt   10x ea, ER S/L 15x A  prone scsp3# 2x10 only today  -  S/L " "ER, prone A GH ext, scap  Retr only 2 x 10         Wall push up plus 10x  -    15x  15x  -  -         AROM shldr flex, scapt, ER at 90 deg cues posture 5x  5x  2x10 2# -  1# 2x10 ea  -  -         RTC samantha   10x5\"  -      limited praneeth to all submax levels  5x ea  -         Bicep curl   -        red tband 2x10, GH ext  2x10 RTB  red tband 2x10, GH ext 2 x 10, RTB         Pulleys flex/ scapt   10x 3-5\"ea  -  - 10x 3-5\" ea  15x ea, 3-5\"  2mflex, 2m scapt         UBE  Alt 1F/1B   4m  -  5m  5m  -  5m         tband IR/ER, Ext,   forwd press    15x green  15x  green  15x red  -  red 2 x 10                               HEP  Pt education    PC  PC  review  fwd press tband  activity mods -rest, inc ice use, sx or signs to call PCP  review         Neuro Re-Ed                      scap retr    green TB 20x3\"  green TB 20x3\"   GTB  20x3\"  -  Organge TB 3\"x10                                                             Ther-Act                       PC educ activity mods, self manage                                        Modalities                      CP R shoulder - 10m 10m 10m 10m 10m 10m                                              Access Code: [email or web address]  URL: [email or web address]  Date: 04/05/2023  Prepared by: Farida Cote    Exercises  - Seated Scapular Retraction  - 2 x daily - 7 x weekly - 2 sets - 10 reps - 3 hold  - Standing Shoulder Internal Rotation Stretch Behind Back  - 2 x daily - 7 x weekly - 1 sets - 5 reps - 5-10 hold  - Isometric Shoulder Flexion at Wall  - 1 x daily - 7 x weekly - 1 sets - 10 reps - 5 hold  - Isometric Shoulder External Rotation at Wall  - 1 x daily - 7 x weekly - 1 sets - 10 reps - 5 hold  - Isometric Shoulder Abduction at Wall  - 1 x daily - 7 x weekly - 1 sets - 10 reps - 5 hold  - Standing Isometric Shoulder Internal Rotation at Doorway  - 1 x daily - 7 x weekly - 1 sets - 10 reps - 5 hold  - Shoulder External Rotation with Anchored Resistance  - 1 x daily - 7 x weekly - 1-3 " sets - 15 reps  - Shoulder Internal Rotation with Resistance  - 1 x daily - 7 x weekly - 1-3 sets - 15 reps  - Shoulder Extension with Resistance  - 1 x daily - 5 x weekly - 1-3 sets - 15 reps  - Sidelying Shoulder External Rotation  - 1 x daily - 5 x weekly - 1-3 sets - 10 reps  - Scapular Retraction with Resistance  - 1 x daily - 5 x weekly - 3 sets - 10 reps  - Wall Push Up  - 1 x daily - 5 x weekly - 3 sets - 10 reps  - Scaption with Dumbbells  - 1 x daily - 5 x weekly - 3 sets - 10 reps

## 2023-04-28 ENCOUNTER — APPOINTMENT (OUTPATIENT)
Dept: PHYSICAL THERAPY | Facility: CLINIC | Age: 58
End: 2023-04-28

## 2023-05-03 ENCOUNTER — OFFICE VISIT (OUTPATIENT)
Dept: PHYSICAL THERAPY | Facility: CLINIC | Age: 58
End: 2023-05-03

## 2023-05-03 DIAGNOSIS — G89.29 CHRONIC RIGHT SHOULDER PAIN: Primary | ICD-10-CM

## 2023-05-03 DIAGNOSIS — M25.511 CHRONIC RIGHT SHOULDER PAIN: Primary | ICD-10-CM

## 2023-05-03 NOTE — PROGRESS NOTES
"Daily Note     Today's date: 5/3/2023  Patient name: Ene Healy  : 1965  MRN: [id number]  Referring provider: Abdias Bean MD  Dx:   Encounter Diagnosis     ICD-10-CM    1  Chronic right shoulder pain  M25 511     G89 29                      Subjective: \"My shoulder is sore this morning in the muscles but not painful\" \" It feels better , I have been not doing any other exercises besides my PT exercises and it is better\"      Objective: See treatment diary below      Assessment: Patient educated in avoiding sx aggravation and possible  Reinjury  Pt tolerated treatment well today  He did have full Painfree PROM of R shoulder except for minimal limitations in R shoulder cross body adduction with mild pain in ant shoulder  Impingement test is positive R shoulder  Patient completed ex including tband and PREs without pain aggravation  He progressed to push up plus on counter top with good tolerance avoiding stresses to ant  Shoulder  Patient is progressing toward discharge to HEP and states he does feel he has the tools of HEP to discharge next visit with rehabilitation goals partially achieved at this time  Patient education in HEP, discharge planning and self managements, activity modifications completed today  Plan: Continue per plan of care  Precautions:  Hx of bipolar,  PMH of shoulder injury and chronic pain     Daily Treatment Diary:      Initial Evaluation Date: 23  Compliance 3/29  3/31  4/5  4/14 4/19  4/21  4/26  5/3       Visit Number 1 2  3  4  5  6  7  8       Re-Eval  IE -  -  -  --          MC   Foto Captured Y -  -  Y     -                3/29  3/31  4/5  4/14 4/19  4/21  4/26  5/3       Manual                      TPR/STM to  LHB 2m  -                   PROM R shoulder flex, scap, IR at 90 deg   abd   10m  10m  10m -  20m  10m  10m       Post capsule stretch supine       3m -  3m  3m  3m       Ther-Ex                      Scapular retr B 10x10\"  10x10\"    prone scap " "retr2#, [facility] ext 2#, scapt   10x ea, ER S/L 15x A  prone scsp3# 2x10 only today  -  S/L ER, prone A GH ext, scap  Retr only 2 x 10  S/L ER  2#, prone [facility] ext, retr 3#    2x15       Wall push up plus 10x  -    15x  15x  -  -  counter top push ups15x       AROM shldr flex, scapt, ER at 90 deg cues posture 5x  5x  2x10 2# -  1# 2x10 ea  -  -         RTC samantha   10x5\"  -      limited praneeth to all submax levels  5x ea  -         Bicep curl   -        red tband 2x10, GH ext  2x10 RTB  red tband 2x10, GH ext 2 x 10, RTB  red tb , IR, ER, fwd press , bicep 2 x 15       Pulleys flex/ scapt   10x 3-5\"ea  -  - 10x 3-5\" ea  15x ea, 3-5\"  2mflex, 2m scapt  2m flex/scapt       UBE  Alt 1F/1B   4m  -  5m  5m  -  5m  5m       tband IR/ER, Ext,   forwd press    15x green  15x  green  15x red  -  red 2 x 10  see above                             HEP  Pt education    PC  PC  review  fwd press tband  activity mods -rest, inc ice use, sx or signs to call PCP  review  review avoiding RTC stress, impingement       Neuro Re-Ed                      scap retr    green TB 20x3\"  green TB 20x3\"   GTB  20x3\"  -  Organge TB 3\"x10  -                                                           Ther-Act                       PC educ activity mods, self manage                                        Modalities                      CP R shoulder - 10m 10m 10m 10m 10m 10m 10m                                             Access Code: [email or web address]  URL: [email or web address]  Date: 04/05/2023  Prepared by: Ziyad Yan    Exercises  - Seated Scapular Retraction  - 2 x daily - 7 x weekly - 2 sets - 10 reps - 3 hold  - Standing Shoulder Internal Rotation Stretch Behind Back  - 2 x daily - 7 x weekly - 1 sets - 5 reps - 5-10 hold  - Isometric Shoulder Flexion at Wall  - 1 x daily - 7 x weekly - 1 sets - 10 reps - 5 hold  - Isometric Shoulder External Rotation at Wall  - 1 x daily - 7 x weekly - 1 sets - 10 reps - 5 hold  - Isometric Shoulder Abduction at " Wall  - 1 x daily - 7 x weekly - 1 sets - 10 reps - 5 hold  - Standing Isometric Shoulder Internal Rotation at Doorway  - 1 x daily - 7 x weekly - 1 sets - 10 reps - 5 hold  - Shoulder External Rotation with Anchored Resistance  - 1 x daily - 7 x weekly - 1-3 sets - 15 reps  - Shoulder Internal Rotation with Resistance  - 1 x daily - 7 x weekly - 1-3 sets - 15 reps  - Shoulder Extension with Resistance  - 1 x daily - 5 x weekly - 1-3 sets - 15 reps  - Sidelying Shoulder External Rotation  - 1 x daily - 5 x weekly - 1-3 sets - 10 reps  - Scapular Retraction with Resistance  - 1 x daily - 5 x weekly - 3 sets - 10 reps  - Wall Push Up  - 1 x daily - 5 x weekly - 3 sets - 10 reps  - Scaption with Dumbbells  - 1 x daily - 5 x weekly - 3 sets - 10 reps

## 2023-05-05 ENCOUNTER — OFFICE VISIT (OUTPATIENT)
Dept: PHYSICAL THERAPY | Facility: CLINIC | Age: 58
End: 2023-05-05

## 2023-05-05 DIAGNOSIS — M25.511 CHRONIC RIGHT SHOULDER PAIN: Primary | ICD-10-CM

## 2023-05-05 DIAGNOSIS — G89.29 CHRONIC RIGHT SHOULDER PAIN: Primary | ICD-10-CM

## 2023-05-05 NOTE — PROGRESS NOTES
DIscharge    Today's date: 2023  Patient name: Murphy Valles  : 1965  MRN: [id number]  Referring provider: Mckenna Ward MD  Dx:   Encounter Diagnosis     ICD-10-CM    1  Chronic right shoulder pain  M25 511     G89 29                      Assessment  Assessment details: 61 yo male referred to PT for care of R shoulder with dx of chronic pain presenting with + Yergsons test and pain LHB, negative clunk, + O Paresh's test,  limited FIR ROM and adduction of R shoulder and pain w/ eccentric lowering at mid arc of elevation with scapular malpositioning, decreased strength of  R shoulder and elbow flexion, atrophy of bicep, and hx of trauma  Patient states he does not wish to pursue further dx testing or surgery  Patient denies any instability of R shoulder, clunk test negative and negative relocation test  R shoulder  Patient evaluation, pt education,  treatment plan  explanation as well as treatment initiation completed today  Patient was educated in self managements of cryotherapy and activity managements to avoid pain with function  Patient is good candidate for conservative approach to impairments and goal achievements with skilled PT  A shoulder musculoskeletal pathology which does not respond to conservative care, past hx of injury, chronic pain  may impact ability to fully achieve patient goals which include heavy weight lifting chest press and overhead  Patient was educated in this as well  Update 23- Patient was treated  9 x in Physical Therapy for care of R shoulder  He does continue to report daily R shoulder pain sx  And is currently modifying activity to avoid pain as he does repetitive lifting at work  Impingment test is positive for provocation of pain R shoulder  Supraspinatous test is positive for pain with strength 4/5 to 4+/5  Tenderness is present with palpation of supraspinatous and insertional region, distal clavicle, LHB  PROM of R shoulder WNLs   Improved scapular stability noted with eccentric lowering  Patient progressed to partial goal achievement  Tissue reactivity is moderate  Patient at this time wishes to continue with HEP  Pt was educated in Final HEP and self managements  He was encouraged to follow with his physician secondary to continued daily pain sx  And moderate tissue reactivity, partial progress toward goals  He does express  unwillingness to have any procedure on R shoulder  Patient does have daily lifting for work and this does appear to be aggravating to R shoulder but he reports  Modifying technique to avoid sx aggravation  Impairments: abnormal or restricted ROM, impaired physical strength, lacks appropriate home exercise program, pain with function and scapular dyskinesis    Symptom irritability: moderateUnderstanding of Dx/Px/POC: good   Prognosis: good    Goals  STG  2-4 weeks  1  Establish independence with [facility] for R shoulder strength, self managements- achiveved 5/5/23  2  R shoulder abduction, ER , bicep and  scapular stabilizer muscle strength to increase by  1/4 to 1/2 MMT grade - achieved 5/5/23  3  Pt to develop pain control and strength to allow return to  [facility] to T12 with no to minimal pain to improve function behind back - achieved T7-8  4  Patient to develop  Improved scapular stabilizer strength to allow reach overhead and return to side  with 3-5 # with no to minimal pain R shoulder to improve overhead function- not achieved pain limited task  LTG   4-6 weeks  1  R shoulder and scapular stabilizer muscle strength 4+/5 to 5/5 to allow for painfree overhead reach 10#  R shoulder- not achieved  2   R shoulder and scapular stabilizer strength  4+/5 to 5/5 for return to ADLs, homekeeping with no to little pain R shoulder- not achieved, but did progress with strength toward goal achievement, pain cont to limit reach with weight overhead    Plan  Plan details: Discharge to [facility], [place]  Recommended pt to  follow with  Referring physician secondary to continued daily pain and limited function with R shoulder  HE did understand advisement but did not commit to follow at this time  Patient would benefit from: skilled physical therapy  Planned modality interventions: cryotherapy  Planned therapy interventions: therapeutic exercise, graded exercise, strengthening, patient education, neuromuscular re-education, manual therapy, activity modification and home exercise program  Frequency: 2x week  Treatment plan discussed with: patient        Subjective Evaluation    History of Present Illness  Mechanism of injury: trauma  Mechanism of injury: Patient reports chronic pain in R shoulder which he does relate to injury sustained when ladder he was on was moved while installing score board and he caught himself from falling with RUE described as abducted and externally rotated  He notes he did seek care for injury and had PT which improved  He notes he  Has continued with pain in R shoulder and decreased strength which has persisted and recently worsened  He has self treated with CP, HP, OTC meds and self exercise  He notes pain in R shoulder is present daily and is worse with  Lifting, carrying, reaching overhead, behind back and across body  He notes typically pushing through pain but recently  has modified approach  Patient notes working full time as  of HS and is able to complete work but with pain reported  Reports sees psychologist for depression/bipolar  Update 5/5/23-  Patient notes less pain in R shoulder than one week ago but daily pain does persist  He notes modifying ligting activity to avoid ovehead and holds items close to body to avoid pain  He notes pain in anterior shoulder with some  Radiation into anterior upper arm to level of elbow intermittently  He notes pain decreased to 1-2/10 but does increase to 6-7/10 at worst with most pain located in anterior shoulder   Patient notes improved flexiblity and ROM of R shoulder since start of care  He notes compliance with HEP and use of CP  He verbalizes understanding of activity mods to avoid pain provocation and self managements to decrease pain  He notes pain cont to limit daily activity of lift and carry tasks R shoulder  Recurrent probem    Quality of life: good    Pain  Current pain ratin  At best pain rating: 3  At worst pain ratin  Location: superior anterior shoulder and bicep  Quality: grinding, dull ache, discomfort, sharp, radiating and burning  Relieving factors: rest, ice, heat and medications  Aggravating factors: lifting and overhead activity  Progression: worsening    Social Support  Steps to enter house: yes  Stairs in house: no   Lives in: apartment  Lives with: alone    Employment status: working  Hand dominance: right  Exercise history: exercises daily and reports healthy lifestyle  Life stress: alot reported and has professional help    Treatments  Previous treatment: physical therapy  Current treatment: medication  Patient Goals  Patient goals for therapy: decreased pain and increased strength  Patient goal: to return to weight lifting without pain, to return to functional reach, lift , push, pull  and behind back without difficulty        Objective     Postural Observations  Seated posture: good  Standing posture: good        Observations     Right Shoulder  Positive for atrophy  Additional Observation Details  Moderate through biceps, hypertonic R UT, medial scapular winging, no swelling in R shoulder, bony hypertrophy of B AC joints  Update 23- atrophy persists, no swelling R shoulder    Palpation     Right   No palpable tenderness to the biceps and upper trapezius  Hypertonic in the pectoralis major, pectoralis minor and upper trapezius  Hypotonic in the biceps  Tenderness of the supraspinatus  Trigger point to pectoralis minor       Additional Palpation Details  Update 23- pain with palpation of R supraspinatousm belly and insertion ant shoulder, UT, distal clavicle     Tenderness     Right Shoulder  Tenderness in the acromion, biceps tendon (proximal) and supraspinatus tendon  Additional Tenderness Details  Tender about posterior  And anterior acromion, supraspinatous insertion, LHB   update 5/5/23 - tender at acromion, bicep tendon, supraspinatous tendon and insertion    Cervical/Thoracic Screen   Cervical range of motion within normal limits  Thoracic range of motion within normal limits    Neurological Testing     Sensation     Shoulder   Left Shoulder   Intact: light touch    Right Shoulder   Intact: light touch    Active Range of Motion   Left Shoulder   Normal active range of motion  Internal rotation BTB: T4     Right Shoulder   Flexion: 170 degrees   Extension: 30 degrees with pain  Abduction: 170 (pain with endrange  and eccentric lowering 90-45) degrees with pain  Adduction: 10 degrees with pain  External rotation 0°: 70 degrees with pain  External rotation 90°: 73 degreeswith pain  External rotation BTH: T1 with pain  Internal rotation 90°: 80 degrees   Internal rotation BTB: L3 with pain    Additional Active Range of Motion Details  Fair scapular control with eccentric lowering from elevated posture with medial scapular winging and elevation  Distal UE strength at forearm, wrist and hand WNLS painfree RUE   Update 5/5/23- AROM flexion and abduction 170 deg without pain, adduction 10deg with pain ant  Shoulder, ER at 0 deg  75-80 deg  With no pain, FIR to T18-9 spinous process, ER at 90 deg abd  80 deg with pain  R elbow , forearm wrist and hand AROM WNLs    Scapular control with eccentric lowering  Is fiar to good  With eccentric lowering and only minimal medial scapular winging    Passive Range of Motion   Left Shoulder   Normal passive range of motion    Right Shoulder   Normal passive range of motion    Joint Play     Right Shoulder  Joints within functional limits are the anterior capsule, posterior capsule, inferior capsule, "SC joint, cervical spine and thoracic spine  [facility]  Strength/Myotome Testing     Left Shoulder     Planes of Motion   External rotation at 0°: 4   External rotation at 90°: 4     Isolated Muscles   Biceps: 4+     Right Shoulder     Planes of Motion   Flexion: 4   Extension: 4+   Abduction: 4   Adduction: 4   External rotation at 0°: 4   External rotation at 90°: 4-   Internal rotation at 0°: 4+   Internal rotation at 90°: 4+     Isolated Muscles   Biceps: 4   Subscapularis: 4+   Supraspinatus: 4   Triceps: 5   Upper trapezius: 5     Additional Strength Details  Update  5/5 23- strength R shoulder ER at 90 deg  abd 4/5 with pain, supraspinatous isolated 4+/5 with pain provocation, IR strength 4+/5, ER at 0 deg abduction 4+/5 with pain provocation, bicep 4+/5  Scapular stabilizer m strength 4+/5 except for retraction 4/5    Tests     Right Shoulder   Positive painful arc and Yergason's  Negative drop arm, external rotation lag sign, full can, laxity (step off), passive horizontal adduction and Devyn's sign  Flowsheet Rows    Flowsheet Row Most Recent Value   PT/OT G-Codes    Current Score 62   Projected Score 77             Precautions:  Hx of bipolar,  PMH of shoulder injury and chronic pain     Daily Treatment Diary:      Initial Evaluation Date: 03/29/23  Compliance 3/29  3/31  4/5  4/14 4/19  4/21  4/26  5/3  5/5     Visit Number 1 2  3  4  5  6  7  8  9     Re-Eval  IE -  -  -  --          MC   Foto Captured Y -  -  Y     -                3/29  3/31  4/5  4/14 4/19  4/21  4/26  5/3  5/5     Manual                      TPR/STM to  LHB 2m  -                   PROM R shoulder flex, scap, IR at 90 deg   abd   10m  10m  10m -  20m  10m  10m  10m     Post capsule stretch supine       3m -  3m  3m  3m  3m     Ther-Ex                      Scapular retr B 10x10\"  10x10\"    prone scap retr2#, GH ext 2#, scapt   10x ea, ER S/L 15x A  prone scsp3# 2x10 only today  -  S/L ER, prone A GH ext, " "scap  Retr only 2 x 10  S/L ER  2#, prone [address] ext, retr 3#    2x15  wall walks  6X     Wall push up plus 10x  -    15x  15x  -  -  counter top push ups15x  -     AROM shldr flex, scapt, ER at 90 deg cues posture 5x  5x  2x10 2# -  1# 2x10 ea  -  -    5x ea     RTC samantha   10x5\"  -      limited praneeth to all submax levels  5x ea  -         Bicep curl   -        red tband 2x10, GH ext  2x10 RTB  red tband 2x10, GH ext 2 x 10, RTB  red tb , IR, ER, fwd press , bicep 2 x 15  red TB IR, ER, bicep, ext 2 x 15     Pulleys flex/ scapt   10x 3-5\"ea  -  - 10x 3-5\" ea  15x ea, 3-5\"  2mflex, 2m scapt  2m flex/scapt  2m ea     UBE  Alt 1F/1B   4m  -  5m  5m  -  5m  5m  5m     tband IR/ER, Ext,   forwd press    15x green  15x  green  15x red  -  red 2 x 10  see above  see above                           HEP  Pt education    PC  PC  review  fwd press tband  activity mods -rest, inc ice use, sx or signs to call PCP  review  review avoiding RTC stress, impingement  review activity mods, HEP, self managements, follow with MD     Neuro Re-Ed                      scap retr    green TB 20x3\"  green TB 20x3\"   GTB  20x3\"  -  Organge TB 3\"x10  -  prange TB 3\" x 10                                                         Ther-Act                       PC educ activity mods, self manage                                        Modalities                      CP R shoulder - 10m 10m 10m 10m 10m 10m 10m 10m                                            Access Code: [email or web address]  URL: [email or web address]  Date: 04/05/2023  Prepared by: Pham Tesfaye    Exercises  - Seated Scapular Retraction  - 2 x daily - 7 x weekly - 2 sets - 10 reps - 3 hold  - Standing Shoulder Internal Rotation Stretch Behind Back  - 2 x daily - 7 x weekly - 1 sets - 5 reps - 5-10 hold  - Isometric Shoulder Flexion at Wall  - 1 x daily - 7 x weekly - 1 sets - 10 reps - 5 hold  - Isometric Shoulder External Rotation at Wall  - 1 x daily - 7 x weekly - 1 sets - 10 reps - 5 " hold  - Isometric Shoulder Abduction at Wall  - 1 x daily - 7 x weekly - 1 sets - 10 reps - 5 hold  - Standing Isometric Shoulder Internal Rotation at Doorway  - 1 x daily - 7 x weekly - 1 sets - 10 reps - 5 hold  - Shoulder External Rotation with Anchored Resistance  - 1 x daily - 7 x weekly - 1-3 sets - 15 reps  - Shoulder Internal Rotation with Resistance  - 1 x daily - 7 x weekly - 1-3 sets - 15 reps  - Shoulder Extension with Resistance  - 1 x daily - 5 x weekly - 1-3 sets - 15 reps  - Sidelying Shoulder External Rotation  - 1 x daily - 5 x weekly - 1-3 sets - 10 reps  - Scapular Retraction with Resistance  - 1 x daily - 5 x weekly - 3 sets - 10 reps  - Wall Push Up  - 1 x daily - 5 x weekly - 3 sets - 10 reps  - Scaption with Dumbbells  - 1 x daily - 5 x weekly - 3 sets - 10 reps

## 2023-05-23 ENCOUNTER — OFFICE VISIT (OUTPATIENT)
Dept: URGENT CARE | Facility: CLINIC | Age: 58
End: 2023-05-23

## 2023-05-23 VITALS
HEART RATE: 65 BPM | OXYGEN SATURATION: 98 % | SYSTOLIC BLOOD PRESSURE: 133 MMHG | WEIGHT: 147 LBS | RESPIRATION RATE: 16 BRPM | HEIGHT: 71 IN | BODY MASS INDEX: 20.58 KG/M2 | DIASTOLIC BLOOD PRESSURE: 92 MMHG | TEMPERATURE: 97.1 F

## 2023-05-23 DIAGNOSIS — J01.00 ACUTE NON-RECURRENT MAXILLARY SINUSITIS: Primary | ICD-10-CM

## 2023-05-23 RX ORDER — AMOXICILLIN AND CLAVULANATE POTASSIUM 875; 125 MG/1; MG/1
1 TABLET, FILM COATED ORAL EVERY 12 HOURS SCHEDULED
Qty: 14 TABLET | Refills: 0 | Status: SHIPPED | OUTPATIENT
Start: 2023-05-23 | End: 2023-05-30

## 2023-05-23 NOTE — PROGRESS NOTES
[facility]        NAME: Marylou Street is a 62 y o  male  : 1965    MRN: [id number]  DATE: May 23, 2023  TIME: 8:49 AM    Assessment and Plan   Acute non-recurrent maxillary sinusitis [J01 00]  1  Acute non-recurrent maxillary sinusitis  amoxicillin-clavulanate (AUGMENTIN) 875-125 mg per tablet        Given symptom duration x6 days without improvement, will treat with Augmentin and encouraged continued supportive measures  Follow up with PCP in 3-5 days or proceed to emergency department for worsening symptoms  Patient verbalized understanding of instructions given  Patient Instructions     Patient Instructions     Take antibiotic as prescribed  Continue with supportive measures, OTC Tylenol, nasal decongestants, and cough suppressants   Cool mist humidifiers, throat lozenges, salt gargles, honey, increased fluid intake and rest   Follow up with PCP in 3-5 days  Present to ER if symptoms worsen     Sinusitis   AMBULATORY CARE:   Sinusitis  is inflammation or infection of your sinuses  Sinusitis is most often caused by a virus  Acute sinusitis may last up to 12 weeks  Chronic sinusitis lasts longer than 12 weeks  Recurrent sinusitis means you have 4 or more infections in 1 year  Common signs and symptoms:   • Fever    • Pain, pressure, redness, or swelling around the forehead, cheeks, or eyes    • Thick yellow or green discharge from your nose    • Tenderness when you touch your face over your sinuses    • Dry cough that happens mostly at night or when you lie down    • Headache and face pain that is worse when you lean forward    • Tooth pain, or pain when you chew    Seek care immediately if:   • You have trouble breathing or wheezing that is getting worse  • You have a stiff neck, a fever, or a bad headache  • You cannot open your eye  • Your eyeball bulges out or you cannot move your eye       • You are more sleepy than normal, or you notice changes in your ability to think, move, or talk  • You have swelling of your forehead or scalp  Call your doctor if:   • You have vision changes, such as double vision  • Your eye and eyelid are red, swollen, and painful  • Your symptoms do not improve or go away after 10 days  • You have nausea and are vomiting  • Your nose is bleeding  • You have questions or concerns about your condition or care  Medicines: Your symptoms may go away on their own  Your healthcare provider may recommend watchful waiting for up to 10 days before starting antibiotics  You may need any of the following:  • Acetaminophen  decreases pain and fever  It is available without a doctor's order  Ask how much to take and how often to take it  Follow directions  Read the labels of all other medicines you are using to see if they also contain acetaminophen, or ask your doctor or pharmacist  Acetaminophen can cause liver damage if not taken correctly  • NSAIDs , such as ibuprofen, help decrease swelling, pain, and fever  This medicine is available with or without a doctor's order  NSAIDs can cause stomach bleeding or kidney problems in certain people  If you take blood thinner medicine, always ask your healthcare provider if NSAIDs are safe for you  Always read the medicine label and follow directions  • Nasal steroid sprays  may help decrease inflammation in your nose and sinuses  • Decongestants  help reduce swelling and drain mucus in the nose and sinuses  They may help you breathe easier  • Antihistamines  help dry mucus in the nose and relieve sneezing  • Antibiotics  help treat or prevent a bacterial infection  Self-care:   • Rinse your sinuses as directed  Use a sinus rinse device to rinse your nasal passages with a saline (salt water) solution or distilled water  Do not use tap water  This will help thin the mucus in your nose and rinse away pollen and dirt   It will also help reduce swelling so you can breathe normally  • Use a humidifier  to increase air moisture in your home  This may make it easier for you to breathe and help decrease your cough  • Sleep with your head elevated  Place an extra pillow under your head before you go to sleep to help your sinuses drain  • Drink liquids as directed  Ask your healthcare provider how much liquid to drink each day and which liquids are best for you  Liquids will thin the mucus in your nose and help it drain  Avoid drinks that contain alcohol or caffeine  • Do not smoke, and avoid secondhand smoke  Nicotine and other chemicals in cigarettes and cigars can make your symptoms worse  Ask your healthcare provider for information if you currently smoke and need help to quit  E-cigarettes or smokeless tobacco still contain nicotine  Talk to your healthcare provider before you use these products  Prevent the spread of germs:   • Wash your hands often with soap and water  Wash your hands after you use the bathroom, change a child's diaper, or sneeze  Wash your hands before you prepare or eat food  • Stay away from people who are sick  Some germs spread easily and quickly through contact  Follow up with your doctor as directed: You may be referred to an ear, nose, and throat specialist  Write down your questions so you remember to ask them during your visits  © Copyright [employer] 2022 Information is for End User's use only and may not be sold, redistributed or otherwise used for commercial purposes  The above information is an  only  It is not intended as medical advice for individual conditions or treatments  Talk to your doctor, nurse or pharmacist before following any medical regimen to see if it is safe and effective for you            Chief Complaint     Chief Complaint   Patient presents with   • Cough     Facial pressure, and ear pressure and congestion also having left eye is bothering him Started on Thursday          History of Present Illness       60-year-old male with a past medical history significant for hypertension and bipolar disorder presents with complaints of ongoing nasal congestion, sinus pressure, postnasal drip, and cough x6 days  Denies fever but states chills  Denies vomiting or diarrhea  Positive sick contact/exposures as he works as a  in a school  He has been taking OTC Mucinex without relief of his symptoms  Review of Systems   Review of Systems   Constitutional: Positive for chills  Negative for fever  HENT: Positive for congestion, postnasal drip, rhinorrhea, sinus pressure and sinus pain  Negative for ear discharge, ear pain, sore throat, trouble swallowing and voice change  Eyes: Negative for discharge  Respiratory: Positive for cough  Negative for shortness of breath and wheezing  Cardiovascular: Negative for chest pain  Gastrointestinal: Negative for abdominal pain, diarrhea, nausea and vomiting  Musculoskeletal: Negative for myalgias  Skin: Negative for rash           Current Medications       Current Outpatient Medications:   •  amoxicillin-clavulanate (AUGMENTIN) 875-125 mg per tablet, Take 1 tablet by mouth every 12 (twelve) hours for 7 days, Disp: 14 tablet, Rfl: 0  •  aspirin 81 mg chewable tablet, Chew 1 tablet (81 mg total) daily, Disp: 30 tablet, Rfl: 0  •  B Complex-C (B-complex with vitamin C) tablet, Take 1 tablet by mouth daily, Disp: , Rfl:   •  buPROPion (Wellbutrin XL) 150 mg 24 hr tablet, 1 po qd (Patient not taking: Reported on 4/5/2023), Disp: 60 tablet, Rfl: 1  •  Glucosamine-Chondroitin (GLUCOSAMINE CHONDR COMPLEX PO), Take by mouth, Disp: , Rfl:   •  Magnesium 500 MG CAPS, 1 po qd (Patient taking differently: Takes half a pill), Disp: 90 capsule, Rfl: 0  •  Omega-3 Fatty Acids (fish oil) 1,000 mg, Take 1,000 mg by mouth daily, Disp: , Rfl:   •  sildenafil (VIAGRA) 50 MG tablet, Take 1 tablet (50 mg total) by mouth daily as needed for erectile dysfunction "Take 1 tablet 2 hours after meal and 1 hour before sexual intercourse  If this is not effective you can increase by half a tablet per encounter up to a maximum of 2 tablets or 100 mg  Do not take more than once in 24 hours and limit to 2-3 occurrences per week, Disp: 30 tablet, Rfl: 12  •  zinc gluconate 50 mg tablet, Take 50 mg by mouth daily, Disp: , Rfl:     Current Allergies     Allergies as of 05/23/2023 - Reviewed 05/23/2023   Allergen Reaction Noted   • Latex Hives 08/05/2022            The following portions of the patient's history were reviewed and updated as appropriate: allergies, current medications, past family history, past medical history, past social history, past surgical history and problem list      Past Medical History:   Diagnosis Date   • DJD (degenerative joint disease)    • Dyslipidemia    • History of hepatitis C    • Hypertension    • 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[facility] [address] )    • Toxoplasmosis chorioretinitis of left eye        Past Surgical History:   Procedure Laterality Date   • CARPAL TUNNEL RELEASE Left    • CATARACT EXTRACTION Left    • COLONOSCOPY     • HERNIA REPAIR     • PLANTAR FASCIA SURGERY Left        Family History   Problem Relation Age of Onset   • Lung cancer Mother    • Kidney disease Mother         drug induced   • COPD Mother    • Dementia Father          Medications have been verified  Objective   /92   Pulse 65   Temp (!) 97 1 °F (36 2 °C)   Resp 16   Ht 5' 11\" (1 803 m)   Wt 66 7 kg (147 lb)   SpO2 98%   BMI 20 50 kg/m²   No LMP for male patient  Physical Exam     Physical Exam  Vitals and nursing note reviewed  Constitutional:       General: He is not in acute distress  Appearance: He is not toxic-appearing  HENT:      Head: Normocephalic  Right Ear: Tympanic membrane, ear canal and external ear normal       Left Ear: Tympanic membrane, ear canal and external ear normal       Nose: Congestion present        Right Sinus: Maxillary sinus " tenderness present  No frontal sinus tenderness  Left Sinus: Maxillary sinus tenderness present  No frontal sinus tenderness  Mouth/Throat:      Mouth: Mucous membranes are moist       Pharynx: Posterior oropharyngeal erythema present  Comments: Mildly erythematous pharynx with mucoid drainage  Eyes:      Conjunctiva/sclera: Conjunctivae normal    Cardiovascular:      Rate and Rhythm: Normal rate and regular rhythm  Heart sounds: Normal heart sounds  Pulmonary:      Effort: Pulmonary effort is normal  No respiratory distress  Breath sounds: Normal breath sounds  No stridor  No wheezing, rhonchi or rales  Lymphadenopathy:      Cervical: No cervical adenopathy  Skin:     General: Skin is warm and dry  Neurological:      Mental Status: He is alert and oriented to person, place, and time  Gait: Gait is intact     Psychiatric:         Mood and Affect: Mood normal          Behavior: Behavior normal

## 2023-05-23 NOTE — PATIENT INSTRUCTIONS
Take antibiotic as prescribed  Continue with supportive measures, OTC Tylenol, nasal decongestants, and cough suppressants   Cool mist humidifiers, throat lozenges, salt gargles, honey, increased fluid intake and rest   Follow up with PCP in 3-5 days  Present to ER if symptoms worsen     Sinusitis   AMBULATORY CARE:   Sinusitis  is inflammation or infection of your sinuses  Sinusitis is most often caused by a virus  Acute sinusitis may last up to 12 weeks  Chronic sinusitis lasts longer than 12 weeks  Recurrent sinusitis means you have 4 or more infections in 1 year  Common signs and symptoms:   Fever    Pain, pressure, redness, or swelling around the forehead, cheeks, or eyes    Thick yellow or green discharge from your nose    Tenderness when you touch your face over your sinuses    Dry cough that happens mostly at night or when you lie down    Headache and face pain that is worse when you lean forward    Tooth pain, or pain when you chew    Seek care immediately if:   You have trouble breathing or wheezing that is getting worse  You have a stiff neck, a fever, or a bad headache  You cannot open your eye  Your eyeball bulges out or you cannot move your eye  You are more sleepy than normal, or you notice changes in your ability to think, move, or talk  You have swelling of your forehead or scalp  Call your doctor if:   You have vision changes, such as double vision  Your eye and eyelid are red, swollen, and painful  Your symptoms do not improve or go away after 10 days  You have nausea and are vomiting  Your nose is bleeding  You have questions or concerns about your condition or care  Medicines: Your symptoms may go away on their own  Your healthcare provider may recommend watchful waiting for up to 10 days before starting antibiotics  You may need any of the following:  Acetaminophen  decreases pain and fever  It is available without a doctor's order   Ask how much to take and how often to take it  Follow directions  Read the labels of all other medicines you are using to see if they also contain acetaminophen, or ask your doctor or pharmacist  Acetaminophen can cause liver damage if not taken correctly  NSAIDs , such as ibuprofen, help decrease swelling, pain, and fever  This medicine is available with or without a doctor's order  NSAIDs can cause stomach bleeding or kidney problems in certain people  If you take blood thinner medicine, always ask your healthcare provider if NSAIDs are safe for you  Always read the medicine label and follow directions  Nasal steroid sprays  may help decrease inflammation in your nose and sinuses  Decongestants  help reduce swelling and drain mucus in the nose and sinuses  They may help you breathe easier  Antihistamines  help dry mucus in the nose and relieve sneezing  Antibiotics  help treat or prevent a bacterial infection  Self-care:   Rinse your sinuses as directed  Use a sinus rinse device to rinse your nasal passages with a saline (salt water) solution or distilled water  Do not use tap water  This will help thin the mucus in your nose and rinse away pollen and dirt  It will also help reduce swelling so you can breathe normally  Use a humidifier  to increase air moisture in your home  This may make it easier for you to breathe and help decrease your cough  Sleep with your head elevated  Place an extra pillow under your head before you go to sleep to help your sinuses drain  Drink liquids as directed  Ask your healthcare provider how much liquid to drink each day and which liquids are best for you  Liquids will thin the mucus in your nose and help it drain  Avoid drinks that contain alcohol or caffeine  Do not smoke, and avoid secondhand smoke  Nicotine and other chemicals in cigarettes and cigars can make your symptoms worse   Ask your healthcare provider for information if you currently smoke and need help to quit  E-cigarettes or smokeless tobacco still contain nicotine  Talk to your healthcare provider before you use these products  Prevent the spread of germs:   Wash your hands often with soap and water  Wash your hands after you use the bathroom, change a child's diaper, or sneeze  Wash your hands before you prepare or eat food  Stay away from people who are sick  Some germs spread easily and quickly through contact  Follow up with your doctor as directed: You may be referred to an ear, nose, and throat specialist  Write down your questions so you remember to ask them during your visits  © Copyright [employer] 2022 Information is for End User's use only and may not be sold, redistributed or otherwise used for commercial purposes  The above information is an  only  It is not intended as medical advice for individual conditions or treatments  Talk to your doctor, nurse or pharmacist before following any medical regimen to see if it is safe and effective for you

## 2023-05-23 NOTE — LETTER
May 23, 2023     Patient: Khanh Metz   YOB: 1965   Date of Visit: 5/23/2023       To Whom it May Concern:    Khanh Metz was seen in my clinic on 5/23/2023  He may return to work on 5/24/2023  Please excuse any missed time due to illness  If you have any questions or concerns, please don't hesitate to call           Sincerely,          LESLY Avila        CC: No Recipients

## 2023-06-05 ENCOUNTER — OFFICE VISIT (OUTPATIENT)
Dept: URGENT CARE | Facility: CLINIC | Age: 58
End: 2023-06-05
Payer: COMMERCIAL

## 2023-06-05 VITALS
SYSTOLIC BLOOD PRESSURE: 138 MMHG | RESPIRATION RATE: 16 BRPM | HEIGHT: 71 IN | DIASTOLIC BLOOD PRESSURE: 81 MMHG | WEIGHT: 150.4 LBS | TEMPERATURE: 97.6 F | HEART RATE: 87 BPM | BODY MASS INDEX: 21.06 KG/M2 | OXYGEN SATURATION: 97 %

## 2023-06-05 DIAGNOSIS — J01.00 ACUTE NON-RECURRENT MAXILLARY SINUSITIS: Primary | ICD-10-CM

## 2023-06-05 DIAGNOSIS — H10.32 ACUTE BACTERIAL CONJUNCTIVITIS OF LEFT EYE: ICD-10-CM

## 2023-06-05 PROCEDURE — 99213 OFFICE O/P EST LOW 20 MIN: CPT

## 2023-06-05 RX ORDER — LISINOPRIL 10 MG/1
10 TABLET ORAL DAILY
COMMUNITY

## 2023-06-05 RX ORDER — DOXYCYCLINE 100 MG/1
100 TABLET ORAL 2 TIMES DAILY
Qty: 14 TABLET | Refills: 0 | Status: SHIPPED | OUTPATIENT
Start: 2023-06-05 | End: 2023-06-12

## 2023-06-05 RX ORDER — PREDNISONE 20 MG/1
40 TABLET ORAL DAILY
Qty: 10 TABLET | Refills: 0 | Status: SHIPPED | OUTPATIENT
Start: 2023-06-05 | End: 2023-06-10

## 2023-06-05 RX ORDER — OFLOXACIN 3 MG/ML
SOLUTION/ DROPS OPHTHALMIC
Qty: 5 ML | Refills: 0 | Status: SHIPPED | OUTPATIENT
Start: 2023-06-05 | End: 2023-06-12

## 2023-06-05 NOTE — PROGRESS NOTES
[id number] [facility]        NAME: Courtney Mares is a 62 y o  male  : 1965    MRN: [id number]  DATE: 2023  TIME: 10:10 AM    Assessment and Plan   Acute non-recurrent maxillary sinusitis [J01 00]  1  Acute non-recurrent maxillary sinusitis  doxycycline (ADOXA) 100 MG tablet    predniSONE 20 mg tablet      2  Acute bacterial conjunctivitis of left eye  ofloxacin (OCUFLOX) 0 3 % ophthalmic solution        Given ongoing symptoms previously treated with Augmentin, will treat with Doxycyline and steroids  Presumed bacterial conjunctivitis and so ofloxacin prescribed  Encouraged continued supportive measures  Contagious for 24 hours  Follow up with PCP in 3-5 days or proceed to emergency department for worsening symptoms  Patient verbalized understanding of instructions given  Patient Instructions     Patient Instructions     Take antibiotic as prescribed  Take steroids as prescribed  Use eye drops as prescribed  Can use in right eye if also develop symptoms  Wash pillow cases  Presumed contagious for 24 hours   Continue with supportive measures  Follow up with PCP in 3-5 days  Present to ER if symptoms worsen     Conjunctivitis   AMBULATORY CARE:   Conjunctivitis , or pink eye, is inflammation of your conjunctiva  The conjunctiva is a thin tissue that covers the front of your eye and the back of your eyelids  The conjunctiva helps protect your eye and keep it moist  Conjunctivitis may be caused by bacteria, allergies, or a virus  If your conjunctivitis is caused by bacteria, it may get better on its own in about 7 days  Viral conjunctivitis can last up to 3 weeks  Common symptoms may include any of the following: You will usually have symptoms in both eyes if your conjunctivitis is caused by allergies  You may also have other allergic symptoms, such as a rash or runny nose  Symptoms will usually start in 1 eye if your conjunctivitis is caused by a virus or bacteria    • Redness in the whites of your eye    • Itching in your eye or around your eye    • Feeling like there is something in your eye    • Watery or thick, sticky discharge    • Crusty eyelids when you wake up in the morning    • Burning, stinging, or swelling in your eye    • Pain when you see bright light    Seek care immediately if:   • You have worsening eye pain  • The swelling in your eye gets worse, even after treatment  • Your vision suddenly becomes worse or you cannot see at all  Call your doctor if:   • You develop a fever and ear pain  • You have tiny bumps or spots of blood on your eye  • You have questions or concerns about your condition or care  Treatment  will depend on the cause of your conjunctivitis  You may need antibiotics or allergy medicine as a pill, eye drop, or eye ointment  Manage your symptoms:   • Apply a cool compress  Wet a washcloth with cold water and place it on your eye  This will help decrease itching and irritation  • Do not wear contact lenses  They can irritate your eye  Throw away the pair you are using and ask when you can wear them again  Use a new pair of lenses when your provider says it is okay  • Avoid irritants  Stay away from smoke filled areas  Shield your eyes from wind and sun  • Flush your eye  You may need to flush your eye with saline to help decrease your symptoms  Ask for more information on how to flush your eye  Medicines:  Treatment depends on what is causing your conjunctivitis  You may be given any of the following:  • Allergy medicine  helps decrease itchy, red, swollen eyes caused by allergies  It may be given as a pill, eye drops, or nasal spray  • Antibiotics  may be needed if your conjunctivitis is caused by bacteria  This medicine may be given as a pill, eye drops, or eye ointment  • Take your medicine as directed  Contact your healthcare provider if you think your medicine is not helping or if you have side effects   Tell your provider if you are allergic to any medicine  Keep a list of the medicines, vitamins, and herbs you take  Include the amounts, and when and why you take them  Bring the list or the pill bottles to follow-up visits  Carry your medicine list with you in case of an emergency  Prevent the spread of conjunctivitis:   • Wash your hands with soap and water often  Wash your hands before and after you touch your eyes  Also wash your hands before you prepare or eat food and after you use the bathroom or change a diaper  • Avoid allergens  Try to avoid the things that cause your allergies, such as pets, dust, or grass  • Avoid contact with others  Do not share towels or washcloths  Try to stay away from others as much as possible  Ask when you can return to work or school  • Throw away eye makeup  The bacteria that caused your conjunctivitis can stay in eye makeup  Throw away your current mascara and other eye makeup  Never share mascara or other eye makeup with anyone  Follow up with your doctor as directed:  Write down your questions so you remember to ask them during your visits  © Copyright [facility] [employer] 2022 Information is for End User's use only and may not be sold, redistributed or otherwise used for commercial purposes  The above information is an  only  It is not intended as medical advice for individual conditions or treatments  Talk to your doctor, nurse or pharmacist before following any medical regimen to see if it is safe and effective for you  Sinusitis   AMBULATORY CARE:   Sinusitis  is inflammation or infection of your sinuses  Sinusitis is most often caused by a virus  Acute sinusitis may last up to 12 weeks  Chronic sinusitis lasts longer than 12 weeks  Recurrent sinusitis means you have 4 or more infections in 1 year          Common signs and symptoms:   • Fever    • Pain, pressure, redness, or swelling around the forehead, cheeks, or eyes    • Thick yellow or green discharge from your nose    • Tenderness when you touch your face over your sinuses    • Dry cough that happens mostly at night or when you lie down    • Headache and face pain that is worse when you lean forward    • Tooth pain, or pain when you chew    Seek care immediately if:   • You have trouble breathing or wheezing that is getting worse  • You have a stiff neck, a fever, or a bad headache  • You cannot open your eye  • Your eyeball bulges out or you cannot move your eye  • You are more sleepy than normal, or you notice changes in your ability to think, move, or talk  • You have swelling of your forehead or scalp  Call your doctor if:   • You have vision changes, such as double vision  • Your eye and eyelid are red, swollen, and painful  • Your symptoms do not improve or go away after 10 days  • You have nausea and are vomiting  • Your nose is bleeding  • You have questions or concerns about your condition or care  Medicines: Your symptoms may go away on their own  Your healthcare provider may recommend watchful waiting for up to 10 days before starting antibiotics  You may need any of the following:  • Acetaminophen  decreases pain and fever  It is available without a doctor's order  Ask how much to take and how often to take it  Follow directions  Read the labels of all other medicines you are using to see if they also contain acetaminophen, or ask your doctor or pharmacist  Acetaminophen can cause liver damage if not taken correctly  • NSAIDs , such as ibuprofen, help decrease swelling, pain, and fever  This medicine is available with or without a doctor's order  NSAIDs can cause stomach bleeding or kidney problems in certain people  If you take blood thinner medicine, always ask your healthcare provider if NSAIDs are safe for you  Always read the medicine label and follow directions      • Nasal steroid sprays  may help decrease inflammation in your nose and sinuses  • Decongestants  help reduce swelling and drain mucus in the nose and sinuses  They may help you breathe easier  • Antihistamines  help dry mucus in the nose and relieve sneezing  • Antibiotics  help treat or prevent a bacterial infection  Self-care:   • Rinse your sinuses as directed  Use a sinus rinse device to rinse your nasal passages with a saline (salt water) solution or distilled water  Do not use tap water  This will help thin the mucus in your nose and rinse away pollen and dirt  It will also help reduce swelling so you can breathe normally  • Use a humidifier  to increase air moisture in your home  This may make it easier for you to breathe and help decrease your cough  • Sleep with your head elevated  Place an extra pillow under your head before you go to sleep to help your sinuses drain  • Drink liquids as directed  Ask your healthcare provider how much liquid to drink each day and which liquids are best for you  Liquids will thin the mucus in your nose and help it drain  Avoid drinks that contain alcohol or caffeine  • Do not smoke, and avoid secondhand smoke  Nicotine and other chemicals in cigarettes and cigars can make your symptoms worse  Ask your healthcare provider for information if you currently smoke and need help to quit  E-cigarettes or smokeless tobacco still contain nicotine  Talk to your healthcare provider before you use these products  Prevent the spread of germs:   • Wash your hands often with soap and water  Wash your hands after you use the bathroom, change a child's diaper, or sneeze  Wash your hands before you prepare or eat food  • Stay away from people who are sick  Some germs spread easily and quickly through contact  Follow up with your doctor as directed: You may be referred to an ear, nose, and throat specialist  Write down your questions so you remember to ask them during your visits     © Copyright [employer] 2022 Information is for End User's use only and may not be sold, redistributed or otherwise used for commercial purposes  The above information is an  only  It is not intended as medical advice for individual conditions or treatments  Talk to your doctor, nurse or pharmacist before following any medical regimen to see if it is safe and effective for you  Chief Complaint     Chief Complaint   Patient presents with   • Cold Like Symptoms     Was seen 5/23- still having sinus drainage, pressure, left eye draining   States he got better then worse again          History of Present Illness       51-year-old male with a past medical history significant for hypertension presents with complaints of ongoing nasal congestion, sinus pressure, green/brown nasal discharge, cough and now left eye redness and drainage x2 days  Patient states that he woke up this morning and his left eye was crusted closed  Denies fever, chills, vomiting, or diarrhea  States possible sick contact/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  Does not wear contact lenses or corrective lenses but has a left eye ocular implant/lens  Denies any visual disturbances  He was evaluated at this urgent care facility on 5/23/2023  Diagnosed with sinusitis and prescribed Augmentin  Patient states finished medication entirely with some improvement however symptoms acutely worsened/returned  Review of Systems   Review of Systems   Constitutional: Negative for chills and fever  HENT: Positive for congestion, rhinorrhea, sinus pressure and sinus pain  Negative for ear discharge, ear pain, sore throat, trouble swallowing and voice change  Eyes: Positive for discharge and redness  Negative for pain and visual disturbance  Respiratory: Positive for cough  Negative for shortness of breath and wheezing  Cardiovascular: Negative for chest pain  Gastrointestinal: Negative for abdominal pain, diarrhea, nausea and vomiting  Musculoskeletal: Negative for myalgias  Skin: Negative for rash  Current Medications       Current Outpatient Medications:   •  B Complex-C (B-complex with vitamin C) tablet, Take 1 tablet by mouth daily, Disp: , Rfl:   •  buPROPion (Wellbutrin XL) 150 mg 24 hr tablet, 1 po qd, Disp: 60 tablet, Rfl: 1  •  doxycycline (ADOXA) 100 MG tablet, Take 1 tablet (100 mg total) by mouth 2 (two) times a day for 7 days, Disp: 14 tablet, Rfl: 0  •  lisinopril (ZESTRIL) 10 mg tablet, Take 10 mg by mouth daily, Disp: , Rfl:   •  Magnesium 500 MG CAPS, 1 po qd, Disp: 90 capsule, Rfl: 0  •  ofloxacin (OCUFLOX) 0 3 % ophthalmic solution, Administer 1 drop into the left eye every 4 (four) hours while awake for 2 days, THEN 1 drop 4 (four) times a day for 5 days  , Disp: 5 mL, Rfl: 0  •  Omega-3 Fatty Acids (fish oil) 1,000 mg, Take 1,000 mg by mouth daily, Disp: , Rfl:   •  predniSONE 20 mg tablet, Take 2 tablets (40 mg total) by mouth daily for 5 days, Disp: 10 tablet, Rfl: 0  •  aspirin 81 mg chewable tablet, Chew 1 tablet (81 mg total) daily (Patient not taking: Reported on 6/5/2023), Disp: 30 tablet, Rfl: 0  •  Glucosamine-Chondroitin (GLUCOSAMINE CHONDR COMPLEX PO), Take by mouth, Disp: , Rfl:   •  sildenafil (VIAGRA) 50 MG tablet, Take 1 tablet (50 mg total) by mouth daily as needed for erectile dysfunction Take 1 tablet 2 hours after meal and 1 hour before sexual intercourse  If this is not effective you can increase by half a tablet per encounter up to a maximum of 2 tablets or 100 mg   Do not take more than once in 24 hours and limit to 2-3 occurrences per week (Patient not taking: Reported on 6/5/2023), Disp: 30 tablet, Rfl: 12  •  zinc gluconate 50 mg tablet, Take 50 mg by mouth daily (Patient not taking: Reported on 6/5/2023), Disp: , Rfl:     Current Allergies     Allergies as of 06/05/2023 - Reviewed 06/05/2023   Allergen Reaction Noted   • Latex Hives 08/05/2022            The following portions of the "patient's history were reviewed and updated as appropriate: allergies, current medications, past family history, past medical history, past social history, past surgical history and problem list      Past Medical History:   Diagnosis Date   • DJD (degenerative joint disease)    • Dyslipidemia    • History of hepatitis C    • Hypertension    • 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[facility] [address] )    • Toxoplasmosis chorioretinitis of left eye        Past Surgical History:   Procedure Laterality Date   • CARPAL TUNNEL RELEASE Left    • CATARACT EXTRACTION Left    • COLONOSCOPY     • HERNIA REPAIR     • PLANTAR FASCIA SURGERY Left        Family History   Problem Relation Age of Onset   • Lung cancer Mother    • Kidney disease Mother         drug induced   • COPD Mother    • Dementia Father          Medications have been verified  Objective   /81   Pulse 87   Temp 97 6 °F (36 4 °C)   Resp 16   Ht 5' 11\" (1 803 m)   Wt 68 2 kg (150 lb 6 4 oz)   SpO2 97%   BMI 20 98 kg/m²   No LMP for male patient  Physical Exam     Physical Exam  Vitals and nursing note reviewed  Constitutional:       General: He is not in acute distress  Appearance: He is not toxic-appearing  HENT:      Head: Normocephalic  Right Ear: Tympanic membrane, ear canal and external ear normal       Left Ear: Tympanic membrane, ear canal and external ear normal       Nose: Congestion present  Mouth/Throat:      Mouth: Mucous membranes are moist       Pharynx: Oropharynx is clear  No posterior oropharyngeal erythema  Eyes:      General: Lids are normal  Vision grossly intact  Right eye: No discharge  Left eye: No discharge  Extraocular Movements: Extraocular movements intact  Conjunctiva/sclera:      Right eye: Right conjunctiva is not injected  Left eye: Left conjunctiva is injected  Pupils: Pupils are equal, round, and reactive to light  Cardiovascular:      Rate and Rhythm: Normal rate and regular rhythm   " Heart sounds: Normal heart sounds  Pulmonary:      Effort: Pulmonary effort is normal  No respiratory distress  Breath sounds: Normal breath sounds  No stridor  No wheezing, rhonchi or rales  Lymphadenopathy:      Cervical: No cervical adenopathy  Skin:     General: Skin is warm and dry  Neurological:      Mental Status: He is alert and oriented to person, place, and time  Gait: Gait is intact     Psychiatric:         Mood and Affect: Mood normal          Behavior: Behavior normal

## 2023-06-05 NOTE — PATIENT INSTRUCTIONS
Take antibiotic as prescribed  Take steroids as prescribed  Use eye drops as prescribed  Can use in right eye if also develop symptoms  Wash pillow cases  Presumed contagious for 24 hours   Continue with supportive measures  Follow up with PCP in 3-5 days  Present to ER if symptoms worsen     Conjunctivitis   AMBULATORY CARE:   Conjunctivitis , or pink eye, is inflammation of your conjunctiva  The conjunctiva is a thin tissue that covers the front of your eye and the back of your eyelids  The conjunctiva helps protect your eye and keep it moist  Conjunctivitis may be caused by bacteria, allergies, or a virus  If your conjunctivitis is caused by bacteria, it may get better on its own in about 7 days  Viral conjunctivitis can last up to 3 weeks  Common symptoms may include any of the following: You will usually have symptoms in both eyes if your conjunctivitis is caused by allergies  You may also have other allergic symptoms, such as a rash or runny nose  Symptoms will usually start in 1 eye if your conjunctivitis is caused by a virus or bacteria  Redness in the whites of your eye    Itching in your eye or around your eye    Feeling like there is something in your eye    Watery or thick, sticky discharge    Crusty eyelids when you wake up in the morning    Burning, stinging, or swelling in your eye    Pain when you see bright light    Seek care immediately if:   You have worsening eye pain  The swelling in your eye gets worse, even after treatment  Your vision suddenly becomes worse or you cannot see at all  Call your doctor if:   You develop a fever and ear pain  You have tiny bumps or spots of blood on your eye  You have questions or concerns about your condition or care  Treatment  will depend on the cause of your conjunctivitis  You may need antibiotics or allergy medicine as a pill, eye drop, or eye ointment  Manage your symptoms:   Apply a cool compress    Wet a washcloth with cold water and place it on your eye  This will help decrease itching and irritation  Do not wear contact lenses  They can irritate your eye  Throw away the pair you are using and ask when you can wear them again  Use a new pair of lenses when your provider says it is okay  Avoid irritants  Stay away from smoke filled areas  Shield your eyes from wind and sun  Flush your eye  You may need to flush your eye with saline to help decrease your symptoms  Ask for more information on how to flush your eye  Medicines:  Treatment depends on what is causing your conjunctivitis  You may be given any of the following: Allergy medicine  helps decrease itchy, red, swollen eyes caused by allergies  It may be given as a pill, eye drops, or nasal spray  Antibiotics  may be needed if your conjunctivitis is caused by bacteria  This medicine may be given as a pill, eye drops, or eye ointment  Take your medicine as directed  Contact your healthcare provider if you think your medicine is not helping or if you have side effects  Tell your provider if you are allergic to any medicine  Keep a list of the medicines, vitamins, and herbs you take  Include the amounts, and when and why you take them  Bring the list or the pill bottles to follow-up visits  Carry your medicine list with you in case of an emergency  Prevent the spread of conjunctivitis:   Wash your hands with soap and water often  Wash your hands before and after you touch your eyes  Also wash your hands before you prepare or eat food and after you use the bathroom or change a diaper  Avoid allergens  Try to avoid the things that cause your allergies, such as pets, dust, or grass  Avoid contact with others  Do not share towels or washcloths  Try to stay away from others as much as possible  Ask when you can return to work or school  Throw away eye makeup  The bacteria that caused your conjunctivitis can stay in eye makeup   Throw away your current mascara and other eye makeup  Never share mascara or other eye makeup with anyone  Follow up with your doctor as directed:  Write down your questions so you remember to ask them during your visits  © Copyright Deena Marquez 2022 Information is for End User's use only and may not be sold, redistributed or otherwise used for commercial purposes  The above information is an  only  It is not intended as medical advice for individual conditions or treatments  Talk to your doctor, nurse or pharmacist before following any medical regimen to see if it is safe and effective for you  Sinusitis   AMBULATORY CARE:   Sinusitis  is inflammation or infection of your sinuses  Sinusitis is most often caused by a virus  Acute sinusitis may last up to 12 weeks  Chronic sinusitis lasts longer than 12 weeks  Recurrent sinusitis means you have 4 or more infections in 1 year  Common signs and symptoms:   Fever    Pain, pressure, redness, or swelling around the forehead, cheeks, or eyes    Thick yellow or green discharge from your nose    Tenderness when you touch your face over your sinuses    Dry cough that happens mostly at night or when you lie down    Headache and face pain that is worse when you lean forward    Tooth pain, or pain when you chew    Seek care immediately if:   You have trouble breathing or wheezing that is getting worse  You have a stiff neck, a fever, or a bad headache  You cannot open your eye  Your eyeball bulges out or you cannot move your eye  You are more sleepy than normal, or you notice changes in your ability to think, move, or talk  You have swelling of your forehead or scalp  Call your doctor if:   You have vision changes, such as double vision  Your eye and eyelid are red, swollen, and painful  Your symptoms do not improve or go away after 10 days  You have nausea and are vomiting  Your nose is bleeding      You have questions or concerns about your condition or care  Medicines: Your symptoms may go away on their own  Your healthcare provider may recommend watchful waiting for up to 10 days before starting antibiotics  You may need any of the following:  Acetaminophen  decreases pain and fever  It is available without a doctor's order  Ask how much to take and how often to take it  Follow directions  Read the labels of all other medicines you are using to see if they also contain acetaminophen, or ask your doctor or pharmacist  Acetaminophen can cause liver damage if not taken correctly  NSAIDs , such as ibuprofen, help decrease swelling, pain, and fever  This medicine is available with or without a doctor's order  NSAIDs can cause stomach bleeding or kidney problems in certain people  If you take blood thinner medicine, always ask your healthcare provider if NSAIDs are safe for you  Always read the medicine label and follow directions  Nasal steroid sprays  may help decrease inflammation in your nose and sinuses  Decongestants  help reduce swelling and drain mucus in the nose and sinuses  They may help you breathe easier  Antihistamines  help dry mucus in the nose and relieve sneezing  Antibiotics  help treat or prevent a bacterial infection  Self-care:   Rinse your sinuses as directed  Use a sinus rinse device to rinse your nasal passages with a saline (salt water) solution or distilled water  Do not use tap water  This will help thin the mucus in your nose and rinse away pollen and dirt  It will also help reduce swelling so you can breathe normally  Use a humidifier  to increase air moisture in your home  This may make it easier for you to breathe and help decrease your cough  Sleep with your head elevated  Place an extra pillow under your head before you go to sleep to help your sinuses drain  Drink liquids as directed    Ask your healthcare provider how much liquid to drink each day and which liquids are best for you  Liquids will thin the mucus in your nose and help it drain  Avoid drinks that contain alcohol or caffeine  Do not smoke, and avoid secondhand smoke  Nicotine and other chemicals in cigarettes and cigars can make your symptoms worse  Ask your healthcare provider for information if you currently smoke and need help to quit  E-cigarettes or smokeless tobacco still contain nicotine  Talk to your healthcare provider before you use these products  Prevent the spread of germs:   Wash your hands often with soap and water  Wash your hands after you use the bathroom, change a child's diaper, or sneeze  Wash your hands before you prepare or eat food  Stay away from people who are sick  Some germs spread easily and quickly through contact  Follow up with your doctor as directed: You may be referred to an ear, nose, and throat specialist  Write down your questions so you remember to ask them during your visits  © Copyright Cherelle Blas 2022 Information is for End User's use only and may not be sold, redistributed or otherwise used for commercial purposes  The above information is an  only  It is not intended as medical advice for individual conditions or treatments  Talk to your doctor, nurse or pharmacist before following any medical regimen to see if it is safe and effective for you

## 2023-06-05 NOTE — LETTER
June 5, 2023     Patient: Matthias Cornell   YOB: 1965   Date of Visit: 6/5/2023       To Whom it May Concern:    Matthias Cornell was seen in my clinic on 6/5/2023  He may return to work on 6/6/2023   If you have any questions or concerns, please don't hesitate to call           Sincerely,          LESLY Lin        CC: No Recipients

## 2023-06-26 ENCOUNTER — TELEPHONE (OUTPATIENT)
Dept: PSYCHIATRY | Facility: CLINIC | Age: 58
End: 2023-06-26

## 2023-06-26 NOTE — TELEPHONE ENCOUNTER
INTEREST LETTER MAILED TO Diania Showers ON 6/27/2023    ADDRESS: [address] [place] [address]  [address] [facility]

## 2023-07-20 ENCOUNTER — TELEMEDICINE (OUTPATIENT)
Dept: PSYCHIATRY | Facility: CLINIC | Age: 58
End: 2023-07-20

## 2023-07-20 DIAGNOSIS — F31.12 BIPOLAR DISORDER, CURR EPISODE MANIC W/O PSYCHOTIC FEATURES, MODERATE (HCC): Primary | ICD-10-CM

## 2023-07-20 NOTE — BH CRISIS PLAN
Client Name: Dhruv Nam       Client YOB: 1965  : 1965    Treatment Team (include name and contact information):     Psychotherapist: Samson Powell    Psychiatrist: Dr Kylie Berman   Release of information completed: yes        Healthcare Provider  Aristeo Wagner MD  [address] [address]  [phone]    Type of Plan   * Child plans (children 15 yo and younger) must be completed and signed by the child's legal guardian   * Plans for all individuals 15 yo and above must be signed by the client. Plan Type: adolescent/adult (15 and over) Initial      My Personal Strengths are (in the client's own words):  "talkative, care about people, good grandfather"  The stressors and triggers that may put me at risk are:  job loss and relationship problems    Coping skills I can use to keep myself calm and safe:  Listen to music, Physical activity and [place]/meditate    Coping skills/supports I can use to maintain abstinence from substance use:   Not Applicable    The people that provide me with help and support: (Include name, contact, and how they can help)   Support person #1: [facility] my wife    * Phone number: in cell phone    * How can they help me? In the past, the following has helped me in times of crisis:    Being in a quiet space and Listening to music      If it is an emergency and you need immediate help, call     If there is a possibility of danger to yourself or others, call the following crisis hotline resources:     Adult Crisis Numbers  Suicide Prevention Hotline - Dial   [facility]: [address] [place] [address] Street: [address] [facility] [address]: [address]: [phone]  [address]: [address]: [address]: [address]: [phone] (daytime).        [phone] (after hours, weekends, holidays)     Child/Adolescent Crisis Numbers   [facility]: [address]: [phone]   [employer]: [address]: [phone]    Please note: Some counties do not have a separate number for Child/Adolescent specific crisis. If your county is not listed under Child/Adolescent, please call the adult number for your county     National Talk to Text Line   All Ages - 204-0    In the event your feelings become unmanageable, and you cannot reach your support system, you will call 911 immediately or go to the nearest hospital emergency room.

## 2023-07-20 NOTE — BH TREATMENT PLAN
Outpatient Behavioral Health Psychotherapy Treatment Plan    Alina Connors  1965     Date of Initial Psychotherapy Assessment: 11/18/2022  Date of Current Treatment Plan: 07/20/23  Treatment Plan Target Date: 7/20/2024  Treatment Plan Expiration Date: 12/31/2023    Diagnosis:   1. Bipolar disorder, curr episode manic w/o psychotic features, moderate (HCC)            Area(s) of Need:   Work on relationship with my wife to decide to end it or not. Work on impulse control    Long Term Goal 1 (in the client's own words): I want to improve interpersonal relationships, impulse control and anger management. Stage of Change: Contemplation    Target Date for completion: to be determined     Anticipated therapeutic modalities: DBT     People identified to complete this goal: therapist, Ashwin Miller, prescriber      Objective 1: (identify the means of measuring success in meeting the objective): Therapist will engage Ashwin Miller in [address] therapy to help with interpersonal effectiveness and impulsivity, including emotional regulation, interpersonal effectiveness, distress tolerance and mindfulness. Client will learn DBT concepts and skills  Client will practice skills and assess effectiveness with therapist biweekly and adjust skills acquisition as needed. Client will demonstrate successful use of DBT skills in at least 8 out of 10 challenging situations. I am currently under the care of a [facility] psychiatric provider: yes    My . [facility] psychiatric provider is: Chastity Fuentes     I am currently taking psychiatric medications: No    I feel that I will be ready for discharge from mental health care when I reach the following (measurable goal/objective): When my impulsivity and anger are improved    For children and adults who have a legal guardian:   Has there been any change to custody orders and/or guardianship status? NA. If yes, attach updated documentation.     I have updated my Crisis Plan and have been offered a copy of this plan    Behavioral Health Treatment Plan [facility]: Diagnosis and Treatment Plan explained to Raymundo Gomez acknowledges an understanding of their diagnosis. Jeff Lutz agrees to this treatment plan. I have been offered a copy of this Treatment Plan. Yes    Jeff Lutz, 1965, actively participated in the review and update of this treatment plan during a virtual session, using the [employer] platform. Jeff Lutz  provided verbal consent on 7/20/2023 at 4:26 PM. The treatment plan was transcribed into the [address] [place] [address] Real Record at a later time.
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
## 2023-07-26 ENCOUNTER — OFFICE VISIT (OUTPATIENT)
Dept: PODIATRY | Facility: CLINIC | Age: 58
End: 2023-07-26
Payer: COMMERCIAL

## 2023-07-26 VITALS
WEIGHT: 150 LBS | HEART RATE: 56 BPM | DIASTOLIC BLOOD PRESSURE: 99 MMHG | HEIGHT: 71 IN | BODY MASS INDEX: 21 KG/M2 | SYSTOLIC BLOOD PRESSURE: 170 MMHG

## 2023-07-26 DIAGNOSIS — L84 FOOT CALLUS: Primary | ICD-10-CM

## 2023-07-26 PROCEDURE — 99212 OFFICE O/P EST SF 10 MIN: CPT | Performed by: STUDENT IN AN ORGANIZED HEALTH CARE EDUCATION/TRAINING PROGRAM

## 2023-08-03 ENCOUNTER — TELEMEDICINE (OUTPATIENT)
Dept: PSYCHIATRY | Facility: CLINIC | Age: 58
End: 2023-08-03
Payer: COMMERCIAL

## 2023-08-03 DIAGNOSIS — F31.12 BIPOLAR DISORDER, CURR EPISODE MANIC W/O PSYCHOTIC FEATURES, MODERATE (HCC): Primary | ICD-10-CM

## 2023-08-03 PROCEDURE — 90834 PSYTX W PT 45 MINUTES: CPT

## 2023-08-03 NOTE — PSYCH
Behavioral Health Psychotherapy Progress Note    Psychotherapy Provided: Individual Psychotherapy     1. 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 Sharon Nneka got annoyed with Moy Dunaway and ripped some of his work off the wall and Moy Dunaway got angry with him, and Sharon Early said just because his wife doesn't want to have sex with him isn't a good reason to get angry. He took MARZENA and PHQ and scored a 12 and 20 respectively. He shared that he has gotten out of depression before by getting out of himself, doing things for others, and being grateful for what he has. He shared he wants to get a divorce but is afraid to because of losing contact with the grandkids and still loves his wife. During this session, this clinician used the following therapeutic modalities: Supportive Psychotherapy    Substance Abuse was not addressed during this session. If the client is diagnosed with a co-occurring substance use disorder, please indicate any changes in the frequency or amount of use:  Stage of change for addressing substance use diagnoses: No substance use/Not applicable    ASSESSMENT:  Toni Rojo presents with a Euthymic/ normal mood. his affect is Normal range and intensity, which is congruent, with his mood and the content of the session. The client has made progress on their goals. Toni Rojo presents with a none risk of suicide, none risk of self-harm, and none risk of harm to others. For any risk assessment that surpasses a "low" rating, a safety plan must be developed.     A safety plan was indicated: no  If yes, describe in detail     PLAN: Between sessions, Toni Rojo will use gratitude and service to others. At the next session, the therapist will use Dialectical Behavior Therapy and Mindfulness-based Strategies to address depression. Behavioral Health Treatment Plan and Discharge Planning: Tamy Segura is aware of and agrees to continue to work on their treatment plan. They have identified and are working toward their discharge goals. yes    Visit start and stop times:    08/03/23       Virtual Regular Visit    Verification of patient location:    Patient is located at Home in the following state in which I hold an active license [place]      Assessment/Plan:    Problem List Items Addressed This Visit        Other    Bipolar disorder, curr episode manic w/o psychotic features, moderate ([address]) - Primary       Goals addressed in session: Goal 1          Reason for visit is No chief complaint on file. Encounter provider Mitzy Tapia LCSW    Provider located at [address] [place] [id number] [phone]      Recent Visits  No visits were found meeting these conditions. Showing recent visits within past 7 days and meeting all other requirements  Future Appointments  No visits were found meeting these conditions. Showing future appointments within next 150 days and meeting all other requirements       The patient was identified by name and date of birth. Tamy Segura was informed that this is a telemedicine visit and that the visit is being conducted throughthe [employer] platform. He agrees to proceed. .  My office door was closed. No one else was in the room. He acknowledged consent and understanding of privacy and security of the video platform. The patient has agreed to participate and understands they can discontinue the visit at any time. Patient is aware this is a billable service. Subjective   .       HPI     Past Medical History:   Diagnosis Date   • DJD (degenerative joint disease)    • Dyslipidemia    • History of hepatitis C    • Hypertension    • 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[address])    • Toxoplasmosis chorioretinitis of left eye        Past Surgical History:   Procedure Laterality Date   • CARPAL TUNNEL RELEASE Left    • CATARACT EXTRACTION Left    • COLONOSCOPY     • HERNIA REPAIR     • PLANTAR FASCIA SURGERY Left        Current Outpatient Medications   Medication Sig Dispense Refill   • aspirin 81 mg chewable tablet Chew 1 tablet (81 mg total) daily (Patient not taking: Reported on 6/5/2023) 30 tablet 0   • B Complex-C (B-complex with vitamin C) tablet Take 1 tablet by mouth daily     • buPROPion (Wellbutrin XL) 150 mg 24 hr tablet 1 po qd (Patient not taking: Reported on 7/26/2023) 60 tablet 1   • Glucosamine-Chondroitin (GLUCOSAMINE CHONDR COMPLEX PO) Take by mouth     • lisinopril (ZESTRIL) 10 mg tablet Take 10 mg by mouth daily (Patient not taking: Reported on 7/26/2023)     • Magnesium 500 MG CAPS 1 po qd 90 capsule 0   • Omega-3 Fatty Acids (fish oil) 1,000 mg Take 1,000 mg by mouth daily     • sildenafil (VIAGRA) 50 MG tablet Take 1 tablet (50 mg total) by mouth daily as needed for erectile dysfunction Take 1 tablet 2 hours after meal and 1 hour before sexual intercourse. If this is not effective you can increase by half a tablet per encounter up to a maximum of 2 tablets or 100 mg. Do not take more than once in 24 hours and limit to 2-3 occurrences per week (Patient not taking: Reported on 6/5/2023) 30 tablet 12   • zinc gluconate 50 mg tablet Take 50 mg by mouth daily (Patient not taking: Reported on 6/5/2023)       No current facility-administered medications for this visit. Allergies   Allergen Reactions   • Latex Hives       Review of Systems    Video Exam    There were no vitals filed for this visit.     Physical Exam

## 2023-08-09 NOTE — PROGRESS NOTES
Assessment/Plan:    Problem List Items Addressed This Visit     None      Visit Diagnoses     Encounter to establish care    -  Primary    Encounter for hepatitis C screening test for low risk patient        Screening for HIV (human immunodeficiency virus)        Relevant Orders    HIV 1/2 Antigen/Antibody (4th Generation) w Reflex [facility]    Screening for colon cancer        Screening for deficiency anemia        Relevant Orders    CBC and differential    Screening for diabetes mellitus        Relevant Orders    Comprehensive metabolic panel    Screening for hyperlipidemia        Relevant Orders    Lipid panel    Screening for prostate cancer        Relevant Orders    PSA, Total Screen    Screening for thyroid disorder        Relevant Orders    TSH, 3rd generation with Free T4 reflex    Current moderate episode of major depressive disorder without prior episode (HCC)        Relevant Medications    buPROPion (WELLBUTRIN SR) 150 mg 12 hr tablet    Erectile dysfunction, unspecified erectile dysfunction type        Relevant Orders    Testosterone    Testosterone, free, total           Diagnoses and all orders for this visit:    Encounter to establish care    Encounter for hepatitis C screening test for low risk patient    Screening for HIV (human immunodeficiency virus)  -     HIV 1/2 Antigen/Antibody (4th Generation) w Reflex [facility]; Future    Screening for colon cancer    Screening for deficiency anemia  -     CBC and differential; Future    Screening for diabetes mellitus  -     Comprehensive metabolic panel; Future    Screening for hyperlipidemia  -     Lipid panel; Future    Screening for prostate cancer  -     PSA, Total Screen; Future    Screening for thyroid disorder  -     TSH, 3rd generation with Free T4 reflex; Future    Current moderate episode of major depressive disorder without prior episode (HCC)  -     buPROPion (WELLBUTRIN SR) 150 mg 12 hr tablet;  Take 1 tablet (150 mg total) by mouth 2 (two) times a day    Erectile dysfunction, unspecified erectile dysfunction type  -     Testosterone; Future  -     Testosterone, free, total; Future    Other orders  -     Discontinue: buPROPion (WELLBUTRIN SR) 150 mg 12 hr tablet; Take 150 mg by mouth 2 (two) times a day        No problem-specific Assessment & Plan notes found for this encounter  Subjective:      Patient ID: Nicola Choi is a 64 y o  male  Presents today to establish care at this office and routine visit  Patient was previously treated for hypertension with lisinopril  Stated that the lisinopril made him feel dizzy he quit taking that  Did have a follow-up with his PCP several months later and found the blood pressure was normal   Patient did quit smoking red redness time and anti hypertension medications were discontinued  Patient does complain of erectile issues  He states his want for coitus has not decreased but his ability to perform has declined  Pt did have US of scrotum 3/2021 which showed bilateral varicoceles  Patient will monitor his blood pressure bring his machine for calibration for next visit  If blood pressures are elevated we will restart blood pressure lowering medication  Erectile Dysfunction  This is a chronic problem  The current episode started more than 1 month ago  The nature of his difficulty is maintaining erection  Non-physiologic factors contributing to erectile dysfunction are performance anxiety  He reports no anxiety or decreased libido  He reports his erection duration to be more than 10 minutes  Irritative symptoms do not include frequency, nocturia or urgency  Obstructive symptoms include a slower stream  Obstructive symptoms do not include dribbling, incomplete emptying, an intermittent stream, straining or a weak stream  Pertinent negatives include no genital pain or hesitancy  The symptoms are aggravated by stress  Risk factors include tobacco use         The following portions of the patient's Instructions: This plan will send the code FBSE to the PM system.  DO NOT or CHANGE the price. history were reviewed and updated as appropriate:   He has a past medical history of History of hepatitis C, 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[address] ), and Toxoplasmosis chorioretinitis of left eye ,  does not have a problem list on file  ,   has a past surgical history that includes Cataract extraction (Left); Hernia repair; Carpal tunnel release (Left); and Plantar fascia surgery (Left)  ,  family history includes Dementia in his father; Kidney disease in his mother; Lung cancer in his mother  ,   reports that he quit smoking about 2 years ago  His smoking use included cigarettes  He quit after 10 00 years of use  He has never used smokeless tobacco  He reports previous alcohol use  He reports previous drug use ,  has No Known Allergies     Current Outpatient Medications   Medication Sig Dispense Refill    buPROPion (WELLBUTRIN SR) 150 mg 12 hr tablet Take 1 tablet (150 mg total) by mouth 2 (two) times a day 180 tablet 1     No current facility-administered medications for this visit  Depression Screening and Follow-up Plan: Patient was screened for depression during today's encounter  They screened negative with a PHQ-2 score of 0  Review of Systems   Constitutional: Negative  HENT: Negative  Eyes: Negative  Respiratory: Negative  Cardiovascular: Negative  Gastrointestinal: Negative  Endocrine: Negative  Genitourinary: Negative  Negative for decreased libido, frequency, hesitancy, incomplete emptying, nocturia and urgency  Musculoskeletal: Negative  Skin: Negative  Allergic/Immunologic: Negative  Neurological: Negative  Hematological: Negative  Psychiatric/Behavioral: Negative  The patient is not nervous/anxious            Objective:  Vitals:    01/24/22 1341 01/24/22 1424   BP: 156/86 156/90   BP Location: Left arm    Patient Position: Sitting    Cuff Size: Standard    Pulse: 69    Temp: 98 8 °F (37 1 °C)    TempSrc: Tympanic    SpO2: 96%    Weight: 74 6 kg (164 lb 6 4 oz) Height: 5' 11" (1 803 m)      Body mass index is 22 93 kg/m²  Physical Exam  Vitals and nursing note reviewed  Constitutional:       Appearance: Normal appearance  He is well-developed  Interventions: Face mask in place  HENT:      Head: Normocephalic and atraumatic  Right Ear: Tympanic membrane, ear canal and external ear normal       Left Ear: Tympanic membrane, ear canal and external ear normal       Nose: Nose normal       Mouth/Throat:      Mouth: Mucous membranes are moist       Pharynx: Uvula midline  Eyes:      General: Lids are normal       Conjunctiva/sclera: Conjunctivae normal       Pupils: Pupils are equal, round, and reactive to light  Neck:      Thyroid: No thyroid mass  Vascular: No JVD  Trachea: Trachea and phonation normal    Cardiovascular:      Rate and Rhythm: Normal rate and regular rhythm  Pulses: Normal pulses  Heart sounds: Normal heart sounds, S1 normal and S2 normal  No murmur heard  No friction rub  No gallop  Pulmonary:      Effort: Pulmonary effort is normal       Breath sounds: Normal breath sounds  Abdominal:      General: Bowel sounds are normal       Palpations: Abdomen is soft  Tenderness: There is no abdominal tenderness  Genitourinary:     Comments: Deferred  Musculoskeletal:         General: Normal range of motion  Cervical back: Full passive range of motion without pain, normal range of motion and neck supple  Right lower leg: No edema  Left lower leg: No edema  Lymphadenopathy:      Head:      Right side of head: No submental, submandibular, tonsillar, preauricular, posterior auricular or occipital adenopathy  Left side of head: No submental, submandibular, tonsillar, preauricular, posterior auricular or occipital adenopathy  Cervical: No cervical adenopathy  Skin:     General: Skin is warm and dry  Capillary Refill: Capillary refill takes less than 2 seconds     Neurological: Detail Level: Simple General: No focal deficit present  Mental Status: He is alert and oriented to person, place, and time  Cranial Nerves: Cranial nerves are intact  Sensory: Sensation is intact  Motor: Motor function is intact  Coordination: Coordination is intact  Gait: Gait is intact  Psychiatric:         Attention and Perception: Attention and perception normal          Mood and Affect: Mood and affect normal          Speech: Speech normal          Behavior: Behavior normal  Behavior is cooperative  Thought Content:  Thought content normal          Cognition and Memory: Cognition normal          Judgment: Judgment normal  Price (Do Not Change): 0.00

## 2023-10-20 ENCOUNTER — TELEPHONE (OUTPATIENT)
Dept: PSYCHIATRY | Facility: CLINIC | Age: 58
End: 2023-10-20

## 2023-10-20 NOTE — TELEPHONE ENCOUNTER
DISCHARGE LETTER for [facility] (certified) placed in outgoing mail on 10/24/2023    Article #:  [id number]    Address:  [address] [place], [place] [address] Brigitte Booker.  [address]

## 2023-10-24 ENCOUNTER — HOSPITAL ENCOUNTER (EMERGENCY)
Facility: HOSPITAL | Age: 58
Discharge: HOME | End: 2023-10-24
Attending: EMERGENCY MEDICINE | Admitting: EMERGENCY MEDICINE
Payer: COMMERCIAL

## 2023-10-24 VITALS
BODY MASS INDEX: 20.72 KG/M2 | WEIGHT: 148 LBS | TEMPERATURE: 98 F | HEIGHT: 71 IN | SYSTOLIC BLOOD PRESSURE: 166 MMHG | HEART RATE: 60 BPM | DIASTOLIC BLOOD PRESSURE: 96 MMHG | OXYGEN SATURATION: 99 % | RESPIRATION RATE: 18 BRPM

## 2023-10-24 DIAGNOSIS — F19.90 SUBSTANCE USE: Primary | ICD-10-CM

## 2023-10-24 PROCEDURE — 99284 EMERGENCY DEPT VISIT MOD MDM: CPT

## 2023-10-24 PROCEDURE — 99284 EMERGENCY DEPT VISIT MOD MDM: CPT | Performed by: EMERGENCY MEDICINE

## 2023-10-24 NOTE — ED NOTES
Upon assessment, patient endorses daily cocaine use, nitrous oxide inhalation as well as cannibis use. States he has been using for "a while"  unable to identify exact length of time.   Thought process racing at times     John Garza RN  10/24/23 5224       John Garza RN  10/24/23 201 Chandan Espinosa RN  10/24/23 [id number]

## 2023-10-24 NOTE — DISCHARGE INSTRUCTIONS
Return immediately if worse or any new symptoms or reconsider further evaluation  Normal hydration  Please restart your antihypertensive  Contact your physician today to arrange appointment within the next few days  Psychiatric follow-up as currently scheduled    [facility]  [address]  On license of [facility], [address] [place] [address]    [phone]

## 2023-10-24 NOTE — ED NOTES
Patient admitting to cocaine use yesterday - does not want any drug rehab or referrals.      Percy Mckeonr, [address]  10/24/23 3722

## 2023-10-24 NOTE — ED PROVIDER NOTES
History  Chief Complaint   Patient presents with    Toxic Inhalation     Pt reports being at a rave over the week & states he inhaled too much nitrous oxide     49-year-old male describes feeling off in his head, woke 2 days ago feeling fatigued and similarly. Notes it began after a rave 3 days ago, slept very little, notes he was snorting cocaine and multitude of nitrous balloons. History of bipolar disorder, not currently taking medicines. Has not taken antihypertensives in 3-4 months, was feeling well, working out, using supplements. He describes intermittent headache, not currently. No vision changes. No numbness or weakness. Notes difficulty concentrating. History provided by:  Patient  Medical Problem  Location:  Generalized  Quality:  Fuzzy in head, feels off, dry mouth  Severity:  Unable to specify  Onset quality:  Unable to specify  Duration:  2 days  Timing:  Constant  Progression:  Waxing and waning  Chronicity:  New  Context:  Polysubstance use  Relieved by:  Nothing  Worsened by:  Nothing  Ineffective treatments:  None  Associated symptoms comment:  Denies suicidal thoughts, ideations. No harmful or aggressive thoughts or urges  Risk factors:  Uncontrolled hypertension. Psychiatric medication noncompliance      Prior to Admission Medications   Prescriptions Last Dose Informant Patient Reported? Taking?    B Complex-C (B-complex with vitamin C) tablet  Self Yes No   Sig: Take 1 tablet by mouth daily   Glucosamine-Chondroitin (GLUCOSAMINE CHONDR COMPLEX PO) Not Taking Self Yes No   Sig: Take by mouth   Patient not taking: Reported on 10/24/2023   Magnesium 500 MG CAPS   No No   Si po qd   Omega-3 Fatty Acids (fish oil) 1,000 mg  Self Yes No   Sig: Take 1,000 mg by mouth daily   aspirin 81 mg chewable tablet Not Taking Self No No   Sig: Chew 1 tablet (81 mg total) daily   Patient not taking: Reported on 2023   buPROPion (Wellbutrin XL) 150 mg 24 hr tablet Not Taking  No No   Si po qd   Patient not taking: Reported on 7/26/2023   lisinopril (ZESTRIL) 10 mg tablet Not Taking  Yes No   Sig: Take 10 mg by mouth daily   Patient not taking: Reported on 7/26/2023   sildenafil (VIAGRA) 50 MG tablet Not Taking  No No   Sig: Take 1 tablet (50 mg total) by mouth daily as needed for erectile dysfunction Take 1 tablet 2 hours after meal and 1 hour before sexual intercourse. If this is not effective you can increase by half a tablet per encounter up to a maximum of 2 tablets or 100 mg. Do not take more than once in 24 hours and limit to 2-3 occurrences per week   Patient not taking: Reported on 6/5/2023   zinc gluconate 50 mg tablet Not Taking Self Yes No   Sig: Take 50 mg by mouth daily   Patient not taking: Reported on 6/5/2023      Facility-Administered Medications: None       Past Medical History:   Diagnosis Date    DJD (degenerative joint disease)     Dyslipidemia     History of hepatitis C     Hypertension     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[address])     Toxoplasmosis chorioretinitis of left eye        Past Surgical History:   Procedure Laterality Date    CARPAL TUNNEL RELEASE Left     CATARACT EXTRACTION Left     COLONOSCOPY      HERNIA REPAIR      PLANTAR FASCIA SURGERY Left        Family History   Problem Relation Age of Onset    Lung cancer Mother     Kidney disease Mother         drug induced    COPD Mother     Dementia Father      I have reviewed and agree with the history as documented.     E-Cigarette/Vaping    E-Cigarette Use Never User      E-Cigarette/Vaping Substances    Nicotine No     THC No     CBD No     Flavoring No     Other No     Unknown No      Social History     Tobacco Use    Smoking status: Former     Years: 10.00     Types: Cigarettes     Quit date: 01/2020     Years since quitting: 3.8    Smokeless tobacco: Never   Vaping Use    Vaping Use: Never used   Substance Use Topics    Alcohol use: Not Currently    Drug use: Not Currently     Types: Marijuana       Review of Systems    Physical Exam  Physical Exam  Vitals and nursing note reviewed. Constitutional:       Comments: Pleasant, comfortable-appearing   HENT:      Head: Normocephalic and atraumatic. Mouth/Throat:      Mouth: Mucous membranes are moist.      Pharynx: Oropharynx is clear. Eyes:      Conjunctiva/sclera: Conjunctivae normal.      Pupils: Pupils are equal, round, and reactive to light. Cardiovascular:      Rate and Rhythm: Normal rate and regular rhythm. Heart sounds: Normal heart sounds. Pulmonary:      Effort: Pulmonary effort is normal.      Breath sounds: Normal breath sounds. Abdominal:      General: Bowel sounds are normal. There is no distension. Palpations: Abdomen is soft. Tenderness: There is no abdominal tenderness. Musculoskeletal:         General: No deformity. Cervical back: Neck supple. No rigidity. Skin:     General: Skin is warm and dry. Neurological:      General: No focal deficit present. Mental Status: He is alert and oriented to person, place, and time. Cranial Nerves: No cranial nerve deficit. Sensory: No sensory deficit. Motor: No weakness. Coordination: Coordination normal.      Gait: Gait normal.   Psychiatric:         Behavior: Behavior normal.         Thought Content:  Thought content normal.         Judgment: Judgment normal.         Vital Signs  ED Triage Vitals [10/24/23 0807]   Temperature Pulse Respirations Blood Pressure SpO2   98 °F (36.7 °C) (!) 52 18 (!) 203/98 98 %      Temp Source Heart Rate Source Patient Position - Orthostatic VS BP Location FiO2 (%)   Tympanic Monitor Sitting Right arm --      Pain Score       No Pain           Vitals:    10/24/23 0807 10/24/23 0809 10/24/23 0830   BP: (!) 203/98 166/96    Pulse: (!) 52 59 60   Patient Position - Orthostatic VS: Sitting  Sitting         Visual Acuity  Visual Acuity      Flowsheet Row Most Recent Value   L Pupil Size (mm) 2   R Pupil Size (mm) 2            ED Medications  Medications - No data to display    Diagnostic Studies  Results Reviewed       None                   No orders to display              Procedures  Procedures         ED Course  ED Course as of 10/24/23 0919   Tue Oct 24, 2023   0917 We discussed presentation, history and exam findings and further evaluation and defers blood work and radiology type testing. Voices good understanding to return if worse, any new symptoms or reconsiders. He exits department appearing comfortable, pleasant demeanor                  Stroke Assessment       Row Name 10/24/23 6072             NIH Stroke Scale    Interval Baseline      Level of Consciousness (1a.) 0      LOC Questions (1b.) 0      LOC Commands (1c.) 0      Best Gaze (2.) 0      Visual (3.) 0      Facial Palsy (4.) 0      Motor Arm, Left (5a.) 0      Motor Arm, Right (5b.) 0      Motor Leg, Left (6a.) 0      Motor Leg, Right (6b.) 0      Limb Ataxia (7.) 0      Sensory (8.) 0      Best Language (9.) 0      Dysarthria (10.) 0      Extinction and Inattention (11.) (Formerly Neglect) 0      Total 0                                SBIRT 20yo+      Flowsheet Row Most Recent Value   Initial Alcohol Screen: US AUDIT-C     1. How often do you have a drink containing alcohol? 1 Filed at: 10/24/2023 0806   2. How many drinks containing alcohol do you have on a typical day you are drinking? 1 Filed at: 10/24/2023 0806   3a. Male UNDER 65: How often do you have five or more drinks on one occasion? 0 Filed at: 10/24/2023 0806   3b. FEMALE Any Age, or MALE 65+: How often do you have 4 or more drinks on one occassion? 0 Filed at: 10/24/2023 0806   Audit-C Score 2 Filed at: 10/24/2023 0786   KAROLINA: How many times in the past year have you. .. Used an illegal drug or used a prescription medication for non-medical reasons? Monthly Filed at: 10/24/2023 0806   DAST-10: In the past 12 months. ..    1. Have you used drugs other than those required for medical reasons?  1 Filed at: 10/24/2023 0806   2. Do you use more than one drug at a time? 1 Filed at: 10/24/2023 0806   3. Have you had medical problems as a result of your drug use (e.g., memory loss, hepatitis, convulsions, bleeding, etc.)? 0 Filed at: 10/24/2023 0806   4. Have you had "blackouts" or "flashbacks" as a result of drug use? YesNo 1 Filed at: 10/24/2023 0806   5. Do you ever feel bad or guilty about your drug use? 0 Filed at: 10/24/2023 0806   6. Does your spouse (or parent) ever complain about your involvement with drugs? 0 Filed at: 10/24/2023 0806   7. Have you neglected your family because of your use of drugs? 0 Filed at: 10/24/2023 0806   8. Have you engaged in illegal activities in order to obtain drugs? 0 Filed at: 10/24/2023 0806   9. Have you ever experienced withdrawal symptoms (felt sick) when you stopped taking drugs? 0 Filed at: 10/24/2023 0806   10. Are you always able to stop using drugs when you want to? 0 Filed at: 10/24/2023 0806   DAST-10 Score 3 Filed at: 10/24/2023 6231                      Medical Decision Making           Disposition  Final diagnoses:   Substance use     Time reflects when diagnosis was documented in both MDM as applicable and the Disposition within this note       Time User Action Codes Description Comment    10/24/2023  8:39 AM Lori Castillo [F19.90] Substance use           ED Disposition       ED Disposition   Discharge    Condition   Stable    Date/Time   Tue Oct 24, 2023 [facility] discharge to home/self care.                    Follow-up Information       Follow up With Specialties Details Why Contact Info    Ag Arita MD Family Medicine Schedule an appointment as soon as possible for a visit in 3 days  [address] [place] [address] Road  [phone]              Discharge Medication List as of 10/24/2023  8:40 AM        CONTINUE these medications which have NOT CHANGED    Details   aspirin 81 mg chewable tablet Chew 1 tablet (81 mg total) daily, Starting Thu 9/1/2022, Normal      B Complex-C (B-complex with vitamin C) tablet Take 1 tablet by mouth daily, Historical Med      buPROPion (Wellbutrin XL) 150 mg 24 hr tablet 1 po qd, Normal      Glucosamine-Chondroitin (GLUCOSAMINE CHONDR COMPLEX PO) Take by mouth, Historical Med      lisinopril (ZESTRIL) 10 mg tablet Take 10 mg by mouth daily, Historical Med      Magnesium 500 MG CAPS 1 po qd, No Print      Omega-3 Fatty Acids (fish oil) 1,000 mg Take 1,000 mg by mouth daily, Historical Med      sildenafil (VIAGRA) 50 MG tablet Take 1 tablet (50 mg total) by mouth daily as needed for erectile dysfunction Take 1 tablet 2 hours after meal and 1 hour before sexual intercourse. If this is not effective you can increase by half a tablet per encounter up to a maximum of 2 tablets or  100 mg. Do not take more than once in 24 hours and limit to 2-3 occurrences per week, Starting Mon 11/21/2022, Normal      zinc gluconate 50 mg tablet Take 50 mg by mouth daily, Historical Med             No discharge procedures on file.     PDMP Review         Value Time User    PDMP Reviewed  Yes 9/1/2022 11:54 AM Cinthya Carvajal, [address] [place] [address]            ED Provider  Electronically Signed by             Lori Triplett DO  10/24/23 4451

## 2023-11-08 ENCOUNTER — DOCUMENTATION (OUTPATIENT)
Dept: PSYCHIATRY | Facility: CLINIC | Age: 58
End: 2023-11-08

## 2023-11-08 DIAGNOSIS — F31.12 BIPOLAR DISORDER, CURR EPISODE MANIC W/O PSYCHOTIC FEATURES, MODERATE (HCC): Primary | ICD-10-CM

## 2023-11-20 NOTE — PROGRESS NOTES
Psychotherapy Discharge Summary    Preferred Name: Angelique Anguiano  YOB: 1965    Admission date to psychotherapy: 11/4/2022    Referred by: self    Presenting Problem: bipolar disorder    Course of treatment included : individual therapy     Progress/Outcome of Treatment Goals (brief summary of course of treatment) patient stopped coming to appointments    Treatment Complications (if any): n/a    Treatment Progress:  unable to assess    Current [facility] Provider: [facility]    Discharge Medications include: buPROPion (Wellbutrin XL) 150mg 24 hour tablet; 1 po qd    Discharge Date: 11/20/2023    Discharge Diagnosis: No diagnosis found.     Criteria for Discharge: demonstrated failure to uphold their treatment plan/contract    Aftercare recommendations include (include specific referral names and phone numbers, if appropriate): return to [facility] treatment as needed    Prognosis:  unable to assess

## 2023-11-30 ENCOUNTER — TELEPHONE (OUTPATIENT)
Dept: FAMILY MEDICINE CLINIC | Facility: CLINIC | Age: 58
End: 2023-11-30

## 2023-11-30 DIAGNOSIS — R74.8 ELEVATED LIVER ENZYMES: Primary | ICD-10-CM

## 2023-11-30 NOTE — TELEPHONE ENCOUNTER
Pt called had labs done at [facility] and is concerned about his renal function. Is asking if provider can look over labs and see if all looks okay. Please advise.

## 2023-12-06 ENCOUNTER — OFFICE VISIT (OUTPATIENT)
Dept: FAMILY MEDICINE CLINIC | Facility: CLINIC | Age: 58
End: 2023-12-06
Payer: COMMERCIAL

## 2023-12-06 VITALS
HEIGHT: 71 IN | WEIGHT: 156.2 LBS | DIASTOLIC BLOOD PRESSURE: 76 MMHG | OXYGEN SATURATION: 97 % | BODY MASS INDEX: 21.87 KG/M2 | HEART RATE: 55 BPM | SYSTOLIC BLOOD PRESSURE: 125 MMHG

## 2023-12-06 DIAGNOSIS — H10.33 ACUTE BACTERIAL CONJUNCTIVITIS OF BOTH EYES: Primary | ICD-10-CM

## 2023-12-06 PROCEDURE — 99213 OFFICE O/P EST LOW 20 MIN: CPT | Performed by: FAMILY MEDICINE

## 2023-12-06 RX ORDER — CIPROFLOXACIN HYDROCHLORIDE 3.5 MG/ML
1 SOLUTION/ DROPS TOPICAL 4 TIMES DAILY
Qty: 2.5 ML | Refills: 0 | Status: SHIPPED | OUTPATIENT
Start: 2023-12-06 | End: 2023-12-13

## 2023-12-08 NOTE — PROGRESS NOTES
Assessment/Plan:       1. Acute bacterial conjunctivitis of both eyes  -     ciprofloxacin (CILOXAN) 0.3 % ophthalmic solution; Administer 1 drop to both eyes 4 (four) times a day for 7 days          Subjective:      Patient ID: Michael Shipley is a 62 y.o. male. The patient has some redness purulent drainage from the right eyelid for several days he is some Cipro drops he had at home he has had this in the past because of a history of toxoplasmosis in his eyes. Eye Problem         The following portions of the patient's history were reviewed and updated as appropriate: allergies, current medications, past family history, past medical history, past social history, past surgical history, and problem list.    Review of Systems   Respiratory: Negative. Cardiovascular: Negative. Objective:      /76 (BP Location: Right arm, Patient Position: Sitting, Cuff Size: Large)   Pulse 55   Ht 5' 11" (1.803 m)   Wt 70.9 kg (156 lb 3.2 oz)   SpO2 97%   BMI 21.79 kg/m²          Physical Exam  Vitals and nursing note reviewed. Constitutional:       Appearance: Normal appearance. HENT:      Head: Normocephalic and atraumatic. Nose: Nose normal.      Mouth/Throat:      Mouth: Mucous membranes are moist.   Eyes:      Extraocular Movements: Extraocular movements intact. Pupils: Pupils are equal, round, and reactive to light. Comments: Erythema right lower eyelid   Cardiovascular:      Rate and Rhythm: Normal rate and regular rhythm. Pulses: Normal pulses. Pulmonary:      Effort: Pulmonary effort is normal.      Breath sounds: Normal breath sounds. Abdominal:      General: Bowel sounds are normal.      Palpations: Abdomen is soft. Musculoskeletal:      Cervical back: Normal range of motion. Skin:     General: Skin is warm and dry. Capillary Refill: Capillary refill takes less than 2 seconds. Neurological:      General: No focal deficit present.       Mental Status: He is alert.   Psychiatric:         Mood and Affect: Mood normal.

## 2023-12-12 ENCOUNTER — APPOINTMENT (OUTPATIENT)
Dept: LAB | Facility: HOSPITAL | Age: 58
End: 2023-12-12
Payer: COMMERCIAL

## 2023-12-12 DIAGNOSIS — R74.8 ELEVATED LIVER ENZYMES: ICD-10-CM

## 2023-12-12 LAB
ALBUMIN SERPL BCP-MCNC: 4.9 G/DL (ref 3.5–5)
ALP SERPL-CCNC: 49 U/L (ref 34–104)
ALT SERPL W P-5'-P-CCNC: 82 U/L (ref 7–52)
AST SERPL W P-5'-P-CCNC: 82 U/L (ref 13–39)
BILIRUB DIRECT SERPL-MCNC: 0.19 MG/DL (ref 0–0.2)
BILIRUB SERPL-MCNC: 0.85 MG/DL (ref 0.2–1)
HAV IGM SER QL: REACTIVE
HBV CORE IGM SER QL: ABNORMAL
HBV SURFACE AG SER QL: ABNORMAL
HCV AB SER QL: REACTIVE
PROT SERPL-MCNC: 8 G/DL (ref 6.4–8.4)

## 2023-12-12 PROCEDURE — 80074 ACUTE HEPATITIS PANEL: CPT

## 2023-12-12 PROCEDURE — 80076 HEPATIC FUNCTION PANEL: CPT

## 2023-12-12 PROCEDURE — 36415 COLL VENOUS BLD VENIPUNCTURE: CPT

## 2023-12-13 DIAGNOSIS — R74.8 ELEVATED LIVER ENZYMES: Primary | ICD-10-CM

## 2024-01-03 ENCOUNTER — APPOINTMENT (OUTPATIENT)
Dept: LAB | Facility: HOSPITAL | Age: 59
End: 2024-01-03
Payer: COMMERCIAL

## 2024-01-03 ENCOUNTER — HOSPITAL ENCOUNTER (OUTPATIENT)
Dept: ULTRASOUND IMAGING | Facility: HOSPITAL | Age: 59
Discharge: HOME | End: 2024-01-03
Attending: FAMILY MEDICINE
Payer: COMMERCIAL

## 2024-01-03 DIAGNOSIS — R74.8 ELEVATED LIVER ENZYMES: ICD-10-CM

## 2024-01-03 DIAGNOSIS — I10 PRIMARY HYPERTENSION: Primary | ICD-10-CM

## 2024-01-03 DIAGNOSIS — Z12.5 PROSTATE CANCER SCREENING: ICD-10-CM

## 2024-01-03 DIAGNOSIS — I10 BENIGN ESSENTIAL HTN: ICD-10-CM

## 2024-01-03 LAB
ALBUMIN SERPL BCP-MCNC: 4.5 G/DL (ref 3.5–5)
ALP SERPL-CCNC: 53 U/L (ref 34–104)
ALT SERPL W P-5'-P-CCNC: 83 U/L (ref 7–52)
ANION GAP SERPL CALCULATED.3IONS-SCNC: 5 MMOL/L
AST SERPL W P-5'-P-CCNC: 79 U/L (ref 13–39)
BASOPHILS # BLD AUTO: 0.02 THOUSANDS/ÂΜL (ref 0–0.1)
BASOPHILS NFR BLD AUTO: 0 % (ref 0–1)
BILIRUB SERPL-MCNC: 0.64 MG/DL (ref 0.2–1)
BUN SERPL-MCNC: 21 MG/DL (ref 5–25)
CALCIUM SERPL-MCNC: 9.9 MG/DL (ref 8.4–10.2)
CHLORIDE SERPL-SCNC: 103 MMOL/L (ref 96–108)
CHOLEST SERPL-MCNC: 173 MG/DL
CO2 SERPL-SCNC: 29 MMOL/L (ref 21–32)
CREAT SERPL-MCNC: 0.77 MG/DL (ref 0.6–1.3)
EOSINOPHIL # BLD AUTO: 0.08 THOUSAND/ÂΜL (ref 0–0.61)
EOSINOPHIL NFR BLD AUTO: 2 % (ref 0–6)
ERYTHROCYTE [DISTWIDTH] IN BLOOD BY AUTOMATED COUNT: 12.6 % (ref 11.6–15.1)
GFR SERPL CREATININE-BSD FRML MDRD: 100 ML/MIN/1.73SQ M
GLUCOSE P FAST SERPL-MCNC: 112 MG/DL (ref 65–99)
HCT VFR BLD AUTO: 41.9 % (ref 36.5–49.3)
HDLC SERPL-MCNC: 60 MG/DL
HGB BLD-MCNC: 13.9 G/DL (ref 12–17)
IMM GRANULOCYTES # BLD AUTO: 0.01 THOUSAND/UL (ref 0–0.2)
IMM GRANULOCYTES NFR BLD AUTO: 0 % (ref 0–2)
LDLC SERPL CALC-MCNC: 98 MG/DL (ref 0–100)
LYMPHOCYTES # BLD AUTO: 1.22 THOUSANDS/ÂΜL (ref 0.6–4.47)
LYMPHOCYTES NFR BLD AUTO: 27 % (ref 14–44)
MCH RBC QN AUTO: 32 PG (ref 26.8–34.3)
MCHC RBC AUTO-ENTMCNC: 33.2 G/DL (ref 31.4–37.4)
MCV RBC AUTO: 96 FL (ref 82–98)
MONOCYTES # BLD AUTO: 0.53 THOUSAND/ÂΜL (ref 0.17–1.22)
MONOCYTES NFR BLD AUTO: 12 % (ref 4–12)
NEUTROPHILS # BLD AUTO: 2.75 THOUSANDS/ÂΜL (ref 1.85–7.62)
NEUTS SEG NFR BLD AUTO: 59 % (ref 43–75)
NONHDLC SERPL-MCNC: 113 MG/DL
NRBC BLD AUTO-RTO: 0 /100 WBCS
PLATELET # BLD AUTO: 165 THOUSANDS/UL (ref 149–390)
PMV BLD AUTO: 9 FL (ref 8.9–12.7)
POTASSIUM SERPL-SCNC: 4.7 MMOL/L (ref 3.5–5.3)
PROT SERPL-MCNC: 7.4 G/DL (ref 6.4–8.4)
PSA SERPL-MCNC: 0.9 NG/ML (ref 0–4)
RBC # BLD AUTO: 4.35 MILLION/UL (ref 3.88–5.62)
SODIUM SERPL-SCNC: 137 MMOL/L (ref 135–147)
TRIGL SERPL-MCNC: 74 MG/DL
WBC # BLD AUTO: 4.61 THOUSAND/UL (ref 4.31–10.16)

## 2024-01-03 PROCEDURE — G0103 PSA SCREENING: HCPCS

## 2024-01-03 PROCEDURE — 80053 COMPREHEN METABOLIC PANEL: CPT

## 2024-01-03 PROCEDURE — 86803 HEPATITIS C AB TEST: CPT

## 2024-01-03 PROCEDURE — 76700 US EXAM ABDOM COMPLETE: CPT

## 2024-01-03 PROCEDURE — 85025 COMPLETE CBC W/AUTO DIFF WBC: CPT

## 2024-01-03 PROCEDURE — 87522 HEPATITIS C REVRS TRNSCRPJ: CPT

## 2024-01-03 PROCEDURE — 80061 LIPID PANEL: CPT

## 2024-01-03 PROCEDURE — 36415 COLL VENOUS BLD VENIPUNCTURE: CPT

## 2024-01-03 RX ORDER — LISINOPRIL 10 MG/1
10 TABLET ORAL DAILY
Qty: 90 TABLET | Refills: 3 | Status: SHIPPED | OUTPATIENT
Start: 2024-01-03

## 2024-01-03 RX ORDER — LISINOPRIL 10 MG/1
10 TABLET ORAL DAILY
COMMUNITY
End: 2024-01-03 | Stop reason: SDUPTHER

## 2024-01-06 LAB
DIAGNOSTIC IMP SPEC-IMP: NORMAL
HCV AB S/CO SERPL IA: REACTIVE
HCV RNA # SERPL NAA+PROBE: NORMAL IU/ML
HCV RNA SERPL NAA+PROBE-LOG IU: 6.75 LOG10 IU/ML
TEST INFORMATION: NORMAL

## 2024-01-10 ENCOUNTER — TELEPHONE (OUTPATIENT)
Age: 59
End: 2024-01-10

## 2024-01-10 ENCOUNTER — OFFICE VISIT (OUTPATIENT)
Dept: FAMILY MEDICINE CLINIC | Facility: CLINIC | Age: 59
End: 2024-01-10
Payer: COMMERCIAL

## 2024-01-10 VITALS
HEIGHT: 71 IN | DIASTOLIC BLOOD PRESSURE: 64 MMHG | WEIGHT: 150.6 LBS | OXYGEN SATURATION: 98 % | SYSTOLIC BLOOD PRESSURE: 102 MMHG | BODY MASS INDEX: 21.08 KG/M2 | HEART RATE: 58 BPM

## 2024-01-10 DIAGNOSIS — N52.01 ERECTILE DYSFUNCTION DUE TO ARTERIAL INSUFFICIENCY: ICD-10-CM

## 2024-01-10 DIAGNOSIS — M25.551 CHRONIC PAIN OF RIGHT HIP: ICD-10-CM

## 2024-01-10 DIAGNOSIS — F17.200 SMOKING: ICD-10-CM

## 2024-01-10 DIAGNOSIS — R76.8 HEPATITIS C ANTIBODY POSITIVE IN BLOOD: ICD-10-CM

## 2024-01-10 DIAGNOSIS — I10 PRIMARY HYPERTENSION: ICD-10-CM

## 2024-01-10 DIAGNOSIS — Z00.00 ANNUAL PHYSICAL EXAM: Primary | ICD-10-CM

## 2024-01-10 DIAGNOSIS — M25.511 CHRONIC PAIN IN RIGHT SHOULDER: ICD-10-CM

## 2024-01-10 DIAGNOSIS — G89.29 CHRONIC PAIN OF RIGHT HIP: ICD-10-CM

## 2024-01-10 DIAGNOSIS — G89.29 CHRONIC PAIN IN RIGHT SHOULDER: ICD-10-CM

## 2024-01-10 PROCEDURE — 99396 PREV VISIT EST AGE 40-64: CPT | Performed by: FAMILY MEDICINE

## 2024-01-10 PROCEDURE — 99214 OFFICE O/P EST MOD 30 MIN: CPT | Performed by: FAMILY MEDICINE

## 2024-01-10 RX ORDER — BUPROPION HYDROCHLORIDE 150 MG/1
150 TABLET ORAL EVERY MORNING
Qty: 30 TABLET | Refills: 5 | Status: SHIPPED | OUTPATIENT
Start: 2024-01-10 | End: 2024-07-08

## 2024-01-10 RX ORDER — SILDENAFIL 100 MG/1
100 TABLET, FILM COATED ORAL DAILY PRN
Qty: 10 TABLET | Refills: 6 | Status: SHIPPED | OUTPATIENT
Start: 2024-01-10

## 2024-01-10 NOTE — PROGRESS NOTES
" Assessment/Plan:       1. Annual physical exam  Assessment & Plan:  Reviewed age-appropriate health maintenance and preventive care.        2. Primary hypertension  Assessment & Plan:  Continue meds, no changes      3. Hepatitis C antibody positive in blood  Comments:  ref to hepatology  Orders:  -     Ambulatory Referral to Gastroenterology; Future          Subjective:      Patient ID: Portillo Flaherty is a 58 y.o. male.    Portillo is doing well.  No complaints.  He is aware of the Hep c and will see a specialist.  Ultrasound results reviewed.  No CP or sob.  No CP or SOB.  He does not drink.  He uses marajuana. He eats very well.  He exercises.        The following portions of the patient's history were reviewed and updated as appropriate: allergies, current medications, past family history, past medical history, past social history, past surgical history, and problem list.    Review of Systems   Respiratory: Negative.     Cardiovascular: Negative.    Gastrointestinal: Negative.          Objective:      /64 (BP Location: Left arm, Patient Position: Sitting, Cuff Size: Standard)   Pulse 58   Ht 5' 11\" (1.803 m)   Wt 68.3 kg (150 lb 9.6 oz)   SpO2 98%   BMI 21.00 kg/m²          Physical Exam  Vitals and nursing note reviewed.   Constitutional:       Appearance: Normal appearance.   HENT:      Head: Normocephalic and atraumatic.      Nose: Nose normal.      Mouth/Throat:      Mouth: Mucous membranes are moist.   Eyes:      Extraocular Movements: Extraocular movements intact.      Pupils: Pupils are equal, round, and reactive to light.   Cardiovascular:      Rate and Rhythm: Normal rate and regular rhythm.      Pulses: Normal pulses.   Pulmonary:      Effort: Pulmonary effort is normal.      Breath sounds: Normal breath sounds.   Abdominal:      General: Bowel sounds are normal.      Palpations: Abdomen is soft.   Musculoskeletal:      Cervical back: Normal range of motion.   Skin:     General: Skin is warm " and dry.      Capillary Refill: Capillary refill takes less than 2 seconds.   Neurological:      General: No focal deficit present.      Mental Status: He is alert.   Psychiatric:         Mood and Affect: Mood normal.

## 2024-01-10 NOTE — TELEPHONE ENCOUNTER
Patient called in to office. He came in at check in time 7:45 am but no one was present at . Placed a call in to office clinical and clerical lines to alert the patient was there for his visit. No answer in office, reconnected with patient and confirmed he was being checked in. Patient was pleasant, patient and thankful for my hep assisting in his check in.

## 2024-01-16 NOTE — PROGRESS NOTES
PT Evaluation     Today's date: 2024  Patient name: Portillo Flaherty  : 1965  MRN: [id number]  Referring provider: Budinetz, Robert, MD  Dx:   Encounter Diagnosis     ICD-10-CM    1. Neck pain  M54.2       2. Chronic pain of right hip  M25.551 Ambulatory Referral to Physical Therapy    G89.29     ref to PT      3. Chronic pain in right shoulder  M25.511 Ambulatory Referral to Physical Therapy    G89.29     ref to PT                     Assessment  Assessment details: PT notes the patient with decrease strength t/o the right hip and LE as well as the right shoulder and scapula with demonstration of impaired UB posture with bilateral UT tightness leading to increase pain levels and functional limitations with need for course of skilled therapy for 6 weeks with focus on strengthening, manual therapy, posture, analgesic modalities, and HEP.    Impairments: abnormal gait, abnormal or restricted ROM, activity intolerance, lacks appropriate home exercise program, pain with function and scapular dyskinesis  Understanding of Dx/Px/POC: good   Prognosis: good    Goals  ST.  Initiate HEP  2.  Decrease pain levels by 25-50%   3.  Increase ROM by 25-50%   4.  Increase strength by 1-2 mm grades   LT.  Improve postural awareness   2.  Decrease limitations with reaching, lifting and carrying  3.  Decrease limitations with standing, walking and stairs  4.  Decrease limitations with OH and push/pull activities   5.  Decrease limitations with ADL and transfers  6.  DC with HEP    Plan  Plan details: PT notes review of POC and findings with patient who is in agreement with PT recommendations of course of skilled therapy.    Patient would benefit from: PT eval and skilled physical therapy  Planned modality interventions: thermotherapy: hydrocollator packs  Planned therapy interventions: manual therapy, neuromuscular re-education, patient education, postural training, self care, strengthening, stretching,  therapeutic exercise, home exercise program, graded exercise and flexibility  Frequency: 2x week  Duration in weeks: 6  Treatment plan discussed with: patient        Subjective Evaluation    History of Present Illness  Mechanism of injury: Patient reports a flare up of right shoulder symptoms from prior work incident.  Patient reports his neck and UB are also tight with limited mobility.  Patient states he regularly visits a chiropractor for his neck and UB but feels he requires some additional intervention to address the tightness.  Patient reports when sitting and driving he experiences pulling in his right groin with difficulty with holding the LE straight with feeling the hip rotates out.  Patient went to his PCP for yearly evaluation and spoke with MD about his issues.  MD ordered a course of skilled therapy to address the listed symptoms.  Patient reports he is retired with significant PMH of Hep C, HTN, TBI with skull fracture, hernia repair, left PF release, and left CTR.    Patient Goals  Patient goals for therapy: decreased pain, increased motion, increased strength, independence with ADLs/IADLs and return to sport/leisure activities    Pain  Current pain ratin  At best pain ratin  At worst pain rating: 10  Location: Cervical spine  Quality: tight and pulling  Relieving factors: heat and medications  Aggravating factors: lifting and overhead activity    Treatments  Current treatment: physical therapy        Objective     Postural Observations  Seated posture: fair  Standing posture: fair      Palpation   Left   Tenderness of the suboccipitals.     Right   Muscle spasm in the anterior deltoid, iliopsoas, levator scapulae and upper trapezius.   Tenderness of the anterior deltoid, biceps, deltoid, iliopsoas, levator scapulae, pectoralis minor, suboccipitals, TFL and upper trapezius.     Tenderness   Cervical Spine   Tenderness in the spinous process and right scapula.     Right Shoulder  Tenderness in  the biceps tendon (proximal) and bicipital groove.     Right Hip   Tenderness in the greater trochanter and iliac crest. No tenderness in the sacroiliac joint.     Neurological Testing     Reflexes   Left   Biceps (C5/C6): normal (2+)  Brachioradialis (C6): normal (2+)  Patellar (L4): normal (2+)  Achilles (S1): normal (2+)    Right   Biceps (C5/C6): normal (2+)  Brachioradialis (C6): normal (2+)  Patellar (L4): normal (2+)  Achilles (S1): normal (2+)    Active Range of Motion   Cervical/Thoracic Spine       Cervical    Flexion:  WFL  Extension:  WFL and with pain  Left lateral flexion: 21 degrees     with pain  Right lateral flexion: 23 degrees     with pain  Left rotation: 74 degrees  Right rotation: 75 degrees     Left Shoulder   Normal active range of motion    Right Shoulder   Normal active range of motion    Left Elbow   Normal active range of motion    Right Elbow   Normal active range of motion  Left Hip   Normal active range of motion    Right Hip   Normal active range of motion  Left Knee   Normal active range of motion    Right Knee   Normal active range of motion    Strength/Myotome Testing     Left Shoulder   Normal muscle strength    Right Shoulder     Planes of Motion   Flexion: 4+   Extension: 5   Abduction: 4   Adduction: 5   External rotation at 0°: 4+   External rotation at 90°: 4-   Internal rotation at 0°: 4+   Internal rotation at 90°: 4-     Left Elbow   Normal strength    Right Elbow   Normal strength    Right Hip   Planes of Motion   Flexion: 4+  Extension: 5  Abduction: 5  Adduction: 5  External rotation: 4-  Internal rotation: 4-    Left Knee   Normal strength  Quadriceps contraction: good    Right Knee   Normal strength  Quadriceps contraction: good    Tests   Cervical   Negative alar ligament test and Sharp-Jaja test.     Left   Negative Spurling's Test A and Spurling's Test B.     Right   Negative Spurling's Test A and Spurling's Test B.     Right Shoulder   Negative clunk, empty can,  Neer's, Speed's and bicep load test positive.     Right Hip   Negative RAINER, FADIR and long sit.   Charles: Positive.   Modified Charles: Positive.   90/90 SLR: Negative.  SLR: Negative.              Precautions:  PMH Hep C, Hernia repair, HTN, TBI with skull fracture, left CTR, left PF release  POC 2/17/24    Manuals 1/17       Prone Bilateral quad/hip flexor stretch         Bilateral UT stretch                         Neuro Re-Ed        S/L scapular 4 way         Prone row        Prone T and Y- Thumb up and knuckle up         Prone ER        TB Diagonals         Prone Scap stabilization         Front/Lat/ Reverse Lunge         Ther Ex        UBE for strength         S/L Clamshells         S/L BOSU Bridge         3 way BOSU SLR         BOSU Squat- Round Down                         HEP update/review  15 min        Ther Activity                        Gait Training                        Modalities        MHP PRN

## 2024-01-17 ENCOUNTER — EVALUATION (OUTPATIENT)
Dept: PHYSICAL THERAPY | Facility: CLINIC | Age: 59
End: 2024-01-17
Payer: COMMERCIAL

## 2024-01-17 DIAGNOSIS — M25.551 CHRONIC PAIN OF RIGHT HIP: ICD-10-CM

## 2024-01-17 DIAGNOSIS — G89.29 CHRONIC PAIN IN RIGHT SHOULDER: ICD-10-CM

## 2024-01-17 DIAGNOSIS — M54.2 NECK PAIN: Primary | ICD-10-CM

## 2024-01-17 DIAGNOSIS — G89.29 CHRONIC PAIN OF RIGHT HIP: ICD-10-CM

## 2024-01-17 DIAGNOSIS — M25.511 CHRONIC PAIN IN RIGHT SHOULDER: ICD-10-CM

## 2024-01-17 PROCEDURE — 97163 PT EVAL HIGH COMPLEX 45 MIN: CPT | Performed by: PHYSICAL THERAPIST

## 2024-01-17 PROCEDURE — 97535 SELF CARE MNGMENT TRAINING: CPT | Performed by: PHYSICAL THERAPIST

## 2024-01-24 ENCOUNTER — APPOINTMENT (OUTPATIENT)
Dept: PHYSICAL THERAPY | Facility: CLINIC | Age: 59
End: 2024-01-24
Payer: COMMERCIAL

## 2024-01-24 DIAGNOSIS — M54.2 NECK PAIN: ICD-10-CM

## 2024-01-24 DIAGNOSIS — G89.29 CHRONIC PAIN OF RIGHT HIP: Primary | ICD-10-CM

## 2024-01-24 DIAGNOSIS — M25.551 CHRONIC PAIN OF RIGHT HIP: Primary | ICD-10-CM

## 2024-01-24 DIAGNOSIS — M25.511 CHRONIC PAIN IN RIGHT SHOULDER: ICD-10-CM

## 2024-01-24 DIAGNOSIS — G89.29 CHRONIC PAIN IN RIGHT SHOULDER: ICD-10-CM

## 2024-01-24 NOTE — PROGRESS NOTES
Daily Note     Today's date: 2024  Patient name: Portillo Flaherty  : 1965  MRN: [id number]  Referring provider: Budinetz, Robert, MD  Dx:   Encounter Diagnosis     ICD-10-CM    1. Chronic pain of right hip  M25.551     G89.29       2. Chronic pain in right shoulder  M25.511     G89.29       3. Neck pain  M54.2                      Subjective: ***      Objective: See treatment diary below      Assessment: Tolerated treatment {Tolerated treatment :5791233795}. Patient {assessment:5702894320}      Plan: Continue per plan of care.      Precautions:  PMH Hep C, Hernia repair, HTN, TBI with skull fracture, left CTR, left PF release  POC 24    Manuals       Prone Bilateral quad/hip flexor stretch         Bilateral UT stretch                         Neuro Re-Ed        S/L scapular 4 way         Prone row        Prone T and Y- Thumb up and knuckle up         Prone ER        TB Diagonals         Prone Scap stabilization         Front/Lat/ Reverse Lunge         Ther Ex        UBE for strength         S/L Clamshells         S/L BOSU Bridge         3 way BOSU SLR         BOSU Squat- Round Down                         HEP update/review  15 min        Ther Activity                        Gait Training                        Modalities        MHP PRN

## 2024-01-25 DIAGNOSIS — I10 PRIMARY HYPERTENSION: ICD-10-CM

## 2024-01-25 RX ORDER — LISINOPRIL 10 MG/1
10 TABLET ORAL DAILY
Qty: 90 TABLET | Refills: 3 | Status: SHIPPED | OUTPATIENT
Start: 2024-01-25 | End: 2024-01-26 | Stop reason: SDUPTHER

## 2024-01-25 NOTE — PROGRESS NOTES
Daily Note     Today's date: 2024  Patient name: Portillo Flaherty  : 1965  MRN: [id number]  Referring provider: Budinetz, Robert, MD  Dx:   Encounter Diagnosis     ICD-10-CM    1. Chronic pain of right hip  M25.551     G89.29       2. Neck pain  M54.2       3. Chronic pain in right shoulder  M25.511     G89.29                      Subjective: ***      Objective: See treatment diary below      Assessment: Tolerated treatment {Tolerated treatment :2830963446}.  PT notes start of POC with focus on strengthening and manual therapy to decrease symptoms and improve functional limitations to meet therapy goals.  PT notes continuation of decrease strength and flexibility t/o the right shoulder and bilateral hips with need for continuation of skilled therapy.       Plan: Continue per plan of care.      Precautions:  PMH Hep C, Hernia repair, HTN, TBI with skull fracture, left CTR, left PF release  POC 24    Manuals       Prone Bilateral quad/hip flexor stretch         Bilateral UT stretch                         Neuro Re-Ed        S/L scapular 4 way         Prone row        Prone T and Y- Thumb up and knuckle up         Prone ER        TB Diagonals         Prone Scap stabilization         Front/Lat/ Reverse Lunge         Ther Ex        UBE for strength         S/L Clamshells         S/L BOSU Bridge         3 way BOSU SLR         BOSU Squat- Round Down                         HEP update/review  15 min        Ther Activity                        Gait Training                        Modalities        MHP PRN

## 2024-01-25 NOTE — TELEPHONE ENCOUNTER
Patient has taken a few extra pills so he will run out sooner than expected. He was able to measure his bp for me when returning to his house which was 177/103 with 85 pulse. He knows this is a little higher than usual but that his bp is well managed on current dose.

## 2024-01-26 ENCOUNTER — APPOINTMENT (OUTPATIENT)
Dept: PHYSICAL THERAPY | Facility: CLINIC | Age: 59
End: 2024-01-26
Payer: COMMERCIAL

## 2024-01-26 ENCOUNTER — OFFICE VISIT (OUTPATIENT)
Dept: FAMILY MEDICINE CLINIC | Facility: CLINIC | Age: 59
End: 2024-01-26
Payer: COMMERCIAL

## 2024-01-26 VITALS
HEIGHT: 71 IN | OXYGEN SATURATION: 99 % | TEMPERATURE: 97.6 F | BODY MASS INDEX: 20.61 KG/M2 | HEART RATE: 70 BPM | DIASTOLIC BLOOD PRESSURE: 72 MMHG | WEIGHT: 147.2 LBS | SYSTOLIC BLOOD PRESSURE: 130 MMHG

## 2024-01-26 DIAGNOSIS — M25.511 CHRONIC PAIN IN RIGHT SHOULDER: ICD-10-CM

## 2024-01-26 DIAGNOSIS — I10 PRIMARY HYPERTENSION: Primary | ICD-10-CM

## 2024-01-26 DIAGNOSIS — G89.29 CHRONIC PAIN OF RIGHT HIP: Primary | ICD-10-CM

## 2024-01-26 DIAGNOSIS — G89.29 CHRONIC PAIN IN RIGHT SHOULDER: ICD-10-CM

## 2024-01-26 DIAGNOSIS — M25.551 CHRONIC PAIN OF RIGHT HIP: Primary | ICD-10-CM

## 2024-01-26 DIAGNOSIS — M54.2 NECK PAIN: ICD-10-CM

## 2024-01-26 PROCEDURE — 99214 OFFICE O/P EST MOD 30 MIN: CPT | Performed by: FAMILY MEDICINE

## 2024-01-26 RX ORDER — LISINOPRIL 10 MG/1
10 TABLET ORAL 2 TIMES DAILY
Qty: 180 TABLET | Refills: 3 | Status: SHIPPED | OUTPATIENT
Start: 2024-01-26

## 2024-01-26 NOTE — PROGRESS NOTES
PT notes the patient contacted clinic and informed PT that he is unable to attend skilled therapy at this time secondary to personal issues so PT will comply with patient request and DC with HEP.

## 2024-01-26 NOTE — PROGRESS NOTES
"Assessment/Plan:       1. Primary hypertension  Assessment & Plan:  Increase lisinopril slightly    Orders:  -     lisinopril (ZESTRIL) 10 mg tablet; Take 1 tablet (10 mg total) by mouth 2 (two) times a day          Subjective:      Patient ID: Portillo Flaherty is a 58 y.o. male.    The patient has a history of hypertension usually controlled on 10 mg of lisinopril.  His blood pressure has been running higher lately.  We are going to increase to 20.  I sent a refill in.  No headache blurry vision chest pain or shortness of breath.    Hypertension  Pertinent negatives include no chest pain, palpitations or shortness of breath.       The following portions of the patient's history were reviewed and updated as appropriate: allergies, current medications, past family history, past medical history, past social history, past surgical history, and problem list.    Review of Systems   Constitutional:  Negative for chills and fever.   HENT:  Negative for ear pain and sore throat.    Eyes:  Negative for pain and visual disturbance.   Respiratory: Negative.  Negative for cough and shortness of breath.    Cardiovascular: Negative.  Negative for chest pain and palpitations.   Gastrointestinal: Negative.  Negative for abdominal pain and vomiting.   Genitourinary:  Negative for dysuria and hematuria.   Musculoskeletal:  Negative for arthralgias and back pain.   Skin:  Negative for color change and rash.   Neurological:  Negative for seizures and syncope.   All other systems reviewed and are negative.        Objective:      /72 (BP Location: Left arm, Patient Position: Sitting, Cuff Size: Standard)   Pulse 70   Temp 97.6 °F (36.4 °C)   Ht 5' 11\" (1.803 m)   Wt 66.8 kg (147 lb 3.2 oz)   SpO2 99%   BMI 20.53 kg/m²          Physical Exam  Vitals and nursing note reviewed.   Constitutional:       Appearance: Normal appearance.   HENT:      Head: Normocephalic and atraumatic.      Nose: Nose normal.      Mouth/Throat:      " Mouth: Mucous membranes are moist.   Eyes:      Extraocular Movements: Extraocular movements intact.      Pupils: Pupils are equal, round, and reactive to light.   Cardiovascular:      Rate and Rhythm: Normal rate and regular rhythm.      Pulses: Normal pulses.   Pulmonary:      Effort: Pulmonary effort is normal.      Breath sounds: Normal breath sounds.   Abdominal:      General: Bowel sounds are normal.      Palpations: Abdomen is soft.   Musculoskeletal:      Cervical back: Normal range of motion.   Skin:     General: Skin is warm and dry.      Capillary Refill: Capillary refill takes less than 2 seconds.   Neurological:      General: No focal deficit present.      Mental Status: He is alert.   Psychiatric:         Mood and Affect: Mood normal.

## 2024-01-31 ENCOUNTER — APPOINTMENT (OUTPATIENT)
Dept: LAB | Facility: HOSPITAL | Age: 59
End: 2024-01-31
Payer: COMMERCIAL

## 2024-01-31 ENCOUNTER — OFFICE VISIT (OUTPATIENT)
Dept: FAMILY MEDICINE CLINIC | Facility: CLINIC | Age: 59
End: 2024-01-31
Payer: COMMERCIAL

## 2024-01-31 ENCOUNTER — APPOINTMENT (OUTPATIENT)
Dept: PHYSICAL THERAPY | Facility: CLINIC | Age: 59
End: 2024-01-31
Payer: COMMERCIAL

## 2024-01-31 VITALS
WEIGHT: 147 LBS | DIASTOLIC BLOOD PRESSURE: 78 MMHG | BODY MASS INDEX: 20.58 KG/M2 | SYSTOLIC BLOOD PRESSURE: 122 MMHG | TEMPERATURE: 97.3 F | HEART RATE: 67 BPM | OXYGEN SATURATION: 92 % | HEIGHT: 71 IN

## 2024-01-31 DIAGNOSIS — R53.83 OTHER FATIGUE: ICD-10-CM

## 2024-01-31 DIAGNOSIS — B58.00 OCULAR TOXOPLASMOSIS: Primary | ICD-10-CM

## 2024-01-31 DIAGNOSIS — H53.9 CHANGES IN VISION: ICD-10-CM

## 2024-01-31 LAB
B BURGDOR IGG+IGM SER QL IA: NEGATIVE
TSH SERPL DL<=0.05 MIU/L-ACNC: 1.92 UIU/ML (ref 0.45–4.5)
VIT B12 SERPL-MCNC: 420 PG/ML (ref 180–914)

## 2024-01-31 PROCEDURE — 99214 OFFICE O/P EST MOD 30 MIN: CPT | Performed by: FAMILY MEDICINE

## 2024-01-31 PROCEDURE — 36415 COLL VENOUS BLD VENIPUNCTURE: CPT

## 2024-01-31 PROCEDURE — 82652 VIT D 1 25-DIHYDROXY: CPT

## 2024-01-31 PROCEDURE — 84443 ASSAY THYROID STIM HORMONE: CPT

## 2024-01-31 PROCEDURE — 86618 LYME DISEASE ANTIBODY: CPT

## 2024-01-31 PROCEDURE — 82607 VITAMIN B-12: CPT

## 2024-01-31 NOTE — PROGRESS NOTES
"Assessment/Plan:       1. Ocular toxoplasmosis  Comments:  ref to ophtho    2. Changes in vision  Comments:  ref to ophtho  Orders:  -     Ambulatory Referral to Ophthalmology; Future    3. Other fatigue  Comments:  check labs  Orders:  -     Vitamin B12; Future  -     Vitamin D 1,25 dihydroxy; Future  -     Lyme Total AB W Reflex to IGM/IGG; Future  -     TSH, 3rd generation with Free T4 reflex; Future; Expected date: 01/31/2024          Subjective:      Patient ID: Portillo Flaherty is a 58 y.o. male.    The patient contracted ocular toxoplasmosis many many years ago.  He was treated for flare ups with bactrim and prednisone then surgery around 2005.  This was through [facility] in [facility].  This is the left eye.  He subsequently had cataract surgery in that left eye.  He had been ok but recently there has been a change in his vision.  He also has been dragging and tired lately.        The following portions of the patient's history were reviewed and updated as appropriate: allergies, current medications, past family history, past medical history, past social history, past surgical history, and problem list.    Review of Systems   Respiratory: Negative.     Cardiovascular: Negative.    Gastrointestinal: Negative.          Objective:      /78 (BP Location: Left arm, Patient Position: Sitting, Cuff Size: Standard)   Pulse 67   Temp (!) 97.3 °F (36.3 °C)   Ht 5' 11\" (1.803 m)   Wt 66.7 kg (147 lb)   SpO2 92%   BMI 20.50 kg/m²          Physical Exam  Vitals and nursing note reviewed.   Constitutional:       Appearance: Normal appearance.   HENT:      Head: Normocephalic and atraumatic.      Nose: Nose normal.      Mouth/Throat:      Mouth: Mucous membranes are moist.   Eyes:      Extraocular Movements: Extraocular movements intact.      Pupils: Pupils are equal, round, and reactive to light.   Cardiovascular:      Rate and Rhythm: Normal rate and regular rhythm.      Pulses: Normal pulses. "   Pulmonary:      Effort: Pulmonary effort is normal.      Breath sounds: Normal breath sounds.   Abdominal:      General: Bowel sounds are normal.      Palpations: Abdomen is soft.   Musculoskeletal:      Cervical back: Normal range of motion.   Skin:     General: Skin is warm and dry.      Capillary Refill: Capillary refill takes less than 2 seconds.   Neurological:      General: No focal deficit present.      Mental Status: He is alert.   Psychiatric:         Mood and Affect: Mood normal.

## 2024-02-01 LAB — 1,25(OH)2D3 SERPL-MCNC: 70.9 PG/ML (ref 24.8–81.5)

## 2024-02-26 DIAGNOSIS — F32.A DEPRESSION, UNSPECIFIED DEPRESSION TYPE: Primary | ICD-10-CM

## 2024-02-26 RX ORDER — BUPROPION HYDROCHLORIDE 150 MG/1
150 TABLET, EXTENDED RELEASE ORAL 2 TIMES DAILY
Qty: 90 TABLET | Refills: 3 | Status: SHIPPED | OUTPATIENT
Start: 2024-02-26

## 2024-02-26 RX ORDER — BUPROPION HYDROCHLORIDE 150 MG/1
150 TABLET, EXTENDED RELEASE ORAL 2 TIMES DAILY
COMMUNITY
End: 2024-02-26 | Stop reason: SDUPTHER

## 2024-03-19 DIAGNOSIS — F32.A DEPRESSION, UNSPECIFIED DEPRESSION TYPE: ICD-10-CM

## 2024-03-19 RX ORDER — BUPROPION HYDROCHLORIDE 150 MG/1
150 TABLET, EXTENDED RELEASE ORAL 2 TIMES DAILY
Qty: 180 TABLET | Refills: 1 | Status: SHIPPED | OUTPATIENT
Start: 2024-03-19

## 2024-04-10 ENCOUNTER — OFFICE VISIT (OUTPATIENT)
Dept: FAMILY MEDICINE CLINIC | Facility: CLINIC | Age: 59
End: 2024-04-10
Payer: COMMERCIAL

## 2024-04-10 ENCOUNTER — TELEPHONE (OUTPATIENT)
Age: 59
End: 2024-04-10

## 2024-04-10 ENCOUNTER — TELEPHONE (OUTPATIENT)
Dept: FAMILY MEDICINE CLINIC | Facility: CLINIC | Age: 59
End: 2024-04-10

## 2024-04-10 VITALS
HEIGHT: 71 IN | SYSTOLIC BLOOD PRESSURE: 128 MMHG | DIASTOLIC BLOOD PRESSURE: 74 MMHG | HEART RATE: 72 BPM | BODY MASS INDEX: 21.42 KG/M2 | WEIGHT: 153 LBS

## 2024-04-10 DIAGNOSIS — Z00.00 ANNUAL PHYSICAL EXAM: Primary | ICD-10-CM

## 2024-04-10 DIAGNOSIS — F31.12 BIPOLAR DISORDER, CURR EPISODE MANIC W/O PSYCHOTIC FEATURES, MODERATE (HCC): ICD-10-CM

## 2024-04-10 DIAGNOSIS — L84: ICD-10-CM

## 2024-04-10 DIAGNOSIS — I10 PRIMARY HYPERTENSION: ICD-10-CM

## 2024-04-10 DIAGNOSIS — H53.9 VISUAL CHANGES: Primary | ICD-10-CM

## 2024-04-10 DIAGNOSIS — H53.9 VISUAL CHANGES: ICD-10-CM

## 2024-04-10 DIAGNOSIS — F32.A DEPRESSION, UNSPECIFIED DEPRESSION TYPE: ICD-10-CM

## 2024-04-10 PROCEDURE — 99214 OFFICE O/P EST MOD 30 MIN: CPT | Performed by: FAMILY MEDICINE

## 2024-04-10 PROCEDURE — 99396 PREV VISIT EST AGE 40-64: CPT | Performed by: FAMILY MEDICINE

## 2024-04-10 RX ORDER — BUPROPION HYDROCHLORIDE 150 MG/1
150 TABLET, EXTENDED RELEASE ORAL 2 TIMES DAILY
Qty: 180 TABLET | Refills: 1 | Status: SHIPPED | OUTPATIENT
Start: 2024-04-10 | End: 2024-04-11 | Stop reason: SDUPTHER

## 2024-04-10 NOTE — TELEPHONE ENCOUNTER
Pt was seen at the practice this morning called in to check on certain details on the ophthalmology referral issued warm transferred the call to practice clinical was answered by Romina. Thanks

## 2024-04-10 NOTE — TELEPHONE ENCOUNTER
Can referral be placed for [facility]mology [facility] in [place] instead of the current location.

## 2024-04-10 NOTE — PROGRESS NOTES
"Assessment/Plan:       1. Annual physical exam  Assessment & Plan:  Reviewed age-appropriate health maintenance and preventive care.        2. Primary hypertension  Assessment & Plan:  Continue meds, no changes      3. Bipolar disorder, curr episode manic w/o psychotic features, moderate (HCC)  Assessment & Plan:  Continue wellbutrin      4. Visual changes  Comments:  ref to ophthamology  Orders:  -     Ambulatory Referral to Ophthalmology; Future    5. Fibrous corn  Comments:  ref to podiatry  Orders:  -     Ambulatory Referral to Podiatry; Future    6. Depression, unspecified depression type  -     buPROPion (WELLBUTRIN SR) 150 mg 12 hr tablet; Take 1 tablet (150 mg total) by mouth 2 (two) times a day          Subjective:      Patient ID: Portillo Flaherty is a 59 y.o. male.    Portillo is here for a f/u visit and an annual visit.  He is doing well.  Meds reviewed.  He feels better in general.  No CP or sob.  Blood pressure is well controlled.  He is considering the referral for hep c but wants to hold right now.  He did not tolerate tx in the past.  He eats well/exercises.  Labs look good.  He had a colonoscopy.        The following portions of the patient's history were reviewed and updated as appropriate: allergies, current medications, past family history, past medical history, past social history, past surgical history, and problem list.    Review of Systems   Respiratory: Negative.     Cardiovascular: Negative.    Gastrointestinal: Negative.          Objective:      /74 (BP Location: Left arm, Patient Position: Sitting, Cuff Size: Standard)   Pulse 72   Ht 5' 11\" (1.803 m)   Wt 69.4 kg (153 lb)   BMI 21.34 kg/m²          Physical Exam  Vitals and nursing note reviewed.   Constitutional:       Appearance: Normal appearance.   HENT:      Head: Normocephalic and atraumatic.      Nose: Nose normal.      Mouth/Throat:      Mouth: Mucous membranes are moist.   Eyes:      Extraocular Movements: Extraocular " movements intact.      Pupils: Pupils are equal, round, and reactive to light.   Cardiovascular:      Rate and Rhythm: Normal rate and regular rhythm.      Pulses: Normal pulses.   Pulmonary:      Effort: Pulmonary effort is normal.      Breath sounds: Normal breath sounds.   Abdominal:      General: Bowel sounds are normal.      Palpations: Abdomen is soft.   Musculoskeletal:      Cervical back: Normal range of motion.   Skin:     General: Skin is warm and dry.      Capillary Refill: Capillary refill takes less than 2 seconds.   Neurological:      General: No focal deficit present.      Mental Status: He is alert.   Psychiatric:         Mood and Affect: Mood normal.

## 2024-04-10 NOTE — TELEPHONE ENCOUNTER
Call from patient. Was wondering if sy found a pair of reading glasses in a soft sliding case in the waiting room; he believes he left in there. Warm transfer to [place]; advised patient they were unable to find his reading glasses.

## 2024-04-11 ENCOUNTER — TELEPHONE (OUTPATIENT)
Age: 59
End: 2024-04-11

## 2024-04-11 DIAGNOSIS — I10 PRIMARY HYPERTENSION: ICD-10-CM

## 2024-04-11 DIAGNOSIS — F32.A DEPRESSION, UNSPECIFIED DEPRESSION TYPE: ICD-10-CM

## 2024-04-11 RX ORDER — BUPROPION HYDROCHLORIDE 150 MG/1
150 TABLET, EXTENDED RELEASE ORAL 2 TIMES DAILY
Qty: 180 TABLET | Refills: 1 | Status: SHIPPED | OUTPATIENT
Start: 2024-04-11

## 2024-04-11 RX ORDER — LISINOPRIL 10 MG/1
10 TABLET ORAL 2 TIMES DAILY
Qty: 180 TABLET | Refills: 3 | Status: SHIPPED | OUTPATIENT
Start: 2024-04-11

## 2024-04-11 NOTE — TELEPHONE ENCOUNTER
Patient called, states  the prescription for  buPROPion (WELLBUTRIN SR) 150 mg 12 hr tablet [[id number]] states the pharmacy [facility] in [place]  advised patient to request a generic brand with the code PAW1. Patient states pharmacy notified patient lisinopril (ZESTRIL) 10 mg tablet [[id number]] , had been canceled by the provider, patient would like to know the reason why. Lastly patient request all of the patient's medication be transferred to [id number] [place] - [address] [place] [address] . Please advise Patient at [phone], if  any further questions.

## 2024-04-24 ENCOUNTER — TELEPHONE (OUTPATIENT)
Dept: FAMILY MEDICINE CLINIC | Facility: CLINIC | Age: 59
End: 2024-04-24

## 2024-04-24 DIAGNOSIS — I10 PRIMARY HYPERTENSION: ICD-10-CM

## 2024-04-24 DIAGNOSIS — F32.A DEPRESSION, UNSPECIFIED DEPRESSION TYPE: ICD-10-CM

## 2024-04-24 RX ORDER — LISINOPRIL 10 MG/1
10 TABLET ORAL 2 TIMES DAILY
Qty: 180 TABLET | Refills: 3 | Status: SHIPPED | OUTPATIENT
Start: 2024-04-24

## 2024-04-24 RX ORDER — BUPROPION HYDROCHLORIDE 150 MG/1
150 TABLET, EXTENDED RELEASE ORAL 2 TIMES DAILY
Qty: 180 TABLET | Refills: 1 | Status: SHIPPED | OUTPATIENT
Start: 2024-04-24

## 2024-04-24 NOTE — TELEPHONE ENCOUNTER
Reason for call: Not a duplicate. Pt would like his medications to be sent to [facility] pharmacy.   [x] Refill   [] Prior Auth  [] Other:     Office: [facility]   [x] PCP/Provider - budinetz   [] Specialty/Provider -     Medication: lisinopril     Dose/Frequency: 10 mg/ twice daily     Quantity: 90 day supply     Pharmacy: [facility]     Does the patient have enough for 3 days?   [] Yes   [x] No - Send as HP to POD

## 2024-04-24 NOTE — TELEPHONE ENCOUNTER
Patient called in regarding ophthalmology referral. There are several placed into the chart. [facility] non par with the insurance.     I suggested he call call the insurance for a listing of area providers that are covered under our plan to locate an office to set up the appointment.     Patient will call and then set up appointment with opthalmology.

## 2024-04-25 ENCOUNTER — TELEPHONE (OUTPATIENT)
Age: 59
End: 2024-04-25

## 2024-04-25 DIAGNOSIS — F32.A DEPRESSION, UNSPECIFIED DEPRESSION TYPE: ICD-10-CM

## 2024-04-25 RX ORDER — BUPROPION HYDROCHLORIDE 150 MG/1
150 TABLET, EXTENDED RELEASE ORAL 2 TIMES DAILY
Qty: 180 TABLET | Refills: 3 | Status: SHIPPED | OUTPATIENT
Start: 2024-04-25

## 2024-04-25 NOTE — TELEPHONE ENCOUNTER
Labs are normal except the following: Vitamin D level low at 22.8 with normal being above 30, triglyceride levels high but not high enough to start a medication.  Please schedule in person appointment for blood pressure recheck within 2 weeks and will discuss all of this in further detail   Patient called the RX Refill Line. Message is being forwarded to the office.     Patient is very upset he has called multiple times due to he needs brand Welbutrun the generic does not work for him.      I also added [facility] mail [facility].  He does not live close enough to  his scripts he needs them send to his home         Please review and adjust.  Contact patient directly if more information is needed       Please contact patient at  [phone]

## 2024-04-25 NOTE — TELEPHONE ENCOUNTER
Called [facility]. Spoke to pharmacy staff and confirmed they did indeed receive the Brand necessary script for Wellbutrin. They will also check for coverage under our plan. They will also see if they are able to order the Brand Wellbutrin as what they stock is the generic. Patient may have to pay out of pocket as brand medications are not covered under our plan.     The pharmacy will check into this and contact the patient if need be.

## 2024-04-26 ENCOUNTER — OFFICE VISIT (OUTPATIENT)
Dept: PODIATRY | Facility: CLINIC | Age: 59
End: 2024-04-26
Payer: COMMERCIAL

## 2024-04-26 VITALS
WEIGHT: 153 LBS | SYSTOLIC BLOOD PRESSURE: 183 MMHG | HEIGHT: 71 IN | DIASTOLIC BLOOD PRESSURE: 109 MMHG | BODY MASS INDEX: 21.42 KG/M2 | HEART RATE: 70 BPM

## 2024-04-26 DIAGNOSIS — M21.622 TAILOR'S BUNION OF BOTH FEET: ICD-10-CM

## 2024-04-26 DIAGNOSIS — M79.671 BILATERAL FOOT PAIN: Primary | ICD-10-CM

## 2024-04-26 DIAGNOSIS — L84 CALLUS: ICD-10-CM

## 2024-04-26 DIAGNOSIS — M79.672 BILATERAL FOOT PAIN: Primary | ICD-10-CM

## 2024-04-26 DIAGNOSIS — M21.621 TAILOR'S BUNION OF BOTH FEET: ICD-10-CM

## 2024-04-26 PROCEDURE — 99213 OFFICE O/P EST LOW 20 MIN: CPT | Performed by: STUDENT IN AN ORGANIZED HEALTH CARE EDUCATION/TRAINING PROGRAM

## 2024-04-26 PROCEDURE — 11056 PARNG/CUTG B9 HYPRKR LES 2-4: CPT | Performed by: STUDENT IN AN ORGANIZED HEALTH CARE EDUCATION/TRAINING PROGRAM

## 2024-04-26 NOTE — PROGRESS NOTES
Assessment/Plan:    No problem-specific Assessment & Plan notes found for this encounter.       Diagnoses and all orders for this visit:    Bilateral foot pain    Tailor's bunion of both feet    Callus  -     Lesion Destruction      Plan:     -  Patient was counseled and educated on the condition and the diagnosis.  The diagnosis, treatment options and prognosis were discussed in detail.   -I informed patient etiologies of plantar forefoot hyperkeratotic lesions.  We discussed excessive plantar pressure during ambulation to the metatarsal heads causing constant friction where forefoot fat pad and soft tissue displaces distally resulting in bursitis, callus formation and pain to the lesser metatarsal heads.  We discussed conservative measure for this including weight-bear as tolerated in supportive shoe gears with wider toebox sneakers, custom orthotics, routine callus debridements wearing offloading callus pads for pain control as well as using pumice stone in showers to reduce the growth of the calluses to frequently.  Patient expressed understanding.   - Hyperkeratotic lesions were sharply trimmed to normal epithelium with a 15 blade without incident.   -Addressed all questions and concerns     -Return as needed for reevaluation      Subjective:      Patient ID: Portillo Flaherty is a 59 y.o. male.    59-year-old male with past medical history as detailed below presents for recurrent callus formation to bilateral forefoot.  Patient reports he has tried using pumice stone however that was not as effective.  He is wearing supportive shoes.  He also has tried using the callus pads in past.  Patient has no other complaints.        The following portions of the patient's history were reviewed and updated as appropriate: He  has a past medical history of DJD (degenerative joint disease), Dyslipidemia, History of hepatitis C, Hypertension, 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HCC), and Toxoplasmosis chorioretinitis of left eye.  He   Patient  "Active Problem List    Diagnosis Date Noted    Annual physical exam 01/10/2024    Varicocele 11/01/2022    Symptomatic varicose veins, bilateral 09/09/2022    Bipolar disorder, curr episode manic w/o psychotic features, moderate (HCC) 08/27/2022    H/O chronic hepatitis 08/27/2022    Elevated liver enzymes 02/28/2022    Primary hypertension 02/28/2022    Severe alcohol dependence in sustained remission (HCC) 08/28/2015     He  has a past surgical history that includes Cataract extraction (Left); Hernia repair; Carpal tunnel release (Left); Plantar fascia surgery (Left); and Colonoscopy.  Current Outpatient Medications   Medication Sig Dispense Refill    B Complex-C (B-complex with vitamin C) tablet Take 1 tablet by mouth daily      buPROPion (WELLBUTRIN SR) 150 mg 12 hr tablet Take 1 tablet (150 mg total) by mouth 2 (two) times a day 180 tablet 1    buPROPion (Wellbutrin SR) 150 mg 12 hr tablet Take 1 tablet (150 mg total) by mouth 2 (two) times a day 180 tablet 3    lisinopril (ZESTRIL) 10 mg tablet Take 1 tablet (10 mg total) by mouth 2 (two) times a day 180 tablet 3    Magnesium 500 MG CAPS 1 po qd 90 capsule 0    Omega-3 Fatty Acids (fish oil) 1,000 mg Take 1,000 mg by mouth daily       No current facility-administered medications for this visit.   .    Review of Systems   All other systems reviewed and are negative.        Objective:      BP (!) 183/109 (BP Location: Left arm, Patient Position: Sitting, Cuff Size: Large)   Pulse 70   Ht 5' 11\" (1.803 m)   Wt 69.4 kg (153 lb)   BMI 21.34 kg/m²          Physical Exam  Vitals reviewed.   Cardiovascular:      Pulses:           Dorsalis pedis pulses are 2+ on the right side and 2+ on the left side.        Posterior tibial pulses are 2+ on the right side and 2+ on the left side.   Musculoskeletal:      Right foot: Tenderness present.      Left foot: Tenderness present.      Comments: Bilateral submet head 5 hyperkeratotic lesion with central core noted.  " "Discomfort with palpation noted.  Foot and ankle range of motion within normal limits without any discomfort otherwise.  Tailor's bunion deformity noted with prominent lateral fifth metatarsal heads.  No discomfort with first MTPJ range of motion.   Feet:      Right foot:      Skin integrity: Callus present.      Left foot:      Skin integrity: Callus present.      Comments: Hyperkeratotic lesions noted to bilateral fifth metatarsal heads..      Lesion Destruction    Date/Time: 4/26/2024 8:15 AM    Performed by: Tran Mendoza DPM  Authorized by: Tran Mendoza DPM  Universal Protocol:  Consent: Verbal consent obtained.  Risks and benefits: risks, benefits and alternatives were discussed  Consent given by: patient  Time out: Immediately prior to procedure a \"time out\" was called to verify the correct patient, procedure, equipment, support staff and site/side marked as required.  Patient understanding: patient states understanding of the procedure being performed  Patient identity confirmed: verbally with patient    Procedure Details - Lesion Destruction:     Number of Lesions:  2  Lesion 1:     Body area:  Lower extremity    Lower extremity location:  R foot    Malignancy: benign hyperkeratotic lesion      Destruction method: scissors used for extraction    Lesion 2:     Body area:  Lower extremity    Lower extremity location:  L foot    Malignancy: benign hyperkeratotic lesion      Destruction method: scissors used for extraction          "

## 2024-04-26 NOTE — TELEPHONE ENCOUNTER
Left message for patient. The pharmacy is working on seeing if they are able to obtain the brand medication as they do not stock it.

## 2024-05-23 ENCOUNTER — TELEPHONE (OUTPATIENT)
Age: 59
End: 2024-05-23

## 2024-05-23 NOTE — TELEPHONE ENCOUNTER
BRAND NAME ONLY    Patient needs prior authorization for WELLBUTRIN    The generic does not work at all for the patient. Name brand stabilizes his mood, reduces nicotine urges and lowers anxiety. The generic does nothing for him.    Please call pt if/when approved.

## 2024-05-23 NOTE — TELEPHONE ENCOUNTER
Clair from [employer] called in and stated that the the WELLBUTRIN SR needed an prior auth since the generic doesn't work for the patient. Was as advised that this outside the benefits plan and that is why a pro auth is needed.    Clair was following up to see if this had been started. Case number [id number].    If you have any question you can contact [employer] at .    Thank you

## 2024-05-29 NOTE — TELEPHONE ENCOUNTER
PA for Wellbutrin SR BRAND NAME    Submitted via    [x]CMM-KEY [id number]  []Surescripts-Case ID #   []Faxed to plan   []Other website   []Phone call Case ID #     Office notes sent, clinical questions answered. Awaiting determination    Turnaround time for your insurance to make a decision on your Prior Authorization can take 7-21 business days.

## 2024-05-30 NOTE — TELEPHONE ENCOUNTER
PA for WELLBUTRIN BRAND NAME Approved     Date(s) approved 05/29/2024-05/29/2025    Case #    Patient advised by          [x] Secrettet Message  [] Phone call   []LMOM  []L/M to call office as no active Communication consent on file  []Unable to leave detailed message as VM not approved on Communication consent       Pharmacy advised by    [x]Fax  []Phone call    Approval letter scanned into Media Yes

## 2024-06-14 DIAGNOSIS — F32.A DEPRESSION, UNSPECIFIED DEPRESSION TYPE: ICD-10-CM

## 2024-06-14 RX ORDER — BUPROPION HYDROCHLORIDE 150 MG/1
150 TABLET, EXTENDED RELEASE ORAL 2 TIMES DAILY
Qty: 180 TABLET | Refills: 1 | Status: SHIPPED | OUTPATIENT
Start: 2024-06-14

## 2024-06-14 NOTE — TELEPHONE ENCOUNTER
Reason for call:   [x] Refill   [] Prior Auth  [x] Other: Not a dup or too esrly, pt needs sent to [facility], was too expensive at other pharm.    Office:   [x] PCP/Provider -   [] Specialty/Provider -         Does the patient have enough for 3 days?   [] Yes   [x] No - Send as HP to POD

## 2024-07-02 DIAGNOSIS — N52.9 ERECTILE DYSFUNCTION, UNSPECIFIED ERECTILE DYSFUNCTION TYPE: ICD-10-CM

## 2024-07-02 RX ORDER — SILDENAFIL 50 MG/1
TABLET, FILM COATED ORAL
Qty: 30 TABLET | Refills: 0 | Status: SHIPPED | OUTPATIENT
Start: 2024-07-02

## 2024-08-15 ENCOUNTER — TELEPHONE (OUTPATIENT)
Dept: FAMILY MEDICINE CLINIC | Facility: CLINIC | Age: 59
End: 2024-08-15

## 2024-08-15 NOTE — TELEPHONE ENCOUNTER
Pt is requesting a letter for an emotional support dog. Is this something that could be obtained? Pt is stating that if possible his wife has an appointment with us today 8.15.24 and is wondering if the letter will be done by her appointment. I explained to the pt that I can't make any promises that the letter will be done that fast seeing as it take a few days to complete things like this. Pt stated that was fine. Just asked for a call back if this is something that could be done. Then he has no issues stopping by the office to  the letter if needed.

## 2024-08-15 NOTE — TELEPHONE ENCOUNTER
Patient called to speak to someone in the office regarding forms for an emotional support dog.Warm transfer to the office.

## 2024-08-23 ENCOUNTER — TELEPHONE (OUTPATIENT)
Age: 59
End: 2024-08-23

## 2024-08-23 NOTE — TELEPHONE ENCOUNTER
Portillo requesting an up date on Disability forms dropped off at the office last week.  Forms scanned into chart 8/15/24 not completed.    Please call patient once forms are completed signed and ready for .    Thank you

## 2024-09-03 ENCOUNTER — TELEPHONE (OUTPATIENT)
Age: 59
End: 2024-09-03

## 2024-09-03 DIAGNOSIS — Z00.00 ANNUAL PHYSICAL EXAM: Primary | ICD-10-CM

## 2024-09-03 NOTE — TELEPHONE ENCOUNTER
Patient called in regards to needing provider to place an order for his A1c. Patient stated that he needs to get done due to be on his wife insurance. Patient would like a call back once order has been place.

## 2024-09-09 ENCOUNTER — APPOINTMENT (OUTPATIENT)
Dept: LAB | Facility: HOSPITAL | Age: 59
End: 2024-09-09

## 2024-09-09 DIAGNOSIS — Z00.8 ENCOUNTER FOR OTHER GENERAL EXAMINATION: ICD-10-CM

## 2024-09-09 LAB
CHOLEST SERPL-MCNC: 183 MG/DL
HDLC SERPL-MCNC: 56 MG/DL
LDLC SERPL CALC-MCNC: 111 MG/DL (ref 0–100)
NONHDLC SERPL-MCNC: 127 MG/DL
TRIGL SERPL-MCNC: 79 MG/DL

## 2024-09-09 PROCEDURE — 83036 HEMOGLOBIN GLYCOSYLATED A1C: CPT

## 2024-09-09 PROCEDURE — 36415 COLL VENOUS BLD VENIPUNCTURE: CPT

## 2024-09-09 PROCEDURE — 80061 LIPID PANEL: CPT

## 2024-09-10 LAB
EST. AVERAGE GLUCOSE BLD GHB EST-MCNC: 111 MG/DL
HBA1C MFR BLD: 5.5 %

## 2024-11-04 DIAGNOSIS — F32.A DEPRESSION, UNSPECIFIED DEPRESSION TYPE: ICD-10-CM

## 2024-11-04 RX ORDER — BUPROPION HYDROCHLORIDE 150 MG/1
150 TABLET, EXTENDED RELEASE ORAL 2 TIMES DAILY
Qty: 180 TABLET | Refills: 1 | Status: SHIPPED | OUTPATIENT
Start: 2024-11-04

## 2024-11-04 NOTE — TELEPHONE ENCOUNTER
Reason for call:   [x] Refill   [] Prior Auth  [] Other:     Office:   [x] PCP/Provider - Robert Budinetz, MD / Dom QUEEN  [] Specialty/Provider -     Medication: buPROPion (Wellbutrin SR) 150 mg 12 hr tablet      Dose/Frequency:Take 1 tablet (150 mg total) by mouth 2 (two) times a day     Quantity: 180    Pharmacy:  [address],     Does the patient have enough for 3 days?   [] Yes   [x] No - Send as HP to POD

## 2024-11-26 ENCOUNTER — OFFICE VISIT (OUTPATIENT)
Dept: FAMILY MEDICINE CLINIC | Facility: CLINIC | Age: 59
End: 2024-11-26
Payer: COMMERCIAL

## 2024-11-26 VITALS
OXYGEN SATURATION: 98 % | DIASTOLIC BLOOD PRESSURE: 70 MMHG | HEIGHT: 71 IN | SYSTOLIC BLOOD PRESSURE: 130 MMHG | HEART RATE: 73 BPM | BODY MASS INDEX: 20.72 KG/M2 | WEIGHT: 148 LBS

## 2024-11-26 DIAGNOSIS — H10.33 ACUTE BACTERIAL CONJUNCTIVITIS OF BOTH EYES: ICD-10-CM

## 2024-11-26 DIAGNOSIS — H10.9 CONJUNCTIVITIS, BACTERIAL: Primary | ICD-10-CM

## 2024-11-26 PROCEDURE — 99213 OFFICE O/P EST LOW 20 MIN: CPT | Performed by: FAMILY MEDICINE

## 2024-11-26 RX ORDER — AZITHROMYCIN 250 MG/1
TABLET, FILM COATED ORAL
Qty: 6 TABLET | Refills: 0 | Status: SHIPPED | OUTPATIENT
Start: 2024-11-26 | End: 2024-11-26 | Stop reason: SDUPTHER

## 2024-11-26 RX ORDER — CIPROFLOXACIN HYDROCHLORIDE 3.5 MG/ML
1 SOLUTION/ DROPS TOPICAL 4 TIMES DAILY
Qty: 2.5 ML | Refills: 0 | Status: SHIPPED | OUTPATIENT
Start: 2024-11-26 | End: 2024-12-09

## 2024-11-26 RX ORDER — CIPROFLOXACIN HYDROCHLORIDE 3.5 MG/ML
1 SOLUTION/ DROPS TOPICAL 4 TIMES DAILY
Qty: 2.5 ML | Refills: 0 | Status: SHIPPED | OUTPATIENT
Start: 2024-11-26 | End: 2024-11-26 | Stop reason: SDUPTHER

## 2024-11-26 RX ORDER — AZITHROMYCIN 250 MG/1
TABLET, FILM COATED ORAL
Qty: 6 TABLET | Refills: 0 | Status: SHIPPED | OUTPATIENT
Start: 2024-11-26 | End: 2024-11-30

## 2024-11-26 NOTE — PROGRESS NOTES
"Assessment/Plan:       1. Conjunctivitis, bacterial  -     azithromycin (ZITHROMAX) 250 mg tablet; Take 2 tablets today then 1 tablet daily x 4 days  -     ciprofloxacin (CILOXAN) 0.3 % ophthalmic solution; Administer 1 drop to both eyes 4 (four) times a day for 13 days  2. Acute bacterial conjunctivitis of both eyes  -     azithromycin (ZITHROMAX) 250 mg tablet; Take 2 tablets today then 1 tablet daily x 4 days  -     ciprofloxacin (CILOXAN) 0.3 % ophthalmic solution; Administer 1 drop to both eyes 4 (four) times a day for 13 days      Start cipro drops  Subjective:      Patient ID: Portillo Flaherty is a 59 y.o. adult.    The patient has a several day history of purulent drainage in his left eye.  It is crusted shut in the morning his eye is red.  His vision is slightly blurry due to a film on his eyeball.  He has a cough as well no fever.    Eye Pain         The following portions of the patient's history were reviewed and updated as appropriate: allergies, current medications, past family history, past medical history, past social history, past surgical history, and problem list.    Review of Systems   Eyes:  Positive for pain.   Respiratory: Negative.     Cardiovascular: Negative.          Objective:      /70 (BP Location: Left arm, Patient Position: Sitting, Cuff Size: Standard)   Pulse 73   Ht 5' 11\" (1.803 m)   Wt 67.1 kg (148 lb)   SpO2 98%   BMI 20.64 kg/m²          Physical Exam  Vitals and nursing note reviewed.   Constitutional:       Appearance: Normal appearance.   HENT:      Head: Normocephalic and atraumatic.      Nose: Nose normal.      Mouth/Throat:      Mouth: Mucous membranes are moist.   Eyes:      Extraocular Movements: Extraocular movements intact.      Pupils: Pupils are equal, round, and reactive to light.      Comments: Left eye- conjunctivitis   Cardiovascular:      Rate and Rhythm: Normal rate and regular rhythm.      Pulses: Normal pulses.   Pulmonary:      Effort: Pulmonary " effort is normal.      Breath sounds: Normal breath sounds.   Abdominal:      General: Bowel sounds are normal.      Palpations: Abdomen is soft.   Musculoskeletal:      Cervical back: Normal range of motion.   Skin:     General: Skin is warm and dry.      Capillary Refill: Capillary refill takes less than 2 seconds.   Neurological:      General: No focal deficit present.      Mental Status: He is alert.   Psychiatric:         Mood and Affect: Mood normal.

## 2024-12-18 DIAGNOSIS — N52.9 ERECTILE DYSFUNCTION, UNSPECIFIED ERECTILE DYSFUNCTION TYPE: ICD-10-CM

## 2024-12-18 RX ORDER — SILDENAFIL 50 MG/1
50 TABLET, FILM COATED ORAL AS NEEDED
Qty: 30 TABLET | Refills: 3 | Status: SHIPPED | OUTPATIENT
Start: 2024-12-18

## 2024-12-18 NOTE — TELEPHONE ENCOUNTER
Reason for call:   [x] Refill   [] Prior Auth  [] Other:     Office:   [x] PCP/Provider - Dr Budinetz  [] Specialty/Provider -     Medication: sildenafil    Dose/Frequency: 50 mg take one tablet 2 hours after a meal and 1 hour before sexual intercourse may increase by 1/2 tablet per encounter up to a max of 2 tablets do not take more then once in 24 hours     Quantity: 30    Pharmacy: [facility]    Does the patient have enough for 3 days?   [] Yes   [x] No - Send as HP to POD- pt is out of medication

## 2025-03-04 NOTE — PROGRESS NOTES
Assessment/Plan:    No problem-specific Assessment & Plan notes found for this encounter. Diagnoses and all orders for this visit:    Foot callus      Plan:     -  Patient was counseled and educated on the condition and the diagnosis. The diagnosis, treatment options and prognosis were discussed in detail.   - Continue off-loading devices to decrease the growth of the calluses such as callus pads and foot ware modification/supportive shoe gear. Patient was instructed to use a pumice stone at home to reduce the growth of the callus as well as OTC lotion or prescription to their feet. -Return as needed  -Addressed all questions and concerns      Subjective:      Patient ID: Angel Echavarria is a 62 y.o. male. 59-year-old male with past medical history as below presents for follow-up of bilateral foot calluses and pain. Patient reports he has been using pumice stones and wearing supportive shoe gear and has noted tremendous improvement in his pain. He has no other complaints at this time. The following portions of the patient's history were reviewed and updated as appropriate:   He  has a past medical history of DJD (degenerative joint disease), Dyslipidemia, History of hepatitis C, Hypertension, 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[address]), and Toxoplasmosis chorioretinitis of left eye. He   Patient Active Problem List    Diagnosis Date Noted   • Elevated testosterone level 11/21/2022   • Varicocele 11/01/2022   • Symptomatic varicose veins, bilateral 09/09/2022   • Bipolar disorder, curr episode manic w/o psychotic features, moderate ([address]) 08/27/2022   • H/O chronic hepatitis 08/27/2022   • Elevated liver enzymes 02/28/2022   • Primary hypertension 02/28/2022   • Severe alcohol dependence in sustained remission ([address]) 08/28/2015     He  has a past surgical history that includes Cataract extraction (Left); Hernia repair; Carpal tunnel release (Left); Plantar fascia surgery (Left); and Colonoscopy. .    Review of Systems All other systems reviewed and are negative. Objective:      /99   Pulse 56   Ht 5' 11" (1.803 m)   Wt 68 kg (150 lb)   BMI 20.92 kg/m²          Physical Exam  Vitals reviewed. Cardiovascular:      Pulses:           Dorsalis pedis pulses are 2+ on the right side and 2+ on the left side. Posterior tibial pulses are 2+ on the right side and 2+ on the left side. Feet:      Comments: No hyperkeratotic lesions noted to bilateral plantar feet. No pain with palpation. show

## 2025-04-22 ENCOUNTER — TELEPHONE (OUTPATIENT)
Age: 60
End: 2025-04-22

## 2025-04-22 DIAGNOSIS — M79.671 FOOT PAIN, BILATERAL: Primary | ICD-10-CM

## 2025-04-22 DIAGNOSIS — Z12.5 SCREENING FOR PROSTATE CANCER: Primary | ICD-10-CM

## 2025-04-22 DIAGNOSIS — M79.672 FOOT PAIN, BILATERAL: Primary | ICD-10-CM

## 2025-04-22 DIAGNOSIS — I10 BENIGN ESSENTIAL HTN: ICD-10-CM

## 2025-04-22 NOTE — TELEPHONE ENCOUNTER
Pt scheduled for 5/2/25 Annual Physical w/Dr. Budinetz.    Patient requesting that lab orders be placed for review at 5/2/25 visit.     Patient requesting a call to confirm when orders have been placed as patient reports that he does not use MyChart.    [phone]    Patient also requesting an ambulatory referral to Podiatry.     Patient recommended either   [facility] - [address] [place], [place] [address]    Or patient stated that he would be willing to try another location closer to his home.

## 2025-04-24 ENCOUNTER — OFFICE VISIT (OUTPATIENT)
Dept: PODIATRY | Age: 60
End: 2025-04-24
Payer: COMMERCIAL

## 2025-04-24 VITALS — BODY MASS INDEX: 21 KG/M2 | WEIGHT: 150 LBS | HEIGHT: 71 IN

## 2025-04-24 DIAGNOSIS — Q82.8 POROKERATOSIS: Primary | ICD-10-CM

## 2025-04-24 DIAGNOSIS — M79.672 LEFT FOOT PAIN: ICD-10-CM

## 2025-04-24 DIAGNOSIS — B07.0 PLANTAR WART: ICD-10-CM

## 2025-04-24 PROCEDURE — 17110 DESTRUCTION B9 LES UP TO 14: CPT | Performed by: STUDENT IN AN ORGANIZED HEALTH CARE EDUCATION/TRAINING PROGRAM

## 2025-04-24 PROCEDURE — 99213 OFFICE O/P EST LOW 20 MIN: CPT | Performed by: STUDENT IN AN ORGANIZED HEALTH CARE EDUCATION/TRAINING PROGRAM

## 2025-04-24 NOTE — PROGRESS NOTES
"Name: Portillo Flaherty      : 1965      MRN: [id number]  Encounter Provider: Bonita Staley DPM  Encounter Date: 2025   Encounter department: [facility] [place]  :  Assessment & Plan  Left foot pain    Orders:    Ambulatory Referral to Podiatry    Lesion Destruction    Porokeratosis    Orders:    Lesion Destruction    Plantar wart    Orders:    Lesion Destruction      PLAN:  I reviewed clinical exam with patient in detail today. I have discussed with the patient the pathophysiology of this diagnosis and reviewed how the examination correlates with this diagnosis.  PCP note from 24 reviewed   Left plantar 5th met head porokeratosis/wart. I discussed treatment today in detail. Patient consents for topical canthrone treatment which was performed as below. I discussed that treatment may need to be performed 1-4x and f/u is every 2 weeks until resolution.   I discussed removing dressing tonight or tomorrow AM and washing off with soap and water. Blistering is normal. Call if signs of infection arise.  F/u 2 weeks for canthrone round 2       Lesion Destruction    Date/Time: 2025 9:15 AM    Performed by: Bonita Staley DPM  Authorized by: Bonita Staley DPM  Universal Protocol:  procedure performed by consultantConsent: Verbal consent obtained.  Risks and benefits: risks, benefits and alternatives were discussed  Consent given by: patient  Time out: Immediately prior to procedure a \"time out\" was called to verify the correct patient, procedure, equipment, support staff and site/side marked as required.  Patient understanding: patient states understanding of the procedure being performed  Patient consent: the patient's understanding of the procedure matches consent given  Patient identity confirmed: verbally with patient    Procedure Details - Lesion Destruction:     Number of Lesions:  1  Lesion 1:     Body area:  Lower extremity    Lower extremity location:  L foot    " "Malignancy: benign lesion      Malignancy comment:  Porokeratosis/wart    Destruction method: chemical removal      Destruction method comment:  Canthrone       History of Present Illness   HPI  Portillo Flaherty is a 60 y.o. male who presents to clinic for left foot pain and corn/callus. Had B/L corns in past which were pared by Dr. Mendoza. Notes no issue to right but left has returned.       Review of Systems   Constitutional:  Negative for activity change, chills and fever.   HENT: Negative.     Respiratory:  Negative for cough, chest tightness and shortness of breath.    Cardiovascular:  Negative for chest pain and leg swelling.   Endocrine: Negative.    Genitourinary: Negative.    Neurological: Negative.  Negative for numbness.   Psychiatric/Behavioral: Negative.  Negative for agitation and behavioral problems.           Objective   Ht 5' 11\" (1.803 m)   Wt 68 kg (150 lb)   BMI 20.92 kg/m²      Physical Exam  Constitutional:       Appearance: Normal appearance.   Cardiovascular:      Pulses: Normal pulses.   Musculoskeletal:         General: Tenderness (Left plantar 5th met head at porokeratosis) and deformity (tailors bunion B/L) present.   Skin:     Capillary Refill: Capillary refill takes less than 2 seconds.      Comments: Left submet 5 head porokeratosis/wart   Neurological:      Mental Status: He is alert.           "

## 2025-04-29 ENCOUNTER — APPOINTMENT (OUTPATIENT)
Dept: LAB | Facility: HOSPITAL | Age: 60
End: 2025-04-29
Payer: COMMERCIAL

## 2025-04-29 ENCOUNTER — RESULTS FOLLOW-UP (OUTPATIENT)
Dept: FAMILY MEDICINE CLINIC | Facility: CLINIC | Age: 60
End: 2025-04-29

## 2025-04-29 DIAGNOSIS — Z12.5 SCREENING FOR PROSTATE CANCER: ICD-10-CM

## 2025-04-29 DIAGNOSIS — I10 BENIGN ESSENTIAL HTN: ICD-10-CM

## 2025-04-29 DIAGNOSIS — Z00.00 ANNUAL PHYSICAL EXAM: ICD-10-CM

## 2025-04-29 LAB
ALBUMIN SERPL BCG-MCNC: 4.8 G/DL (ref 3.5–5)
ALP SERPL-CCNC: 49 U/L (ref 34–104)
ALT SERPL W P-5'-P-CCNC: 66 U/L (ref 7–52)
ANION GAP SERPL CALCULATED.3IONS-SCNC: 9 MMOL/L (ref 4–13)
AST SERPL W P-5'-P-CCNC: 76 U/L (ref 13–39)
BASOPHILS # BLD AUTO: 0.05 THOUSANDS/ÂΜL (ref 0–0.1)
BASOPHILS NFR BLD AUTO: 1 % (ref 0–1)
BILIRUB SERPL-MCNC: 1.16 MG/DL (ref 0.2–1)
BUN SERPL-MCNC: 11 MG/DL (ref 5–25)
CALCIUM SERPL-MCNC: 10.1 MG/DL (ref 8.4–10.2)
CHLORIDE SERPL-SCNC: 101 MMOL/L (ref 96–108)
CHOLEST SERPL-MCNC: 198 MG/DL (ref ?–200)
CO2 SERPL-SCNC: 28 MMOL/L (ref 21–32)
CREAT SERPL-MCNC: 0.8 MG/DL (ref 0.6–1.3)
EOSINOPHIL # BLD AUTO: 0.15 THOUSAND/ÂΜL (ref 0–0.61)
EOSINOPHIL NFR BLD AUTO: 3 % (ref 0–6)
ERYTHROCYTE [DISTWIDTH] IN BLOOD BY AUTOMATED COUNT: 12 % (ref 11.6–15.1)
EST. AVERAGE GLUCOSE BLD GHB EST-MCNC: 111 MG/DL
GFR SERPL CREATININE-BSD FRML MDRD: 97 ML/MIN/1.73SQ M
GLUCOSE P FAST SERPL-MCNC: 108 MG/DL (ref 65–99)
HBA1C MFR BLD: 5.5 %
HCT VFR BLD AUTO: 45 % (ref 36.5–49.3)
HDLC SERPL-MCNC: 65 MG/DL
HGB BLD-MCNC: 15.3 G/DL (ref 12–17)
IMM GRANULOCYTES # BLD AUTO: 0.01 THOUSAND/UL (ref 0–0.2)
IMM GRANULOCYTES NFR BLD AUTO: 0 % (ref 0–2)
LDLC SERPL CALC-MCNC: 119 MG/DL (ref 0–100)
LYMPHOCYTES # BLD AUTO: 2.56 THOUSANDS/ÂΜL (ref 0.6–4.47)
LYMPHOCYTES NFR BLD AUTO: 43 % (ref 14–44)
MCH RBC QN AUTO: 31.4 PG (ref 26.8–34.3)
MCHC RBC AUTO-ENTMCNC: 34 G/DL (ref 31.4–37.4)
MCV RBC AUTO: 92 FL (ref 82–98)
MONOCYTES # BLD AUTO: 0.55 THOUSAND/ÂΜL (ref 0.17–1.22)
MONOCYTES NFR BLD AUTO: 10 % (ref 4–12)
NEUTROPHILS # BLD AUTO: 2.48 THOUSANDS/ÂΜL (ref 1.85–7.62)
NEUTS SEG NFR BLD AUTO: 43 % (ref 43–75)
NONHDLC SERPL-MCNC: 133 MG/DL
NRBC BLD AUTO-RTO: 0 /100 WBCS
PLATELET # BLD AUTO: 191 THOUSANDS/UL (ref 149–390)
PMV BLD AUTO: 9.2 FL (ref 8.9–12.7)
POTASSIUM SERPL-SCNC: 4.3 MMOL/L (ref 3.5–5.3)
PROT SERPL-MCNC: 7.8 G/DL (ref 6.4–8.4)
PSA SERPL-MCNC: 1.21 NG/ML (ref 0–4)
RBC # BLD AUTO: 4.87 MILLION/UL (ref 3.88–5.62)
SODIUM SERPL-SCNC: 138 MMOL/L (ref 135–147)
TRIGL SERPL-MCNC: 68 MG/DL (ref ?–150)
WBC # BLD AUTO: 5.8 THOUSAND/UL (ref 4.31–10.16)

## 2025-04-29 PROCEDURE — G0103 PSA SCREENING: HCPCS

## 2025-04-29 PROCEDURE — 36415 COLL VENOUS BLD VENIPUNCTURE: CPT

## 2025-04-29 PROCEDURE — 83036 HEMOGLOBIN GLYCOSYLATED A1C: CPT

## 2025-04-29 PROCEDURE — 85025 COMPLETE CBC W/AUTO DIFF WBC: CPT

## 2025-04-29 PROCEDURE — 80053 COMPREHEN METABOLIC PANEL: CPT

## 2025-04-29 PROCEDURE — 80061 LIPID PANEL: CPT

## 2025-05-05 ENCOUNTER — OFFICE VISIT (OUTPATIENT)
Dept: FAMILY MEDICINE CLINIC | Facility: CLINIC | Age: 60
End: 2025-05-05
Payer: COMMERCIAL

## 2025-05-05 VITALS
SYSTOLIC BLOOD PRESSURE: 124 MMHG | BODY MASS INDEX: 20.64 KG/M2 | HEART RATE: 97 BPM | HEIGHT: 71 IN | DIASTOLIC BLOOD PRESSURE: 78 MMHG | OXYGEN SATURATION: 99 % | WEIGHT: 147.4 LBS | RESPIRATION RATE: 16 BRPM

## 2025-05-05 DIAGNOSIS — Z00.00 ANNUAL PHYSICAL EXAM: Primary | ICD-10-CM

## 2025-05-05 DIAGNOSIS — F98.8 ATTENTION DEFICIT DISORDER (ADD) IN ADULT: ICD-10-CM

## 2025-05-05 PROCEDURE — 99396 PREV VISIT EST AGE 40-64: CPT | Performed by: FAMILY MEDICINE

## 2025-05-05 PROCEDURE — 99214 OFFICE O/P EST MOD 30 MIN: CPT | Performed by: FAMILY MEDICINE

## 2025-05-05 RX ORDER — ATOMOXETINE 40 MG/1
40 CAPSULE ORAL DAILY
Qty: 30 CAPSULE | Refills: 3 | Status: SHIPPED | OUTPATIENT
Start: 2025-05-05

## 2025-05-05 NOTE — ASSESSMENT & PLAN NOTE
Start strattera    Orders:    atoMOXetine (STRATTERA) 40 mg capsule; Take 1 capsule (40 mg total) by mouth daily

## 2025-05-05 NOTE — PROGRESS NOTES
"Name: Portillo Flaherty      : 1965      MRN: [id number]  Encounter Provider: Robert Budinetz, MD  Encounter Date: 2025   Encounter department: [facility] PR[facility]  :  Assessment & Plan  Annual physical exam  Reviewed age-appropriate health maintenance and preventive care.           Attention deficit disorder (ADD) in adult  Start strattera    Orders:    atoMOXetine (STRATTERA) 40 mg capsule; Take 1 capsule (40 mg total) by mouth daily           History of Present Illness   Portillo is here for an annual.  Doing well.  Labs reviewed.  He has a hx of hep c and chronically elevated LFT for over 40 years.  He [place] snot want a workup for this now.  I had referred him to hepatology.  Ultrasound showed a normal appearance.  No CP or sob.  Meds reviewed.  He had a normal PSA.  He is UTD on colonoscopy.  He eats well and exercises.  He uses marajuana no cigarettes.  He has a hx of bipolar and adult ADD.  He is suffering from a lot of ADD sx.  Very difficult to focus and concentrate and calm down.  Sleep is intermittently an issue.      Review of Systems   Respiratory: Negative.     Cardiovascular: Negative.    Gastrointestinal: Negative.        Objective   /78 (BP Location: Left arm, Patient Position: Sitting, Cuff Size: Standard)   Pulse 97   Resp 16   Ht 5' 11\" (1.803 m)   Wt 66.9 kg (147 lb 6.4 oz)   SpO2 99%   BMI 20.56 kg/m²      Physical Exam  Vitals and nursing note reviewed.   Constitutional:       General: He is not in acute distress.     Appearance: He is well-developed.   HENT:      Head: Normocephalic and atraumatic.   Eyes:      Conjunctiva/sclera: Conjunctivae normal.   Cardiovascular:      Rate and Rhythm: Normal rate and regular rhythm.      Heart sounds: No murmur heard.  Pulmonary:      Effort: Pulmonary effort is normal. No respiratory distress.      Breath sounds: Normal breath sounds.   Abdominal:      Palpations: Abdomen is soft.      Tenderness: There is no abdominal " tenderness.   Musculoskeletal:         General: No swelling.      Cervical back: Neck supple.   Skin:     General: Skin is warm and dry.      Capillary Refill: Capillary refill takes less than 2 seconds.   Neurological:      Mental Status: He is alert.   Psychiatric:         Mood and Affect: Mood normal.

## 2025-05-05 NOTE — ADDENDUM NOTE
Addended by: BUDINETZ, ROBERT on: 5/5/2025 09:22 AM     Modules accepted: Orders, Level of Service

## 2025-05-14 ENCOUNTER — OFFICE VISIT (OUTPATIENT)
Dept: PODIATRY | Age: 60
End: 2025-05-14
Payer: COMMERCIAL

## 2025-05-14 VITALS — WEIGHT: 138 LBS | HEIGHT: 71 IN | BODY MASS INDEX: 19.32 KG/M2

## 2025-05-14 DIAGNOSIS — Q82.8 POROKERATOSIS: Primary | ICD-10-CM

## 2025-05-14 DIAGNOSIS — B07.0 PLANTAR WART: ICD-10-CM

## 2025-05-14 DIAGNOSIS — M79.672 LEFT FOOT PAIN: ICD-10-CM

## 2025-05-14 PROCEDURE — 99212 OFFICE O/P EST SF 10 MIN: CPT | Performed by: STUDENT IN AN ORGANIZED HEALTH CARE EDUCATION/TRAINING PROGRAM

## 2025-05-14 NOTE — PROGRESS NOTES
"Name: Portillo Flaherty      : 1965      MRN: [id number]  Encounter Provider: Bonita Staley DPM  Encounter Date: 2025   Encounter department: [facility] [place]  :  Assessment & Plan  Porokeratosis         Plantar wart         Left foot pain           PLAN:  Left plantar 5th met head porokeratosis/wart resolved with one round of canthrone. Monitor for recurrence   F/u prn    History of Present Illness   HPI  Portillo Flaherty is a 60 y.o. male who presents to clinic for left foot pain and corn/callus. Had one round of canthrone and doing well, no pain.      Review of Systems   Constitutional:  Negative for activity change, chills and fever.   HENT: Negative.     Respiratory:  Negative for cough, chest tightness and shortness of breath.    Cardiovascular:  Negative for chest pain and leg swelling.   Endocrine: Negative.    Genitourinary: Negative.    Neurological: Negative.  Negative for numbness.   Psychiatric/Behavioral: Negative.  Negative for agitation and behavioral problems.           Objective   Ht 5' 11\" (1.803 m)   Wt 62.6 kg (138 lb)   BMI 19.25 kg/m²      Physical Exam  Constitutional:       Appearance: Normal appearance.     Cardiovascular:      Pulses: Normal pulses.     Musculoskeletal:         General: Deformity (tailors bunion B/L) present. No tenderness (Left plantar 5th met head at porokeratosis).     Skin:     Capillary Refill: Capillary refill takes less than 2 seconds.      Comments: Left submet 5 head porokeratosis/wart resolved     Neurological:      Mental Status: He is alert.           "

## 2025-05-21 ENCOUNTER — TELEPHONE (OUTPATIENT)
Age: 60
End: 2025-05-21

## 2025-05-21 NOTE — TELEPHONE ENCOUNTER
Patient does not use [email or web address]. Do not send him any [email or web address] messages. Patient had a physical 5/5/25. At the time he did not have his medicare a and b id number. It is now added. Patient would like a paper statement mailed to his home address and wants to be certain the insurance is billed properly for date of service.

## 2025-05-27 NOTE — TELEPHONE ENCOUNTER
Patient didn't have any questions regarding insurance just was looking to pay a balance    Patient would like to pay at his next upcoming visit.       Nothing further needed.

## 2025-06-16 ENCOUNTER — OFFICE VISIT (OUTPATIENT)
Dept: FAMILY MEDICINE CLINIC | Facility: CLINIC | Age: 60
End: 2025-06-16
Payer: COMMERCIAL

## 2025-06-16 VITALS
BODY MASS INDEX: 18.9 KG/M2 | OXYGEN SATURATION: 99 % | SYSTOLIC BLOOD PRESSURE: 120 MMHG | DIASTOLIC BLOOD PRESSURE: 76 MMHG | HEIGHT: 71 IN | HEART RATE: 76 BPM | WEIGHT: 135 LBS

## 2025-06-16 DIAGNOSIS — B58.9 TOXOPLASMOSIS: ICD-10-CM

## 2025-06-16 DIAGNOSIS — R63.4 WEIGHT LOSS: Primary | ICD-10-CM

## 2025-06-16 DIAGNOSIS — R63.6 UNDERWEIGHT: ICD-10-CM

## 2025-06-16 PROBLEM — F19.920 SUBSTANCE INTOXICATION WITHOUT COMPLICATION (HCC): Status: ACTIVE | Noted: 2025-06-16

## 2025-06-16 PROBLEM — F31.2 BIPOLAR AFFECTIVE DISORDER, CURRENT EPISODE MANIC WITH PSYCHOTIC SYMPTOMS (HCC): Status: ACTIVE | Noted: 2025-06-16

## 2025-06-16 PROCEDURE — 99214 OFFICE O/P EST MOD 30 MIN: CPT | Performed by: FAMILY MEDICINE

## 2025-06-16 NOTE — PROGRESS NOTES
"Name: Portillo Flaherty      : 1965      MRN: [id number]  Encounter Provider: Robert Budinetz, MD  Encounter Date: 2025   Encounter department: [facility] PRIMARY [facility]ARE  :  Assessment & Plan  Weight loss  Ref to nutrition  Orders:    Ambulatory Referral to Nutrition Services; Future    Underweight  Ref to nutrition  Orders:    Ambulatory Referral to Nutrition Services; Future    Toxoplasmosis  Ref ophth  Orders:    Ambulatory Referral to Ophthalmology; Future           History of Present Illness   The patient has been fasting for spiritual reasons.  He feels that it brings him closer to God he is now underweight I am going to refer him to nutrition services and he is agreeable to that.  He has a history of ocular toxoplasmosis would like to see an eye doctor.  He is aware that his weight is too low and he is trying to gain weight back      Review of Systems   Respiratory: Negative.     Cardiovascular: Negative.    Gastrointestinal: Negative.        Objective   /76 (BP Location: Left arm, Patient Position: Sitting, Cuff Size: Large)   Pulse 76   Ht 5' 11\" (1.803 m)   Wt 61.2 kg (135 lb)   SpO2 99%   BMI 18.83 kg/m²      Physical Exam  Vitals and nursing note reviewed.   Constitutional:       General: He is not in acute distress.     Appearance: He is well-developed.   HENT:      Head: Normocephalic and atraumatic.     Eyes:      Conjunctiva/sclera: Conjunctivae normal.       Cardiovascular:      Rate and Rhythm: Normal rate and regular rhythm.      Heart sounds: No murmur heard.  Pulmonary:      Effort: Pulmonary effort is normal. No respiratory distress.      Breath sounds: Normal breath sounds.   Abdominal:      Palpations: Abdomen is soft.      Tenderness: There is no abdominal tenderness.     Musculoskeletal:         General: No swelling.      Cervical back: Neck supple.     Skin:     General: Skin is warm and dry.      Capillary Refill: Capillary refill takes less than 2 seconds. "     Neurological:      Mental Status: He is alert.     Psychiatric:         Mood and Affect: Mood normal.

## 2025-06-17 ENCOUNTER — TELEPHONE (OUTPATIENT)
Age: 60
End: 2025-06-17

## 2025-06-17 DIAGNOSIS — B58.9 TOXOPLASMOSIS: Primary | ICD-10-CM

## 2025-06-17 NOTE — TELEPHONE ENCOUNTER
Patient seen 6/16 and given an Ophthalmologist referral for an out of network provider.    Patient state Dr. Budinetz did recommended an in network Ophthalmologist and would like to have a referral to see them instead. Also would like to have that in network Ophthalmologist give him a call to schedule.    Please review

## 2025-06-17 NOTE — TELEPHONE ENCOUNTER
Patient was referred to [facility], Dr. Benjamin. They are out of network with patients insurance. Patient is aware. He is going to call insurance to see who is in network so we can update the referral. [facility] and [facility] are both out of network.

## 2025-06-19 ENCOUNTER — TELEPHONE (OUTPATIENT)
Age: 60
End: 2025-06-19

## 2025-06-23 ENCOUNTER — HOSPITAL ENCOUNTER (EMERGENCY)
Facility: HOSPITAL | Age: 60
End: 2025-06-24
Attending: EMERGENCY MEDICINE
Payer: COMMERCIAL

## 2025-06-23 DIAGNOSIS — R74.8 ELEVATED LIVER ENZYMES: ICD-10-CM

## 2025-06-23 DIAGNOSIS — Z87.19 H/O CHRONIC HEPATITIS: ICD-10-CM

## 2025-06-23 DIAGNOSIS — I10 PRIMARY HYPERTENSION: ICD-10-CM

## 2025-06-23 DIAGNOSIS — F31.9 BIPOLAR DISORDER (HCC): ICD-10-CM

## 2025-06-23 DIAGNOSIS — F30.9 MANIA (HCC): Primary | ICD-10-CM

## 2025-06-23 LAB
ALBUMIN SERPL BCG-MCNC: 4.7 G/DL (ref 3.5–5)
ALP SERPL-CCNC: 52 U/L (ref 34–104)
ALT SERPL W P-5'-P-CCNC: 68 U/L (ref 7–52)
AMPHETAMINES SERPL QL SCN: NEGATIVE
ANION GAP SERPL CALCULATED.3IONS-SCNC: 10 MMOL/L (ref 4–13)
AST SERPL W P-5'-P-CCNC: 68 U/L (ref 13–39)
BARBITURATES UR QL: NEGATIVE
BASOPHILS # BLD AUTO: 0.03 THOUSANDS/ÂΜL (ref 0–0.1)
BASOPHILS NFR BLD AUTO: 0 % (ref 0–1)
BENZODIAZ UR QL: NEGATIVE
BILIRUB SERPL-MCNC: 0.68 MG/DL (ref 0.2–1)
BUN SERPL-MCNC: 18 MG/DL (ref 5–25)
CALCIUM SERPL-MCNC: 9.8 MG/DL (ref 8.4–10.2)
CHLORIDE SERPL-SCNC: 103 MMOL/L (ref 96–108)
CO2 SERPL-SCNC: 22 MMOL/L (ref 21–32)
COCAINE UR QL: NEGATIVE
CREAT SERPL-MCNC: 0.84 MG/DL (ref 0.6–1.3)
EOSINOPHIL # BLD AUTO: 0.08 THOUSAND/ÂΜL (ref 0–0.61)
EOSINOPHIL NFR BLD AUTO: 1 % (ref 0–6)
ERYTHROCYTE [DISTWIDTH] IN BLOOD BY AUTOMATED COUNT: 12.8 % (ref 11.6–15.1)
ETHANOL SERPL-MCNC: <10 MG/DL
FENTANYL UR QL SCN: NEGATIVE
FLUAV AG UPPER RESP QL IA.RAPID: NEGATIVE
FLUBV AG UPPER RESP QL IA.RAPID: NEGATIVE
GFR SERPL CREATININE-BSD FRML MDRD: 95 ML/MIN/1.73SQ M
GLUCOSE SERPL-MCNC: 93 MG/DL (ref 65–140)
HCT VFR BLD AUTO: 37.7 % (ref 36.5–49.3)
HGB BLD-MCNC: 13 G/DL (ref 12–17)
HYDROCODONE UR QL SCN: NEGATIVE
IMM GRANULOCYTES # BLD AUTO: 0.03 THOUSAND/UL (ref 0–0.2)
IMM GRANULOCYTES NFR BLD AUTO: 0 % (ref 0–2)
LYMPHOCYTES # BLD AUTO: 3.89 THOUSANDS/ÂΜL (ref 0.6–4.47)
LYMPHOCYTES NFR BLD AUTO: 40 % (ref 14–44)
MCH RBC QN AUTO: 32.3 PG (ref 26.8–34.3)
MCHC RBC AUTO-ENTMCNC: 34.5 G/DL (ref 31.4–37.4)
MCV RBC AUTO: 94 FL (ref 82–98)
METHADONE UR QL: NEGATIVE
MONOCYTES # BLD AUTO: 0.73 THOUSAND/ÂΜL (ref 0.17–1.22)
MONOCYTES NFR BLD AUTO: 8 % (ref 4–12)
NEUTROPHILS # BLD AUTO: 5.02 THOUSANDS/ÂΜL (ref 1.85–7.62)
NEUTS SEG NFR BLD AUTO: 51 % (ref 43–75)
NRBC BLD AUTO-RTO: 0 /100 WBCS
OPIATES UR QL SCN: NEGATIVE
OXYCODONE+OXYMORPHONE UR QL SCN: NEGATIVE
PCP UR QL: NEGATIVE
PLATELET # BLD AUTO: 163 THOUSANDS/UL (ref 149–390)
PMV BLD AUTO: 8.9 FL (ref 8.9–12.7)
POTASSIUM SERPL-SCNC: 4.1 MMOL/L (ref 3.5–5.3)
PROT SERPL-MCNC: 7.4 G/DL (ref 6.4–8.4)
RBC # BLD AUTO: 4.03 MILLION/UL (ref 3.88–5.62)
SARS-COV+SARS-COV-2 AG RESP QL IA.RAPID: NEGATIVE
SODIUM SERPL-SCNC: 135 MMOL/L (ref 135–147)
THC UR QL: POSITIVE
TSH SERPL DL<=0.05 MIU/L-ACNC: 2.11 UIU/ML (ref 0.45–4.5)
WBC # BLD AUTO: 9.78 THOUSAND/UL (ref 4.31–10.16)

## 2025-06-23 PROCEDURE — 85025 COMPLETE CBC W/AUTO DIFF WBC: CPT | Performed by: EMERGENCY MEDICINE

## 2025-06-23 PROCEDURE — 80307 DRUG TEST PRSMV CHEM ANLYZR: CPT | Performed by: EMERGENCY MEDICINE

## 2025-06-23 PROCEDURE — 36415 COLL VENOUS BLD VENIPUNCTURE: CPT | Performed by: EMERGENCY MEDICINE

## 2025-06-23 PROCEDURE — 82077 ASSAY SPEC XCP UR&BREATH IA: CPT | Performed by: EMERGENCY MEDICINE

## 2025-06-23 PROCEDURE — 87804 INFLUENZA ASSAY W/OPTIC: CPT | Performed by: EMERGENCY MEDICINE

## 2025-06-23 PROCEDURE — 99285 EMERGENCY DEPT VISIT HI MDM: CPT | Performed by: EMERGENCY MEDICINE

## 2025-06-23 PROCEDURE — 84443 ASSAY THYROID STIM HORMONE: CPT | Performed by: EMERGENCY MEDICINE

## 2025-06-23 PROCEDURE — 87811 SARS-COV-2 COVID19 W/OPTIC: CPT | Performed by: EMERGENCY MEDICINE

## 2025-06-23 PROCEDURE — 80053 COMPREHEN METABOLIC PANEL: CPT | Performed by: EMERGENCY MEDICINE

## 2025-06-23 PROCEDURE — 99283 EMERGENCY DEPT VISIT LOW MDM: CPT

## 2025-06-23 RX ORDER — ACETAMINOPHEN 325 MG/1
650 TABLET ORAL ONCE
Status: COMPLETED | OUTPATIENT
Start: 2025-06-23 | End: 2025-06-23

## 2025-06-23 RX ORDER — LISINOPRIL 10 MG/1
10 TABLET ORAL DAILY
Status: DISCONTINUED | OUTPATIENT
Start: 2025-06-24 | End: 2025-06-24 | Stop reason: HOSPADM

## 2025-06-23 RX ADMIN — ACETAMINOPHEN 650 MG: 325 TABLET ORAL at 22:37

## 2025-06-24 ENCOUNTER — HOSPITAL ENCOUNTER (INPATIENT)
Facility: HOSPITAL | Age: 60
LOS: 3 days | Discharge: HOME | End: 2025-06-27
Attending: PSYCHIATRY & NEUROLOGY | Admitting: PSYCHIATRY & NEUROLOGY
Payer: COMMERCIAL

## 2025-06-24 VITALS
HEART RATE: 61 BPM | DIASTOLIC BLOOD PRESSURE: 85 MMHG | WEIGHT: 135.8 LBS | RESPIRATION RATE: 16 BRPM | SYSTOLIC BLOOD PRESSURE: 115 MMHG | OXYGEN SATURATION: 97 % | HEIGHT: 71 IN | BODY MASS INDEX: 19.01 KG/M2 | TEMPERATURE: 98.4 F

## 2025-06-24 DIAGNOSIS — F31.2 BIPOLAR AFFECTIVE DISORDER, CURRENT EPISODE MANIC WITH PSYCHOTIC SYMPTOMS (HCC): ICD-10-CM

## 2025-06-24 DIAGNOSIS — F98.8 ATTENTION DEFICIT DISORDER (ADD) IN ADULT: ICD-10-CM

## 2025-06-24 DIAGNOSIS — I10 PRIMARY HYPERTENSION: ICD-10-CM

## 2025-06-24 DIAGNOSIS — F31.12 BIPOLAR DISORDER, CURR EPISODE MANIC W/O PSYCHOTIC FEATURES, MODERATE (HCC): Primary | ICD-10-CM

## 2025-06-24 RX ORDER — BENZTROPINE MESYLATE 0.5 MG/1
0.5 TABLET ORAL
Status: CANCELLED | OUTPATIENT
Start: 2025-06-24

## 2025-06-24 RX ORDER — OLANZAPINE 2.5 MG/1
2.5 TABLET, FILM COATED ORAL
Status: DISCONTINUED | OUTPATIENT
Start: 2025-06-24 | End: 2025-06-27 | Stop reason: HOSPADM

## 2025-06-24 RX ORDER — OLANZAPINE 2.5 MG/1
5 TABLET, FILM COATED ORAL
Status: CANCELLED | OUTPATIENT
Start: 2025-06-24

## 2025-06-24 RX ORDER — LISINOPRIL 10 MG/1
10 TABLET ORAL DAILY
Status: DISCONTINUED | OUTPATIENT
Start: 2025-06-25 | End: 2025-06-27 | Stop reason: HOSPADM

## 2025-06-24 RX ORDER — ACETAMINOPHEN 325 MG/1
650 TABLET ORAL EVERY 4 HOURS PRN
Status: CANCELLED | OUTPATIENT
Start: 2025-06-24

## 2025-06-24 RX ORDER — ACETAMINOPHEN 325 MG/1
650 TABLET ORAL EVERY 4 HOURS PRN
Status: DISCONTINUED | OUTPATIENT
Start: 2025-06-24 | End: 2025-06-27 | Stop reason: HOSPADM

## 2025-06-24 RX ORDER — OLANZAPINE 5 MG/1
5 TABLET, FILM COATED ORAL
Status: DISCONTINUED | OUTPATIENT
Start: 2025-06-24 | End: 2025-06-27 | Stop reason: HOSPADM

## 2025-06-24 RX ORDER — LORAZEPAM 1 MG/1
1 TABLET ORAL
Status: CANCELLED | OUTPATIENT
Start: 2025-06-24

## 2025-06-24 RX ORDER — OLANZAPINE 2.5 MG/1
2.5 TABLET, FILM COATED ORAL
Status: CANCELLED | OUTPATIENT
Start: 2025-06-24

## 2025-06-24 RX ORDER — OLANZAPINE 10 MG/2ML
5 INJECTION, POWDER, FOR SOLUTION INTRAMUSCULAR
Status: DISCONTINUED | OUTPATIENT
Start: 2025-06-24 | End: 2025-06-27 | Stop reason: HOSPADM

## 2025-06-24 RX ORDER — BENZTROPINE MESYLATE 0.5 MG/1
0.5 TABLET ORAL
Status: DISCONTINUED | OUTPATIENT
Start: 2025-06-24 | End: 2025-06-27 | Stop reason: HOSPADM

## 2025-06-24 RX ORDER — LORAZEPAM 2 MG/ML
1 INJECTION INTRAMUSCULAR
Status: DISCONTINUED | OUTPATIENT
Start: 2025-06-24 | End: 2025-06-27 | Stop reason: HOSPADM

## 2025-06-24 RX ORDER — LORAZEPAM 1 MG/1
1 TABLET ORAL
Status: DISCONTINUED | OUTPATIENT
Start: 2025-06-24 | End: 2025-06-27 | Stop reason: HOSPADM

## 2025-06-24 RX ORDER — BENZTROPINE MESYLATE 1 MG/ML
1 INJECTION, SOLUTION INTRAMUSCULAR; INTRAVENOUS
Status: CANCELLED | OUTPATIENT
Start: 2025-06-24

## 2025-06-24 RX ORDER — LISINOPRIL 10 MG/1
10 TABLET ORAL DAILY
Status: CANCELLED | OUTPATIENT
Start: 2025-06-25

## 2025-06-24 RX ORDER — ACETAMINOPHEN 325 MG/1
975 TABLET ORAL EVERY 6 HOURS PRN
Status: CANCELLED | OUTPATIENT
Start: 2025-06-24

## 2025-06-24 RX ORDER — HYDROXYZINE HYDROCHLORIDE 25 MG/1
25 TABLET, FILM COATED ORAL
Status: DISCONTINUED | OUTPATIENT
Start: 2025-06-24 | End: 2025-06-27 | Stop reason: HOSPADM

## 2025-06-24 RX ORDER — NICOTINE 21 MG/24HR
1 PATCH, TRANSDERMAL 24 HOURS TRANSDERMAL DAILY
Status: DISCONTINUED | OUTPATIENT
Start: 2025-06-24 | End: 2025-06-27 | Stop reason: HOSPADM

## 2025-06-24 RX ORDER — BENZTROPINE MESYLATE 1 MG/ML
1 INJECTION, SOLUTION INTRAMUSCULAR; INTRAVENOUS
Status: DISCONTINUED | OUTPATIENT
Start: 2025-06-24 | End: 2025-06-27 | Stop reason: HOSPADM

## 2025-06-24 RX ORDER — LORAZEPAM 2 MG/ML
1 INJECTION INTRAMUSCULAR
Status: CANCELLED | OUTPATIENT
Start: 2025-06-24

## 2025-06-24 RX ORDER — OLANZAPINE 10 MG/2ML
5 INJECTION, POWDER, FOR SOLUTION INTRAMUSCULAR
Status: CANCELLED | OUTPATIENT
Start: 2025-06-24

## 2025-06-24 RX ORDER — LORAZEPAM 0.5 MG/1
0.5 TABLET ORAL
Status: DISCONTINUED | OUTPATIENT
Start: 2025-06-24 | End: 2025-06-27 | Stop reason: HOSPADM

## 2025-06-24 RX ORDER — HYDROXYZINE HYDROCHLORIDE 25 MG/1
25 TABLET, FILM COATED ORAL
Status: CANCELLED | OUTPATIENT
Start: 2025-06-24

## 2025-06-24 RX ORDER — ACETAMINOPHEN 325 MG/1
975 TABLET ORAL EVERY 6 HOURS PRN
Status: DISCONTINUED | OUTPATIENT
Start: 2025-06-24 | End: 2025-06-27 | Stop reason: HOSPADM

## 2025-06-24 RX ORDER — LORAZEPAM 0.5 MG/1
0.5 TABLET ORAL
Status: CANCELLED | OUTPATIENT
Start: 2025-06-24

## 2025-06-24 RX ADMIN — Medication 3 MG: at 21:25

## 2025-06-24 RX ADMIN — LISINOPRIL 10 MG: 10 TABLET ORAL at 09:06

## 2025-06-24 RX ADMIN — NICOTINE POLACRILEX 4 MG: 4 GUM, CHEWING BUCCAL at 21:25

## 2025-06-24 RX ADMIN — NICOTINE 1 PATCH: 21 PATCH, EXTENDED RELEASE TRANSDERMAL at 16:29

## 2025-06-24 RX ADMIN — LORAZEPAM 1 MG: 1 TABLET ORAL at 21:24
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
## 2025-06-24 NOTE — H&P
Aline Flaherty#  :1965 M  MRN:[id number]    [facility]:[id number]  Adm Date: 2025 1442  2:42 PM   ATT PHY: Dipak Self Md  [facility]         Chief Complaint: Worsening depression and anxiety    History of Presenting Illness: Portillo Flaherty is a(n) 60 y.o. year old male who was admitted through ED where he appears to be manic.  Patient admitted having increasing depression symptoms.    Patient examined at bedside.  Patient denied any active suicidal homicidal ideations.    Allergies[1]    Medications Ordered Prior to Encounter[2]    Active Ambulatory Problems     Diagnosis Date Noted    Elevated liver enzymes 2022    Primary hypertension 2022    Bipolar disorder, curr episode manic w/o psychotic features, moderate (HCC) 2022    H/O chronic hepatitis 2022    Symptomatic varicose veins, bilateral 2022    Severe alcohol dependence in sustained remission (HCC) 2015    Varicocele 2022    Annual physical exam 01/10/2024    Attention deficit disorder (ADD) in adult 2025    Bipolar affective disorder, current episode manic with psychotic symptoms (HCC) 2025    Substance intoxication without complication (HCC) 2025     Resolved Ambulatory Problems     Diagnosis Date Noted    Severe protein-calorie malnutrition (HCC) 2022    Erectile dysfunction 2022    Prostate cancer screening 2022     Past Medical History:   Diagnosis Date    DJD (degenerative joint disease)     Dyslipidemia     History of hepatitis C     Hypertension     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HCC)     Toxoplasmosis chorioretinitis of left eye        Past Surgical History[3]    Social History: Social History[4]    Family History: Family History[5]    Review of Systems   Musculoskeletal:  Positive for arthralgias.   Neurological:  Positive for headaches.   Psychiatric/Behavioral:  Positive for sleep disturbance.    All other systems  reviewed and are negative.      Physical Exam   Vitals: There were no vitals taken for this visit.,There is no height or weight on file to calculate BMI.  Constitutional: Awake and Alert. Well-developed and well-nourished. No distress.   HENT: PERR,EOMI, conjunctiva normal  Head: Normocephalic and atraumatic.   Mouth/Throat: Oropharynx is clear and moist.    Eyes: Conjunctivae and EOM are normal. Pupils are equal, round, and reactive to light. Right and left eye exhibits no discharge.  Neck: Neck supple. No tracheal deviation present. No thyromegaly present.   Cardiovascular: Normal rate, regular rhythm and normal heart sounds.  Exam reveals no friction rub. No murmur heard.  Pulmonary/Chest: Effort normal and breath sounds normal. No respiratory distress. She has no wheezes.   Abdominal: Soft. Bowel sounds are normal. She exhibits no distension. There is no tenderness. There is no rebound and no guarding.   Neurological: Cranial Nerves grossly intact. No sensory deficit. Coordination normal.   Musculoskeletal:   Nontender spine  Skin: Skin is warm and dry. No rash noted. No diaphoresis. No erythema. No edema. No cyanosis.    Assessment     Portillo Flaherty is a(n) 60 y.o. year old male with MDD    Cardiac with history of hypertension.  Patient is currently on lisinopril 10 mg daily.  Nicotine addiction.  Patient will be put on nicotine transdermal patch 21 mg daily along with nicotine gum as needed.  Arthralgia/headache.  Patient may get Tylenol as needed.  Insomnia.  Patient is on melatonin 3 mg at bedtime.  Gum pain.  Patient is using his own Campho-Phenique liquid every 2 hours as needed.  Psych with MDD.  Patient is being managed by psych.    Prognosis: Fair.    Discharge Plan: In progress.    Advanced Directives: I have discussed in detail the patient the advanced directives.  Patient has not appointed anyone as his POA and has no living will with advanced healthcare directives.  When discussing cardiac and  pulmonary resuscitation efforts with the patient, the patient wishes to be FULL CODE.    I have spent more than 50 minutes gathering data, doing physical examination, and discussing the advanced directives, which was witnessed by caring staff.       [1]   Allergies  Allergen Reactions    Latex Hives   [2]   Current Facility-Administered Medications on File Prior to Encounter   Medication Dose Route Frequency Provider Last Rate Last Admin    [COMPLETED] acetaminophen (TYLENOL) tablet 650 mg  650 mg Oral Once Kareem Garcia MD   650 mg at 25 2237    [DISCONTINUED] lisinopril (ZESTRIL) tablet 10 mg  10 mg Oral Daily Kareem Garcia MD   10 mg at 25 0906     Current Outpatient Medications on File Prior to Encounter   Medication Sig Dispense Refill    B Complex-C (B-complex with vitamin C) tablet Take 1 tablet by mouth in the morning.      lisinopril (ZESTRIL) 10 mg tablet Take 1 tablet (10 mg total) by mouth 2 (two) times a day 180 tablet 3    Magnesium 500 MG CAPS 1 po qd 90 capsule 0    Omega-3 Fatty Acids (fish oil) 1,000 mg Take 1,000 mg by mouth in the morning.      sildenafil (VIAGRA) 50 MG tablet Take 1 tablet (50 mg total) by mouth as needed for erectile dysfunction 30 tablet 3   [3]   Past Surgical History:  Procedure Laterality Date    CARPAL TUNNEL RELEASE Left     CATARACT EXTRACTION Left     COLONOSCOPY      HERNIA REPAIR      PLANTAR FASCIA SURGERY Left    [4]   Social History  Socioeconomic History    Marital status: /Civil Union   Tobacco Use    Smoking status: Former     Current packs/day: 0.00     Types: Cigarettes     Start date: 2010     Quit date: 2020     Years since quittin.4     Passive exposure: Never    Smokeless tobacco: Never   Vaping Use    Vaping status: Never Used   Substance and Sexual Activity    Alcohol use: Not Currently    Drug use: Not Currently     Types: Marijuana    Sexual activity: Not Currently     Social Drivers of Health     Food  Insecurity: Patient Unable To Answer (6/24/2025)    Nursing - Inadequate Food Risk Classification     Ran Out of Food in the Last Year: Patient unable to answer   Transportation Needs: Patient Unable To Answer (6/24/2025)    Nursing - Transportation Risk Classification     Lack of Transportation: Patient unable to answer    Received from [employer]   Intimate Partner Violence: Patient Unable To Answer (6/24/2025)    Nursing IPS     Physically Hurt by Someone: Patient unable to answer     Hurt or Threatened by Someone: Patient unable to answer   Housing Stability: Patient Unable To Answer (6/24/2025)    Nursing: Inadequate Housing Risk Classification     Unable to Pay for Housing in the Last Year: Patient unable to answer     Has Housing: Patient unable to answer   [5]   Family History  Problem Relation Name Age of Onset    Lung cancer Mother      Kidney disease Mother          drug induced    COPD Mother      Dementia Father
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
## 2025-06-24 NOTE — ED NOTES
Per Dr. Squires pt was allowed to go through his bag to get his books and was allowed one pen to write with.     Rosalia Kenny RN  06/24/25 0030

## 2025-06-24 NOTE — ED NOTES
Patient is accepted at [facility].  Patient is accepted by Dr. Clair Martin.     Transportation is arranged with [employer].     Transportation is scheduled for 13:35 via [employer].   Patient may go to the floor after lunch.          Nurse report is to be called to [phone] prior to patient transfer.

## 2025-06-24 NOTE — ED NOTES
The physician's examination documentation was faxed to [facility] ([phone]).

## 2025-06-24 NOTE — ED PROVIDER NOTES
Time reflects when diagnosis was documented in both MDM as applicable and the Disposition within this note       Time User Action Codes Description Comment    6/23/2025  9:40 PM Kareem Garcia Add [F31.9] Bipolar disorder (HCC)     6/23/2025  9:41 PM Kareem Garcia Add [F29] Psychosis (HCC)     6/23/2025  9:41 PM GarciaKareem Modify [F31.9] Bipolar disorder (HCC)     6/23/2025  9:41 PM GarciaKareem Modify [F29] Psychosis (HCC)     6/23/2025  9:41 PM GarciaKareem Modify [F31.9] Bipolar disorder (HCC)     6/23/2025  9:41 PM GarciaKareem wray Remove [F29] Psychosis (HCC)     6/23/2025  9:41 PM GarciaKareem Add [F30.9] Annabelle (HCC)     6/23/2025  9:41 PM GarciaKareem wray Modify [F31.9] Bipolar disorder (HCC)     6/23/2025  9:41 PM GarciaKareem wray Modify [F30.9] Annabelle (HCC)           ED Disposition       ED Disposition   Transfer to Behavioral Health Condition   --    Date/Time   Mon Jun 23, 2025  9:40 PM    Comment                  Assessment & Plan       Medical Decision Making  2132: Patient appears manic, vital signs reviewed.  Patient noted to have a low-grade temperature however has no symptomatology of infection.  Likely metabolic in nature due to his hyperactive state, also has been out in the heat recently.  I will give Tylenol and reevaluate.  Patient states he is having manic state and is willing to be hospitalized under 201 admission.  Patient denies SI or HI.  Plan to complete basic labs including tox screens.  Send COVID/flu PCR.  Discussed with crisis, the recommendations were to consult with psychiatry for need for hospitalization.  I will consult Rey.  Patient has been off his medications for many months.  Patient is not willing to accept any medications at this time.  The patient is cooperative.  No 302 grounds at this time.    2200: Crisis presented to the emergency room with a 302 petition by ex-wife.  No 302 grounds.  Patient is willing to sign 201 admission consent.      2230: Labs  reviewed.  The patient has remained stable throughout ED course.  Patient is medically cleared for inpatient psychiatric treatment.    0300: Admit 201 consent signed.    Amount and/or Complexity of Data Reviewed  Labs: ordered.  Discussion of management or test interpretation with external provider(s): Crisis  Amwell    Risk  OTC drugs.  Prescription drug management.  Decision regarding hospitalization.             Medications   lisinopril (ZESTRIL) tablet 10 mg (has no administration in time range)   acetaminophen (TYLENOL) tablet 650 mg (650 mg Oral Given 6/23/25 2237)       ED Risk Strat Scores                    No data recorded        SBIRT 22yo+      Flowsheet Row Most Recent Value   Initial Alcohol Screen: US AUDIT-C     1. How often do you have a drink containing alcohol? 0 Filed at: 06/23/2025 2112   2. How many drinks containing alcohol do you have on a typical day you are drinking?  0 Filed at: 06/23/2025 2112   3a. Male UNDER 65: How often do you have five or more drinks on one occasion? 0 Filed at: 06/23/2025 2112   Audit-C Score 0 Filed at: 06/23/2025 2112   KAROLINA: How many times in the past year have you...    Used an illegal drug or used a prescription medication for non-medical reasons? Never Filed at: 06/23/2025 2112                            History of Present Illness       Chief Complaint   Patient presents with    Psychiatric Evaluation     He said that he is bipolar and he is  from his wife and is getting very depressed. He is manic at the moment. He said that he has drug induced psychosis. He wants to be admitted to [facility].        Past Medical History[1]   Past Surgical History[2]   Family History[3]   Social History[4]   E-Cigarette/Vaping    E-Cigarette Use Never User       E-Cigarette/Vaping Substances    Nicotine No     THC No     CBD No     Flavoring No     Other No     Unknown No       I have reviewed and agree with the history as documented.       History provided by:   Medical records and patient  Psychiatric Evaluation  Presenting symptoms: delusional, disorganized speech and disorganized thought process    Presenting symptoms: no agitation, no hallucinations, no self-mutilation, no suicidal thoughts, no suicidal threats and no suicide attempt    Patient accompanied by: Self.  Degree of incapacity (severity):  Moderate  Onset quality:  Gradual  Duration:  3 days  Timing:  Constant  Progression:  Unchanged  Chronicity:  Recurrent  Context: noncompliance    Context comment:  History of bipolar disorder, patient states he has been manic over the last 3 days, not sleeping, disorganized thought.  Patient states he has been in arguments with his ex-wife whom he lives with over the last several days.  Treatment compliance:  Untreated  Time since last dose of psychoactive medication: Many months, off his Depakote.  Relieved by:  Nothing  Worsened by:  Family interactions and drugs (Arguments with his ex-wife.  Chronic THC use.)  Ineffective treatments:  None tried  Associated symptoms: no abdominal pain, no anxiety, no appetite change, no chest pain, no fatigue and no headaches    Risk factors comment:  Stay please came to his residence over the weekend after calls from his neighbors.      Review of Systems   Constitutional:  Negative for appetite change, chills, fatigue and fever.   HENT:  Negative for ear pain, rhinorrhea, sore throat and trouble swallowing.    Eyes:  Negative for pain, discharge and visual disturbance.   Respiratory:  Negative for cough, chest tightness and shortness of breath.    Cardiovascular:  Negative for chest pain and palpitations.   Gastrointestinal:  Negative for abdominal pain, nausea and vomiting.   Endocrine: Negative for polydipsia, polyphagia and polyuria.   Genitourinary:  Negative for difficulty urinating, dysuria, hematuria and testicular pain.   Musculoskeletal:  Negative for arthralgias and back pain.   Skin:  Negative for color change and rash.    Allergic/Immunologic: Negative for immunocompromised state.   Neurological:  Negative for dizziness, seizures, syncope, weakness and headaches.   Hematological:  Negative for adenopathy.   Psychiatric/Behavioral:  Positive for behavioral problems, decreased concentration and sleep disturbance. Negative for agitation, confusion, dysphoric mood, hallucinations, self-injury and suicidal ideas. The patient is not nervous/anxious and is not hyperactive.    All other systems reviewed and are negative.          Objective       ED Triage Vitals [06/23/25 2105]   Temperature Pulse Blood Pressure Respirations SpO2 Patient Position - Orthostatic VS   100.3 °F (37.9 °C) 104 164/97 18 97 % Lying      Temp Source Heart Rate Source BP Location FiO2 (%) Pain Score    Temporal Monitor Left arm -- No Pain      Vitals      Date and Time Temp Pulse SpO2 Resp BP Pain Score FACES Pain Rating User   06/23/25 2330 -- -- -- -- -- 4 -- RG   06/23/25 2237 -- -- -- -- -- 4 --    06/23/25 2105 100.3 °F (37.9 °C) 104 97 % 18 164/97 No Pain -- AR            Physical Exam  Vitals and nursing note reviewed.   Constitutional:       General: He is not in acute distress.     Appearance: Normal appearance. He is not ill-appearing, toxic-appearing or diaphoretic.   HENT:      Head: Normocephalic and atraumatic.      Nose: Nose normal. No congestion or rhinorrhea.      Mouth/Throat:      Mouth: Mucous membranes are moist.      Pharynx: Oropharynx is clear. No oropharyngeal exudate or posterior oropharyngeal erythema.     Eyes:      General:         Right eye: No discharge.         Left eye: No discharge.       Cardiovascular:      Rate and Rhythm: Normal rate and regular rhythm.      Pulses: Normal pulses.      Heart sounds: Normal heart sounds. No murmur heard.     No gallop.   Pulmonary:      Effort: Pulmonary effort is normal. No respiratory distress.      Breath sounds: Normal breath sounds. No stridor. No wheezing, rhonchi or rales.   Chest:       Chest wall: No tenderness.   Abdominal:      General: Bowel sounds are normal. There is no distension.      Palpations: Abdomen is soft. There is no mass.      Tenderness: There is no abdominal tenderness. There is no right CVA tenderness, left CVA tenderness, guarding or rebound.      Hernia: No hernia is present.     Musculoskeletal:         General: Normal range of motion.      Cervical back: Normal range of motion and neck supple.     Skin:     General: Skin is warm and dry.      Capillary Refill: Capillary refill takes less than 2 seconds.     Neurological:      General: No focal deficit present.      Mental Status: He is alert and oriented to person, place, and time.      Cranial Nerves: No cranial nerve deficit.      Sensory: No sensory deficit.      Motor: No weakness.      Coordination: Coordination normal.      Gait: Gait normal.      Deep Tendon Reflexes: Reflexes normal.     Psychiatric:         Thought Content: Thought content normal.         Judgment: Judgment normal.      Comments: Hyperactive, pressured speech at times, Restorationism connotations         Results Reviewed       Procedure Component Value Units Date/Time    Rapid drug screen, urine [[id number]]  (Abnormal) Collected: 06/23/25 2207    Lab Status: Final result Specimen: Urine, Clean Catch Updated: 06/23/25 2329     Amph/Meth UR Negative     Barbiturate Ur Negative     Benzodiazepine Urine Negative     Cocaine Urine Negative     Methadone Urine Negative     Opiate Urine Negative     PCP Ur Negative     THC Urine Positive     Oxycodone Urine Negative     Fentanyl Urine Negative     HYDROCODONE URINE Negative    Narrative:      Presumptive report. If requested, specimen will be sent to reference lab for confirmation.  FOR MEDICAL PURPOSES ONLY.   IF CONFIRMATION NEEDED PLEASE CONTACT THE LAB WITHIN 5 DAYS.    Drug Screen Cutoff Levels:  AMPHETAMINE/METHAMPHETAMINES  1000 ng/mL  BARBITURATES     200 ng/mL  BENZODIAZEPINES     200  ng/mL  COCAINE      300 ng/mL  METHADONE      300 ng/mL  OPIATES      300 ng/mL  PHENCYCLIDINE     25 ng/mL  THC       50 ng/mL  OXYCODONE      100 ng/mL  FENTANYL      5 ng/mL  HYDROCODONE     300 ng/mL    TSH [[id number]]  (Normal) Collected: 06/23/25 2205    Lab Status: Final result Specimen: Blood from Arm, Left Updated: 06/23/25 2252     TSH 3RD GENERATION 2.114 uIU/mL     Comprehensive metabolic panel [[id number]]  (Abnormal) Collected: 06/23/25 2205    Lab Status: Final result Specimen: Blood from Arm, Left Updated: 06/23/25 2236     Sodium 135 mmol/L      Potassium 4.1 mmol/L      Chloride 103 mmol/L      CO2 22 mmol/L      ANION GAP 10 mmol/L      BUN 18 mg/dL      Creatinine 0.84 mg/dL      Glucose 93 mg/dL      Calcium 9.8 mg/dL      AST 68 U/L      ALT 68 U/L      Alkaline Phosphatase 52 U/L      Total Protein 7.4 g/dL      Albumin 4.7 g/dL      Total Bilirubin 0.68 mg/dL      eGFR 95 ml/min/1.73sq m     Narrative:      National Kidney Disease Foundation guidelines for Chronic Kidney Disease (CKD):     Stage 1 with normal or high GFR (GFR > 90 mL/min/1.73 square meters)    Stage 2 Mild CKD (GFR = 60-89 mL/min/1.73 square meters)    Stage 3A Moderate CKD (GFR = 45-59 mL/min/1.73 square meters)    Stage 3B Moderate CKD (GFR = 30-44 mL/min/1.73 square meters)    Stage 4 Severe CKD (GFR = 15-29 mL/min/1.73 square meters)    Stage 5 End Stage CKD (GFR <15 mL/min/1.73 square meters)  Note: GFR calculation is accurate only with a steady state creatinine    Ethanol [[id number]]  (Normal) Collected: 06/23/25 2205    Lab Status: Final result Specimen: Blood from Arm, Left Updated: 06/23/25 2236     Ethanol Lvl <10 mg/dL     FLU/COVID Rapid Antigen (30 min. TAT) - Preferred screening test in ED [[id number]]  (Normal) Collected: 06/23/25 2205    Lab Status: Final result Specimen: Nares from Nose Updated: 06/23/25 2233     SARS COV Rapid Antigen Negative     Influenza A Rapid Antigen Negative     Influenza B Rapid  Antigen Negative    Narrative:      This test has been performed using the Quidel Tammy 2 FLU+SARS Antigen test under the Emergency Use Authorization (EUA). This test has been validated by the  and verified by the performing laboratory. The Tammy uses lateral flow immunofluorescent sandwich assay to detect SARS-COV, Influenza A and Influenza B Antigen.     The Quidel Tammy 2 SARS Antigen test does not differentiate between SARS-CoV and SARS-CoV-2.     Negative results are presumptive and may be confirmed with a molecular assay, if necessary, for patient management. Negative results do not rule out SARS-CoV-2 or influenza infection and should not be used as the sole basis for treatment or patient management decisions. A negative test result may occur if the level of antigen in a sample is below the limit of detection of this test.     Positive results are indicative of the presence of viral antigens, but do not rule out bacterial infection or co-infection with other viruses.     All test results should be used as an adjunct to clinical observations and other information available to the provider.    FOR PEDIATRIC PATIENTS - copy/paste COVID Guidelines URL to browser: [email or web address]    CBC and differential [[id number]] Collected: 06/23/25 2205    Lab Status: Final result Specimen: Blood from Arm, Left Updated: 06/23/25 2217     WBC 9.78 Thousand/uL      RBC 4.03 Million/uL      Hemoglobin 13.0 g/dL      Hematocrit 37.7 %      MCV 94 fL      MCH 32.3 pg      MCHC 34.5 g/dL      RDW 12.8 %      MPV 8.9 fL      Platelets 163 Thousands/uL      nRBC 0 /100 WBCs      Segmented % 51 %      Immature Grans % 0 %      Lymphocytes % 40 %      Monocytes % 8 %      Eosinophils Relative 1 %      Basophils Relative 0 %      Absolute Neutrophils 5.02 Thousands/µL      Absolute Immature Grans 0.03 Thousand/uL      Absolute Lymphocytes 3.89 Thousands/µL       Absolute Monocytes 0.73 Thousand/µL      Eosinophils Absolute 0.08 Thousand/µL      Basophils Absolute 0.03 Thousands/µL             No orders to display       Procedures    ED Medication and Procedure Management   Prior to Admission Medications   Prescriptions Last Dose Informant Patient Reported? Taking?   B Complex-C (B-complex with vitamin C) tablet  Self Yes No   Sig: Take 1 tablet by mouth in the morning.   Magnesium 500 MG CAPS  Self No No   Si po qd   Omega-3 Fatty Acids (fish oil) 1,000 mg  Self Yes No   Sig: Take 1,000 mg by mouth in the morning.   lisinopril (ZESTRIL) 10 mg tablet  Self No No   Sig: Take 1 tablet (10 mg total) by mouth 2 (two) times a day   sildenafil (VIAGRA) 50 MG tablet   No No   Sig: Take 1 tablet (50 mg total) by mouth as needed for erectile dysfunction      Facility-Administered Medications: None     Patient's Medications   Discharge Prescriptions    No medications on file     No discharge procedures on file.  ED SEPSIS DOCUMENTATION   Time reflects when diagnosis was documented in both MDM as applicable and the Disposition within this note       Time User Action Codes Description Comment    2025  9:40 PM Kareem Garcia Add [F31.9] Bipolar disorder (HCC)     2025  9:41 PM Kareem Garcia Add [F29] Psychosis (HCC)     2025  9:41 PM Kareem Garcia Modify [F31.9] Bipolar disorder (HCC)     2025  9:41 PM Kareem Garcia Modify [F29] Psychosis (HCC)     2025  9:41 PM Kareem Garcia Modify [F31.9] Bipolar disorder (HCC)     2025  9:41 PM Kareem Garcia Remove [F29] Psychosis (HCC)     2025  9:41 PM Kareem Garcia Add [F30.9] Annabelle (HCC)     2025  9:41 PM Kareem Garcia Modify [F31.9] Bipolar disorder (HCC)     2025  9:41 PM Kareem Garcia Modify [F30.9] Annabelle (HCC)                      [1]   Past Medical History:  Diagnosis Date    DJD (degenerative joint disease)     Dyslipidemia     History of hepatitis C     Hypertension     Manic  depression (HCC)     Toxoplasmosis chorioretinitis of left eye    [2]   Past Surgical History:  Procedure Laterality Date    CARPAL TUNNEL RELEASE Left     CATARACT EXTRACTION Left     COLONOSCOPY      HERNIA REPAIR      PLANTAR FASCIA SURGERY Left    [3]   Family History  Problem Relation Name Age of Onset    Lung cancer Mother      Kidney disease Mother          drug induced    COPD Mother      Dementia Father     [4]   Social History  Tobacco Use    Smoking status: Former     Current packs/day: 0.00     Types: Cigarettes     Start date: 2010     Quit date: 2020     Years since quittin.4     Passive exposure: Never    Smokeless tobacco: Never   Vaping Use    Vaping status: Never Used   Substance Use Topics    Alcohol use: Not Currently    Drug use: Not Currently     Types: Marijuana        Kareem Garcia MD  25 0317

## 2025-06-24 NOTE — ED CARE HANDOFF
Emergency Department Sign Out Note      Sign out and transfer of care from Dr. Garcia. See Separate Emergency Department note.     The patient, Portillo Flaherty, was evaluated by the previous provider for psychiatric evaluation.    Workup Completed:  Labs.  Medically cleared.    ED Course / Workup Pending (followup):  60-year-old male.  Patient presents with arsh.  History of bipolar disorder.  Having disorganized thoughts, insomnia.  Denies homicidal/suicidal ideations. Having arguments with ex-wife over the last few days.     The patient was evaluated by psychiatry.  Recommendations provided.  See separate note.    Transfer accepted by Dr. Self at [facility].     No data recorded       Procedures  Medical Decision Making  Amount and/or Complexity of Data Reviewed  Labs: ordered.    Risk  OTC drugs.  Prescription drug management.  Decision regarding hospitalization.      Disposition  Final diagnoses:   Bipolar disorder (HCC)   Arsh (HCC)     Time reflects when diagnosis was documented in both MDM as applicable and the Disposition within this note       Time User Action Codes Description Comment    6/23/2025  9:40 PM Kareem Garcia Add [F31.9] Bipolar disorder (HCC)     6/23/2025  9:41 PM Kareem Garcia Add [F29] Psychosis (HCC)     6/23/2025  9:41 PM Kareem Garcia Modify [F31.9] Bipolar disorder (HCC)     6/23/2025  9:41 PM Kareem Garcia Modify [F29] Psychosis (HCC)     6/23/2025  9:41 PM Kareem Garcia Modify [F31.9] Bipolar disorder (HCC)     6/23/2025  9:41 PM Kareem Garcia Remove [F29] Psychosis (HCC)     6/23/2025  9:41 PM Kareem Garcia Add [F30.9] Arsh (HCC)     6/23/2025  9:41 PM Kareem Garcia Modify [F31.9] Bipolar disorder (HCC)     6/23/2025  9:41 PM Kareem Garcia Modify [F30.9] Arsh (HCC)     6/24/2025 12:06 PM Daniel Fishman Add [R74.8] Elevated liver enzymes     6/24/2025 12:06 PM Daniel Fishman Add [I10] Primary hypertension     6/24/2025 12:07 PM Sravanthi  Daniel Add [Z87.19] H/O chronic hepatitis           ED Disposition       ED Disposition   Transfer to Behavioral Health Condition   --    Date/Time   Mon Jun 23, 2025  9:40 PM    Comment                  MD Documentation      Flowsheet Row Most Recent Value   Patient Condition The patient has been stabilized such that within reasonable medical probability, no material deterioration of the patient condition or the condition of the unborn child(dnan) is likely to result from the transfer   Reason for Transfer Level of Care needed not available at this facility   Benefits of Transfer Specialized equipment and/or services available at the receiving facility (Include comment)________________________   Risks of Transfer Potential for delay in receiving treatment   Accepting Physician Dr. Self   Accepting Facility Name, [facility].    (Name & Tel number) [facility] - [phone]   Transported by (Company and Unit #) [employer]   Sending MD Squires   Provider Certification General risk, such as traffic hazards, adverse weather conditions, rough terrain or turbulence, possible failure of equipment (including vehicle or aircraft), or consequences of actions of persons outside the control of the transport personnel, The patient is stable for psychiatric transfer because they are medically stable, and is protected from harming him/herself or others during transport          RN Documentation      Flowsheet Row Most Recent Value   Accepting Facility Name, [facility].    (Name & Tel number) [facility] - [phone]   Transport Mode Car   Transported by (Company and Unit #) [employer]   Transfer Date 06/24/25   Transfer Time 1335          Follow-up Information    None       Discharge Medication List as of 6/24/2025  1:58 PM        CONTINUE these medications which have NOT CHANGED    Details   B Complex-C (B-complex  with vitamin C) tablet Take 1 tablet by mouth in the morning., Historical Med      lisinopril (ZESTRIL) 10 mg tablet Take 1 tablet (10 mg total) by mouth 2 (two) times a day, Starting Wed 4/24/2024, Normal      Magnesium 500 MG CAPS 1 po qd, No Print      Omega-3 Fatty Acids (fish oil) 1,000 mg Take 1,000 mg by mouth in the morning., Historical Med      sildenafil (VIAGRA) 50 MG tablet Take 1 tablet (50 mg total) by mouth as needed for erectile dysfunction, Starting Wed 12/18/2024, Normal           No discharge procedures on file.       ED Provider  Electronically Signed by     Tony Squires DO  06/24/25 8641

## 2025-06-24 NOTE — ED NOTES
He said that his wife blocks his way into his house since they are  and he said that she is also being mean to his service dog.     Joann Harvey RN  06/23/25 2114

## 2025-06-24 NOTE — ED NOTES
Meal tray given with additional sides per pt request. Tooth brush and socks given as well     Markie Snider RN  06/24/25 9528

## 2025-06-24 NOTE — EMTALA/ACUTE CARE TRANSFER
[facility] EMERGENCY DEPARTMENT  [address] [address]  Dept: [phone]      EMTALA TRANSFER CONSENT    NAME Portillo Flaherty                                         1965                              MRN [id number]    I have been informed of my rights regarding examination, treatment, and transfer   by Dr. Tony Squires DO    Benefits: Specialized equipment and/or services available at the receiving facility (Include comment)________________________    Risks: Potential for delay in receiving treatment      Consent for Transfer:  I acknowledge that my medical condition has been evaluated and explained to me by the emergency department physician or other qualified medical person and/or my attending physician, who has recommended that I be transferred to the service of  Accepting Physician: Dr. Self at Accepting Facility Name, City & State : [facility].. The above potential benefits of such transfer, the potential risks associated with such transfer, and the probable risks of not being transferred have been explained to me, and I fully understand them.  The doctor has explained that, in my case, the benefits of transfer outweigh the risks.  I agree to be transferred.    I authorize the performance of emergency medical procedures and treatments upon me in both transit and upon arrival at the receiving facility.  Additionally, I authorize the release of any and all medical records to the receiving facility and request they be transported with me, if possible.  I understand that the safest mode of transportation during a medical emergency is an ambulance and that the Hospital advocates the use of this mode of transport. Risks of traveling to the receiving facility by car, including absence of medical control, life sustaining equipment, such as oxygen, and medical personnel has been explained to me and I fully understand them.    (ROSA MARIA CORRECT BOX BELOW)  [  ]  I  consent to the stated transfer and to be transported by ambulance/helicopter.  [  ]  I consent to the stated transfer, but refuse transportation by ambulance and accept full responsibility for my transportation by car.  I understand the risks of non-ambulance transfers and I exonerate the Hospital and its staff from any deterioration in my condition that results from this refusal.    X___________________________________________    DATE  25  TIME________  Signature of patient or legally responsible individual signing on patient behalf           RELATIONSHIP TO PATIENT_________________________          Provider Certification    NAME Portillo Flaherty                                         1965                              MRN [id number]    A medical screening exam was performed on the above named patient.  Based on the examination:    Condition Necessitating Transfer The primary encounter diagnosis was Annabelle (HCC). Diagnoses of Bipolar disorder (HCC), Elevated liver enzymes, Primary hypertension, and H/O chronic hepatitis were also pertinent to this visit.    Patient Condition: The patient has been stabilized such that within reasonable medical probability, no material deterioration of the patient condition or the condition of the unborn child(dann) is likely to result from the transfer    Reason for Transfer: Level of Care needed not available at this facility    Transfer Requirements: Facility [facility].   Space available and qualified personnel available for treatment as acknowledged by Georgina U - [phone]  Agreed to accept transfer and to provide appropriate medical treatment as acknowledged by       Dr. Self  Appropriate medical records of the examination and treatment of the patient are provided at the time of transfer   STAFF INITIAL WHEN COMPLETED _______  Transfer will be performed by qualified personnel from [facility]  and appropriate transfer equipment as required,  including the use of necessary and appropriate life support measures.    Provider Certification: I have examined the patient and explained the following risks and benefits of being transferred/refusing transfer to the patient/family:  General risk, such as traffic hazards, adverse weather conditions, rough terrain or turbulence, possible failure of equipment (including vehicle or aircraft), or consequences of actions of persons outside the control of the transport personnel, The patient is stable for psychiatric transfer because they are medically stable, and is protected from harming him/herself or others during transport      Based on these reasonable risks and benefits to the patient and/or the unborn child(dann), and based upon the information available at the time of the patient’s examination, I certify that the medical benefits reasonably to be expected from the provision of appropriate medical treatments at another medical facility outweigh the increasing risks, if any, to the individual’s medical condition, and in the case of labor to the unborn child, from effecting the transfer.    X____________________________________________ DATE 06/24/25        TIME_______      ORIGINAL - SEND TO MEDICAL RECORDS   COPY - SEND WITH PATIENT DURING TRANSFER

## 2025-06-24 NOTE — NURSING NOTE
"Patient admitted to [facility] this afternoon at 1442 from [facility] ED under a 201. Patient admitted for manic behavior at home.  Upon arrival, patient handed this nurse an empty bag of THC troches and stated he ate them today. Was unable to state exactly when and unsure how reliable the patient is.   Patient is extremely restless and boisterous. He is disorganized, pressured, and rambling in conversation. He has poor focus and is barely able to cooperate with the admission process due to his inability to focus. He is having random outbursts of hysterical laughter. He is pacing and jumping around the unit. He was offered Ativan and declined. He stated, \"No, I don't take medication.\"  He did shower and allowed a skin assessment. Skin intact except for small abrasion to left shoulder. He denies pain.   "

## 2025-06-24 NOTE — ED NOTES
Pt requesting more food, dietary called to modify order to get the patient more food.     Rosalia Kenny RN  06/24/25 8675

## 2025-06-24 NOTE — ED NOTES
Insurance Authorization for admission:   Phone call placed to [facility].  Phone number: **.     Spoke to **.     ** days approved.  Level of care: 201.  Review on **.   Authorization # **.        Clinical submitted through [employer].  The following message was received:    Your electronic submission will receive priority processing. We will contact you with your certification details as soon as we finish processing this information.    Eligibility Verification System checked - ([phone]).  Online system / automated system indicates:     Recipient  Name: SEFERINO BECKER  Recipient ID: [id number]  YOB: 1965  Gender: Male    Eligibility Summary  Type Name Begin End  Medicaid Category: MHX  Program Status: 00  Service Program: [facility] Based Funding Only - Non-Medic 06/24/2025 06/24/2025

## 2025-06-24 NOTE — ED NOTES
Pt on televisit with Dr. Cotton at this time      Markie Snider, JAKI  06/24/25 1133       Markie Snider RN  06/24/25 1133

## 2025-06-25 PROBLEM — F12.10 CANNABIS ABUSE: Status: ACTIVE | Noted: 2025-06-25

## 2025-06-25 PROCEDURE — 99223 1ST HOSP IP/OBS HIGH 75: CPT | Performed by: PSYCHIATRY & NEUROLOGY

## 2025-06-25 PROCEDURE — GZHZZZZ GROUP PSYCHOTHERAPY: ICD-10-PCS | Performed by: FAMILY MEDICINE

## 2025-06-25 RX ORDER — OLANZAPINE 10 MG/1
10 TABLET, FILM COATED ORAL
Status: DISCONTINUED | OUTPATIENT
Start: 2025-06-25 | End: 2025-06-27 | Stop reason: HOSPADM

## 2025-06-25 RX ORDER — NICOTINE 21 MG/24HR
1 PATCH, TRANSDERMAL 24 HOURS TRANSDERMAL ONCE
Status: COMPLETED | OUTPATIENT
Start: 2025-06-25 | End: 2025-06-26

## 2025-06-25 RX ADMIN — NICOTINE 1 PATCH: 21 PATCH, EXTENDED RELEASE TRANSDERMAL at 08:30

## 2025-06-25 RX ADMIN — NICOTINE POLACRILEX 4 MG: 4 GUM, CHEWING BUCCAL at 20:40

## 2025-06-25 RX ADMIN — OLANZAPINE 10 MG: 10 TABLET, FILM COATED ORAL at 20:42

## 2025-06-25 RX ADMIN — LORAZEPAM 1 MG: 1 TABLET ORAL at 21:40

## 2025-06-25 RX ADMIN — LISINOPRIL 10 MG: 10 TABLET ORAL at 08:30

## 2025-06-25 RX ADMIN — Medication 3 MG: at 20:43

## 2025-06-25 RX ADMIN — NICOTINE 1 PATCH: 21 PATCH, EXTENDED RELEASE TRANSDERMAL at 10:30

## 2025-06-25 RX ADMIN — NICOTINE POLACRILEX 4 MG: 4 GUM, CHEWING BUCCAL at 17:32

## 2025-06-25 RX ADMIN — NICOTINE POLACRILEX 4 MG: 4 GUM, CHEWING BUCCAL at 13:36

## 2025-06-25 NOTE — NURSING NOTE
Patient noted to have slept majority of the night. Patient up x1 w/ some confusion while searching for restroom. Returned to bed shortly after w/ no issues. Non labored breathing noted while asleep. Safety checks continuous and maintained.
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
## 2025-06-25 NOTE — PROGRESS NOTES
"Progress Note - Portillo Flaherty 60 y.o. male MRN: [id number]    Unit/Bed#: [facility] 203-01 Encounter: [id number]        Subjective:   Patient seen and examined at bedside after reviewing the chart and discussing the case with the caring staff.      Patient examined at bedside.  Patient rambling with pressured speech.  Requires frequent redirection.  He has small abrasion on left shoulder which he declines wanting anything for.  He initially requested his home eye drops and ear drops to be ordered however now does not want them.  No other complaints at this time.     Physical Exam   Vitals: Blood pressure 144/76, pulse 60, temperature 98.1 °F (36.7 °C), temperature source Temporal, resp. rate 16, height 5' 11\" (1.803 m), weight 63 kg (138 lb 12.8 oz), SpO2 98%.,Body mass index is 19.36 kg/m².  Constitutional: Patient in no acute distress.  HEENT: PERR, EOMI, MMM.  Cardiovascular: Normal rate and regular rhythm.    Pulmonary/Chest: Effort normal and breath sounds normal.   Abdomen: Soft, + BS, NT.  Skin: superficial abrasion noted to left anterior shoulder without signs of infection.     Assessment/Plan:  Portillo Flaherty is a(n) 60 y.o. year old male with bipolar disorder.     Hypertension.  Patient is currently on lisinopril 10 mg daily.  Nicotine dependence.  NRT.  Insomnia.  Patient is on melatonin 3 mg at bedtime.  Elevated LFTs.  AST 68, ALT 68 on 6/23.  Pt denies GI complaints.  Cont to monitor.   Gum pain.  Campho-Phenique liquid q2h as needed (pt brought from home).  Left shoulder abrasion.  No signs of infection.  Pt declines topical abx.  Cont to monitor.     The patient was discussed with Dr. Torres and he is in agreement with the above note.  "

## 2025-06-25 NOTE — PROGRESS NOTES
06/25/25   Team Meeting   Meeting Type Tx Team Meeting   Team Members Present   Team Members Present Physician;Nurse;   Physician Team Member Clair YBARRA   Nursing Team Member Keagan POLLACK   Social Work Team Member Artem BEAR   Patient/Family Present   Patient Present Yes   Patient's Family Present No   Tx team reviewed pt strengths,limitations,tx goals and plan.  All in agreement and signed.

## 2025-06-25 NOTE — NURSING NOTE
Upon reassessment, patient is calmer and more cooperative with staff. He is less restless and is now quiet in his room. Medication effective.

## 2025-06-25 NOTE — PROGRESS NOTES
06/25/25 0937   Team Meeting   Meeting Type Daily Rounds   Initial Conference Date 06/25/25   Team Members Present   Team Members Present Physician;Nurse;;Occupational Therapist   Physician Team Member Clair GRACE   Nursing Team Member Keagan POLLACK   Social Work Team Member Artem ROMERO   OT Team Member Keagan MCGRAW   Patient/Family Present   Patient Present No   Patient's Family Present No     201, new admit, going through divorce, outbursts of laughter, requires redirection, loud, manic, hyper verbal, environmentally preoccupied, multiple PRN, mod anx/dep, denies si/hi/avh, signed 72 6/24 1728, d/c Fri

## 2025-06-25 NOTE — TREATMENT TEAM
06/24/25 2124   Hall Anxiety Scale   Anxious Mood 4   Tension 4   Fears 4   Insomnia 3   Intellectual 4   Depressed Mood 0   Somatic Complaints: Muscular 0   Somatic Complaints: Sensory 0   Cardiovascular Symptoms 1   Respiratory Symptoms 0   Gastrointestinal Symptoms 2   Genitourinary Symptoms 0   Autonomic Symptoms 2   Behavior at Interview 4   Hall Anxiety Score 28     1mg Ativan administered at 2124 for severe anxiety as indicated by Hall score.

## 2025-06-25 NOTE — NURSING NOTE
Patient came out at this time running in hallway x2 searching for a restroom. Patient informed by staff of the restroom located in his assigned room. Patient noted to be disorganized in thought. Utilized bathroom before returning to bed.

## 2025-06-25 NOTE — NURSING NOTE
"Met by patient in hallway. Presents with rapid movements, pressured speech, manic behavior. Agreeable to sitting with me in my office.     Offered good eye contact, moderate controls. Thinking is severely circumstantial though redirectable at times.     Pt reports signing a 201 \"to get help\". Describes that help as needing assistance managing and coping with behavior from his wife and figuring out a new housing situation that suits them both. Pt declines any intent to take prescription medication for mood, stating he is free from all of that and will do it naturally. Shares he is connected with [facility] and  is Radha, though their help is often slow. Does see a medical provider in [place] who he refers to as \"Dr. Brady\"- details in chart review. Is not concerned with the safety of his dog, sharing she is at home with his wife.    Pt reports a significant history of drug and alcohol use, though reports he is only currently using medical marijuana via edibles.    Presents with several mentions and references to his Islam stefan, though this does not appear to be preoccupation, given his references to how his stefan influences his life.     Pt was receptive and able to teach back recommendations for behavior on the unit. Remain calm, focus on self not others, communicate goals clearly and simply to staff.     Pt will be provided with a yoga mat for practicing Sha Chi in his room, something he shares is a coping skill. Encouraged to punch his mattress if he needs to expel more agitated energy- pt thankful.    Pt was encouraged to seek out this writer with needs through stay. Assigned RN SAFIA and Charge RN  informed of above.   "

## 2025-06-25 NOTE — PROGRESS NOTES
Cm met with pt to obtain the following info.  Pt was selective with what information he shared. Pt states he can return home where he lives during the day with his service dog.  He reports going through a divorce with his wife who is a  employee. He does not want his wife to have any information re his hospital stay/tx plan/d/c plan despite encouragement to communicate d/c planning and to assess any safety concerns.    PATIENT INFORMATION     Referral Reason Psych   [facility]   30 Day Readmission No   Mental Status Confused   Primary Caregiver Self   Accompanied by/Relationship n/a   Support System Other (Comment)  (pt states he is his own support by choice)   Jain/Cultural Requests Temple   Tx Plan Signed Yes   Current Status: 201   Legal Issues denies   Health Care Proxy Appointed No   Functional Status Independent   Assistive Device No device needed   Living Arrangement Homeless   Ambulation Independent   Access to Firearms No   Income Source SSI/SSD     Special Needs glasses   Living Arrangement Homeless   Can patient return home? 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   Status of Admission 201   [facility]   72 Hour Notice Initiated   Date patient signed 72h Notice 06/24/25   Time patient signed 72h Notice 4744   Presenting Problems psychoisis, pos meth, arsh   Treatment History multiple ip and op tx stays, currently attends talk and music therapy   Currently in Treatment Yes   Current Psychiatrist/Therapist pt would not sign SANJANA nor disclose his therapist, in need   Current Treatment Appt Info unknown   Name of ICM: pt states he has a cm at [facility] however would not sign a SANJANA or disclose   Community Agency  Supports DOC pt states he has a cm Radha   Medical Problems see h&p   Legal Issues denies   Probation/ Name (if applicable) n/a   Substance Abuse Pt has a medical marijuana card and reports using THC products daily. Pt also requesting benzos only for anxiety. Previous dependence of benzo unknown.     Release of Information Signed No  (pt declined to sign SANJANA's, cm will attempt again at a later date)

## 2025-06-25 NOTE — TREATMENT PLAN
TREATMENT PLAN REVIEW - Behavioral Health Portillo Flaherty 60 y.o. 1965 male MRN: [id number]    [facility] Room / Bed: [facility] 203/[facility] 203-01 Encounter: [id number]          Admit Date/Time:  6/24/2025  2:42 PM    Treatment Team:   MD Jorge Gilliam MD Jacqueline F Wagner Loree Smith Pope, LPC Paul F Klose, RN Patricia Solt, JAKI Sher    Diagnosis: Principal Problem:    Bipolar disorder, curr episode manic w/o psychotic features, moderate (HCC)  Active Problems:    Cannabis abuse      Patient Strengths/Assets: family ties, negotiates basic needs, patient is on a voluntary commitment    Patient Barriers/Limitations: poor insight, poor reasoning ability    Short Term Goals: decrease in level of agitation, ability to stay free of restraints, improvement in insight, improvement in reasoning ability, improvement in self care, sleep improvement, improvement in appetite, mood stabilization, increase in group attendance, increase in socialization with peers on the unit, acceptance of need for psychiatric treatment, acceptance of psychiatric medications    Long Term Goals: improvement in depression, improvement in anxiety, stabilization of mood, improvement in reasoning ability, improved insight, acceptance of need for psychiatric medications, acceptance of need for psychiatric treatment, acceptance of psychiatric diagnosis, adequate self care, adequate sleep, adequate appetite, adequate oral intake, appropriate interaction with peers    Progress Towards Goals: starting psychiatric medications as prescribed    Recommended Treatment: medication management, patient medication education, group therapy, milieu therapy, continued Behavioral Health psychiatric evaluation/assessment process, medical follow up with medical team    Treatment Frequency: daily medication monitoring, group and milieu therapy daily, monitoring through  interdisciplinary rounds, monitoring through weekly patient care conferences    Expected Discharge Date: 5 midnights     Discharge Plan: will be determined    Treatment Plan Created/Updated By: Dipak Self MD

## 2025-06-25 NOTE — NURSING NOTE
Pt requesting information on Zyprexa that was ordered, nurse provided pt printed educational information.

## 2025-06-25 NOTE — NURSING NOTE
"Patient visible in milieu, mood and affect labile, periods of irritability can be argumentative with staff over belongings. Patient noted to be anxious, denies depression, SI/HI, no overt hallucinations. Thought process disorganized, tangential, with flight of ideas. Speech rambling, rapid, repetitive, pressured. Patient appears to be religiously preoccupied, noted to be praying on knees in front of peers door. Patient attempted to share his food with others at breakfast. Patient redirected and explained by staff that we cannot share food. Patient became agitated and threw tray into cart. Patient environmentally preoccupied. Patient took Nicoderm patch off x 2. Poor judgement, poor impulse control. Patient took other medication as prescribed this morning. Remains on 15\" checks for safety and behaviors.  "

## 2025-06-25 NOTE — PLAN OF CARE
Problem: BEHAVIOR  Goal: Pt/Family maintain appropriate behavior and adhere to behavioral management agreement, if implemented  Description: INTERVENTIONS:  - Assess the family dynamic   - Encourage verbalization of thoughts and concerns in a socially appropriate manner  - Assess patient/family's coping skills and non-compliant behavior (including use of illegal substances).  - Utilize positive, consistent limit setting strategies supporting safety of patient, staff and others  - Initiate consult with Case Management, Spiritual Care or other ancillary services as appropriate  - If a patient's/visitor's behavior jeopardizes the safety of the patient, staff, or others, refer to organization procedure.   - Notify Security of behavior or suspected illegal substances which indicate the need for search of the patient and/or belongings  - Encourage participation in the decision making process about a behavioral management agreement; implement if patient meets criteria  Outcome: Not Progressing

## 2025-06-26 ENCOUNTER — TRANSITIONAL CARE MANAGEMENT (OUTPATIENT)
Dept: FAMILY MEDICINE CLINIC | Facility: CLINIC | Age: 60
End: 2025-06-26

## 2025-06-26 ENCOUNTER — TELEPHONE (OUTPATIENT)
Dept: FAMILY MEDICINE CLINIC | Facility: CLINIC | Age: 60
End: 2025-06-26

## 2025-06-26 PROBLEM — F32.9 MAJOR DEPRESSIVE DISORDER, SINGLE EPISODE, UNSPECIFIED: Status: RESOLVED | Noted: 2025-06-26 | Resolved: 2025-06-26

## 2025-06-26 PROBLEM — F32.9 MAJOR DEPRESSIVE DISORDER, SINGLE EPISODE, UNSPECIFIED: Status: ACTIVE | Noted: 2025-06-26

## 2025-06-26 PROBLEM — Z00.00 ANNUAL PHYSICAL EXAM: Status: RESOLVED | Noted: 2024-01-10 | Resolved: 2025-06-26

## 2025-06-26 LAB
25(OH)D3 SERPL-MCNC: 62.6 NG/ML (ref 30–100)
EST. AVERAGE GLUCOSE BLD GHB EST-MCNC: 117 MG/DL
FOLATE SERPL-MCNC: 20.3 NG/ML
HBA1C MFR BLD: 5.7 %
VIT B12 SERPL-MCNC: 305 PG/ML (ref 180–914)

## 2025-06-26 PROCEDURE — 83036 HEMOGLOBIN GLYCOSYLATED A1C: CPT

## 2025-06-26 PROCEDURE — 99232 SBSQ HOSP IP/OBS MODERATE 35: CPT

## 2025-06-26 PROCEDURE — 82746 ASSAY OF FOLIC ACID SERUM: CPT

## 2025-06-26 PROCEDURE — 82306 VITAMIN D 25 HYDROXY: CPT

## 2025-06-26 PROCEDURE — 82607 VITAMIN B-12: CPT

## 2025-06-26 RX ORDER — OLANZAPINE 10 MG/1
10 TABLET, FILM COATED ORAL
Qty: 30 TABLET | Refills: 0 | Status: SHIPPED | OUTPATIENT
Start: 2025-06-26

## 2025-06-26 RX ORDER — LORAZEPAM 1 MG/1
1 TABLET ORAL ONCE
Status: COMPLETED | OUTPATIENT
Start: 2025-06-26 | End: 2025-06-26

## 2025-06-26 RX ORDER — LISINOPRIL 10 MG/1
10 TABLET ORAL DAILY
Qty: 30 TABLET | Refills: 0 | Status: SHIPPED | OUTPATIENT
Start: 2025-06-27

## 2025-06-26 RX ADMIN — OLANZAPINE 10 MG: 10 TABLET, FILM COATED ORAL at 20:43

## 2025-06-26 RX ADMIN — LORAZEPAM 1 MG: 1 TABLET ORAL at 04:10

## 2025-06-26 RX ADMIN — LORAZEPAM 1 MG: 1 TABLET ORAL at 14:25

## 2025-06-26 RX ADMIN — LORAZEPAM 1 MG: 1 TABLET ORAL at 20:43

## 2025-06-26 RX ADMIN — NICOTINE 1 PATCH: 21 PATCH, EXTENDED RELEASE TRANSDERMAL at 08:16

## 2025-06-26 RX ADMIN — LORAZEPAM 1 MG: 1 TABLET ORAL at 22:44

## 2025-06-26 RX ADMIN — NICOTINE POLACRILEX 4 MG: 4 GUM, CHEWING BUCCAL at 13:38

## 2025-06-26 RX ADMIN — Medication 3 MG: at 20:43

## 2025-06-26 RX ADMIN — LISINOPRIL 10 MG: 10 TABLET ORAL at 08:16

## 2025-06-26 NOTE — PLAN OF CARE
Problem: PAIN - ADULT  Goal: Verbalizes/displays adequate comfort level or baseline comfort level  Description: Interventions:  - Encourage patient to monitor pain and request assistance  - Assess pain using appropriate pain scale  - Administer analgesics as ordered based on type and severity of pain and evaluate response  - Implement non-pharmacological measures as appropriate and evaluate response  - Consider cultural and social influences on pain and pain management  - Notify physician/advanced practitioner if interventions unsuccessful or patient reports new pain  - Educate patient/family on pain management process including their role and importance of  reporting pain   - Provide non-pharmacologic/complimentary pain relief interventions  Outcome: Progressing     Problem: DISCHARGE PLANNING  Goal: Discharge to home or other facility with appropriate resources  Description: INTERVENTIONS:  - Identify barriers to discharge w/patient and caregiver  - Arrange for needed discharge resources and transportation as appropriate  - Identify discharge learning needs (meds, wound care, etc.)  - Arrange for interpretive services to assist at discharge as needed  - Refer to Case Management Department for coordinating discharge planning if the patient needs post-hospital services based on physician/advanced practitioner order or complex needs related to functional status, cognitive ability, or social support system  Outcome: Progressing     Problem: Anxiety  Goal: Anxiety is at manageable level  Description: Interventions:  - Assess and monitor patient's anxiety level.   - Monitor for signs and symptoms (heart palpitations, chest pain, shortness of breath, headaches, nausea, feeling jumpy, restlessness, irritable, apprehensive).   - Collaborate with interdisciplinary team and initiate plan and interventions as ordered.  - [place] patient to unit/surroundings  - Explain treatment plan  - Encourage participation in care  -  Encourage verbalization of concerns/fears  - Identify coping mechanisms  - Assist in developing anxiety-reducing skills  - Administer/offer alternative therapies  - Limit or eliminate stimulants  Outcome: Progressing     Problem: Depression  Goal: Treatment Goal: Demonstrate behavioral control of depressive symptoms, verbalize feelings of improved mood/affect, and adopt new coping skills prior to discharge  Outcome: Progressing  Goal: Verbalize thoughts and feelings  Description: Interventions:  - Assess and re-assess patient's level of risk   - Engage patient in 1:1 interactions, daily, for a minimum of 15 minutes   - Encourage patient to express feelings, fears, frustrations, hopes   Outcome: Progressing  Goal: Refrain from isolation  Description: Interventions:  - Develop a trusting relationship   - Encourage socialization   Outcome: Progressing  Goal: Attend and participate in unit activities, including therapeutic, recreational, and educational groups  Description: Interventions:  - Provide therapeutic and educational activities daily, encourage attendance and participation, and document same in the medical record   Outcome: Progressing  Goal: Complete daily ADLs, including personal hygiene independently, as able  Description: Interventions:  - Observe, teach, and assist patient with ADLS  -  Monitor and promote a balance of rest/activity, with adequate nutrition and elimination   Outcome: Progressing

## 2025-06-26 NOTE — PROGRESS NOTES
Patient complained of high anxiety at 2130. Hyper-verbal, restless, praying in hallway. Hall Scale 28 at this time. Given Ativan 1 mg PO. Reassessed at 2240. Patient sleeping. Hall Score 0. Medication effective.   06/25/25 2240   Hall Anxiety Scale   Anxious Mood 0   Tension 0   Fears 0   Insomnia 0   Intellectual 0   Depressed Mood 0   Somatic Complaints: Muscular 0   Somatic Complaints: Sensory 0   Cardiovascular Symptoms 0   Respiratory Symptoms 0   Gastrointestinal Symptoms 0   Genitourinary Symptoms 0   Autonomic Symptoms 0   Behavior at Interview 0   Hall Anxiety Score 0

## 2025-06-26 NOTE — BH TRANSITION RECORD
Contact Information: If you have any questions, concerns, pended studies, tests and/or procedures, or emergencies regarding your inpatient behavioral health visit. Please contact [facility] [phone] and ask to speak to a , nurse or physician. A contact is available 24 hours/ 7 days a week at this number.     Summary of Procedures Performed During your Stay:  Below is a list of major procedures performed during your hospital stay and a summary of results:  - No major procedures performed.    Pending Studies (From admission, onward)       Start     Ordered    06/25/25 0600  Lipid panel  Morning draw         06/24/25 1504                  Please follow up on the above pending studies with your PCP and/or referring provider.

## 2025-06-26 NOTE — PROGRESS NOTES
Progress Note - Behavioral Health   Name: Portillo Flaherty 60 y.o. male I MRN: [id number]  Unit/Bed#: [facility] 203-01 I Date of Admission: 6/24/2025   Date of Service: 6/26/2025 I Hospital Day: 2    Assessment & Plan  Bipolar disorder, curr episode manic w/o psychotic features, moderate (HCC)  Zyprexa 10 mg po hs.  Cannabis abuse      Current Medications:    Current Facility-Administered Medications:     lisinopril (ZESTRIL) tablet 10 mg, Oral, Daily    melatonin tablet 3 mg, Oral, HS    nicotine (NICODERM CQ) 21 mg/24 hr TD 24 hr patch 1 patch, Transdermal, Daily    OLANZapine (ZyPREXA) tablet 10 mg, Oral, HS    Current Facility-Administered Medications:     acetaminophen (TYLENOL) tablet 650 mg, Oral, Q4H PRN    acetaminophen (TYLENOL) tablet 650 mg, Oral, Q4H PRN    acetaminophen (TYLENOL) tablet 975 mg, Oral, Q6H PRN    benztropine (COGENTIN) injection 1 mg, Intramuscular, Q4H PRN Max 6/day    benztropine (COGENTIN) tablet 0.5 mg, Oral, Q4H PRN Max 6/day    hydrOXYzine HCL (ATARAX) tablet 25 mg, Oral, Q6H PRN Max 4/day    LORazepam (ATIVAN) injection 1 mg, Intramuscular, Q6H PRN Max 3/day    LORazepam (ATIVAN) tablet 0.5 mg, Oral, Q6H PRN Max 4/day    LORazepam (ATIVAN) tablet 1 mg, Oral, Q6H PRN Max 3/day    nicotine polacrilex (NICORETTE) gum 4 mg, Oral, Q2H PRN    NON FORMULARY, Oral, Q2H PRN    OLANZapine (ZyPREXA) IM injection 5 mg, Intramuscular, Q3H PRN Max 3/day    OLANZapine (ZyPREXA) tablet 2.5 mg, Oral, Q4H PRN Max 6/day    OLANZapine (ZyPREXA) tablet 5 mg, Oral, Q4H PRN Max 3/day    OLANZapine (ZyPREXA) tablet 5 mg, Oral, Q3H PRN Max 3/day    Risks/Benefits of Treatment:     Risks, benefits, and possible side effects of medications explained to patient and patient verbalizes understanding and agreement for treatment.    Progress Toward Goals: unchanged    Treatment Planning:      - Encourage early mobility and having a structured day  - Provide frequent re-orientation, and cognitive stimulation  - Ensure  assistive devices are in proper working order (eye-glasses, hearing aids)  - Encourage adequate hydration, nutrition and monitor bowel movements  - Maintain sleep-wake cycle: Uninterrupted sleep time; low-level lighting at night  - Fall precaution  - Follow up with [facility] regarding the medical problems   - Continue medication titration and treatment plan; adjust medication to optimize treatment response and as clinically indicated.   - Observation: N/A  - VS: as per unit protocol  - Encourage group attendance and milieu therapy  - Dispo: To be determined  - Estimated Discharge Day: 6/27/2025 1 day (6/27/2025)  - Legal Status : Signed 72 hour notice.  - Long Stay Certification : Not Applicable    Subjective     Portillo was seen today for psychiatric follow-up. Patient is intermittently visible on the unit for meals. He remains disorganized, bizarre in behavior, religiously preoccupied, tangential and hyper verbal. According to nursing staff report patient is argumentative, with poor boundaries and difficult to redirect on the unit.     Sleep: broken sleep  Appetite: normal  Medication side effects: No  ROS: review of systems as noted above in HPI/Subjective report, all other systems are negative    Objective :  Temp:  [98.4 °F (36.9 °C)] 98.4 °F (36.9 °C)  HR:  [56-70] 70  BP: (131-133)/(89-94) 133/94  Resp:  [18] 18  SpO2:  [97 %-98 %] 98 %  O2 Device: None (Room air)    Mental Status Evaluation:    Appearance:  age appropriate, thin & gaunt looking, bearded, tattooed   Behavior:  restless   Speech:  Increased rate, hypertalkative   Mood:  labile   Affect:  labile   Thought Process:  Increased rate of thoughts   Thought Content:  Ruminations, religiously preoccupied   Perceptual Disturbances: Denied AVH, when asked   Risk Potential: Suicidal Ideation -  None at present  Homicidal Ideation -  None at present  Potential for Aggression - No   Sensorium:  oriented to person, place, and time/date   Memory:  recent and  "remote memory: unable to assess due to lack of cooperation   Consciousness:  alert and awake   Attention/Concentration: attention span and concentration appear shorter than expected for age   Insight:  limited   Judgment: limited   Gait/Station: normal gait/station   Motor Activity: no abnormal movements     Cognitive Assessment : N/A  Pain : denied  Pain Scale : 0      Lab Results: I have reviewed the following results:  Most Recent Labs:   Lab Results   Component Value Date    WBC 9.78 06/23/2025    RBC 4.03 06/23/2025    HGB 13.0 06/23/2025    HCT 37.7 06/23/2025     06/23/2025    RDW 12.8 06/23/2025    NEUTROABS 5.02 06/23/2025    SODIUM 135 06/23/2025    K 4.1 06/23/2025     06/23/2025    CO2 22 06/23/2025    BUN 18 06/23/2025    CREATININE 0.84 06/23/2025    GLUC 93 06/23/2025    CALCIUM 9.8 06/23/2025    AST 68 (H) 06/23/2025    ALT 68 (H) 06/23/2025    ALKPHOS 52 06/23/2025    TP 7.4 06/23/2025    ALB 4.7 06/23/2025    TBILI 0.68 06/23/2025    CHOLESTEROL 198 04/29/2025    HDL 65 04/29/2025    TRIG 68 04/29/2025    LDLCALC 119 (H) 04/29/2025    NONHDLC 133 04/29/2025    VALPROICTOT <3 (L) 12/15/2022    OUC4NYPRPDCI 2.114 06/23/2025    T3FREE 173.0 (H) 01/31/2023    HGBA1C 5.5 04/29/2025     04/29/2025       Administrative Statements     Counseling / Coordination of Care:   Patient's progress discussed with staff in treatment team meeting.  Medication changes reviewed with staff in treatment team meeting.    Portions of the record may have been created with voice recognition software. Occasional wrong word or \"sound a like\" substitutions may have occurred due to the inherent limitations of voice recognition software. Read the chart carefully and recognize, using context, where substitutions have occurred.    LESLY Domingo 06/26/25  "

## 2025-06-26 NOTE — TREATMENT TEAM
06/25/25 1445   Activity/Group Checklist   Group Admission/Discharge   Attendance Other (Comment)  (pt unable to complete unable to focus on questions asked/religiously focused)

## 2025-06-26 NOTE — NURSING NOTE
Patient became hostile after staff informed him that he needed to use a shower chair in the shower room. He stated he wanted to use a chair from the dining room. Belittling, condescending and argumentative towards staff. De-escalation with multiple staff effective. Portillo agreed to use designated chair for shower room. Allowed to shower.

## 2025-06-26 NOTE — PROGRESS NOTES
"Patient awake, hyper-verbal and restless. Hall Scale 25. Requested and received Ativan 1 mg PO for stated \"high anxiety\". Stated he will return to bedroom to relax and try to sleep.   06/26/25 0410   Hall Anxiety Scale   Anxious Mood 3   Tension 3   Fears 3   Insomnia 3   Intellectual 3   Depressed Mood 1   Somatic Complaints: Muscular 1   Somatic Complaints: Sensory 1   Cardiovascular Symptoms 1   Respiratory Symptoms 1   Gastrointestinal Symptoms 0   Genitourinary Symptoms 0   Autonomic Symptoms 2   Behavior at Interview 3   Hall Anxiety Score 25       "

## 2025-06-26 NOTE — PROGRESS NOTES
06/26/25 1316    Admission Notification   Notification of Admission Provided to: Family;ICM;PCP   Family Notified via: Phone call   ICM Notified via: Phone call   PCP Notified via: Phone call     SAJI left vm  for pt spouse Cleopatra [phone].    SAJI placed call to office of Dr Sewell [phone]. Spoke to Rukhsana and secured appt on 7/7 at 4:30pm.      SAJI left vm for saji Garcia at [employer] ([facility]) [phone] to notify of pt d/c.

## 2025-06-26 NOTE — PROGRESS NOTES
06/26/25    Team Meeting   Meeting Type Daily Rounds   Initial Conference Date 06/26/25   Team Members Present   Team Members Present Physician;Nurse;;Occupational Therapist   Physician Team Member Clair GRACE   Nursing Team Member Keagan POLLACK   Social Work Team Member Artem BEAR   OT Team Member Keagan MCGRAW   Patient/Family Present   Patient Present No   Patient's Family Present No   201, d/c rachell on 72 hr notice by [id number], belittling staff/peers, argumentative, ativan seeking, poor boundaries, religiously preoccupied, demanding, difficult to redirect, needs to sign SANJANA to speak with wife to inform of return home

## 2025-06-26 NOTE — NURSING NOTE
Pt has been visible on the unit and took morning medications. Pt can be labile at times and still very repetitive when speaking. Pt is disorganized in thought content and will often go on tangents from one topic to another in a matter of minutes. Pt is hard to redirect but has been redirectable. He has poor insight to his condition and does not believe he needs any psych meds. Pt was pacing hallways earlier talking to himself and is Pentecostal ly preoccupied at times. He has been staff splitting on the unit and talking down to staff as if no one is qualified to give him care. His mood changes from being excited and happy to being argumentative and accusatory. Continuous safety monitoring in place.

## 2025-06-26 NOTE — NURSING NOTE
Staff splitting behavior and testing boundaries this morning. Tried to get unauthorized products from personal items. Re-directed.

## 2025-06-26 NOTE — PLAN OF CARE
Problem: DISCHARGE PLANNING  Goal: Discharge to home or other facility with appropriate resources  Description: INTERVENTIONS:  - Identify barriers to discharge w/patient and caregiver  - Arrange for needed discharge resources and transportation as appropriate  - Identify discharge learning needs (meds, wound care, etc.)  - Arrange for interpretive services to assist at discharge as needed  - Refer to Case Management Department for coordinating discharge planning if the patient needs post-hospital services based on physician/advanced practitioner order or complex needs related to functional status, cognitive ability, or social support system  Outcome: Progressing   201, new goal initiated

## 2025-06-26 NOTE — PROGRESS NOTES
"Progress Note - Portillo Flaherty 60 y.o. male MRN: [id number]    Unit/Bed#: [facility] 203-01 Encounter: [id number]        Subjective:   Patient seen and examined at bedside after reviewing the chart and discussing the case with the caring staff.      Patient examined at bedside.  Patient denies any acute symptoms.     Physical Exam   Vitals: Blood pressure 133/94, pulse 70, temperature 98.4 °F (36.9 °C), temperature source Temporal, resp. rate 18, height 5' 11\" (1.803 m), weight 63 kg (138 lb 12.8 oz), SpO2 98%.,Body mass index is 19.36 kg/m².  Constitutional: Patient in no acute distress.  HEENT: PERR, EOMI, MMM.  Cardiovascular: Normal rate and regular rhythm.    Pulmonary/Chest: Effort normal and breath sounds normal.   Abdomen: Soft, + BS, NT.    Assessment/Plan:  Portillo Flaherty is a(n) 60 y.o. year old male with bipolar disorder.     Hypertension.  Patient is currently on lisinopril 10 mg daily.  Nicotine dependence.  NRT.  Insomnia.  Patient is on melatonin 3 mg at bedtime.  Elevated LFTs.  AST 68, ALT 68 on 6/23.  Pt denies GI complaints.  Cont to monitor.   Gum pain.  Campho-Phenique liquid q2h as needed (pt brought from home).  Left shoulder abrasion.  No signs of infection.  Pt declines topical abx.  Cont to monitor.     The patient was discussed with Dr. Torres and he is in agreement with the above note.  "

## 2025-06-26 NOTE — DISCHARGE SUMMARY
Discharge Summary - Behavioral Health   Name: Portillo Flaherty 60 y.o. male I MRN: [id number]  Unit/Bed#: [facility] 203-01 I Date of Admission: 6/24/2025   Date of Service: 6/26/2025 I Hospital Day: 2         Admission Date: 6/24/2025         Discharge Date: 6/27/2025    Attending Psychiatrist: Dipak Self MD    Reason for Admission/HPI: Major depressive disorder, single episode, unspecified [F32.9]    According to H&P of Dr. Self    Portillo Flaherty is a 60 y.o. male with a history of Bipolar Disorder who was admitted to the inpatient psychiatric unit on a voluntary 201 commitment basis due to manic symptoms, unstable mood, and non adherence to medications.  As per psych consult and assessment done in ED Dr. Evelyn Cotton: Patient is a 60-year-old male with history of bipolar disorder who presented to the ER with flight of ideas, pressured speech and irritability.  At the time, his wife petitioned for 302 involuntary admission. However patient willing to sign voluntarily and has signed 201 with request to go to [facility].   Symptoms prior to admission included racing thoughts, Adventism preoccupation, noncompliance with treatment, and noncompliance with medications. Onset of symptoms was gradual starting several days ago with gradually worsening course since that time. Stressors preceding admission included chronic mental illness and noncompliance with treatment.   On initial evaluation after admission to the inpatient psychiatric unit Portillo presents talkative, restless but he is able to answer questions with frequent redirections. He reports fair adjustment to the unit since he recognized the unit staff member from previous admission. Patient states he had an argument with his wife and he decided to come to ED and asked specifically to come to the [facility]. He admits that he has been using THC gummies and has decreased sleep and unknown amount of weight loss. Denies any current suicidal, homicidal ideation  or hallucination but remains religiously preoccupied. He is also demanding to use specific personal product for his use. Discussed medication option and patient agreed to take Olanzapine at bedtime. Denies any recent alcohol or illicit drug use except THC. He signed the treatment plan with her .     Hospital Course: The patient was admitted to the inpatient psychiatric unit and started on every 7 minutes precautions. During the hospitalization the patient was attending individual therapy, group therapy, milieu therapy and occupational therapy.    Psychiatric medications were titrated over the hospital stay. To address manic symptoms the patient was started on antipsychotic medication Zyprexa and hypnotic medication Melatonin. Medication doses were titrated during the hospital course. Prior to beginning of treatment medications risks and benefits and possible side effects including risk of parkinsonian symptoms, Tardive Dyskinesia and metabolic syndrome related to treatment with antipsychotic medications, risk of cardiovascular events in elderly related to treatment with antipsychotic medications, and risk of impaired next-day mental alertness, complex sleep-related behavior and dependence related to treatment with hypnotic medications were reviewed with the patient. The patient verbalized understanding and agreement for treatment.     Patient's symptoms improved gradually over the hospital course. At the end of treatment the patient was doing well. Mood was stable at the time of discharge. The patient denied suicidal ideation, intent or plan at the time of discharge and denied homicidal ideation, intent or plan at the time of discharge. There was no overt psychosis at the time of discharge. Sleep and appetite were improved. The patient was tolerating medications and was not reporting any significant side effects at the time of discharge.    Since the patient was doing well at the end of the  hospitalization, treatment team felt that the patient could be safely discharged to outpatient care.     The outpatient follow up with Dr. Sewell and Services Access Management was arranged by the unit  upon discharge.    Mental Status at time of Discharge:     Appearance:  age appropriate, casually dressed, and bearded   Behavior:  calm   Speech:  normal pitch and normal volume   Mood:  euthymic   Affect:  mood-congruent   Thought Process:  concrete   Thought Content:  No overt delusions   Perceptual Disturbances: Denied AVH when asked   Risk Potential: None at present   Sensorium:  person, place, and time/date   Cognition:  recent and remote memory grossly intact   Consciousness:  alert and awake    Attention: attention span and concentration were age appropriate   Insight:  fair   Judgment: fair   Gait/Station: normal gait/station   Motor Activity: no abnormal movements     Admission Diagnosis:Major depressive disorder, single episode, unspecified [F32.9]    Discharge Diagnosis:   Principal Problem:    Bipolar disorder, curr episode manic w/o psychotic features, moderate (HCC)  Active Problems:    Cannabis abuse  Resolved Problems:    Major depressive disorder, single episode, unspecified        Lab results:  Admission on 06/24/2025   Component Date Value    Vitamin B-12 06/26/2025 305     Folate 06/26/2025 20.3     Vit D, 25-Hydroxy 06/26/2025 62.6     Hemoglobin A1C 06/26/2025 5.7 (H)     EAG 06/26/2025 117        Discharge Medications:  Current Discharge Medication List        START taking these medications    Details   melatonin 3 mg Take 1 tablet (3 mg total) by mouth daily at bedtime  Qty: 30 tablet, Refills: 0    Associated Diagnoses: Bipolar disorder, curr episode manic w/o psychotic features, moderate (HCC)      OLANZapine (ZyPREXA) 10 mg tablet Take 1 tablet (10 mg total) by mouth daily at bedtime  Qty: 30 tablet, Refills: 0    Associated Diagnoses: Bipolar disorder, curr episode manic w/o  psychotic features, moderate (HCC)              Current Discharge Medication List           Current Discharge Medication List           Current Discharge Medication List        CONTINUE these medications which have NOT CHANGED    Details   B Complex-C (B-complex with vitamin C) tablet Take 1 tablet by mouth in the morning.      lisinopril (ZESTRIL) 10 mg tablet Take 1 tablet (10 mg total) by mouth 2 (two) times a day  Qty: 180 tablet, Refills: 3    Associated Diagnoses: Primary hypertension      Magnesium 500 MG CAPS 1 po qd  Qty: 90 capsule, Refills: 0    Associated Diagnoses: Benign essential HTN      Omega-3 Fatty Acids (fish oil) 1,000 mg Take 1,000 mg by mouth in the morning.      sildenafil (VIAGRA) 50 MG tablet Take 1 tablet (50 mg total) by mouth as needed for erectile dysfunction  Qty: 30 tablet, Refills: 3    Associated Diagnoses: Erectile dysfunction, unspecified erectile dysfunction type              Discharge instructions/Information to patient and family:   See after visit summary for information provided to patient and family.      Provisions for Follow-Up Care:  See after visit summary for information related to follow-up care and any pertinent home health orders.      Discharge Statement     I spent 30 minutes discharging the patient. This time was spent on the day of discharge. I had direct contact with the patient on the day of discharge.     Additional documentation is required if more than 30 minutes were spent on discharge:    I reviewed with Portillo importance of compliance with medications and outpatient treatment after discharge.  I discussed the medication regimen and possible side effects of the medications with Portillo prior to discharge. At the time of discharge he was tolerating psychiatric medications.  I discussed outpatient follow up with Portillo.  I reviewed with Portillo crisis plan and safety plan upon discharge.  Portillo signed 72 hour notice and requested discharge. At the time  of the 72 hour notice expiration he had no criteria for involuntary commitment and denied any suicidal or homicidal ideation.

## 2025-06-26 NOTE — NURSING NOTE
Pt came up to the nurses station stating he is having bad anxiety due to a dream he was having. Pt stated he wants to go to group and needs medication that can help settle him down. Pt stated that his anxiety was a 10 and had a sanchez score of 25. Pt received ativan 1 mg. Will re-assess for effectives. Continued safety monitoring in place.     Pt rechecked in one hour and medication was effective. Pt stated he got relief from the medication and it calmed his mind down.

## 2025-06-26 NOTE — PROGRESS NOTES
Patient reassessed at 0452. Appeared to be sleeping. Hall Scale 0 at this time. Ativan 1 mg PO effective to decrease anxiety. Slept well during most q 15 minute safety checks prior to early morning awakening.  No respiratory distress or changes in medical condition.   No suicidal ideations noted overnight   06/26/25 0452   Hall Anxiety Scale   Anxious Mood 0   Tension 0   Fears 0   Insomnia 0   Intellectual 0   Depressed Mood 0   Somatic Complaints: Muscular 0   Somatic Complaints: Sensory 0   Cardiovascular Symptoms 0   Respiratory Symptoms 0   Gastrointestinal Symptoms 0   Genitourinary Symptoms 0   Autonomic Symptoms 0   Behavior at Interview 0   Hall Anxiety Score 0     .  Safety maintained via clutter-free environment, ID band, and given non-skid socks.  Will continue to monitor.

## 2025-06-26 NOTE — DISCHARGE INSTR - OTHER ORDERS
You are being discharged to your home at [address] [place] [address] TELEPHONE [phone]     Triggers you have identified during your hospitalization that led to your admission of a regressed mood include ineffective coping skills. Coping skills you have identified during your hospitalization include reading the bible. If you are unable to deal with your distressed mood alone please contact . If that is not effective and you continue to have (ex: suicidal ideation, homicidal ideation, distressed mood, overwhelmed, in crisis) please contact Crisis by dialing [phone], call [facility] Crisis: [phone], dial [phone] or go to the nearest emergency center.      *[facility] Crisis: (868) 308-1357  *[facility] Drug and Alcohol: Phone: [phone]) [phone]- [phone]  *National Suicide Prevention Lifeline:  1-401.679.6231  *Alcohol Anonymous: 580.148.5470  *National [place] on Mental Illness (MARY) HELPLINE: 296.891.7940/Website: www.mary.org  *Substance Abuse and Mental Health Services Administration([facility]) National Helpline, which is a confidential, free, 24-hour-a-day, 365-day-a-year, information service for individuals and family members facing mental health and/or substance use disorders. This service provides referrals to local treatment facilities, support groups, and community-based organizations. Callers can also order free publications and other information.  Call 1-634.559.7419/Website: www.[facility].gov  *[facility] 2-1-1: This is a toll free, confidential, 24-hour-a-day service which connects you to a community  in your area who can help you find services and resources that are available to you locally and provide critical services that can improve and save lives.  Call: 211  /Website: http://www.[employer].org/       Chelsea, or Rochelle, our Behavioral Health Nurse Navigators, will be calling you after your discharge, on the phone number that you provided.  They will be available  as an additional support, if needed.   If you wish to speak with one of them, you may contact Chelsea at [phone] or Rochelle at [phone]

## 2025-06-27 ENCOUNTER — TELEPHONE (OUTPATIENT)
Age: 60
End: 2025-06-27

## 2025-06-27 VITALS
BODY MASS INDEX: 19.43 KG/M2 | TEMPERATURE: 98 F | HEART RATE: 80 BPM | OXYGEN SATURATION: 98 % | RESPIRATION RATE: 19 BRPM | DIASTOLIC BLOOD PRESSURE: 89 MMHG | SYSTOLIC BLOOD PRESSURE: 142 MMHG | WEIGHT: 138.8 LBS | HEIGHT: 71 IN

## 2025-06-27 PROCEDURE — 99238 HOSP IP/OBS DSCHRG MGMT 30/<: CPT

## 2025-06-27 RX ADMIN — LISINOPRIL 10 MG: 10 TABLET ORAL at 08:46

## 2025-06-27 RX ADMIN — NICOTINE POLACRILEX 4 MG: 4 GUM, CHEWING BUCCAL at 05:07

## 2025-06-27 RX ADMIN — LORAZEPAM 1 MG: 1 TABLET ORAL at 05:07

## 2025-06-27 RX ADMIN — NICOTINE POLACRILEX 4 MG: 4 GUM, CHEWING BUCCAL at 11:07

## 2025-06-27 RX ADMIN — NICOTINE 1 PATCH: 21 PATCH, EXTENDED RELEASE TRANSDERMAL at 08:46

## 2025-06-27 NOTE — NURSING NOTE
"Here with caregiver.    Concerns:  none    Feeding:  whole milk  Changes disc'd  Cup disc'd  Bottle disc'd  Choking disc'd  Brushing/fluoride disc'd.  Pacifier disc'd  Transition to milk disc'd.    Sleep:  no concerns.    Elimination:  no concerns with bm/uo.    No rash    Developmental:    Babbling  Interactive/imitating  Words:  augusto, mama  Using spoon  Cruising  Standing  Walking x 3 steps.  Climbing   Kicking  Throwing    Reading and speech development disc'd    Safety:  disc'd at length    Visit Vitals  Ht 0.762 m (2' 6\")   Wt 9.446 kg   HC 48.3 cm   BMI 16.27 kg/m²   Smoking Status Never   BSA 0.45 m²        Physical Exam  Constitutional:       General: He is active. He is not in acute distress.     Appearance: Normal appearance. He is not toxic-appearing.   HENT:      Right Ear: Tympanic membrane, ear canal and external ear normal.      Left Ear: Tympanic membrane, ear canal and external ear normal.      Nose: Nose normal.      Mouth/Throat:      Mouth: Mucous membranes are moist.      Pharynx: No oropharyngeal exudate or posterior oropharyngeal erythema.   Eyes:      General:         Right eye: No discharge.         Left eye: No discharge.   Cardiovascular:      Rate and Rhythm: Normal rate and regular rhythm.      Pulses: Normal pulses.      Heart sounds: Normal heart sounds. No murmur heard.     No friction rub. No gallop.   Pulmonary:      Effort: Pulmonary effort is normal. No retractions.      Breath sounds: Normal breath sounds. No stridor. No wheezing, rhonchi or rales.   Abdominal:      General: Abdomen is flat.      Palpations: Abdomen is soft.   Genitourinary:     Penis: Uncircumcised.       Comments: Normal external genitalia  Musculoskeletal:         General: Normal range of motion.      Cervical back: Normal range of motion and neck supple.   Lymphadenopathy:      Cervical: No cervical adenopathy.   Skin:     General: Skin is warm.      Capillary Refill: Capillary refill takes less than 2 " Slept well during most q 15 minute safety checks.  No respiratory distress or changes in medical condition. No further complaints of anxiety. Sleep has been sound and undisturbed.  No suicidal ideations noted.  Safety maintained via clutter-free environment, ID band, and given non-skid socks.  Will continue to monitor.       seconds.      Findings: No rash.   Neurological:      General: No focal deficit present.      Mental Status: He is alert.         Assessment:  well 12 m.o. male  Prev at 15mo  Anticipatory guidance disc'd.  F/U at 15mo for wcc.

## 2025-06-27 NOTE — TELEPHONE ENCOUNTER
ASAP referral received today from [facility] IP for Med Management services. Upon review, pt scheduled, 6/30/25 at 1:30 PM with Osito Hardy in [place].     Referral completed/closed

## 2025-06-27 NOTE — SOCIAL WORK
Cm spoke to pt spouse Cleopatra [phone]. Cleopatra stated that pt can return home however, if behavior continues, she will be forced to get a PFA and have him evicted. Pt has been disruptive in the home, in the community they live in, and Cleopatra states she is at risk of getting evicted from the lot they rent due to the number of times the police need to be called to their residence.  Cleopatra states that she wants to be a support to pt and wants pt well.  She would like pt to take his meds, stop using THC products, and sleep.  Cleopatra was tearful throughout phone call. CM provided support and reinforced safety plan. Crisis numbers provided. Cleopatra stated that she has the support of her  and Yarsani that she is in contact with.  Cleopatra expressed gratitude and call ended.

## 2025-06-27 NOTE — PROGRESS NOTES
06/27/25    Team Meeting   Meeting Type Daily Rounds   Initial Conference Date 06/27/25   Team Members Present   Team Members Present Physician;Nurse;;Occupational Therapist   Physician Team Member Clair YBARRA, Fletcher GRACE   Nursing Team Member Michele RN   Care Management Team Member Shakir BEAR   OT Team Member Keagan MCGRAW   Patient/Family Present   Patient Present No   Patient's Family Present No   201, PT D/C  today back home with Psych follow up.

## 2025-06-27 NOTE — NURSING NOTE
Received patient at 1900. Out in community.  Minimally social with peers. Pleasant during evening assessment. Rated depression as  mild  and anxiety as high.  Denied any suicidal or homicidal ideations. Patient became irritated when talking to a BHT and threw a snack at her. Portillo was able to calm self, but remain edgy and short during conversations with female staff members. Uses shower as coping mechanism.  Safety maintained via clutter-free environment, ID band, and given non-skid socks.  No change in medical condition.  Compliant with medications and snack.  Hygiene is good .  Medication education given.  Care Plan reviewed and amended. Maintained on q 15 minute checks. Will continue to monitor.

## 2025-06-27 NOTE — PROGRESS NOTES
"2244 - Patient at nursing station, complaining of \"noise on unit waking him up\". Hall Scale 25. Anxiety level stated as high. Ruminating about not being able to sleep, restless, hyper-verbal and edgy. Fragile controls noted. Dr. Kidd called and gave new order for one time STAT dose of Ativan 1 mg PO. Medication given as ordered.  Reassessed at 2344. Patient appeared to be sleeping. No further hostility. Hall Scale 0. Medication effective to decrease anxiety.  "

## 2025-06-27 NOTE — PROGRESS NOTES
"Progress Note - Portillo Flaherty 60 y.o. male MRN: [id number]    Unit/Bed#: [facility] 203-01 Encounter: [id number]        Subjective:   Patient seen and examined at bedside after reviewing the chart and discussing the case with the caring staff.      Patient examined at bedside.  Patient denies any acute symptoms.     Patient is being discharged today, Friday, 6/27/2025.    Physical Exam   Vitals: Blood pressure 142/89, pulse 80, temperature 98 °F (36.7 °C), temperature source Temporal, resp. rate 19, height 5' 11\" (1.803 m), weight 63 kg (138 lb 12.8 oz), SpO2 98%.,Body mass index is 19.36 kg/m².  Constitutional: Patient in no acute distress.  HEENT: PERR, EOMI, MMM.  Cardiovascular: Normal rate and regular rhythm.    Pulmonary/Chest: Effort normal and breath sounds normal.   Abdomen: Soft, + BS, NT.    Assessment/Plan:  Portillo Flaherty is a(n) 60 y.o. year old male with bipolar disorder.    Medical clearance.  Patient is medically cleared for discharge.  All scripts were sent out for the patient.     Hypertension.  Patient is currently on lisinopril 10 mg daily.  Nicotine dependence.  NRT.  Insomnia.  Patient is on melatonin 3 mg at bedtime.  Elevated LFTs.  AST 68, ALT 68 on 6/23.  Pt denies GI complaints.  Cont to monitor.   Gum pain.  Campho-Phenique liquid q2h as needed (pt brought from home).  Left shoulder abrasion.  No signs of infection.  Pt declines topical abx.  Cont to monitor.     The patient was discussed with Dr. Torres and he is in agreement with the above note.  "

## 2025-06-27 NOTE — PROGRESS NOTES
"Patient at nursing station, hyper-verbal, restless. Endorsing severe anxiety. \"I want to behave today.\" Hall Score 31. Requested and received Ativan 1 mg.    06/27/25 0507   Hall Anxiety Scale   Anxious Mood 4   Tension 4   Fears 3   Insomnia 0   Intellectual 4   Depressed Mood 1   Somatic Complaints: Muscular 1   Somatic Complaints: Sensory 1   Cardiovascular Symptoms 1   Respiratory Symptoms 1   Gastrointestinal Symptoms 0   Genitourinary Symptoms 0   Autonomic Symptoms 2   Behavior at Interview 3   Hall Anxiety Score 25       "

## 2025-06-27 NOTE — PROGRESS NOTES
06/27/25 1227   Discharge Planning   Living Arrangements Lives w/ Spouse/significant other;Lives w/ Family members   Support Systems Self;Family members;Psychiatrist;Therapist   Assistance Needed med mgmt, follow through med mgmt services   Type of Current Residence Private residence   Current Home Care Services No   Other Referral/Resources/Interventions Provided:   Financial Resources Provided Indigent Transportation   Referrals Provided: Psychiatrist;Crisis Hotline   Discharge Communications   Discharge planning discussed with: pt tx team spouse pcp psych cm   CM contacted family/caregiver? Yes  (Cleopatra (spouse))   Were Treatment Team discharge recommendations reviewed with patient/caregiver? Yes   Did patient/caregiver verbalize understanding of patient care needs? Yes   Were patient/caregiver advised of the risks associated with not following Treatment Team discharge recommendations? Yes   Contacts   Patient Contacts Cleopatra Flaherty (spouse)   Relationship to Patient: Family   Contact Method Phone   Phone Number [phone]   Reason/Outcome Continuity of Care;Discharge Planning   [facility]   Would you like to participate in our [facility] service program?   Yes

## 2025-06-27 NOTE — NURSING NOTE
Reviewed AVS with patient including follow up appointments and prescription information. Belongings reviewed and signed off on. Walked to ride share with this writer with belongings

## 2025-06-27 NOTE — PROGRESS NOTES
Patient reassessed. Patient stated he is only having mild anxiety. Watching television at presently. Hall Scale 7. Medication effective.   06/27/25 0607   Hall Anxiety Scale   Anxious Mood 2   Tension 1   Fears 0   Insomnia 0   Intellectual 1   Depressed Mood 1   Somatic Complaints: Muscular 0   Somatic Complaints: Sensory 0   Cardiovascular Symptoms 0   Respiratory Symptoms 0   Gastrointestinal Symptoms 0   Genitourinary Symptoms 0   Autonomic Symptoms 1   Behavior at Interview 1   Hall Anxiety Score 7

## 2025-06-27 NOTE — PROGRESS NOTES
06/26/25 1300   Activity/Group Checklist   Group Admission/Discharge   Attendance Other (Comment)  (rpp attempted pt focused on other frustrustrations unable to/or unwilling to work on rpp.)

## 2025-06-27 NOTE — PROGRESS NOTES
2043 - Patient upset, restless, hyper-verbal. Extensive 1 to 1 could not de-escalate patient. Verbally aggressive to BHT. Ruminating about wife and dog issues. Endorsed high anxiety. Requested and received Ativan 1 mg PO. Hall Scale 25. Reassessed at 2143. Patient stated Ativan was minimally effect, however patient appeared less hostile and was able to communicate needs to nurse. Hall Scale 18. Medication Mildly effective for anxiety.

## 2025-06-30 ENCOUNTER — TELEPHONE (OUTPATIENT)
Age: 60
End: 2025-06-30

## 2025-06-30 ENCOUNTER — OFFICE VISIT (OUTPATIENT)
Age: 60
End: 2025-06-30
Payer: COMMERCIAL

## 2025-06-30 DIAGNOSIS — F98.8 ATTENTION DEFICIT DISORDER (ADD) IN ADULT: ICD-10-CM

## 2025-06-30 DIAGNOSIS — F31.2 BIPOLAR AFFECTIVE DISORDER, CURRENT EPISODE MANIC WITH PSYCHOTIC SYMPTOMS (HCC): ICD-10-CM

## 2025-06-30 PROCEDURE — 90792 PSYCH DIAG EVAL W/MED SRVCS: CPT | Performed by: PHYSICIAN ASSISTANT

## 2025-06-30 RX ORDER — OLANZAPINE 10 MG/1
10 TABLET, FILM COATED ORAL
Qty: 30 TABLET | Refills: 1 | Status: SHIPPED | OUTPATIENT
Start: 2025-06-30 | End: 2025-07-07

## 2025-06-30 NOTE — TELEPHONE ENCOUNTER
Radha calling from Service Access and Management to request patient sign a SANJANA so that care can be properly coordinated.    Radha is requesting any DX, med review, history, etc. available.
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
## 2025-06-30 NOTE — TELEPHONE ENCOUNTER
Alana called in inquiring about diagnosis Toxoplasmosis alana states when was patient diagnosis and results please fax to  Fax#[phone]  Please Advise

## 2025-07-01 ENCOUNTER — TELEPHONE (OUTPATIENT)
Age: 60
End: 2025-07-01

## 2025-07-01 NOTE — TELEPHONE ENCOUNTER
Writer received a call from pt.'s  at [facility]. She requested that pt.'s psych eval please be Faxed to Service access and management at [phone]. She reported that pt. Already signed a SANJANA. Office will need to verify.

## 2025-07-02 ENCOUNTER — TELEPHONE (OUTPATIENT)
Dept: PSYCHIATRY | Facility: CLINIC | Age: 60
End: 2025-07-02

## 2025-07-02 NOTE — TELEPHONE ENCOUNTER
A medical record request (SANJANA) was received 7/2/25 from [facility] from [employer]. The request is for Psychiatric Evaluation with diagnosis.     Paperwork sent to  at [place] office to forward to Osito CARRIZALES for his review.

## 2025-07-02 NOTE — TELEPHONE ENCOUNTER
Radha Kramer called to return a call to Jillian HENRY regarding the above encounter. The call was successfully transferred at this time, for further assistance.

## 2025-07-03 ENCOUNTER — HOSPITAL ENCOUNTER (INPATIENT)
Facility: HOSPITAL | Age: 60
LOS: 1 days | End: 2025-07-03
Attending: EMERGENCY MEDICINE | Admitting: PSYCHIATRY & NEUROLOGY
Payer: COMMERCIAL

## 2025-07-03 ENCOUNTER — HOSPITAL ENCOUNTER (INPATIENT)
Facility: HOSPITAL | Age: 60
LOS: 4 days | Discharge: HOME | DRG: 885 | End: 2025-07-07
Attending: STUDENT IN AN ORGANIZED HEALTH CARE EDUCATION/TRAINING PROGRAM | Admitting: PSYCHIATRY & NEUROLOGY
Payer: COMMERCIAL

## 2025-07-03 VITALS
BODY MASS INDEX: 19.25 KG/M2 | DIASTOLIC BLOOD PRESSURE: 63 MMHG | OXYGEN SATURATION: 99 % | TEMPERATURE: 97 F | RESPIRATION RATE: 12 BRPM | SYSTOLIC BLOOD PRESSURE: 110 MMHG | WEIGHT: 138 LBS | HEART RATE: 48 BPM

## 2025-07-03 DIAGNOSIS — F14.10 COCAINE ABUSE (HCC): ICD-10-CM

## 2025-07-03 DIAGNOSIS — F31.2 BIPOLAR AFFECTIVE DISORDER, CURRENT EPISODE MANIC WITH PSYCHOTIC SYMPTOMS (HCC): Primary | ICD-10-CM

## 2025-07-03 DIAGNOSIS — F12.10 CANNABIS ABUSE: ICD-10-CM

## 2025-07-03 DIAGNOSIS — F19.10 POLYSUBSTANCE ABUSE (HCC): ICD-10-CM

## 2025-07-03 DIAGNOSIS — F31.2 BIPOLAR AFFECTIVE DISORDER, CURRENTLY MANIC, SEVERE, WITH PSYCHOTIC FEATURES (HCC): ICD-10-CM

## 2025-07-03 DIAGNOSIS — F31.12 BIPOLAR DISORDER, CURR EPISODE MANIC W/O PSYCHOTIC FEATURES, MODERATE (HCC): ICD-10-CM

## 2025-07-03 DIAGNOSIS — F98.8 ATTENTION DEFICIT DISORDER (ADD) IN ADULT: ICD-10-CM

## 2025-07-03 DIAGNOSIS — F98.8 ATTENTION DEFICIT DISORDER (ADD) IN ADULT: Primary | ICD-10-CM

## 2025-07-03 LAB
ALBUMIN SERPL BCG-MCNC: 4.5 G/DL (ref 3.5–5)
ALP SERPL-CCNC: 43 U/L (ref 34–104)
ALT SERPL W P-5'-P-CCNC: 54 U/L (ref 7–52)
AMPHETAMINES SERPL QL SCN: NEGATIVE
ANION GAP SERPL CALCULATED.3IONS-SCNC: 6 MMOL/L (ref 4–13)
AST SERPL W P-5'-P-CCNC: 59 U/L (ref 13–39)
ATRIAL RATE: 46 BPM
BARBITURATES UR QL: NEGATIVE
BASOPHILS # BLD AUTO: 0.01 THOUSANDS/ÂΜL (ref 0–0.1)
BASOPHILS NFR BLD AUTO: 0 % (ref 0–1)
BENZODIAZ UR QL: NEGATIVE
BILIRUB SERPL-MCNC: 0.73 MG/DL (ref 0.2–1)
BILIRUB UR QL STRIP: NEGATIVE
BUN SERPL-MCNC: 26 MG/DL (ref 5–25)
CALCIUM SERPL-MCNC: 9.3 MG/DL (ref 8.4–10.2)
CHLORIDE SERPL-SCNC: 100 MMOL/L (ref 96–108)
CLARITY UR: CLEAR
CO2 SERPL-SCNC: 26 MMOL/L (ref 21–32)
COCAINE UR QL: POSITIVE
COLOR UR: YELLOW
CREAT SERPL-MCNC: 0.81 MG/DL (ref 0.6–1.3)
EOSINOPHIL # BLD AUTO: 0.04 THOUSAND/ÂΜL (ref 0–0.61)
EOSINOPHIL NFR BLD AUTO: 1 % (ref 0–6)
ERYTHROCYTE [DISTWIDTH] IN BLOOD BY AUTOMATED COUNT: 12.9 % (ref 11.6–15.1)
ETHANOL EXG-MCNC: NORMAL MG/DL
ETHANOL SERPL-MCNC: <10 MG/DL
FENTANYL UR QL SCN: POSITIVE
GFR SERPL CREATININE-BSD FRML MDRD: 96 ML/MIN/1.73SQ M
GLUCOSE SERPL-MCNC: 173 MG/DL (ref 65–140)
GLUCOSE UR STRIP-MCNC: NEGATIVE MG/DL
HCT VFR BLD AUTO: 36.9 % (ref 36.5–49.3)
HGB BLD-MCNC: 12.8 G/DL (ref 12–17)
HGB UR QL STRIP.AUTO: NEGATIVE
HYDROCODONE UR QL SCN: NEGATIVE
IMM GRANULOCYTES # BLD AUTO: 0.04 THOUSAND/UL (ref 0–0.2)
IMM GRANULOCYTES NFR BLD AUTO: 1 % (ref 0–2)
KETONES UR STRIP-MCNC: NEGATIVE MG/DL
LEUKOCYTE ESTERASE UR QL STRIP: NEGATIVE
LYMPHOCYTES # BLD AUTO: 1.67 THOUSANDS/ÂΜL (ref 0.6–4.47)
LYMPHOCYTES NFR BLD AUTO: 24 % (ref 14–44)
MCH RBC QN AUTO: 32.1 PG (ref 26.8–34.3)
MCHC RBC AUTO-ENTMCNC: 34.7 G/DL (ref 31.4–37.4)
MCV RBC AUTO: 93 FL (ref 82–98)
METHADONE UR QL: NEGATIVE
MONOCYTES # BLD AUTO: 0.43 THOUSAND/ÂΜL (ref 0.17–1.22)
MONOCYTES NFR BLD AUTO: 6 % (ref 4–12)
NEUTROPHILS # BLD AUTO: 4.71 THOUSANDS/ÂΜL (ref 1.85–7.62)
NEUTS SEG NFR BLD AUTO: 68 % (ref 43–75)
NITRITE UR QL STRIP: NEGATIVE
NRBC BLD AUTO-RTO: 0 /100 WBCS
OPIATES UR QL SCN: NEGATIVE
OXYCODONE+OXYMORPHONE UR QL SCN: NEGATIVE
P AXIS: 73 DEGREES
PCP UR QL: NEGATIVE
PH UR STRIP.AUTO: 6 [PH]
PLATELET # BLD AUTO: 165 THOUSANDS/UL (ref 149–390)
PMV BLD AUTO: 9.1 FL (ref 8.9–12.7)
POTASSIUM SERPL-SCNC: 4.8 MMOL/L (ref 3.5–5.3)
PR INTERVAL: 188 MS
PROT SERPL-MCNC: 7.3 G/DL (ref 6.4–8.4)
PROT UR STRIP-MCNC: NEGATIVE MG/DL
QRS AXIS: 52 DEGREES
QRSD INTERVAL: 92 MS
QT INTERVAL: 508 MS
QTC INTERVAL: 444 MS
RBC # BLD AUTO: 3.99 MILLION/UL (ref 3.88–5.62)
SODIUM SERPL-SCNC: 132 MMOL/L (ref 135–147)
SP GR UR STRIP.AUTO: 1.01 (ref 1–1.03)
T WAVE AXIS: 55 DEGREES
THC UR QL: POSITIVE
TSH SERPL DL<=0.05 MIU/L-ACNC: 10.69 UIU/ML (ref 0.45–4.5)
UROBILINOGEN UR QL STRIP.AUTO: 0.2 E.U./DL
VENTRICULAR RATE: 46 BPM
WBC # BLD AUTO: 6.9 THOUSAND/UL (ref 4.31–10.16)

## 2025-07-03 PROCEDURE — 96375 TX/PRO/DX INJ NEW DRUG ADDON: CPT

## 2025-07-03 PROCEDURE — 93005 ELECTROCARDIOGRAM TRACING: CPT

## 2025-07-03 PROCEDURE — 96372 THER/PROPH/DIAG INJ SC/IM: CPT

## 2025-07-03 PROCEDURE — 81003 URINALYSIS AUTO W/O SCOPE: CPT | Performed by: STUDENT IN AN ORGANIZED HEALTH CARE EDUCATION/TRAINING PROGRAM

## 2025-07-03 PROCEDURE — 99285 EMERGENCY DEPT VISIT HI MDM: CPT | Performed by: EMERGENCY MEDICINE

## 2025-07-03 PROCEDURE — 93010 ELECTROCARDIOGRAM REPORT: CPT | Performed by: INTERNAL MEDICINE

## 2025-07-03 PROCEDURE — 96374 THER/PROPH/DIAG INJ IV PUSH: CPT

## 2025-07-03 PROCEDURE — 80053 COMPREHEN METABOLIC PANEL: CPT | Performed by: EMERGENCY MEDICINE

## 2025-07-03 PROCEDURE — 36415 COLL VENOUS BLD VENIPUNCTURE: CPT | Performed by: EMERGENCY MEDICINE

## 2025-07-03 PROCEDURE — 99285 EMERGENCY DEPT VISIT HI MDM: CPT

## 2025-07-03 PROCEDURE — 82075 ASSAY OF BREATH ETHANOL: CPT | Performed by: EMERGENCY MEDICINE

## 2025-07-03 PROCEDURE — 84443 ASSAY THYROID STIM HORMONE: CPT | Performed by: EMERGENCY MEDICINE

## 2025-07-03 PROCEDURE — 82077 ASSAY SPEC XCP UR&BREATH IA: CPT | Performed by: EMERGENCY MEDICINE

## 2025-07-03 PROCEDURE — 80307 DRUG TEST PRSMV CHEM ANLYZR: CPT | Performed by: EMERGENCY MEDICINE

## 2025-07-03 PROCEDURE — 85025 COMPLETE CBC W/AUTO DIFF WBC: CPT | Performed by: EMERGENCY MEDICINE

## 2025-07-03 RX ORDER — NICOTINE 21 MG/24HR
21 PATCH, TRANSDERMAL 24 HOURS TRANSDERMAL ONCE
Status: DISCONTINUED | OUTPATIENT
Start: 2025-07-03 | End: 2025-07-03 | Stop reason: HOSPADM

## 2025-07-03 RX ORDER — ACETAMINOPHEN 325 MG/1
975 TABLET ORAL EVERY 6 HOURS PRN
Status: CANCELLED | OUTPATIENT
Start: 2025-07-03

## 2025-07-03 RX ORDER — HALOPERIDOL 5 MG/1
2.5 TABLET ORAL
Status: CANCELLED | OUTPATIENT
Start: 2025-07-03

## 2025-07-03 RX ORDER — LORAZEPAM 1 MG/1
2 TABLET ORAL ONCE
Status: COMPLETED | OUTPATIENT
Start: 2025-07-03 | End: 2025-07-03

## 2025-07-03 RX ORDER — DIPHENHYDRAMINE HYDROCHLORIDE 50 MG/ML
50 INJECTION, SOLUTION INTRAMUSCULAR; INTRAVENOUS EVERY 6 HOURS PRN
Status: DISCONTINUED | OUTPATIENT
Start: 2025-07-03 | End: 2025-07-07 | Stop reason: HOSPADM

## 2025-07-03 RX ORDER — LORAZEPAM 2 MG/ML
1 INJECTION INTRAMUSCULAR
Status: CANCELLED | OUTPATIENT
Start: 2025-07-03

## 2025-07-03 RX ORDER — MAGNESIUM HYDROXIDE/ALUMINUM HYDROXICE/SIMETHICONE 120; 1200; 1200 MG/30ML; MG/30ML; MG/30ML
30 SUSPENSION ORAL EVERY 4 HOURS PRN
Status: DISCONTINUED | OUTPATIENT
Start: 2025-07-03 | End: 2025-07-07 | Stop reason: HOSPADM

## 2025-07-03 RX ORDER — HALOPERIDOL 5 MG/ML
5 INJECTION INTRAMUSCULAR
Status: CANCELLED | OUTPATIENT
Start: 2025-07-03

## 2025-07-03 RX ORDER — RISPERIDONE 1 MG/1
0.5 TABLET ORAL
Status: CANCELLED | OUTPATIENT
Start: 2025-07-03

## 2025-07-03 RX ORDER — HYDROXYZINE HYDROCHLORIDE 25 MG/1
25 TABLET, FILM COATED ORAL
Status: DISCONTINUED | OUTPATIENT
Start: 2025-07-03 | End: 2025-07-07 | Stop reason: HOSPADM

## 2025-07-03 RX ORDER — HALOPERIDOL 5 MG/1
2.5 TABLET ORAL
Status: DISCONTINUED | OUTPATIENT
Start: 2025-07-03 | End: 2025-07-07 | Stop reason: HOSPADM

## 2025-07-03 RX ORDER — TRAZODONE HYDROCHLORIDE 50 MG/1
50 TABLET ORAL
Status: CANCELLED | OUTPATIENT
Start: 2025-07-03

## 2025-07-03 RX ORDER — HALOPERIDOL 1 MG/1
1 TABLET ORAL EVERY 6 HOURS PRN
Status: DISCONTINUED | OUTPATIENT
Start: 2025-07-03 | End: 2025-07-07 | Stop reason: HOSPADM

## 2025-07-03 RX ORDER — IBUPROFEN 800 MG/1
800 TABLET, FILM COATED ORAL EVERY 8 HOURS PRN
Status: DISCONTINUED | OUTPATIENT
Start: 2025-07-03 | End: 2025-07-04

## 2025-07-03 RX ORDER — BENZTROPINE MESYLATE 1 MG/ML
1 INJECTION, SOLUTION INTRAMUSCULAR; INTRAVENOUS
Status: DISCONTINUED | OUTPATIENT
Start: 2025-07-03 | End: 2025-07-07 | Stop reason: HOSPADM

## 2025-07-03 RX ORDER — LORAZEPAM 2 MG/ML
1 INJECTION INTRAMUSCULAR
Status: DISCONTINUED | OUTPATIENT
Start: 2025-07-03 | End: 2025-07-07 | Stop reason: HOSPADM

## 2025-07-03 RX ORDER — OLANZAPINE 10 MG/2ML
10 INJECTION, POWDER, FOR SOLUTION INTRAMUSCULAR ONCE
Status: DISCONTINUED | OUTPATIENT
Start: 2025-07-03 | End: 2025-07-03

## 2025-07-03 RX ORDER — BISACODYL 10 MG
10 SUPPOSITORY, RECTAL RECTAL DAILY PRN
Status: CANCELLED | OUTPATIENT
Start: 2025-07-03

## 2025-07-03 RX ORDER — IBUPROFEN 600 MG/1
600 TABLET, FILM COATED ORAL EVERY 6 HOURS PRN
Status: DISCONTINUED | OUTPATIENT
Start: 2025-07-03 | End: 2025-07-04

## 2025-07-03 RX ORDER — BISACODYL 10 MG
10 SUPPOSITORY, RECTAL RECTAL DAILY PRN
Status: DISCONTINUED | OUTPATIENT
Start: 2025-07-03 | End: 2025-07-07 | Stop reason: HOSPADM

## 2025-07-03 RX ORDER — RISPERIDONE 0.25 MG/1
0.25 TABLET ORAL
Status: CANCELLED | OUTPATIENT
Start: 2025-07-03

## 2025-07-03 RX ORDER — AMOXICILLIN 250 MG
1 CAPSULE ORAL DAILY PRN
Status: CANCELLED | OUTPATIENT
Start: 2025-07-03

## 2025-07-03 RX ORDER — BENZTROPINE MESYLATE 1 MG/ML
1 INJECTION, SOLUTION INTRAMUSCULAR; INTRAVENOUS
Status: CANCELLED | OUTPATIENT
Start: 2025-07-03

## 2025-07-03 RX ORDER — BENZTROPINE MESYLATE 1 MG/1
1 TABLET ORAL
Status: DISCONTINUED | OUTPATIENT
Start: 2025-07-03 | End: 2025-07-07 | Stop reason: HOSPADM

## 2025-07-03 RX ORDER — IBUPROFEN 600 MG/1
600 TABLET, FILM COATED ORAL EVERY 6 HOURS PRN
Status: CANCELLED | OUTPATIENT
Start: 2025-07-03

## 2025-07-03 RX ORDER — DIPHENHYDRAMINE HYDROCHLORIDE 50 MG/ML
50 INJECTION, SOLUTION INTRAMUSCULAR; INTRAVENOUS EVERY 6 HOURS PRN
Status: CANCELLED | OUTPATIENT
Start: 2025-07-03

## 2025-07-03 RX ORDER — HALOPERIDOL 5 MG/ML
5 INJECTION INTRAMUSCULAR
Status: DISCONTINUED | OUTPATIENT
Start: 2025-07-03 | End: 2025-07-07 | Stop reason: HOSPADM

## 2025-07-03 RX ORDER — MAGNESIUM HYDROXIDE/ALUMINUM HYDROXICE/SIMETHICONE 120; 1200; 1200 MG/30ML; MG/30ML; MG/30ML
30 SUSPENSION ORAL EVERY 4 HOURS PRN
Status: CANCELLED | OUTPATIENT
Start: 2025-07-03

## 2025-07-03 RX ORDER — LORAZEPAM 2 MG/ML
2 INJECTION INTRAMUSCULAR EVERY 6 HOURS PRN
Status: DISCONTINUED | OUTPATIENT
Start: 2025-07-03 | End: 2025-07-07 | Stop reason: HOSPADM

## 2025-07-03 RX ORDER — ACETAMINOPHEN 325 MG/1
650 TABLET ORAL EVERY 4 HOURS PRN
Status: CANCELLED | OUTPATIENT
Start: 2025-07-03

## 2025-07-03 RX ORDER — TRAZODONE HYDROCHLORIDE 50 MG/1
50 TABLET ORAL
Status: DISCONTINUED | OUTPATIENT
Start: 2025-07-03 | End: 2025-07-07 | Stop reason: HOSPADM

## 2025-07-03 RX ORDER — BENZTROPINE MESYLATE 1 MG/ML
0.5 INJECTION, SOLUTION INTRAMUSCULAR; INTRAVENOUS
Status: CANCELLED | OUTPATIENT
Start: 2025-07-03

## 2025-07-03 RX ORDER — HYDROXYZINE HYDROCHLORIDE 50 MG/1
100 TABLET, FILM COATED ORAL
Status: DISCONTINUED | OUTPATIENT
Start: 2025-07-03 | End: 2025-07-07 | Stop reason: HOSPADM

## 2025-07-03 RX ORDER — OLANZAPINE 10 MG/1
10 TABLET, FILM COATED ORAL ONCE
Status: DISCONTINUED | OUTPATIENT
Start: 2025-07-03 | End: 2025-07-03

## 2025-07-03 RX ORDER — AMOXICILLIN 250 MG
1 CAPSULE ORAL DAILY PRN
Status: DISCONTINUED | OUTPATIENT
Start: 2025-07-03 | End: 2025-07-07 | Stop reason: HOSPADM

## 2025-07-03 RX ORDER — POLYETHYLENE GLYCOL 3350 17 G/17G
17 POWDER, FOR SOLUTION ORAL DAILY PRN
Status: CANCELLED | OUTPATIENT
Start: 2025-07-03

## 2025-07-03 RX ORDER — BENZTROPINE MESYLATE 1 MG/ML
0.5 INJECTION, SOLUTION INTRAMUSCULAR; INTRAVENOUS
Status: DISCONTINUED | OUTPATIENT
Start: 2025-07-03 | End: 2025-07-07 | Stop reason: HOSPADM

## 2025-07-03 RX ORDER — HALOPERIDOL 5 MG/ML
2.5 INJECTION INTRAMUSCULAR
Status: CANCELLED | OUTPATIENT
Start: 2025-07-03

## 2025-07-03 RX ORDER — LORAZEPAM 2 MG/ML
2 INJECTION INTRAMUSCULAR EVERY 6 HOURS PRN
Status: CANCELLED | OUTPATIENT
Start: 2025-07-03

## 2025-07-03 RX ORDER — PROPRANOLOL HCL 20 MG
10 TABLET ORAL EVERY 8 HOURS PRN
Status: CANCELLED | OUTPATIENT
Start: 2025-07-03

## 2025-07-03 RX ORDER — HYDROXYZINE HYDROCHLORIDE 25 MG/1
25 TABLET, FILM COATED ORAL
Status: CANCELLED | OUTPATIENT
Start: 2025-07-03

## 2025-07-03 RX ORDER — HYDROXYZINE HYDROCHLORIDE 25 MG/1
50 TABLET, FILM COATED ORAL
Status: CANCELLED | OUTPATIENT
Start: 2025-07-03

## 2025-07-03 RX ORDER — POLYETHYLENE GLYCOL 3350 17 G/17G
17 POWDER, FOR SOLUTION ORAL DAILY PRN
Status: DISCONTINUED | OUTPATIENT
Start: 2025-07-03 | End: 2025-07-07 | Stop reason: HOSPADM

## 2025-07-03 RX ORDER — LORAZEPAM 2 MG/ML
2 INJECTION INTRAMUSCULAR
Status: CANCELLED | OUTPATIENT
Start: 2025-07-03

## 2025-07-03 RX ORDER — HALOPERIDOL 5 MG/1
5 TABLET ORAL
Status: CANCELLED | OUTPATIENT
Start: 2025-07-03

## 2025-07-03 RX ORDER — RISPERIDONE 1 MG/1
1 TABLET ORAL
Status: CANCELLED | OUTPATIENT
Start: 2025-07-03

## 2025-07-03 RX ORDER — IBUPROFEN 400 MG/1
400 TABLET, FILM COATED ORAL EVERY 4 HOURS PRN
Status: CANCELLED | OUTPATIENT
Start: 2025-07-03

## 2025-07-03 RX ORDER — HYDROXYZINE HYDROCHLORIDE 50 MG/1
50 TABLET, FILM COATED ORAL
Status: DISCONTINUED | OUTPATIENT
Start: 2025-07-03 | End: 2025-07-07 | Stop reason: HOSPADM

## 2025-07-03 RX ORDER — BENZTROPINE MESYLATE 1 MG/1
1 TABLET ORAL
Status: CANCELLED | OUTPATIENT
Start: 2025-07-03

## 2025-07-03 RX ORDER — NALOXONE HYDROCHLORIDE 0.4 MG/ML
0.1 INJECTION, SOLUTION INTRAMUSCULAR; INTRAVENOUS; SUBCUTANEOUS ONCE
Status: COMPLETED | OUTPATIENT
Start: 2025-07-03 | End: 2025-07-03

## 2025-07-03 RX ORDER — PROPRANOLOL HYDROCHLORIDE 10 MG/1
10 TABLET ORAL EVERY 8 HOURS PRN
Status: DISCONTINUED | OUTPATIENT
Start: 2025-07-03 | End: 2025-07-07 | Stop reason: HOSPADM

## 2025-07-03 RX ORDER — HALOPERIDOL 5 MG/1
5 TABLET ORAL
Status: DISCONTINUED | OUTPATIENT
Start: 2025-07-03 | End: 2025-07-07 | Stop reason: HOSPADM

## 2025-07-03 RX ORDER — HALOPERIDOL 1 MG/1
1 TABLET ORAL EVERY 6 HOURS PRN
Status: CANCELLED | OUTPATIENT
Start: 2025-07-03

## 2025-07-03 RX ORDER — LORAZEPAM 2 MG/ML
2 INJECTION INTRAMUSCULAR
Status: DISCONTINUED | OUTPATIENT
Start: 2025-07-03 | End: 2025-07-07 | Stop reason: HOSPADM

## 2025-07-03 RX ORDER — ONDANSETRON 2 MG/ML
4 INJECTION INTRAMUSCULAR; INTRAVENOUS ONCE
Status: COMPLETED | OUTPATIENT
Start: 2025-07-03 | End: 2025-07-03

## 2025-07-03 RX ORDER — OLANZAPINE 10 MG/2ML
10 INJECTION, POWDER, FOR SOLUTION INTRAMUSCULAR ONCE
Status: COMPLETED | OUTPATIENT
Start: 2025-07-03 | End: 2025-07-03

## 2025-07-03 RX ORDER — HYDROXYZINE HYDROCHLORIDE 25 MG/1
100 TABLET, FILM COATED ORAL
Status: CANCELLED | OUTPATIENT
Start: 2025-07-03

## 2025-07-03 RX ORDER — HALOPERIDOL 5 MG/ML
2.5 INJECTION INTRAMUSCULAR
Status: DISCONTINUED | OUTPATIENT
Start: 2025-07-03 | End: 2025-07-07 | Stop reason: HOSPADM

## 2025-07-03 RX ORDER — IBUPROFEN 400 MG/1
800 TABLET, FILM COATED ORAL EVERY 8 HOURS PRN
Status: CANCELLED | OUTPATIENT
Start: 2025-07-03

## 2025-07-03 RX ORDER — IBUPROFEN 400 MG/1
400 TABLET, FILM COATED ORAL EVERY 4 HOURS PRN
Status: DISCONTINUED | OUTPATIENT
Start: 2025-07-03 | End: 2025-07-04

## 2025-07-03 RX ORDER — PROPRANOLOL HYDROCHLORIDE 10 MG/1
5 TABLET ORAL EVERY 8 HOURS PRN
Status: CANCELLED | OUTPATIENT
Start: 2025-07-03

## 2025-07-03 RX ADMIN — TRAZODONE HYDROCHLORIDE 50 MG: 50 TABLET ORAL at 23:10

## 2025-07-03 RX ADMIN — ONDANSETRON 4 MG: 2 INJECTION INTRAMUSCULAR; INTRAVENOUS at 07:38

## 2025-07-03 RX ADMIN — Medication 3 MG: at 23:10

## 2025-07-03 RX ADMIN — NICOTINE 21 MG: 21 PATCH, EXTENDED RELEASE TRANSDERMAL at 11:15

## 2025-07-03 RX ADMIN — OLANZAPINE 10 MG: 10 INJECTION, POWDER, FOR SOLUTION INTRAMUSCULAR at 11:44

## 2025-07-03 RX ADMIN — NALOXONE HYDROCHLORIDE 0.1 MG: 0.4 INJECTION, SOLUTION INTRAMUSCULAR; INTRAVENOUS; SUBCUTANEOUS at 07:35

## 2025-07-03 RX ADMIN — LORAZEPAM 2 MG: 1 TABLET ORAL at 11:15

## 2025-07-03 RX ADMIN — NALOXONE HYDROCHLORIDE 0.1 MG: 0.4 INJECTION, SOLUTION INTRAMUSCULAR; INTRAVENOUS; SUBCUTANEOUS at 07:55

## 2025-07-03 NOTE — ED NOTES
Patient continues to have periods of bradycardia as low as 33 and episodes of apnea. Provider aware, ordered additional narcan.      Breann Centeno RN  07/03/25 [id number]

## 2025-07-03 NOTE — ED NOTES
[place] PROMISe indicates:  Eligible.  Recipient #[id number]   Plan is FFS.    Availity indicates:  Primary payor is [facility] managed by [employer].

## 2025-07-03 NOTE — ED NOTES
Insurance Authorization for admission:   Phone call placed to [facility].  Phone number: **.     Spoke to **.     ** days approved.  Level of care: 302.  Review on **.   Authorization # **.          Request sent via portal/ [employer].      COB w/[employer]ic[employer]- authorization not required.

## 2025-07-03 NOTE — ED NOTES
Attempted to complete psychiatric assessment,  however patient was unable to remain awake for assessment.     Will attempt assessment next shift.

## 2025-07-03 NOTE — ED NOTES
Patient is accepted at [facility].  Patient is accepted by Dr. Elisa Kidd per Intake/Prasad.     Transportation is arranged with [facility].     Transportation is scheduled for pending.   Patient may go to the floor at anytime after 2000.          Nurse report is to be called to [phone] prior to patient transfer.

## 2025-07-03 NOTE — ED NOTES
Patient fed.- states he does not eat beef and threw his tray to the side stating he doesn't want it. Then stated he was hungry and pulled the tray back and started eating.       Angel Canchola RN  07/03/25 [id number]     You can access the FollowMyHealth Patient Portal offered by [facility] by registering at the following website: [email or web address]. By joining [email or web address]’s FollowMyHealth portal, you will also be able to view your health information using other applications (apps) compatible with our system.

## 2025-07-03 NOTE — ED NOTES
Patient requesting to be transferred to [facility] because that is where he was prior.      Angel Canchola RN  07/03/25 8720

## 2025-07-03 NOTE — ED NOTES
Patient acting erratic, asking to leave AMA. Wife at bedside with provider. Wife wishing to petition 302. Call placed to [facility] to facilitate the process.      Breann Centeno RN  07/03/25 1127

## 2025-07-03 NOTE — ED CARE HANDOFF
Emergency Department Sign Out Note        Sign out and transfer of care from Dr. Moulton. See Separate Emergency Department note.     The patient, Portillo Flaherty, was evaluated by the previous provider for overdose, polysubstance abuse.    Workup Completed:  Medically cleared. 302 petitioned.     ED Course / Workup Pending (followup):  60 M. Hx of polysubstance abuse. Required IV Narcan. Medically cleared. 302 petitioned by wife. Upheld by Dr. Moulton. Bed search pending.     Transfer accepted by Dr. Kidd at [facility].    No data recorded       Procedures  Medical Decision Making  Amount and/or Complexity of Data Reviewed  Labs: ordered.    Risk  OTC drugs.  Prescription drug management.  Decision regarding hospitalization.      Disposition  Final diagnoses:   Polysubstance abuse (HCC)     Time reflects when diagnosis was documented in both MDM as applicable and the Disposition within this note       Time User Action Codes Description Comment    7/3/2025  6:28 PM Elisa Kidd Add [F98.8] Attention deficit disorder (ADD) in adult     7/3/2025  7:06 PM Tony Squires Add [F19.10] Polysubstance abuse (HCC)           ED Disposition       ED Disposition   Transfer to Behavioral Health Condition   --    Date/Time   Thu Jul 3, 2025  7:06 PM    Comment   Portillo Flaherty should be transferred out to \[facility]\"" and has been medically cleared.                MD Documentation      Flowsheet Row Most Recent Value   Patient Condition The patient has been stabilized such that within reasonable medical probability, no material deterioration of the patient condition or the condition of the unborn child(dann) is likely to result from the transfer   Reason for Transfer Level of Care needed not available at this facility   Benefits of Transfer Specialized equipment and/or services available at the receiving facility (Include comment)________________________   Risks of Transfer Potential for delay in receiving treatment    Accepting Physician Bernabe   Accepting Facility Name, [facility]   Sending MD Squires          RN Documentation      Flowsheet Row Most Recent Value   Accepting Facility Name, [facility] [address]   Report Given to José Miguel Moralez RN EMS          Follow-up Information    None       Discharge Medication List as of 7/3/2025  8:15 PM        CONTINUE these medications which have NOT CHANGED    Details   B Complex-C (B-complex with vitamin C) tablet Take 1 tablet by mouth in the morning., Historical Med      lisinopril (ZESTRIL) 10 mg tablet Take 1 tablet (10 mg total) by mouth daily, Starting Fri 6/27/2025, Normal      Magnesium 500 MG CAPS 1 po qd, No Print      melatonin 3 mg Take 1 tablet (3 mg total) by mouth daily at bedtime, Starting Thu 6/26/2025, Normal      Omega-3 Fatty Acids (fish oil) 1,000 mg Take 1,000 mg by mouth in the morning., Historical Med      OLANZapine (ZyPREXA) 10 mg tablet Take 1 tablet (10 mg total) by mouth daily at bedtime, Starting Mon 6/30/2025, Normal           No discharge procedures on file.       ED Provider  Electronically Signed by     Tony Squires DO  07/03/25 7420

## 2025-07-03 NOTE — ED PROVIDER NOTES
Time reflects when diagnosis was documented in both MDM as applicable and the Disposition within this note       Time User Action Codes Description Comment    7/3/2025  6:28 PM Elisa Kidd Add [F98.8] Attention deficit disorder (ADD) in adult     7/3/2025  7:06 PM Tony Squires Add [F19.10] Polysubstance abuse (HCC)     7/4/2025  6:05 AM Kenney Daniel Add [F31.2] Bipolar affective disorder, currently manic, severe, with psychotic features (HCC)           ED Disposition       ED Disposition   Transfer to Behavioral Health Condition   --    Date/Time   Thu Jul 3, 2025  7:06 PM    Comment   Portillo Flaherty should be transferred out to [facility] and has been medically cleared.                Assessment & Plan       Medical Decision Making  Patient was seen and evaluated.  Presents with decreased mental status after overdose of unclear intent.  At risk for toxidromes, airway compromise, acute neurologic process, systemic infection, organ dysfunction, electrolyte disturbance, cardiac dysrhythmia, other.  Patient initially very somnolent, requiring frequent stimulation to arouse, episodes of bradycardia.  Required Narcan 0.4 mg twice.  Eventually improved without need for additional intervention.  With patient more awake, became apparent that patient was exhibiting manic features, will Incorporated by patient's wife who came to the ED.  See ED course notes.  Placed under 302 involuntary hold.  Medically cleared.  Care turned over to Dr. Squires pending inpatient psychiatric placement.    Amount and/or Complexity of Data Reviewed  Labs: ordered.  ECG/medicine tests: ordered and independent interpretation performed.     Details: EKG per my interpretation demonstrates sinus bradycardia with occasional PVCs and a ventricular to 46 bpm.  Normal axis, normal intervals.  No acute ST elevations or T wave inversions.  Compared with prior EKG from August 25, 2022, ventricular rate has decreased by 23 bpm.    Risk  OTC  drugs.  Prescription drug management.  Decision regarding hospitalization.        ED Course as of 07/04/25 0608   Thu Jul 03, 2025   0731 States used heroin, cocaine, ketamine, acid, marijuana, alcohol; used around 0100.  Presenting with episodes of somnolence, bradycardia.   1111 Patient's wife expressed concern regarding patient's suicidal comments, erratic behavior.  Was hospitalized recently under 201, released with little improvement.  She is concerned about his safety.  Patient is very disorganized, restless, requiring frequent redirection, anxious.   1113 Patient is medically clear at this time.   1118 Patient reportedly took a gummy that he had had in his pocket, states God told him to take it.       Medications   naloxone (NARCAN) injection 0.1 mg (0.1 mg Intravenous Given 7/3/25 0735)   ondansetron (ZOFRAN) injection 4 mg (4 mg Intravenous Given 7/3/25 0738)   naloxone (NARCAN) injection 0.1 mg (0.1 mg Intravenous Given 7/3/25 0755)   LORazepam (ATIVAN) tablet 2 mg (2 mg Oral Given 7/3/25 1115)   OLANZapine (ZyPREXA) IM injection 10 mg (10 mg Intramuscular Given 7/3/25 1144)       ED Risk Strat Scores                    No data recorded        SBIRT 20yo+      Flowsheet Row Most Recent Value   Initial Alcohol Screen: US AUDIT-C     1. How often do you have a drink containing alcohol? 0 Filed at: 07/03/2025 1200   2. How many drinks containing alcohol do you have on a typical day you are drinking?  0 Filed at: 07/03/2025 1200   3b. FEMALE Any Age, or MALE 65+: How often do you have 4 or more drinks on one occassion? 0 Filed at: 07/03/2025 1724   Audit-C Score 0 Filed at: 07/03/2025 1724   KAROLINA: How many times in the past year have you...    Used an illegal drug or used a prescription medication for non-medical reasons? Weekly Filed at: 07/03/2025 1200   DAST-10: In the past 12 months...    1. Have you used drugs other than those required for medical reasons? 1 Filed at: 07/03/2025 1200   2. Do you use more  "than one drug at a time? 1 Filed at: 07/03/2025 1200   3. Have you had medical problems as a result of your drug use (e.g., memory loss, hepatitis, convulsions, bleeding, etc.)? 1 Filed at: 07/03/2025 1200   4. Have you had \"blackouts\" or \"flashbacks\" as a result of drug use?YesNo 1 Filed at: 07/03/2025 1200   5. Do you ever feel bad or guilty about your drug use? 0 Filed at: 07/03/2025 1200   6. Does your spouse (or parent) ever complain about your involvement with drugs? 1 Filed at: 07/03/2025 1200   7. Have you neglected your family because of your use of drugs? 1 Filed at: 07/03/2025 1200   8. Have you engaged in illegal activities in order to obtain drugs? 0 Filed at: 07/03/2025 1200   9. Have you ever experienced withdrawal symptoms (felt sick) when you stopped taking drugs? 1 Filed at: 07/03/2025 1200   10. Are you always able to stop using drugs when you want to? 1 Filed at: 07/03/2025 1200   DAST-10 Score 8 Filed at: 07/03/2025 1200                            History of Present Illness       Chief Complaint   Patient presents with    Altered Mental Status     Heroine and cocaine use per EMS. Patient altered upon arrival. States he did 6 lines of cocaine yesterday.       Past Medical History[1]   Past Surgical History[2]   Family History[3]   Social History[4]   E-Cigarette/Vaping    E-Cigarette Use Never User       E-Cigarette/Vaping Substances    Nicotine No     THC No     CBD No     Flavoring No     Other No     Unknown No       I have reviewed and agree with the history as documented.     Patient presents to the emergency department via EMS from home after his wife called after she found the patient laying on the floor after he yelled out to her.  Patient reportedly used multiple substances including cocaine, marijuana, heroin, LSD.  Patient arousable to loud voice, but recurrently somnolent, episodes of bradycardia on evaluation.  Offers disorganized and tangential responses to questioning, unclear " intent of overdose.  Additional history review of systems limited due to acuity of condition, mental status.        Review of Systems   Unable to perform ROS: Other (acutity, altered mental status, psychiatric condition)           Objective       ED Triage Vitals [07/03/25 0717]   Temperature Pulse Blood Pressure Respirations SpO2 Patient Position - Orthostatic VS   (!) 97 °F (36.1 °C) (!) 50 (!) 191/104 19 98 % Lying      Temp Source Heart Rate Source BP Location FiO2 (%) Pain Score    Temporal Monitor Right arm -- --      Vitals      Date and Time Temp Pulse SpO2 Resp BP Pain Score FACES Pain Rating User   07/03/25 1045 -- 48 99 % 12 110/63 -- -- MB   07/03/25 1015 -- 52 99 % 11 108/71 -- -- MB   07/03/25 0930 -- 53 99 % 12 103/66 -- -- MB   07/03/25 0800 -- 41 93 % 8 139/102 -- -- MB   07/03/25 0745 -- 54 95 % 22 176/113 -- -- MB   07/03/25 0717 97 °F (36.1 °C) 50 98 % 19 191/104 -- -- PK            Physical Exam  Vitals and nursing note reviewed.   Constitutional:       General: He is not in acute distress.     Appearance: He is not ill-appearing or toxic-appearing.      Comments: Somnolent but arousable.   HENT:      Head: Normocephalic and atraumatic.     Eyes:      Conjunctiva/sclera: Conjunctivae normal.       Cardiovascular:      Rate and Rhythm: Regular rhythm. Bradycardia present.      Heart sounds: Normal heart sounds. No murmur heard.     No friction rub. No gallop.   Pulmonary:      Effort: Pulmonary effort is normal. No respiratory distress.      Breath sounds: Normal breath sounds. No wheezing, rhonchi or rales.   Abdominal:      General: There is no distension.      Palpations: Abdomen is soft.      Tenderness: There is no abdominal tenderness. There is no guarding.     Musculoskeletal:         General: No swelling. Normal range of motion.      Cervical back: Normal range of motion and neck supple.     Skin:     General: Skin is warm and dry.      Capillary Refill: Capillary refill takes less than 2  seconds.     Neurological:      Mental Status: He is lethargic.     Psychiatric:         Attention and Perception: He is inattentive.         Speech: Speech is tangential.         Behavior: Behavior is uncooperative, agitated and hyperactive.         Thought Content: Thought content is paranoid. Thought content includes suicidal ideation.         Judgment: Judgment is impulsive and inappropriate.         Results Reviewed       Procedure Component Value Units Date/Time    UA w Reflex to Microscopic w Reflex to Culture [[id number]] Collected: 07/03/25 1137    Lab Status: Final result Specimen: Urine Updated: 07/03/25 1840     Color, UA Yellow     Clarity, UA Clear     Specific Gravity, UA 1.015     pH, UA 6.0     Leukocytes, UA Negative     Nitrite, UA Negative     Protein, UA Negative mg/dl      Glucose, UA Negative mg/dl      Ketones, UA Negative mg/dl      Urobilinogen, UA 0.2 E.U./dl      Bilirubin, UA Negative     Occult Blood, UA Negative    Rapid drug screen, urine [[id number]]  (Abnormal) Collected: 07/03/25 1137    Lab Status: Final result Specimen: Urine Updated: 07/03/25 1214     Amph/Meth UR Negative     Barbiturate Ur Negative     Benzodiazepine Urine Negative     Cocaine Urine Positive     Methadone Urine Negative     Opiate Urine Negative     PCP Ur Negative     THC Urine Positive     Oxycodone Urine Negative     Fentanyl Urine Positive     HYDROCODONE URINE Negative    Narrative:      Presumptive report. If requested, specimen will be sent to reference lab for confirmation.  FOR MEDICAL PURPOSES ONLY.   IF CONFIRMATION NEEDED PLEASE CONTACT THE LAB WITHIN 5 DAYS.    Drug Screen Cutoff Levels:  AMPHETAMINE/METHAMPHETAMINES  1000 ng/mL  BARBITURATES     200 ng/mL  BENZODIAZEPINES     200 ng/mL  COCAINE      300 ng/mL  METHADONE      300 ng/mL  OPIATES      300 ng/mL  PHENCYCLIDINE     25 ng/mL  THC       50 ng/mL  OXYCODONE      100 ng/mL  FENTANYL      5 ng/mL  HYDROCODONE     300 ng/mL    TSH [[id number]]   (Abnormal) Collected: 07/03/25 0736    Lab Status: Final result Specimen: Blood from Arm, Right Updated: 07/03/25 0812     TSH 3RD GENERATION 10.689 uIU/mL     Comprehensive metabolic panel [[id number]]  (Abnormal) Collected: 07/03/25 0736    Lab Status: Final result Specimen: Blood from Arm, Right Updated: 07/03/25 0800     Sodium 132 mmol/L      Potassium 4.8 mmol/L      Chloride 100 mmol/L      CO2 26 mmol/L      ANION GAP 6 mmol/L      BUN 26 mg/dL      Creatinine 0.81 mg/dL      Glucose 173 mg/dL      Calcium 9.3 mg/dL      AST 59 U/L      ALT 54 U/L      Alkaline Phosphatase 43 U/L      Total Protein 7.3 g/dL      Albumin 4.5 g/dL      Total Bilirubin 0.73 mg/dL      eGFR 96 ml/min/1.73sq m     Narrative:      National Kidney Disease Foundation guidelines for Chronic Kidney Disease (CKD):     Stage 1 with normal or high GFR (GFR > 90 mL/min/1.73 square meters)    Stage 2 Mild CKD (GFR = 60-89 mL/min/1.73 square meters)    Stage 3A Moderate CKD (GFR = 45-59 mL/min/1.73 square meters)    Stage 3B Moderate CKD (GFR = 30-44 mL/min/1.73 square meters)    Stage 4 Severe CKD (GFR = 15-29 mL/min/1.73 square meters)    Stage 5 End Stage CKD (GFR <15 mL/min/1.73 square meters)  Note: GFR calculation is accurate only with a steady state creatinine    Ethanol [[id number]]  (Normal) Collected: 07/03/25 0736    Lab Status: Final result Specimen: Blood from Arm, Right Updated: 07/03/25 0800     Ethanol Lvl <10 mg/dL     CBC and differential [[id number]] Collected: 07/03/25 0736    Lab Status: Final result Specimen: Blood from Arm, Right Updated: 07/03/25 0742     WBC 6.90 Thousand/uL      RBC 3.99 Million/uL      Hemoglobin 12.8 g/dL      Hematocrit 36.9 %      MCV 93 fL      MCH 32.1 pg      MCHC 34.7 g/dL      RDW 12.9 %      MPV 9.1 fL      Platelets 165 Thousands/uL      nRBC 0 /100 WBCs      Segmented % 68 %      Immature Grans % 1 %      Lymphocytes % 24 %      Monocytes % 6 %      Eosinophils Relative 1 %      Basophils  Relative 0 %      Absolute Neutrophils 4.71 Thousands/µL      Absolute Immature Grans 0.04 Thousand/uL      Absolute Lymphocytes 1.67 Thousands/µL      Absolute Monocytes 0.43 Thousand/µL      Eosinophils Absolute 0.04 Thousand/µL      Basophils Absolute 0.01 Thousands/µL     POCT alcohol breath test [[id number]]  (Normal) Collected: 25    Lab Status: Final result Updated: 25     EXTBreath Alcohol 0.0%            No orders to display       Procedures    ED Medication and Procedure Management   Prior to Admission Medications   Prescriptions Last Dose Informant Patient Reported? Taking?   B Complex-C (B-complex with vitamin C) tablet  Self Yes No   Sig: Take 1 tablet by mouth in the morning.   Patient not taking: Reported on 7/3/2025   Magnesium 500 MG CAPS  Self No No   Si po qd   Patient not taking: Reported on 7/3/2025   OLANZapine (ZyPREXA) 10 mg tablet   No No   Sig: Take 1 tablet (10 mg total) by mouth daily at bedtime   Patient not taking: Reported on 7/3/2025   Omega-3 Fatty Acids (fish oil) 1,000 mg  Self Yes No   Sig: Take 1,000 mg by mouth in the morning.   Patient not taking: Reported on 7/3/2025   lisinopril (ZESTRIL) 10 mg tablet   No No   Sig: Take 1 tablet (10 mg total) by mouth daily   melatonin 3 mg   No No   Sig: Take 1 tablet (3 mg total) by mouth daily at bedtime   Patient not taking: Reported on 7/3/2025      Facility-Administered Medications: None     Discharge Medication List as of 7/3/2025  8:15 PM        CONTINUE these medications which have NOT CHANGED    Details   B Complex-C (B-complex with vitamin C) tablet Take 1 tablet by mouth in the morning., Historical Med      lisinopril (ZESTRIL) 10 mg tablet Take 1 tablet (10 mg total) by mouth daily, Starting 2025, Normal      Magnesium 500 MG CAPS 1 po qd, No Print      melatonin 3 mg Take 1 tablet (3 mg total) by mouth daily at bedtime, Starting Thu 2025, Normal      OLANZapine (ZyPREXA) 10 mg tablet Take  1 tablet (10 mg total) by mouth daily at bedtime, Starting Mon 6/30/2025, Normal      Omega-3 Fatty Acids (fish oil) 1,000 mg Take 1,000 mg by mouth in the morning., Historical Med           No discharge procedures on file.  ED SEPSIS DOCUMENTATION   Time reflects when diagnosis was documented in both MDM as applicable and the Disposition within this note       Time User Action Codes Description Comment    7/3/2025  6:28 PM Elisa Kidd Add [F98.8] Attention deficit disorder (ADD) in adult     7/3/2025  7:06 PM Tony Squires Add [F19.10] Polysubstance abuse (HCC)     7/4/2025  6:05 AM Daniel Moulton Add [F31.2] Bipolar affective disorder, currently manic, severe, with psychotic features (HCC)                    [1]   Past Medical History:  Diagnosis Date    DJD (degenerative joint disease)     Dyslipidemia     History of hepatitis C     Hypertension     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HCC)     Toxoplasmosis chorioretinitis of left eye    [2]   Past Surgical History:  Procedure Laterality Date    CARPAL TUNNEL RELEASE Left     CATARACT EXTRACTION Left     COLONOSCOPY      HERNIA REPAIR      PLANTAR FASCIA SURGERY Left    [3]   Family History  Problem Relation Name Age of Onset    Lung cancer Mother      Kidney disease Mother          drug induced    COPD Mother      Dementia Father     [4]   Social History  Tobacco Use    Smoking status: Every Day     Types: Cigarettes     Start date: 01/2010     Passive exposure: Never    Smokeless tobacco: Never    Tobacco comments:     Uninterested in quitting.   Vaping Use    Vaping status: Never Used   Substance Use Topics    Alcohol use: Not Currently     Comment: Reports no alcohol consumption since 2006.    Drug use: Yes     Types: Marijuana, Cocaine, Heroin, Fentanyl        Daniel Moulton MD  07/04/25 0608

## 2025-07-03 NOTE — EMTALA/ACUTE CARE TRANSFER
[facility] EMERGENCY DEPARTMENT  [address]  [facility] [address]  Dept: [phone]      EMTALA TRANSFER CONSENT    NAME Portillo Flaherty                                         1965                              MRN [id number]    I have been informed of my rights regarding examination, treatment, and transfer   by Dr. Elisa Kidd MD    Benefits: Specialized equipment and/or services available at the receiving facility (Include comment)________________________    Risks: Potential for delay in receiving treatment      Consent for Transfer:  I acknowledge that my medical condition has been evaluated and explained to me by the emergency department physician or other qualified medical person and/or my attending physician, who has recommended that I be transferred to the service of  Accepting Physician: Dr. Kidd at Accepting Facility Name, City & State : [facility]. The above potential benefits of such transfer, the potential risks associated with such transfer, and the probable risks of not being transferred have been explained to me, and I fully understand them.  The doctor has explained that, in my case, the benefits of transfer outweigh the risks.  I agree to be transferred.    I authorize the performance of emergency medical procedures and treatments upon me in both transit and upon arrival at the receiving facility.  Additionally, I authorize the release of any and all medical records to the receiving facility and request they be transported with me, if possible.  I understand that the safest mode of transportation during a medical emergency is an ambulance and that the Hospital advocates the use of this mode of transport. Risks of traveling to the receiving facility by car, including absence of medical control, life sustaining equipment, such as oxygen, and medical personnel has been explained to me and I fully understand them.    (ROSA MARIA CORRECT BOX BELOW)  [  ]  I  consent to the stated transfer and to be transported by ambulance/helicopter.  [  ]  I consent to the stated transfer, but refuse transportation by ambulance and accept full responsibility for my transportation by car.  I understand the risks of non-ambulance transfers and I exonerate the Hospital and its staff from any deterioration in my condition that results from this refusal.    X___________________________________________    DATE  25  TIME________  Signature of patient or legally responsible individual signing on patient behalf           RELATIONSHIP TO PATIENT_________________________          Provider Certification    NAME Portillo Flaherty                                         1965                              MRN [id number]    A medical screening exam was performed on the above named patient.  Based on the examination:    Condition Necessitating Transfer The primary encounter diagnosis was Attention deficit disorder (ADD) in adult. A diagnosis of Polysubstance abuse (HCC) was also pertinent to this visit.    Patient Condition: The patient has been stabilized such that within reasonable medical probability, no material deterioration of the patient condition or the condition of the unborn child(dann) is likely to result from the transfer    Reason for Transfer: Level of Care needed not available at this facility    Transfer Requirements: Facility [facility]   Space available and qualified personnel available for treatment as acknowledged by    Agreed to accept transfer and to provide appropriate medical treatment as acknowledged by       Dr. Kidd  Appropriate medical records of the examination and treatment of the patient are provided at the time of transfer   STAFF INITIAL WHEN COMPLETED _______  Transfer will be performed by qualified personnel from    and appropriate transfer equipment as required, including the use of necessary and appropriate life support measures.    Provider  Certification: I have examined the patient and explained the following risks and benefits of being transferred/refusing transfer to the patient/family:         Based on these reasonable risks and benefits to the patient and/or the unborn child(dann), and based upon the information available at the time of the patient’s examination, I certify that the medical benefits reasonably to be expected from the provision of appropriate medical treatments at another medical facility outweigh the increasing risks, if any, to the individual’s medical condition, and in the case of labor to the unborn child, from effecting the transfer.    X____________________________________________ DATE 07/03/25        TIME_______      ORIGINAL - SEND TO MEDICAL RECORDS   COPY - SEND WITH PATIENT DURING TRANSFER

## 2025-07-03 NOTE — ED NOTES
Portillo Flaherty is a 59 y/o male, diagnosed with Bipolar disorder, curr episode manic w/o psychotic features, moderate F31.12, Substance Abuse history who presents to ED due to:  Heroine and cocaine use per EMS. Patient altered upon arrival. States he did 6 lines of cocaine yesterday.   Crisis Intake Assessment completed virtually by this writer, located at [facility].    Patient is currently located at [facility].    Patient provided verbal consent to virtually participate in this crisis intake assessment.    Dr. Moulton also provided agreement for virtual assessment to take place at this time.     Pt oriented to self and place. Pt presents with no eye contact. Pt appears very tired, sleepy. This is a second attempt to talk to the pt.  Pt was recently discharged from [facility] on 6/27/2025.   Pt informed about the 302 and its content. Petition states pt made homicidal threats to neighbor, about killing his wife with an ax. Petition also states that pt made statement of going home and blowing his brains out. Pt appears to be defensive, blaming others for his actions, stating that every one lies.  Pt denies making statement about wanting to kill his wife, but then states that he worded differently. He states that he said, that would take him long time to do it. Pt denies making suicidal ideations. Pt denies any self harming behavior. Pt denies any hallucinations. Pt states he is not taking his medications, as he did not get them from pharmacy. Pt reports no feeling safe, but was unable to elaborate. Pt does not feel he needs treatment at this time.  302 uphold by dr. Moulton. Pt informed.

## 2025-07-03 NOTE — ED NOTES
Belongings given to wife. Patient has bible at bedside.      Berann Centeno, JAKI  07/03/25 7556

## 2025-07-04 PROBLEM — F14.10 COCAINE ABUSE (HCC): Status: ACTIVE | Noted: 2025-07-04

## 2025-07-04 PROBLEM — Z00.8 MEDICAL CLEARANCE FOR PSYCHIATRIC ADMISSION: Status: ACTIVE | Noted: 2024-01-10

## 2025-07-04 PROBLEM — F19.959 DRUG-INDUCED PSYCHOTIC DISORDER (HCC): Status: ACTIVE | Noted: 2025-07-04

## 2025-07-04 PROBLEM — F11.10 OPIOID ABUSE (HCC): Status: ACTIVE | Noted: 2025-07-04

## 2025-07-04 PROCEDURE — 99223 1ST HOSP IP/OBS HIGH 75: CPT | Performed by: PSYCHIATRY & NEUROLOGY

## 2025-07-04 PROCEDURE — 86780 TREPONEMA PALLIDUM: CPT | Performed by: PSYCHIATRY & NEUROLOGY

## 2025-07-04 PROCEDURE — 99253 IP/OBS CNSLTJ NEW/EST LOW 45: CPT | Performed by: PHYSICIAN ASSISTANT

## 2025-07-04 RX ORDER — NICOTINE 21 MG/24HR
1 PATCH, TRANSDERMAL 24 HOURS TRANSDERMAL DAILY
Status: DISCONTINUED | OUTPATIENT
Start: 2025-07-04 | End: 2025-07-07 | Stop reason: HOSPADM

## 2025-07-04 RX ORDER — ACETAMINOPHEN 325 MG/1
650 TABLET ORAL EVERY 6 HOURS PRN
Status: DISCONTINUED | OUTPATIENT
Start: 2025-07-04 | End: 2025-07-07 | Stop reason: HOSPADM

## 2025-07-04 RX ORDER — ACETAMINOPHEN 325 MG/1
975 TABLET ORAL EVERY 8 HOURS PRN
Status: DISCONTINUED | OUTPATIENT
Start: 2025-07-04 | End: 2025-07-07 | Stop reason: HOSPADM

## 2025-07-04 RX ORDER — ARIPIPRAZOLE 5 MG/1
5 TABLET ORAL DAILY
Status: DISCONTINUED | OUTPATIENT
Start: 2025-07-04 | End: 2025-07-05

## 2025-07-04 RX ORDER — LISINOPRIL 10 MG/1
10 TABLET ORAL DAILY
Status: DISCONTINUED | OUTPATIENT
Start: 2025-07-05 | End: 2025-07-07 | Stop reason: HOSPADM

## 2025-07-04 RX ORDER — ACETAMINOPHEN 325 MG/1
650 TABLET ORAL EVERY 4 HOURS PRN
Status: DISCONTINUED | OUTPATIENT
Start: 2025-07-04 | End: 2025-07-07 | Stop reason: HOSPADM

## 2025-07-04 RX ADMIN — NICOTINE POLACRILEX 4 MG: 4 GUM, CHEWING BUCCAL at 21:37

## 2025-07-04 RX ADMIN — NICOTINE POLACRILEX 4 MG: 4 GUM, CHEWING BUCCAL at 17:09

## 2025-07-04 RX ADMIN — NICOTINE 1 PATCH: 21 PATCH, EXTENDED RELEASE TRANSDERMAL at 15:33

## 2025-07-04 RX ADMIN — TRAZODONE HYDROCHLORIDE 50 MG: 50 TABLET ORAL at 21:37

## 2025-07-04 RX ADMIN — ARIPIPRAZOLE 5 MG: 5 TABLET ORAL at 09:12

## 2025-07-04 NOTE — PLAN OF CARE
Problem: DISCHARGE PLANNING  Goal: Discharge to home or other facility with appropriate resources  Description: INTERVENTIONS:  - Identify barriers to discharge w/patient and caregiver  - Arrange for needed discharge resources and transportation as appropriate  - Identify discharge learning needs (meds, wound care, etc.)  - Arrange for interpretive services to assist at discharge as needed  - Refer to Case Management Department for coordinating discharge planning if the patient needs post-hospital services based on physician/advanced practitioner order or complex needs related to functional status, cognitive ability, or social support system  Outcome: Progressing     Problem: Ineffective Coping  Goal: Cooperates with admission process  Description: Interventions:   - Complete admission process  Outcome: Progressing     Problem: Depression  Goal: Refrain from harming self  Description: Interventions:  - Monitor patient closely, per order   - Supervise medication ingestion, monitor effects and side effects   Outcome: Progressing     Problem: Risk for Violence/Aggression Toward Others  Goal: Verbalize thoughts and feelings  Description: Interventions:  - Assess and re-assess patient's level of risk, every waking shift  - Engage patient in 1:1 interactions, daily, for a minimum of 15 minutes   - Allow patient to express feelings and frustrations in a safe and non-threatening manner   - Establish rapport/trust with patient   Outcome: Progressing  Goal: Refrain from harming others  Outcome: Progressing  Goal: Control angry outbursts  Description: Interventions:  - Monitor patient closely, per order  - Ensure early verbal de-escalation  - Monitor prn medication needs  - Set reasonable/therapeutic limits, outline behavioral expectations, and consequences   - Provide a non-threatening milieu, utilizing the least restrictive interventions   Outcome: Progressing  Goal: Identify appropriate positive anger management  techniques  Description: Interventions:  - Offer anger management and coping skills groups   - Staff will provide positive feedback for appropriate anger control  Outcome: Progressing     Problem: Alteration in Orientation  Goal: Interact with staff daily  Description: Interventions:  - Assess and re-assess patient's level of orientation  - Engage patient in 1 on 1 interactions, daily, for a minimum of 15 minutes   - Establish rapport/trust with patient   Outcome: Progressing  Goal: Attend and participate in unit activities, including therapeutic, recreational, and educational groups  Description: Interventions:  - Provide therapeutic and educational activities daily, encourage attendance and participation, and document same in the medical record   - Provide appropriate opportunities for reminiscence   - Provide a consistent daily routine   - Encourage family contact/visitation   Outcome: Progressing  Goal: Complete daily ADLs, including personal hygiene independently, as able  Description: Interventions:  - Observe, teach, and assist patient with ADLS  Outcome: Progressing

## 2025-07-04 NOTE — NURSING NOTE
"Portillo was unable to sit still during admission lab-work attempt this morning, despite prompting from staff. One tube was obtained and patient began shouting, \"Stop! You're hurting me!\" Patient withdrew his arm, declined additional attempt at this time.  "

## 2025-07-04 NOTE — CONSULTS
Consultation - Hospitalist   Name: Portillo Flaherty 60 y.o. male I MRN: [id number]  Unit/Bed#: -01 I Date of Admission: 7/3/2025   Date of Service: 7/4/2025 I Hospital Day: 1     Inpatient consult for Medical Clearance for  patient  Consult performed by: Joann Ayala PA-C  Consult ordered by: Elisa Kidd MD        Physician Requesting Evaluation: Felix Booth DO   Reason for Evaluation / Principal Problem: Medical clearance    Assessment & Plan  Medical clearance for psychiatric admission  Vital signs stable at time of assessment  Blood draw that was attempted earlier this morning unsuccessful as patient pulled his arm away during attempt  EKG with sinus bradycardia normal Qtc  Patient appears medically stable at this time for inpatient psychiatric treatment  Elevated liver enzymes  Appear to be chronically elevated  Denies any abdominal pain or nausea/vomiting  Prior abdominal ultrasound in 2024 without acute findings  Suspect multifactorial in setting of polysubstance abuse  Per PCP office visit note from May 2025, hx of hepatitis C and declined additional work up at that time  Recommend outpt follow up with GI  Primary hypertension  Blood pressure stable  Continue lisinopril -creatinine WNL on labs from ED  Bipolar disorder, curr episode manic w/o psychotic features, moderate (HCC)  Further plan per psychiatry  Drug-induced psychotic disorder (HCC)  Noted polysubstance use  Further plan per psychiatry  Please contact the SecureChat role for the Hospitalist service with any questions/concerns.    Recommendations for Discharge:  Follow up with GI    Collaboration of Care: Were Recommendations Directly Discussed with Primary Treatment Team? No    History of Present Illness   Chief Complaint: Erratic behaviors, HI    Portillo Flaherty is a 60 y.o. male with a PMH of bipolar disorder, polysubstance abuse, hypertension, chronically elevated LFTs who presents with erratic behavior,  HI.    Patient presented to the emergency department due to various erratic behaviors and making homicidal as well as suicidal statements.  Denies any physical complaints this morning including chest pain/palpitations, shortness of breath, nausea/vomiting, abdominal pain, fever/chills.    Review of Systems   Constitutional:  Negative for chills, fatigue, fever and unexpected weight change.   HENT:  Negative for congestion, sore throat and trouble swallowing.    Eyes:  Negative for photophobia, pain and visual disturbance.   Respiratory:  Negative for cough, shortness of breath and wheezing.    Cardiovascular:  Negative for chest pain, palpitations and leg swelling.   Gastrointestinal:  Negative for abdominal pain, constipation, diarrhea, nausea and vomiting.   Endocrine: Negative for polyuria.   Genitourinary:  Negative for difficulty urinating, dysuria, flank pain, hematuria and urgency.   Musculoskeletal:  Negative for back pain, myalgias, neck pain and neck stiffness.   Skin:  Negative for pallor and rash.   Neurological:  Negative for dizziness, tremors, syncope, speech difficulty, weakness, light-headedness and headaches.   Hematological:  Does not bruise/bleed easily.   Psychiatric/Behavioral:  Positive for behavioral problems. Negative for agitation and confusion. The patient is hyperactive.        Historical Information   Past Medical History[1]  Past Surgical History[2]  Social History[3]  E-Cigarette/Vaping    E-Cigarette Use Never User      E-Cigarette/Vaping Substances    Nicotine No     THC No     CBD No     Flavoring No     Other No     Unknown No      Family History[4]  Social History:  Marital Status: /Civil Union   Occupation:   Patient Pre-hospital Living Situation: Home  Patient Pre-hospital Level of Mobility: walks  Patient Pre-hospital Diet Restrictions:     Meds/Allergies   I have reviewed home medications using recent Epic encounter.  Prior to Admission medications    Medication Sig  Start Date End Date Taking? Authorizing Provider   lisinopril (ZESTRIL) 10 mg tablet Take 1 tablet (10 mg total) by mouth daily 6/27/25  Yes Georgina Rodriguez PA-C   B Complex-C (B-complex with vitamin C) tablet Take 1 tablet by mouth in the morning.  Patient not taking: Reported on 7/3/2025    Historical Provider, MD   Magnesium 500 MG CAPS 1 po qd  Patient not taking: Reported on 7/3/2025 3/23/23   Robert Budinetz, MD   melatonin 3 mg Take 1 tablet (3 mg total) by mouth daily at bedtime  Patient not taking: Reported on 7/3/2025 6/26/25   LESLY Domingo   OLANZapine (ZyPREXA) 10 mg tablet Take 1 tablet (10 mg total) by mouth daily at bedtime  Patient not taking: Reported on 7/3/2025 6/30/25   Osito Hardy PA-C   Omega-3 Fatty Acids (fish oil) 1,000 mg Take 1,000 mg by mouth in the morning.  Patient not taking: Reported on 7/3/2025    Historical Provider, MD     Allergies   Allergen Reactions    Latex Hives       Objective :  Temp:  [97 °F (36.1 °C)-100.2 °F (37.9 °C)] 100.2 °F (37.9 °C)  HR:  [75-84] 75  BP: (120-152)/(79-90) 152/90  Resp:  [16-18] 18  SpO2:  [96 %-100 %] 96 %  O2 Device: None (Room air)    Physical Exam  Vitals and nursing note reviewed.   Constitutional:       Appearance: Normal appearance.      Comments: No acute distress   HENT:      Head: Normocephalic.     Eyes:      General: No scleral icterus.     Extraocular Movements: Extraocular movements intact.      Conjunctiva/sclera: Conjunctivae normal.       Cardiovascular:      Rate and Rhythm: Normal rate and regular rhythm.      Heart sounds: Normal heart sounds. No murmur heard.  Pulmonary:      Effort: Pulmonary effort is normal. No respiratory distress.      Breath sounds: Normal breath sounds. No wheezing, rhonchi or rales.   Abdominal:      General: Bowel sounds are normal.      Palpations: Abdomen is soft.      Tenderness: There is no abdominal tenderness. There is no guarding or rebound.     Musculoskeletal:      Cervical  back: Normal range of motion.      Comments: Ambulating room without difficulty, no edema     Skin:     General: Skin is warm and dry.     Neurological:      General: No focal deficit present.      Mental Status: He is alert.     Psychiatric:         Speech: Speech is rapid and pressured and tangential.            Lab Results: I have reviewed the following results:   Results from last 7 days   Lab Units 07/03/25  0736   WBC Thousand/uL 6.90   HEMOGLOBIN g/dL 12.8   HEMATOCRIT % 36.9   PLATELETS Thousands/uL 165   SEGS PCT % 68   LYMPHO PCT % 24   MONO PCT % 6   EOS PCT % 1     Results from last 7 days   Lab Units 07/03/25  0736   SODIUM mmol/L 132*   POTASSIUM mmol/L 4.8   CHLORIDE mmol/L 100   CO2 mmol/L 26   BUN mg/dL 26*   CREATININE mg/dL 0.81   ANION GAP mmol/L 6   CALCIUM mg/dL 9.3   ALBUMIN g/dL 4.5   TOTAL BILIRUBIN mg/dL 0.73   ALK PHOS U/L 43   ALT U/L 54*   AST U/L 59*   GLUCOSE RANDOM mg/dL 173*             Lab Results   Component Value Date    HGBA1C 5.7 (H) 06/26/2025    HGBA1C 5.5 04/29/2025    HGBA1C 5.5 09/09/2024               Imaging Results Review: No pertinent imaging studies reviewed.  Other Study Results Review: EKG was reviewed.     Administrative Statements   Today, Patient Was Seen By: Joann Ayala PA-C    ** Please Note: This note may have been constructed using a voice recognition system.**       [1]   Past Medical History:  Diagnosis Date    DJD (degenerative joint disease)     Dyslipidemia     History of hepatitis C     Hypertension     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HCC)     Toxoplasmosis chorioretinitis of left eye    [2]   Past Surgical History:  Procedure Laterality Date    CARPAL TUNNEL RELEASE Left     CATARACT EXTRACTION Left     COLONOSCOPY      HERNIA REPAIR      PLANTAR FASCIA SURGERY Left    [3]   Social History  Tobacco Use    Smoking status: Every Day     Types: Cigarettes     Start date: 01/2010     Passive exposure: Never    Smokeless tobacco: Never    Tobacco comments:      Uninterested in quitting.   Vaping Use    Vaping status: Never Used   Substance and Sexual Activity    Alcohol use: Not Currently     Comment: Reports no alcohol consumption since 2006.    Drug use: Yes     Types: Marijuana, Cocaine, Heroin, Fentanyl    Sexual activity: Not Currently   [4]   Family History  Problem Relation Name Age of Onset    Lung cancer Mother      Kidney disease Mother          drug induced    COPD Mother      Dementia Father

## 2025-07-04 NOTE — CMS CERTIFICATION NOTE
Recertification: Based upon physical, mental and social evaluations, I certify that inpatient psychiatric services continue to be medically necessary for this patient for a duration of 5 midnights for the treatment of  Bipolar disorder, curr episode manic w/o psychotic features, moderate (HCC) Available alternative community resources still do not meet the patient's mental health care needs. I further attest that an established written individualized plan of care has been updated and is outlined in the patient's medical records.

## 2025-07-04 NOTE — NURSING NOTE
Patient pleasant bright when approached, rambles in conversation w pressured speech, appears manic, Patient staff splits. Patient asked for water while I was attending to a different patient , patient became irritated and began to ask somebody else for water, patient then filled his cup from facet in nutrition room. Patient picture of his penis removed from his bible and placed in belongings, not during this shift

## 2025-07-04 NOTE — NURSING NOTE
Patient  reports that his room mates has snacks and he wants snacks, told him he can not have snacks, he was intrusive while attending to another patient. He then said that he would go in and eat the room mates snacks, snacks removed form room.  Patient then asking EVS staff for tea, patient informed he can not have tea, then said that he was informed by a different staff member that he can have tea. Patient not given tea, Boundaries were explained to patient

## 2025-07-04 NOTE — PLAN OF CARE
Problem: DISCHARGE PLANNING  Goal: Discharge to home or other facility with appropriate resources  Description: INTERVENTIONS:  - Identify barriers to discharge w/patient and caregiver  - Arrange for needed discharge resources and transportation as appropriate  - Identify discharge learning needs (meds, wound care, etc.)  - Arrange for interpretive services to assist at discharge as needed  - Refer to Case Management Department for coordinating discharge planning if the patient needs post-hospital services based on physician/advanced practitioner order or complex needs related to functional status, cognitive ability, or social support system  Outcome: Progressing  Note: Pt met with CM to review & sign ROIs & complete intake.

## 2025-07-04 NOTE — NURSING NOTE
"Portillo is a 59 y/o male, here on a 302 from [facility]. Patient was recently discharged from [facility] on . Portillo presented to the ED with erratic behaviors, making HI statements to kill his wife with an axe and SI statements \"to blow his brains out.\" Patient received two doses of Narcan in the ED, with bradycardic episodes in the 30s and episodes of apnea. Portillo has been off meds since discharging from [facility], did not  his meds, states, \"I only take Lisinopril and marijuana. Marijuana is my medicine.\" Patient reports poor sleep, denies SI/HI/AVH upon arrival to [facility], states, \"My wife is to blame here. 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 We're getting . Don't talk to her about me. She's no longer my emergency contact.\" Portillo has a history of bipolar, ADD and HTN. Patient states, \"I . I'm Lazarus. I  twice, once the other day and once in . Don't tell people that I'm suicidal. It was God.\" Patient was disorganized and irritable throughout admission process, refusing to remove necklaces, stating, \"These are Druze icons! You cannot take these from me!\" Portillo was hyper-verbal, rambling with rapid speech, circumstantial and tangential throughout admission process. Patient endorses history of recent heroin, cocaine, marijuana, fentanyl and nitrous oxide abuse. When asked about drug use, patient stated, \"I'm a mandated . I've had training. I know how to talk to kids, if I need to.\" Patient then abruptly stripped naked and began jumping, stating, \"It's just a penis.\" Portillo needed extensive redirection throughout assessment. UDS: THC, cocaine, fentanyl.   "

## 2025-07-04 NOTE — TREATMENT PLAN
TREATMENT PLAN REVIEW - Behavioral Health Portillo Flaherty 60 y.o. 1965 male MRN: [id number]    [facility] QU IP BEHAVIORAL [facility] Room / Bed: [facility] 216/[facility] 216-01 Encounter: [id number]          Admit Date/Time:  7/3/2025  9:04 PM    Treatment Team:   DO Kamille Morris RN Brianne Swope, JAKI Palacios  StefanyJAKI Villatoro, JOHN Conde RN Nichole Heiser, JAKI Wolfe RN    Diagnosis: Principal Problem:    Bipolar disorder, curr episode manic w/o psychotic features, moderate (HCC)  Active Problems:    Attention deficit disorder (ADD) in adult    Cannabis abuse    Cocaine abuse (HCC)    Opioid abuse (HCC)    Drug-induced psychotic disorder (HCC)      Patient Strengths/Assets: cooperative, communication skills    Patient Barriers/Limitations: substance abuse    Short Term Goals: ability to stay safe on the unit, ability to stay free of restraints, improvement in insight, improvement in reality testing    Long Term Goals: free of suicidal thoughts, free of homicidal thoughts, no self abusive behavior    Progress Towards Goals: starting psychiatric medications as prescribed, improving gradually    Recommended Treatment: medication management, patient medication education, group therapy, milieu therapy, continued Behavioral Health psychiatric evaluation/assessment process    Treatment Frequency: daily medication monitoring, group and milieu therapy daily, monitoring through interdisciplinary rounds, monitoring through weekly patient care conferences    Expected Discharge Date:  5 days    Discharge Plan: discharge to home    Treatment Plan Created/Updated By: Felix Booth DO

## 2025-07-04 NOTE — CASE MANAGEMENT
CM contacted Pt's spouse, Cleopatra, at [phone]  This is a repeated pattern for him  She didn't even know he had appointments  When she would try to talk to him about it, he would tell her it's not of her business.  Recently Radha has been communicating more, because   She was very upset that he was sent home after 72 hours.  CM reviewed 302    He has been in & out of rehab throughout our marriage.  There was a period of time he was clean & sober.  His mom  4 years ago & that is when the mental health set in.  He had bipolar & after his mom  his older sister shared some information of things that happened between his mom & father when Pt was little.  Sold our home to move in with her, bc her wish was to die at home. He was going to shoot himself the first time she had him hospitalized. He left the house & took a gun with him, she immediately called 911.   They  when he got out.  He is verbally & mentally abusive.  He would follow her around & provoke her until it would get physical & one of them would leave the house. She left & moved into an apartment.  He stayed in his mom's house until him & siblings settled the estate.  He moved into the same apartment building she was in, but on a different floor.  They didn't see each other much, except for family functions.  2nd year they started talking more, getting along, & doing on dates. They decided to get back together.  In May it was 1 yr that they have been back together. Around Easter he started with behaviors she hadn't noticed before. She asked if he was using drugs, he would say no & get very angry, screaming & yelling at her about her weight, how she looks, things not getting done around the house, however, she works all day & he is home.   He went to [facility] & they told him to 201 himself.  He came home & changed the lock to the spare bedroom, which is his room now.   He took a large sum of money out of their account & refused to put it back.  "He said he spent it.     Throughout their community police have been called: he confronts people, threatens people, not within just the [place] but out in the town as well.    Cocaine & Tank at [facility] in [facility]  More recently psychedelics, cocaine, & ketamine  She confronted him about it & he flipped out on it.  Monday he went to [facility] & contacted her nephew who is 24 & texted him, \"if you give Cleopatra intelligence, I'm going to unfriend you\". That is her brother's son, who committed suicide bc of his own mental health issues.  Cleopatra confronted Pt & said not to contact him, bc he has been through too much. He got in his car & drove to the house & told Cleopatra's sister-in-law that he was sorry & asked her took look after her, because he would not be around to do it.  Tues PM when she came home from work she asked him & he told her he wouldn't be around. She asked what it means, & he said it means what it means. Saying she was casting doom.  Neighbor called & said when she left for work at 10 AM there was no sign of Pt, & they got worried bc it was a long period of time & the police came to do a welfare check. He didn't want to come outside, the police had to take the dog out. She got home & they sat down to talk & he started talking about Mamadou.  She has an appointment with an , bc he said he had an , but he said he only lied & said he had a  because he doesn't want her to talk to him anymore.   He was screaming at her & she called 911, & police said that they were already out there once today & told her just to go to her room.  At 3:30 it got quiet but at 5:30/6:00 she hears him screaming her name & asking for help.  His door is locked & she banging on his door. He is naked, lying on the floor, there are pills all over the place & his speech was slurred.  He said he as having a heart attack & to call 911.  She asked him what the pills are & he said it's his vitamins & linisnopril.  He said that he fell & " they spilled.  He told her he did acid & the night before he said he did heroin. She helped him get dressed & then he started yelling at her to get the fuck out.  EMTs arrive & it took awhile to get him out of his room.  He then became unresponsive for a minute but then snapped out of it.  He was nodding in & out in ED.  Exasperated when not well - Buddhism

## 2025-07-04 NOTE — H&P
H&P - Behavioral Health   Name: Portillo Flaherty 60 y.o. male I MRN: [id number]  Unit/Bed#: -01 I Date of Admission: 7/3/2025   Date of Service: 7/4/2025 I Hospital Day: 1     Assessment & Plan  Bipolar disorder, curr episode manic w/o psychotic features, moderate (HCC)  Start Abilify 5mg po QD  Suspect a large component of patients presentation is drug-related. Given that he is willing to cooperate with treatment, take medications, and engage in aspects of his care, will plan to observe him in the milieu and discharge him at the conclusion of the 302 on Monday 7/7. Do not currently see criteria for 303.  Attention deficit disorder (ADD) in adult    Cannabis abuse  Encourage cessation from substance use.     Cocaine abuse (HCC)  Encourage cessation from substance use.   Opioid abuse (HCC)  Encourage cessation from substance use.   Drug-induced psychotic disorder (HCC)  See BPAD plan above.    Current Medications:    Current Facility-Administered Medications:     ARIPiprazole (ABILIFY) tablet 5 mg, Oral, Daily      Risks/Benefits of Treatment:     Risks, benefits, and possible side effects of medications explained to patient and patient verbalizes understanding and agreement for treatment.    Treatment Planning:     All current active medications have been reviewed.  Medication changes/recommendations as above in Assessment & Plan Section.  Continue to monitor response to treatment and assess for potential side effects of medications.  Encourage group therapy, milieu therapy and occupational therapy.  Collaboration with medical service for medical comorbidities as indicated.  Behavioral Health checks for safety monitoring.  Estimated Discharge Day:  5 days (7/9/2025)  Legal Status on Admission: On 302 commitment for up to 5 days.    Psychiatric Evaluation    Chief Complaint: drug induced psychosis    History of Present Illness     Patient is a 60-year-old white male with a past psychiatric history of bipolar  "disorder, ADHD, and polysubstance abuse (reports an actual past dx of drug induced psychosis) who presents on a 302 for threatening behaviors as detailed below.    Symptoms prior to hospitalization include: Polysubstance abuse and using heroin, cocaine, marijuana, and LSD in [facility], reportedly making homicidal threats to neighbor, reportedly threatening to kill wife with an axe, reportedly threatening to kill self, and generalized anxiety. Symptom severity is rated as mild. Timeline is acute on chronic. Mitigating factors include wife's support. Exacerbating stressors include polysubstance abuse and medication nonadherence.    On initial psychiatric evaluation today, patient states that he never intended to harm himself or anybody else. He minimizes the statements alleged in the 302. He alludes to making those statements while intoxicated and states he has been previously diagnosed with substance induced psychosis. States his wife always assumes he is going out to use drugs, so this time he wanted to prove her right. He subsequently went to \"a show\" in [place] and then went to [place] to get drugs. He is somewhat religiously preoccupied and talks about healing himself with gods help. He is calm, cooperative, and pleasant. He has slightly pressured speech but is interruptible and appropriate with me. He is agreeable to taking a medication and cooperating with treatment and is hopefuly to leave at the conclusion of his 302. He denies SI, HI, or AVH. He states he never picked up his medications following discharge from [facility] because the pharmacy was too far away.     Psychiatric Review Of Systems:    Pertinent items are noted in HPI; all others negative    Historical Information     Past Psychiatric History:     Past Inpatient Psychiatric Treatment:   Multiple past inpatient psychiatric admissions at [facility]  Past Outpatient Psychiatric Treatment:    Was in " outpatient psychiatric treatment in the past with a psychiatrist  Past Suicide Attempts: no  Past Violent Behavior: no  Past Psychiatric Medication Trials: Zyprexa, Depakote, Melatonin    Substance Abuse History:    Tobacco, Alcohol and Drug Use History     Tobacco Use    Smoking status: Every Day     Types: Cigarettes     Start date: 01/2010     Passive exposure: Never    Smokeless tobacco: Never    Tobacco comments:     Uninterested in quitting.   Vaping Use    Vaping status: Never Used   Substance Use Topics    Alcohol use: Not Currently     Comment: Reports no alcohol consumption since 2006.    Drug use: Yes     Types: Marijuana, Cocaine, Heroin, Fentanyl      Additional Substance Use Detail       Questions Responses    Problems Due to Past Use of Alcohol? No    Problems Due to Past Use of Substances? Yes    Substance Use Assessment Substance use greater than 12 months ago    Alcohol Use Frequency Denies use in past 12 months    Cannabis frequency 3 or more times/week    Comment:  3 or more times/week on 7/3/2025     Heroin Frequency 1 or 2 times/week    Cocaine frequency 1-2 times/week    Comment:  1-2 times/week on 7/3/2025     Crack Cocaine Frequency Denies use in past 12 months    Methamphetamine Frequency Denies use in past 12 months    Narcotic Frequency Denies use in past 12 months    Benzodiazepine Frequency Denies use in past 12 months    Amphetamine frequency Denies use in past 12 months    Barbituate Frequency Denies use use in past 12 months    Inhalant frequency 1-2 times/week    Comment:  1-2 times/week on 7/3/2025     Hallucinogen frequency Never used    Comment:  Never used on 7/3/2025     Ecstasy frequency Never used    Comment:  Never used on 7/3/2025     Other drug frequency Never used    Comment:  Never used on 7/3/2025     Opiate frequency 1 or 2 times/week    Last reviewed by Asha Bhakta RN on 7/3/2025           I have assessed this patient for substance use within the past 12  months    Family Psychiatric History:     As documented in HPI, if clinically relevant.      Social History:    Patient is unemployed and receives SSI.  Previously worked in various industries including previously meat processing.  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 [facility] time  No  hx  Finished HS  Is     Traumatic History:     Abuse:none  Other Traumatic Events: none    Past Medical History      History of Seizures: no  History of Head injury with loss of consciousness: no    Past Medical History[1]  Past Surgical History[2]  Meds/Allergies      Allergies   Allergen Reactions    Latex Hives        Current Facility-Administered Medications:     aluminum-magnesium hydroxide-simethicone (MAALOX) oral suspension 30 mL, Q4H PRN    ARIPiprazole (ABILIFY) tablet 5 mg, Daily    haloperidol lactate (HALDOL) injection 2.5 mg, Q4H PRN Max 4/day **AND** LORazepam (ATIVAN) injection 1 mg, Q4H PRN Max 4/day **AND** benztropine (COGENTIN) injection 0.5 mg, Q4H PRN Max 4/day    haloperidol lactate (HALDOL) injection 5 mg, Q4H PRN Max 4/day **AND** LORazepam (ATIVAN) injection 2 mg, Q4H PRN Max 4/day **AND** benztropine (COGENTIN) injection 1 mg, Q4H PRN Max 4/day    benztropine (COGENTIN) injection 1 mg, Q4H PRN Max 6/day    benztropine (COGENTIN) tablet 1 mg, Q4H PRN Max 6/day    bisacodyl (DULCOLAX) rectal suppository 10 mg, Daily PRN    hydrOXYzine HCL (ATARAX) tablet 50 mg, Q6H PRN Max 4/day **OR** diphenhydrAMINE (BENADRYL) injection 50 mg, Q6H PRN    haloperidol (HALDOL) tablet 1 mg, Q6H PRN    haloperidol (HALDOL) tablet 2.5 mg, Q4H PRN Max 4/day    haloperidol (HALDOL) tablet 5 mg, Q4H PRN Max 4/day    hydrOXYzine HCL (ATARAX) tablet 100 mg, Q6H PRN Max 4/day **OR** LORazepam (ATIVAN) injection 2 mg, Q6H PRN    hydrOXYzine HCL (ATARAX) tablet 25 mg, Q6H PRN Max 4/day    ibuprofen (MOTRIN) tablet 400 mg, Q4H PRN    ibuprofen (MOTRIN) tablet 600 mg, Q6H PRN    ibuprofen (MOTRIN) tablet 800 mg, Q8H PRN     "polyethylene glycol (MIRALAX) packet 17 g, Daily PRN    propranolol (INDERAL) tablet 10 mg, Q8H PRN    senna-docusate sodium (SENOKOT S) 8.6-50 mg per tablet 1 tablet, Daily PRN    traZODone (DESYREL) tablet 50 mg, HS PRN  Medical History Review: I have reviewed the patient's PMH, PSH, Social History, Family History, Meds, and Allergies     Objective :  Temp:  [97 °F (36.1 °C)-100.2 °F (37.9 °C)] 100.2 °F (37.9 °C)  HR:  [48-84] 75  BP: (103-152)/(63-90) 152/90  Resp:  [11-18] 18  SpO2:  [96 %-100 %] 96 %  O2 Device: None (Room air)    Mental Status Evaluation:    Appearance: moderately kempt  Motor: mild psychomotor agitation  Behavior: cooperative, pleasant  Speech: normal rate, rhythm, and volume  Mood: \"a little bit anxious\"  Affect: mood-congruent, euthymic  Thought Process: circumstantial but able to ultimately remain on topic  Thought Content: denies auditory hallucinations, denies visual hallucinations, denies delusions  Risk Potential: denies suicidal ideation, plan, or intent. Denies homicidal ideation  Sensorium: Oriented to person, place, time, and situation  Cognition: cognitive ability appears intact but was not quantitatively tested  Consciousness: alert and awake  Attention: currently intact  Insight: fair  Judgement: poor      Patient Strengths/Assets: negotiates basic needs, reasoning ability    Patient Barriers/Limitations: substance abuse          Lab Results: I have reviewed the following results:            Code Status: Level 1 - Full Code  Advance Directive and Living Will: <no information>  Next of Kin: Extended Emergency Contact Information  Primary Emergency Contact: Cleopatra Flaherty  Mobile Phone: [phone]  Relation: Spouse    Administrative Statements     Counseling / Coordination of Care:   Patient's progress discussed with staff in treatment team meeting.  Medication changes reviewed with staff in treatment team meeting.    Inpatient Psychiatric Certification:  Estimated length of stay: " "5 midnights   Based upon physical, mental and social evaluations, I certify that inpatient psychiatric services are medically necessary for this patient for a duration of 5 midnights for the treatment of Bipolar disorder, curr episode manic w/o psychotic features, moderate (HCC)  Available alternative community resources do not meet the patient's mental health care needs.  I further attest that an established written individualized plan of care has been implemented and is outlined in the patient's medical records.    Portions of the record may have been created with voice recognition software. Occasional wrong word or \"sound a like\" substitutions may have occurred due to the inherent limitations of voice recognition software. Read the chart carefully and recognize, using context, where substitutions have occurred.    Felix Booth,  07/04/25         [1]   Past Medical History:  Diagnosis Date    DJD (degenerative joint disease)     Dyslipidemia     History of hepatitis C     Hypertension     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HCC)     Toxoplasmosis chorioretinitis of left eye    [2]   Past Surgical History:  Procedure Laterality Date    CARPAL TUNNEL RELEASE Left     CATARACT EXTRACTION Left     COLONOSCOPY      HERNIA REPAIR      PLANTAR FASCIA SURGERY Left      "

## 2025-07-04 NOTE — ASSESSMENT & PLAN NOTE
Vital signs stable at time of assessment  Blood draw that was attempted earlier this morning unsuccessful as patient pulled his arm away during attempt  EKG with sinus bradycardia normal Qtc  Patient appears medically stable at this time for inpatient psychiatric treatment

## 2025-07-04 NOTE — NURSING NOTE
Bin:   Glasses (broken)  Bible with misc. Papers. (Some misc. Papers removed d/t staples)    NO PHONE, WALLET, KEYS, ECT.   NO CLOTHES.

## 2025-07-04 NOTE — ASSESSMENT & PLAN NOTE
Appear to be chronically elevated  Denies any abdominal pain or nausea/vomiting  Prior abdominal ultrasound in 2024 without acute findings  Suspect multifactorial in setting of polysubstance abuse  Per PCP office visit note from May 2025, hx of hepatitis C and declined additional work up at that time  Recommend outpt follow up with GI

## 2025-07-04 NOTE — CASE MANAGEMENT
Readmit score:  26(RED)   Confirmed Address:    [facility]:  [place] [place] [address]    [facility]   Resides in the home with:      Will Return Home at Discharge: Yes   Confirmed Phone Number: (cell)    Confirmed Email Address:     Marital Status:      Children:    Family History:                            Parents:                           Siblings:        Commitment Status:     Admitted from:      Presenting C/O:          Past Inpatient Tx:      Past Suicide Attempts:    Current outpatient:    Psychiatrist:     ELLEN ACKERMAN    Therapist:     ELLEN HDEZ/ICM/CPS/WRT/SC:   ELLEN ACKERMAN   PCP:     ELLEN Lal Hx/Concern:      Medications:      Pharmacy:      Spirituality/Congregation:    Education:      Work/Income:        Legal:       Access to Firearms:    Transportation:      Strength:      Coping Skills:      Goal:      Referrals Needed:        Transport at Discharge:    IMM: N/A [place] Text SANJANA: N/A   Emergency Contact:     ROIs obtained:      Insurance:       Audit:        PAWSS:   BAT:  UDS:      Substance Abuse: Freq. Amount Last Use Notes:   Heroin       Amp/Meth       Alcohol       Cocaine       Cannabis       Benzodiazepine       Barbituartes       Other       Tobacco

## 2025-07-04 NOTE — ASSESSMENT & PLAN NOTE
Start Abilify 5mg po QD  Suspect a large component of patients presentation is drug-related. Given that he is willing to cooperate with treatment, take medications, and engage in aspects of his care, will plan to observe him in the milieu and discharge him at the conclusion of the 302 on Monday 7/7. Do not currently see criteria for 303.

## 2025-07-04 NOTE — NURSING NOTE
Patient approached nurses station window requesting Trazodone 50 mg PO for insomnia. Trazodone 50 mg PO administered. Upon follow up, pt is resting in bed with eyes closed. Breathing even and unlabored, appears to be sleeping. Medication effective.

## 2025-07-04 NOTE — NURSING NOTE
"Patient visible in milieu pacing hallways, labile, pressured/rambling speech, manic, religiously preoccupied and cooperative. Patient states he has a lot of \"Mamadou energy,\" but denies auditory and visual hallucinations. Patient has high anxiety and denies depression. Patient denies SI/HI. Continued care and safety rounds in progress.  "

## 2025-07-05 LAB
ALBUMIN SERPL BCG-MCNC: 3.8 G/DL (ref 3.5–5)
ALP SERPL-CCNC: 43 U/L (ref 34–104)
ALT SERPL W P-5'-P-CCNC: 47 U/L (ref 7–52)
ANION GAP SERPL CALCULATED.3IONS-SCNC: 5 MMOL/L (ref 4–13)
AST SERPL W P-5'-P-CCNC: 47 U/L (ref 13–39)
BASOPHILS # BLD AUTO: 0.01 THOUSANDS/ÂΜL (ref 0–0.1)
BASOPHILS NFR BLD AUTO: 0 % (ref 0–1)
BILIRUB SERPL-MCNC: 0.79 MG/DL (ref 0.2–1)
BUN SERPL-MCNC: 13 MG/DL (ref 5–25)
CALCIUM SERPL-MCNC: 9.2 MG/DL (ref 8.4–10.2)
CHLORIDE SERPL-SCNC: 102 MMOL/L (ref 96–108)
CHOLEST SERPL-MCNC: 145 MG/DL (ref ?–200)
CO2 SERPL-SCNC: 31 MMOL/L (ref 21–32)
CREAT SERPL-MCNC: 0.64 MG/DL (ref 0.6–1.3)
EOSINOPHIL # BLD AUTO: 0.05 THOUSAND/ÂΜL (ref 0–0.61)
EOSINOPHIL NFR BLD AUTO: 1 % (ref 0–6)
ERYTHROCYTE [DISTWIDTH] IN BLOOD BY AUTOMATED COUNT: 12.9 % (ref 11.6–15.1)
FOLATE SERPL-MCNC: 18.2 NG/ML
GFR SERPL CREATININE-BSD FRML MDRD: 106 ML/MIN/1.73SQ M
GLUCOSE P FAST SERPL-MCNC: 96 MG/DL (ref 65–99)
GLUCOSE SERPL-MCNC: 96 MG/DL (ref 65–140)
HCT VFR BLD AUTO: 36.5 % (ref 36.5–49.3)
HDLC SERPL-MCNC: 58 MG/DL
HGB BLD-MCNC: 12.9 G/DL (ref 12–17)
IMM GRANULOCYTES # BLD AUTO: 0.01 THOUSAND/UL (ref 0–0.2)
IMM GRANULOCYTES NFR BLD AUTO: 0 % (ref 0–2)
LDLC SERPL CALC-MCNC: 71 MG/DL (ref 0–100)
LYMPHOCYTES # BLD AUTO: 2.61 THOUSANDS/ÂΜL (ref 0.6–4.47)
LYMPHOCYTES NFR BLD AUTO: 52 % (ref 14–44)
MCH RBC QN AUTO: 33.1 PG (ref 26.8–34.3)
MCHC RBC AUTO-ENTMCNC: 35.3 G/DL (ref 31.4–37.4)
MCV RBC AUTO: 94 FL (ref 82–98)
MONOCYTES # BLD AUTO: 0.52 THOUSAND/ÂΜL (ref 0.17–1.22)
MONOCYTES NFR BLD AUTO: 10 % (ref 4–12)
NEUTROPHILS # BLD AUTO: 1.84 THOUSANDS/ÂΜL (ref 1.85–7.62)
NEUTS SEG NFR BLD AUTO: 37 % (ref 43–75)
NONHDLC SERPL-MCNC: 87 MG/DL
NRBC BLD AUTO-RTO: 0 /100 WBCS
PLATELET # BLD AUTO: 139 THOUSANDS/UL (ref 149–390)
PMV BLD AUTO: 9.4 FL (ref 8.9–12.7)
POTASSIUM SERPL-SCNC: 4 MMOL/L (ref 3.5–5.3)
PROT SERPL-MCNC: 6.4 G/DL (ref 6.4–8.4)
RBC # BLD AUTO: 3.9 MILLION/UL (ref 3.88–5.62)
SODIUM SERPL-SCNC: 138 MMOL/L (ref 135–147)
TREPONEMA PALLIDUM IGG+IGM AB [PRESENCE] IN SERUM OR PLASMA BY IMMUNOASSAY: NORMAL
TRIGL SERPL-MCNC: 82 MG/DL (ref ?–150)
TSH SERPL DL<=0.05 MIU/L-ACNC: 2.05 UIU/ML (ref 0.45–4.5)
VIT B12 SERPL-MCNC: 322 PG/ML (ref 180–914)
WBC # BLD AUTO: 5.04 THOUSAND/UL (ref 4.31–10.16)

## 2025-07-05 PROCEDURE — 82607 VITAMIN B-12: CPT | Performed by: PSYCHIATRY & NEUROLOGY

## 2025-07-05 PROCEDURE — 80053 COMPREHEN METABOLIC PANEL: CPT | Performed by: PSYCHIATRY & NEUROLOGY

## 2025-07-05 PROCEDURE — 82746 ASSAY OF FOLIC ACID SERUM: CPT | Performed by: PSYCHIATRY & NEUROLOGY

## 2025-07-05 PROCEDURE — 82306 VITAMIN D 25 HYDROXY: CPT | Performed by: PSYCHIATRY & NEUROLOGY

## 2025-07-05 PROCEDURE — 99232 SBSQ HOSP IP/OBS MODERATE 35: CPT | Performed by: PSYCHIATRY & NEUROLOGY

## 2025-07-05 PROCEDURE — 84443 ASSAY THYROID STIM HORMONE: CPT | Performed by: PSYCHIATRY & NEUROLOGY

## 2025-07-05 PROCEDURE — 85025 COMPLETE CBC W/AUTO DIFF WBC: CPT | Performed by: PSYCHIATRY & NEUROLOGY

## 2025-07-05 PROCEDURE — 80061 LIPID PANEL: CPT | Performed by: PSYCHIATRY & NEUROLOGY

## 2025-07-05 RX ORDER — DIAZEPAM 5 MG/1
5 TABLET ORAL
Status: DISCONTINUED | OUTPATIENT
Start: 2025-07-05 | End: 2025-07-07 | Stop reason: HOSPADM

## 2025-07-05 RX ORDER — ARIPIPRAZOLE 10 MG/1
10 TABLET ORAL DAILY
Status: DISCONTINUED | OUTPATIENT
Start: 2025-07-06 | End: 2025-07-07 | Stop reason: HOSPADM

## 2025-07-05 RX ADMIN — TRAZODONE HYDROCHLORIDE 50 MG: 50 TABLET ORAL at 21:03

## 2025-07-05 RX ADMIN — ACETAMINOPHEN 975 MG: 325 TABLET ORAL at 19:37

## 2025-07-05 RX ADMIN — ARIPIPRAZOLE 5 MG: 5 TABLET ORAL at 08:03

## 2025-07-05 RX ADMIN — NICOTINE 1 PATCH: 21 PATCH, EXTENDED RELEASE TRANSDERMAL at 14:51

## 2025-07-05 RX ADMIN — LISINOPRIL 10 MG: 10 TABLET ORAL at 08:03

## 2025-07-05 RX ADMIN — NICOTINE POLACRILEX 4 MG: 4 GUM, CHEWING BUCCAL at 12:18

## 2025-07-05 RX ADMIN — DIAZEPAM 5 MG: 5 TABLET ORAL at 21:03

## 2025-07-05 NOTE — ED NOTES
This RN at the bedside. Removed bible and glasses from bedside for patient safety, placed in locker #1. Made pt aware of  time for transport for 2030 with [facility] to [facility]. Pt expressed understanding.       Natalia Clark RN  07/04/25 1349

## 2025-07-05 NOTE — TREATMENT TEAM
Patient remain Bizarre, Manic, religiously  preoccupied, denies SI HI AVH at present. Patient starts Abilify 10 mg increased from 5 this AM tomorrow 07/06  AM and Valium 07/05 HS

## 2025-07-05 NOTE — NURSING NOTE
Patient visible in milieu with peers, at times withdrawn to room. Patient denies all, no anxiety or depression, denies SI/HI and hallucinations. Patient came up to nurses station for PRN Tylenol 975 mg for headache. Patient attends group therapies and interacts appropriately with peers. Continued care and safety rounds in progress.

## 2025-07-05 NOTE — NURSING NOTE
Patient bright and cooperative this morning. Patient religiously preoccupied stating he has Mamadou in him. Denies SI/HI/AVH. Denies any unmet needs at this time.

## 2025-07-05 NOTE — NURSING NOTE
Patient had PRN Trazodone 50 mg for insomnia at 2137.  Upon re-assessment, prn medication was effective and patient is resting.

## 2025-07-05 NOTE — ASSESSMENT & PLAN NOTE
Increase Abilify to 10mg po QD starting 7/6  Start Valium 5mg po HS for dual benefit of sleep and arsh  Appreciate patients current manic symptoms; likely exacerbated by polysubstance abuse PTA; patient is pleasantly manic; vehemently denying SI/HI/AVH; cooperating with treatment; no criteria for 303; 303 could not even be completed as patients commitment expires Tuesday morning before any such hearing could theoretically occur; patient unwilling to sign 201 for further treatment beyond Monday.

## 2025-07-05 NOTE — PROGRESS NOTES
Progress Note - Behavioral Health   Name: Portillo Flaherty 60 y.o. male I MRN: [id number]  Unit/Bed#: -01 I Date of Admission: 7/3/2025   Date of Service: 7/5/2025 I Hospital Day: 2    Assessment & Plan  Bipolar disorder, curr episode manic w/o psychotic features, moderate (HCC)  Increase Abilify to 10mg po QD starting 7/6  Start Valium 5mg po HS for dual benefit of sleep and arsh  Appreciate patients current manic symptoms; likely exacerbated by polysubstance abuse PTA; patient is pleasantly manic; vehemently denying SI/HI/AVH; cooperating with treatment; no criteria for 303; 303 could not even be completed as patients commitment expires Tuesday morning before any such hearing could theoretically occur; patient unwilling to sign 201 for further treatment beyond Monday.  Attention deficit disorder (ADD) in adult    Cannabis abuse  Encourage cessation from substance use.     Cocaine abuse (HCC)  Encourage cessation from substance use.   Opioid abuse (HCC)  Encourage cessation from substance use.   Drug-induced psychotic disorder (HCC)  See BPAD plan above.  Elevated liver enzymes    Primary hypertension    Medical clearance for psychiatric admission      Current Medications:    Current Facility-Administered Medications:     [START ON 7/6/2025] ARIPiprazole (ABILIFY) tablet 10 mg, Oral, Daily    diazepam (VALIUM) tablet 5 mg, Oral, HS    lisinopril (ZESTRIL) tablet 10 mg, Oral, Daily    nicotine (NICODERM CQ) 21 mg/24 hr TD 24 hr patch 1 patch, Transdermal, Daily      Risks/Benefits of Treatment:     Risks, benefits, and possible side effects of medications explained to patient and patient verbalizes understanding and agreement for treatment.    Progress Toward Goals: improving    Treatment Planning:     All current active medications have been reviewed.  Medication changes/recommendations as above in Assessment & Plan Section.  Continue to monitor response to treatment and assess for potential side effects of  "medications.  Encourage group therapy, milieu therapy and occupational therapy.  Collaboration with medical service for medical comorbidities as indicated.  Behavioral Health checks for safety monitoring.  Estimated Discharge Day: 7/7/2025 2 days (7/7/2025)  Legal Status : On 302 commitment for up to 5 days.  Long Stay Certification : Not Applicable    Subjective     Behavior over the last 24 hours: slowly improving    Per nursing, patient continues to be manic and hypertalkative.  He is intrusive and staff splitting at times.  He has been observed praying.  Today, the patient was seen for follow-up.  He is religiously preoccupied and pleasantly manic.  He states that he would like to start something that would help him with sleep and help with his manic symptoms but he is unwilling to start a mood stabilizer.  I discussed Depakote and gabapentin and the patient stated that he did not want to take these medications again as he has taken them in the past and did not like the way that they made him feel.  He ultimately was agreeable to taking Valium at bedtime.  I did caution him that benzos should not be combined with opiates and the patient stated that he will never use opiates again because he did not like the recent \"quiroz\" that he went on when he did a \"speedball.\"  He denies SI, HI, or AVH.  He states that his symptoms prior to hospitalization were exaggerated and he was never going to hurt himself, hurt his wife, or hurt his neighbor. He again alluded to a significant drug induced component.     Sleep: unchanged  Appetite: normal  Medication side effects: No  ROS: review of systems as noted above in HPI/Subjective report, all other systems are negative    Objective :  Temp:  [97.6 °F (36.4 °C)-97.9 °F (36.6 °C)] 97.6 °F (36.4 °C)  HR:  [63-75] 75  BP: (145-159)/(92-98) 159/98  Resp:  [18] 18  SpO2:  [99 %-100 %] 99 %  O2 Device: None (Room air)    Mental Status Evaluation:    Appearance: disheveled, large " "beard  Motor: +psychomotor agitation, no gait abnormalities  Behavior: superficially cooperative, requires repeated verbal redirection  Speech: pressured, volume is intermittently loud  Mood: \"excellent\"  Affect: expansive, labile, manic-appearing  Thought Process: disorganized, tangential, grandiose  Thought Content: denies auditory hallucinations, denies visual hallucinations, denies delusions  Risk Potential: denies suicidal ideation, plan, or intent. Denies homicidal ideation  Sensorium: Oriented to person, place, time, and situation  Cognition: cognitive ability appears intact but was not quantitatively tested  Consciousness: alert and awake  Attention: currently impaired  Insight: limited  Judgement: limited        Lab Results: I have reviewed the following results:      Administrative Statements     Counseling / Coordination of Care:   Patient's progress discussed with staff in treatment team meeting.  Medication changes reviewed with staff in treatment team meeting.    Portions of the record may have been created with voice recognition software. Occasional wrong word or \"sound a like\" substitutions may have occurred due to the inherent limitations of voice recognition software. Read the chart carefully and recognize, using context, where substitutions have occurred.    Felix Booth, DO 07/05/25  " BIBA for back pain x 2 days. Fell last week. No head trauma or LOC.

## 2025-07-06 LAB
25(OH)D3 SERPL-MCNC: 81.1 NG/ML (ref 30–100)
BACTERIA UR QL AUTO: NORMAL /HPF
BILIRUB UR QL STRIP: NEGATIVE
CLARITY UR: CLEAR
COLOR UR: YELLOW
GLUCOSE SERPL-MCNC: 113 MG/DL (ref 65–140)
GLUCOSE UR STRIP-MCNC: NEGATIVE MG/DL
HGB UR QL STRIP.AUTO: NEGATIVE
KETONES UR STRIP-MCNC: NEGATIVE MG/DL
LEUKOCYTE ESTERASE UR QL STRIP: NEGATIVE
NITRITE UR QL STRIP: NEGATIVE
NON-SQ EPI CELLS URNS QL MICRO: NORMAL /HPF
PH UR STRIP.AUTO: 7.5 [PH]
PROT UR STRIP-MCNC: NEGATIVE MG/DL
RBC #/AREA URNS AUTO: NORMAL /HPF
SP GR UR STRIP.AUTO: 1.02 (ref 1–1.03)
UROBILINOGEN UR STRIP-ACNC: <2 MG/DL
WBC #/AREA URNS AUTO: NORMAL /HPF

## 2025-07-06 PROCEDURE — GZHZZZZ GROUP PSYCHOTHERAPY: ICD-10-PCS | Performed by: PSYCHIATRY & NEUROLOGY

## 2025-07-06 PROCEDURE — 81001 URINALYSIS AUTO W/SCOPE: CPT | Performed by: PSYCHIATRY & NEUROLOGY

## 2025-07-06 PROCEDURE — 82948 REAGENT STRIP/BLOOD GLUCOSE: CPT

## 2025-07-06 PROCEDURE — 99232 SBSQ HOSP IP/OBS MODERATE 35: CPT | Performed by: PSYCHIATRY & NEUROLOGY

## 2025-07-06 RX ADMIN — DIAZEPAM 5 MG: 5 TABLET ORAL at 21:06

## 2025-07-06 RX ADMIN — TRAZODONE HYDROCHLORIDE 50 MG: 50 TABLET ORAL at 23:53

## 2025-07-06 RX ADMIN — HYDROXYZINE HYDROCHLORIDE 100 MG: 50 TABLET ORAL at 02:31

## 2025-07-06 RX ADMIN — NICOTINE POLACRILEX 4 MG: 4 GUM, CHEWING BUCCAL at 12:56

## 2025-07-06 RX ADMIN — HYDROXYZINE HYDROCHLORIDE 50 MG: 50 TABLET ORAL at 12:56

## 2025-07-06 RX ADMIN — ARIPIPRAZOLE 10 MG: 10 TABLET ORAL at 08:19

## 2025-07-06 RX ADMIN — NICOTINE 1 PATCH: 21 PATCH, EXTENDED RELEASE TRANSDERMAL at 15:06

## 2025-07-06 RX ADMIN — LISINOPRIL 10 MG: 10 TABLET ORAL at 08:19

## 2025-07-06 NOTE — NURSING NOTE
Patient reported feeling general malaise, believed his blood sugar was low and requested it to be checked. Blood glucose test done, resulted WNL.

## 2025-07-06 NOTE — PLAN OF CARE
Problem: Depression  Goal: Refrain from harming self  Description: Interventions:  - Monitor patient closely, per order   - Supervise medication ingestion, monitor effects and side effects   Outcome: Progressing     Problem: Risk for Violence/Aggression Toward Others  Goal: Verbalize thoughts and feelings  Description: Interventions:  - Assess and re-assess patient's level of risk, every waking shift  - Engage patient in 1:1 interactions, daily, for a minimum of 15 minutes   - Allow patient to express feelings and frustrations in a safe and non-threatening manner   - Establish rapport/trust with patient   Outcome: Progressing  Goal: Refrain from harming others  Outcome: Progressing  Goal: Control angry outbursts  Description: Interventions:  - Monitor patient closely, per order  - Ensure early verbal de-escalation  - Monitor prn medication needs  - Set reasonable/therapeutic limits, outline behavioral expectations, and consequences   - Provide a non-threatening milieu, utilizing the least restrictive interventions   Outcome: Progressing  Goal: Identify appropriate positive anger management techniques  Description: Interventions:  - Offer anger management and coping skills groups   - Staff will provide positive feedback for appropriate anger control  Outcome: Progressing     Problem: Alteration in Orientation  Goal: Interact with staff daily  Description: Interventions:  - Assess and re-assess patient's level of orientation  - Engage patient in 1 on 1 interactions, daily, for a minimum of 15 minutes   - Establish rapport/trust with patient   Outcome: Progressing  Goal: Attend and participate in unit activities, including therapeutic, recreational, and educational groups  Description: Interventions:  - Provide therapeutic and educational activities daily, encourage attendance and participation, and document same in the medical record   - Provide appropriate opportunities for reminiscence   - Provide a consistent  daily routine   - Encourage family contact/visitation   Outcome: Progressing  Goal: Complete daily ADLs, including personal hygiene independently, as able  Description: Interventions:  - Observe, teach, and assist patient with ADLS  Outcome: Progressing

## 2025-07-06 NOTE — NURSING NOTE
Pt received prn Trazodone 50mg PO @ 2103 for sleep. Upon follow up at 2203, pt is observed lying in bed with eyes closed. Respirations equal and unlabored. Appears that medication was effective.

## 2025-07-06 NOTE — ASSESSMENT & PLAN NOTE
Continue Abilify 10mg po QD   Continue Valium 5mg po HS for dual benefit of sleep and arsh  Appreciate patients current manic symptoms; likely exacerbated by polysubstance abuse PTA; patient is pleasantly manic; vehemently denying SI/HI/AVH; cooperating with treatment; no criteria for 303; 303 could not even be completed as patients commitment expires Tuesday morning before any such hearing could theoretically occur; patient unwilling to sign 201 for further treatment beyond Monday.

## 2025-07-06 NOTE — UTILIZATION REVIEW
NOTIFICATION OF INPATIENT ADMISSION   AUTHORIZATION REQUEST   SERVICING FACILITY:   [place] [address]  Tax ID: [id number]  NPI: [id number] ATTENDING PROVIDER:  Attending Name and NPI#: Elisa Kidd Md [[id number]]  Address: [address] [place] [address]  Phone: [phone]   ADMISSION INFORMATION:  Place of Service: Inpatient [facility]  Place of Service Code: 21  Inpatient Admission Date/Time: 7/3/25  6:40 PM  Discharge Date/Time: 7/3/2025  8:14 PM  Admitting Diagnosis Code/Description:  AMS (altered mental status) [R41.82]     UTILIZATION REVIEW CONTACT:  Becki Torres, Utilization   Network Utilization Review Department  Phone: [phone]  Fax [phone]  Email: [email or web address]  Contact for approvals/pending authorizations, clinical reviews, and discharge.     PHYSICIAN ADVISORY SERVICES:  Medical Necessity Denial & Hibh-gk-Fmmd Review  Phone: [phone]  Fax: [phone]  Email: [email or web address]     DISCHARGE SUPPORT TEAM:  For Patients Discharge Needs & Updates  Phone: [phone] opt. 2 Fax: [phone]  Email: [email or web address]

## 2025-07-06 NOTE — PROGRESS NOTES
Progress Note - Behavioral Health   Name: Portillo Flaherty 60 y.o. male I MRN: [id number]  Unit/Bed#: -01 I Date of Admission: 7/3/2025   Date of Service: 7/6/2025 I Hospital Day: 3    Assessment & Plan  Bipolar disorder, curr episode manic w/o psychotic features, moderate (HCC)  Continue Abilify 10mg po QD   Continue Valium 5mg po HS for dual benefit of sleep and arsh  Appreciate patients current manic symptoms; likely exacerbated by polysubstance abuse PTA; patient is pleasantly manic; vehemently denying SI/HI/AVH; cooperating with treatment; no criteria for 303; 303 could not even be completed as patients commitment expires Tuesday morning before any such hearing could theoretically occur; patient unwilling to sign 201 for further treatment beyond Monday.  Attention deficit disorder (ADD) in adult    Cannabis abuse  Encourage cessation from substance use.     Cocaine abuse (HCC)  Encourage cessation from substance use.   Opioid abuse (HCC)  Encourage cessation from substance use.   Drug-induced psychotic disorder (HCC)  See BPAD plan above.  Elevated liver enzymes    Primary hypertension    Medical clearance for psychiatric admission      Current Medications:    Current Facility-Administered Medications:     ARIPiprazole (ABILIFY) tablet 10 mg, Oral, Daily    diazepam (VALIUM) tablet 5 mg, Oral, HS    lisinopril (ZESTRIL) tablet 10 mg, Oral, Daily    nicotine (NICODERM CQ) 21 mg/24 hr TD 24 hr patch 1 patch, Transdermal, Daily      Risks/Benefits of Treatment:     Risks, benefits, and possible side effects of medications explained to patient and patient verbalizes understanding and agreement for treatment.    Progress Toward Goals: improving    Treatment Planning:     All current active medications have been reviewed.  Medication changes/recommendations as above in Assessment & Plan Section.  Continue to monitor response to treatment and assess for potential side effects of medications.  Encourage group  "therapy, milieu therapy and occupational therapy.  Collaboration with medical service for medical comorbidities as indicated.  Behavioral Health checks for safety monitoring.  Estimated Discharge Day: 7/7/2025 2 days (7/8/2025)  Legal Status : On 302 commitment for up to 5 days.  Long Stay Certification : Not Applicable    Subjective     Behavior over the last 24 hours: slowly improving    Staff today note that patient is still manic, but less so, since going up on patients Abilify. Today, patient denies SI, HI, or AVH.  He was able to reconcile his wife's concerns over the statements that he made prior to hospitalization but again states that he had no intent to actually do anything and also noted that he was intoxicated at the time that he made those statements.  Patient states that the Abilify has been helpful to him as well as the Valium and he is looking forward to continuing to take these medications.  He demonstrates future oriented thinking, noting the things that he has to do after discharge and the things that he is looking forward to doing.  States that he is going to get a divorce from his wife.  Patient is denying any side effects to medications.  He continues to be religiously preoccupied and manic appearing but is unwilling to sign a 201 for further treatment beyond the expiration of his 302.    Sleep: unchanged  Appetite: normal  Medication side effects: No  ROS: review of systems as noted above in HPI/Subjective report, all other systems are negative    Objective :  Temp:  [96.2 °F (35.7 °C)-97 °F (36.1 °C)] 97 °F (36.1 °C)  HR:  [73-85] 85  BP: (125-151)/() 151/109  Resp:  [18] 18  SpO2:  [97 %-99 %] 97 %  O2 Device: None (Room air)    Mental Status Evaluation:    Appearance: casually dressed, has a large beard  Motor: +psychomotor agitation, no gait abnormalities  Behavior: cooperative  Speech: pressured, but less so today as compared to previous days  Mood: \"much better\"  Affect: " "manic-appearing  Thought Process: disorganized, tangential, grandiose, religiously preoccupied  Thought Content: denies auditory hallucinations, denies visual hallucinations, denies delusions  Risk Potential: denies suicidal ideation, plan, or intent. Denies homicidal ideation  Sensorium: Oriented to person, place, time, and situation  Cognition: cognitive ability appears intact but was not quantitatively tested  Consciousness: alert and awake  Attention: currently impaired  Insight: limited  Judgement: limited        Lab Results: I have reviewed the following results:      Administrative Statements     Counseling / Coordination of Care:   Patient's progress discussed with staff in treatment team meeting.  Medication changes reviewed with staff in treatment team meeting.    Portions of the record may have been created with voice recognition software. Occasional wrong word or \"sound a like\" substitutions may have occurred due to the inherent limitations of voice recognition software. Read the chart carefully and recognize, using context, where substitutions have occurred.    Felix Booth,  07/06/25  "

## 2025-07-06 NOTE — NURSING NOTE
Patient reported increased anxiety related to noise on the unit, requested PRN medication. Patient received Atarax 50mg PO at 1256. Upon reassessment patient reports medication was effective.

## 2025-07-07 ENCOUNTER — TELEPHONE (OUTPATIENT)
Age: 60
End: 2025-07-07

## 2025-07-07 ENCOUNTER — TELEPHONE (OUTPATIENT)
Dept: OTHER | Facility: OTHER | Age: 60
End: 2025-07-07

## 2025-07-07 VITALS
WEIGHT: 130.4 LBS | RESPIRATION RATE: 18 BRPM | OXYGEN SATURATION: 98 % | HEART RATE: 89 BPM | TEMPERATURE: 97.1 F | DIASTOLIC BLOOD PRESSURE: 95 MMHG | HEIGHT: 71 IN | BODY MASS INDEX: 18.26 KG/M2 | SYSTOLIC BLOOD PRESSURE: 129 MMHG

## 2025-07-07 PROCEDURE — 99239 HOSP IP/OBS DSCHRG MGMT >30: CPT

## 2025-07-07 RX ORDER — ARIPIPRAZOLE 10 MG/1
10 TABLET ORAL DAILY
Qty: 30 TABLET | Refills: 0 | Status: SHIPPED | OUTPATIENT
Start: 2025-07-07 | End: 2025-08-06

## 2025-07-07 RX ORDER — DIAZEPAM 5 MG/1
5 TABLET ORAL
Qty: 10 TABLET | Refills: 0 | Status: SHIPPED | OUTPATIENT
Start: 2025-07-07 | End: 2025-07-17

## 2025-07-07 RX ADMIN — NICOTINE POLACRILEX 4 MG: 4 GUM, CHEWING BUCCAL at 12:41

## 2025-07-07 RX ADMIN — LISINOPRIL 10 MG: 10 TABLET ORAL at 08:07

## 2025-07-07 RX ADMIN — ACETAMINOPHEN 975 MG: 325 TABLET ORAL at 14:06

## 2025-07-07 RX ADMIN — NICOTINE POLACRILEX 4 MG: 4 GUM, CHEWING BUCCAL at 09:12

## 2025-07-07 RX ADMIN — HYDROXYZINE HYDROCHLORIDE 100 MG: 50 TABLET ORAL at 02:35

## 2025-07-07 RX ADMIN — ARIPIPRAZOLE 10 MG: 10 TABLET ORAL at 08:07

## 2025-07-07 NOTE — CASE MANAGEMENT
SAJI contacted [facility] at [phone] & was informed the  for JOHN Hardy was on the other line & would call CM back.      SAJI returned a phone call to Pt's Yesy ROACH Jill at @ [phone] & provided an update.  She reported that she is trying to work with Pt to apply for MA, as he needs a denial letter for the [facility] to continue to fund [facility] services.  SAJI reviewed d/c plans for today for Pt & she said that she would meet him on Weds (7/9) at 9 AM at [facility]; this is typically where they meet & not at Pt's home.  SAJI confirmed fax number [phone] to send d/c information.

## 2025-07-07 NOTE — NURSING NOTE
Patient bright and cooperative this morning. Denies SI/HI/AVH. Patient reports feeling slightly apprehensive regarding discharge but feels ready. Patient reviewed plan to continue being adherent with medications. Denies any unmet needs at this time.

## 2025-07-07 NOTE — NURSING NOTE
Patient requested and received Trazodone 50 mg PO at 2353 for sleep. Upon follow up, patient appears to be asleep in bed. Respirations are even and unlabored, and they are in no apparent distress. Medication effective for sleep.

## 2025-07-07 NOTE — TELEPHONE ENCOUNTER
Lacie from [facility] calling again to get patient scheduled for a follow up as patient is being discharged today.  Please call Lacie to advised if patient was scheduled for fu. Can also call patient to schedule.

## 2025-07-07 NOTE — ASSESSMENT & PLAN NOTE
Continue Abilify 10mg po QD   Continue Valium 5mg po HS for dual benefit of sleep and arsh  Appreciate patients current manic symptoms; likely exacerbated by polysubstance abuse PTA; patient is pleasantly manic; vehemently denying SI/HI/AVH; cooperating with treatment; no criteria for 303; 303 could not even be completed as patients commitment expires Tuesday morning before any such hearing could theoretically occur; patient unwilling to sign 201 for further treatment beyond Monday 7/7/2025.

## 2025-07-07 NOTE — NURSING NOTE
Patient requested and received Trazodone 50 mg PO at  for sleep. Upon follow up, patient appears to be asleep in bed. Respirations are even and unlabored, and they are in no apparent distress. Medication effective for sleep.

## 2025-07-07 NOTE — TELEPHONE ENCOUNTER
Patient is calling regarding cancelling an appointment.     Date/Time: TODAY, 7/7 @ 4:30PM     Patient was rescheduled: YES [ ] NO [ x]     Patient requesting call back to reschedule: YES [ ] NO [ x]  *Patients wife called to cancel TCM appt today. Patient is admitted back in the hospital. Appointment has been cancelled. Please be advised, thank you.

## 2025-07-07 NOTE — BH TRANSITION RECORD
Contact Information: If you have any questions, concerns, pended studies, tests and/or procedures, or emergencies regarding your inpatient behavioral health visit. Please contact [place] [facility] unit [phone] and ask to speak to a , nurse or physician. A contact is available 24 hours/ 7 days a week at this number.     Summary of Procedures Performed During your Stay:  Below is a list of major procedures performed during your hospital stay and a summary of results:  - Cardiac Procedures/Studies: ECG.  Sinus bradycardia with occasional Premature ventricular complexes  Otherwise normal ECG  When compared with ECG of 25-Aug-2022 15:15,  Vent. rate has decreased by  23 bpm  T wave amplitude has increased in Anterior leads     Pending Studies (From admission, onward)      None          Please follow up on the above pending studies with your PCP and/or referring provider.

## 2025-07-07 NOTE — PROGRESS NOTES
Treatment Team Rounds Completed  Medical and Psychiatric Review Completed  D/C: Today - Pt's 302 will   @ 07 / Pt denies any SI/HI or hallucinations.      25 0750   Team Meeting   Meeting Type Daily Rounds   Team Members Present   Team Members Present Physician;Nurse;;Other (Discipline and Name)   Physician Team Member Dr. Fleming / LESLY Barry / JOHN Walker / [place] Student   Nursing Team Member Andres / Mario / RN Students(x2)   Care Management Team Member Mary / Guillermo / Lola Perry   Other (Discipline and Name) Josiane(group facilitator)   Patient/Family Present   Patient Present No   Patient's Family Present No

## 2025-07-07 NOTE — TELEPHONE ENCOUNTER
Writer received a call from  at [facility]. They wanted to schedule follow-up appt for pt. Please call back.

## 2025-07-07 NOTE — NURSING NOTE
Expressed readiness for discharge. AVS reviewed including new therapy appointment, no questions or concerns.BHT escorted to [facility].

## 2025-07-07 NOTE — DISCHARGE INSTR - APPOINTMENTS
Behavioral Health Nurse Navigator, Chelsea or Rochelle will be calling you after your discharge, on the phone number that you provided.  They will be available as an additional support, if needed. If you wish to speak with Chelsea, you may contact her at [phone].    You are being discharged home [place] [address].  You provided phone number [phone] as the best way to contact you.

## 2025-07-07 NOTE — NURSING NOTE
Denies SI AVH feeling improved this PM, pleasant and smiling , will come to staff if feeling unsafe.

## 2025-07-07 NOTE — DISCHARGE INSTR - OTHER ORDERS
Crisis/Emergency Services  Emergency crisis services are available 24 hours a day, 7 days a week, and 365 days a year. For any crisis call [phone] or [phone].   [facility]al [facility] Office: Crisis/Emergency Services  Phone: [phone]  Address: [address] [place] [address], [address], [place] [address]  Provides emergency crisis services 24 hours a day, 7 days a week, and 365 days a year.    [facility]: Crisis/Emergency Services  Phone: [phone]  Address: [address], [place] [address]  Provides crisis services that are available 24 hours a day, 7 days a week, and 365 days a year....    The National [place] on Mental Illness (MARY) offers various education & support groups for you & your family.  For more information visit their website at http://www.mary.org.      Text CONNECT to 732778 from anywhere in the [place], anytime, about any type of crisis.  A live, trained Crisis Counselor receives the text and lets you know that they are here to listen.  The volunteer Crisis Counselor will help you move from a hot moment to a cool moment.    The 988 Suicide & Crisis Lifeline (formerly known as the National Suicide Prevention Lifeline) provides free and confidential emotional support to people in suicidal crisis or emotional distress 24 hours a day, 7 days a week, across the [place]. The Lifeline is comprised of a national network of over 200 local crisis centers, combining custom local care and resources with national standards and best practices.

## 2025-07-07 NOTE — DISCHARGE SUMMARY
Discharge Summary - Behavioral Health   Name: Portillo Flaherty 60 y.o. male I MRN: [id number]  Unit/Bed#: -01 I Date of Admission: 7/3/2025   Date of Service: 7/7/2025 I Hospital Day: 4    Assessment & Plan  Bipolar disorder, curr episode manic w/o psychotic features, moderate (HCC)  Continue Abilify 10mg po QD   Continue Valium 5mg po HS for dual benefit of sleep and arsh  Appreciate patients current manic symptoms; likely exacerbated by polysubstance abuse PTA; patient is pleasantly manic; vehemently denying SI/HI/AVH; cooperating with treatment; no criteria for 303; 303 could not even be completed as patients commitment expires Tuesday morning before any such hearing could theoretically occur; patient unwilling to sign 201 for further treatment beyond Monday 7/7/2025.  Attention deficit disorder (ADD) in adult    Cannabis abuse  Encourage cessation from substance use.     Cocaine abuse (HCC)  Encourage cessation from substance use.   Opioid abuse (HCC)  Encourage cessation from substance use.   Drug-induced psychotic disorder (HCC)  See BPAD plan above.  Elevated liver enzymes    Primary hypertension    Medical clearance for psychiatric admission      Admission Date: 7/3/2025       Discharge Date: 07/07/2025  Attending Psychiatrist: Felix Booth DO    Admission Diagnosis:  Principal Problem:    Bipolar disorder, curr episode manic w/o psychotic features, moderate (HCC)  Active Problems:    Elevated liver enzymes    Primary hypertension    Medical clearance for psychiatric admission    Attention deficit disorder (ADD) in adult    Cannabis abuse    Cocaine abuse (HCC)    Opioid abuse (HCC)    Drug-induced psychotic disorder (HCC)    Discharge Diagnosis:   Principal Problem:    Bipolar disorder, curr episode manic w/o psychotic features, moderate (HCC)  Active Problems:    Elevated liver enzymes    Primary hypertension    Medical clearance for psychiatric admission    Attention deficit disorder (ADD)  "in adult    Cannabis abuse    Cocaine abuse (HCC)    Opioid abuse (HCC)    Drug-induced psychotic disorder (HCC)  Resolved Problems:    * No resolved hospital problems. *    Medical Problems       Resolved Problems  Date Reviewed: 7/6/2025   None         Reason for Admission/HPI:     Per H & P completed by Dr Felix Booth on 7/4/2025:    \"Patient is a 60-year-old white male with a past psychiatric history of bipolar disorder, ADHD, and polysubstance abuse (reports an actual past dx of drug induced psychosis) who presents on a 302 for threatening behaviors as detailed below.     Symptoms prior to hospitalization include: Polysubstance abuse and using heroin, cocaine, marijuana, and LSD in [facility], reportedly making homicidal threats to neighbor, reportedly threatening to kill wife with an axe, reportedly threatening to kill self, and generalized anxiety. Symptom severity is rated as mild. Timeline is acute on chronic. Mitigating factors include wife's support. Exacerbating stressors include polysubstance abuse and medication nonadherence.     On initial psychiatric evaluation today, patient states that he never intended to harm himself or anybody else. He minimizes the statements alleged in the 302. He alludes to making those statements while intoxicated and states he has been previously diagnosed with substance induced psychosis. States his wife always assumes he is going out to use drugs, so this time he wanted to prove her right. He subsequently went to \"a show\" in [place] and then went to [place] to get drugs. He is somewhat religiously preoccupied and talks about healing himself with gods help. He is calm, cooperative, and pleasant. He has slightly pressured speech but is interruptible and appropriate with me. He is agreeable to taking a medication and cooperating with treatment and is hopefuly to leave at the conclusion of his 302. He denies SI, HI, or AVH. He states he never picked up " "his medications following discharge from [facility] because the pharmacy was too far away.\"     Past Medical History[1]  Past Surgical History[2]  Allergies   Allergen Reactions    Latex Hives       Objective :  Temp:  [97.1 °F (36.2 °C)-97.2 °F (36.2 °C)] 97.1 °F (36.2 °C)  HR:  [72-89] 89  BP: (129-158)/() 129/95  Resp:  [18] 18  SpO2:  [98 %-99 %] 98 %  O2 Device: None (Room air)    Hospital Course:     Portillo was admitted to the inpatient psychiatric unit and started on Behavioral Health checks for safety monitoring. During the hospitalization he was attending individual therapy, group therapy, milieu therapy and occupational therapy..     Psychiatric medications were adjusted over the hospital stay. To address mood instability, manic symptoms, and impulsivity, Portillo was treated with antipsychotic medication Abilify and anxiolytic medication Valium. Medication doses were adjusted during the hospital course. Abilify was added and titrated to 10mg PO daily for mood stabilization. Valium was added at the dose of 5mg PO at  for anxiety symptoms. Patient was given 10 day prescription for valium at the time of discharge.  Prior to beginning of treatment medications risks and benefits and possible side effects including risk of cardiovascular events in elderly related to treatment with antipsychotic medications and risks of misuse, abuse or dependence, sedation and respiratory depression related to treatment with benzodiazepine medications were reviewed with Portillo. He verbalized understanding and agreement for treatment. Upon admission Portillo was seen by medical service for medical clearance for inpatient treatment and medical follow up.     Portillo's symptoms improved somewhat but he continued with some manic symptoms over the hospital course. Initially after admission he was still feeling manic, delusional, and grandiose. With adjustment of medications and therapeutic milieu his symptoms improved. At the end " of treatment Portillo was more stable. His mood was more stable at the time of discharge. Portillo denied suicidal ideation, intent or plan at the time of discharge and denied homicidal ideation, intent or plan at the time of discharge. There was no overt psychosis at the time of discharge. Portillo was participating appropriately in milieu at the time of discharge. Behavior was appropriate on the unit at the time of discharge. Sleep and appetite were improved.     Since patient did not meet criteria for 303 at the expiration of the 302, the treatment team agreed the patient would be discharged at the expiration of the 302.  Although patient remains religiously preoccupied and grandiose at times, he did not present as a threat to himself or others.  He consistently and adamantly denied suicidal or homicidal ideation intent or plan.  He was pleasantly hypomanic and he remains in behavioral control while on the unit.       The outpatient follow up with a psychiatrist at [facility]  with [facility] was arranged by the unit  upon discharge.       Mental Status at Time of Discharge:     Appearance:  casually dressed, adequate grooming, long beard   Behavior:  pleasant, cooperative   Speech:  increased rate, not pressured   Mood:  hypomanic   Affect:  overbright   Thought Process:  goal directed, perseverative   Thought Content:  Mu-ism preoccupation, grandiose ideas   Perceptual Disturbances: none   Risk Potential: Suicidal Ideation -  None  Homicidal Ideation -  None  Potential for Aggression - No   Sensorium:  oriented to person, place, time/date, and situation   Memory:  recent and remote memory grossly intact   Consciousness:  alert and awake   Attention/Concentration: decreased attention span and decreased concentration   Insight:  partial   Judgment: partial   Gait/Station: normal gait/station   Motor Activity: no abnormal movements     Suicide/Homicide  Risk Assessment:    Risk of Harm to Self:   The following ratings are based on assessment at the time of the interview  Nursing Suicide Risk Assessment Last 24 hours: C-SSRS Risk (Since Last Contact)  Calculated C-SSRS Risk Score (Since Last Contact): No Risk Indicated  Demographic Risk Factors include: , male, age: over 50 or older  Historical Risk Factors include: chronic psychiatric problems, history of substance use  Current Specific Risk Factors include: recent inpatient psychiatric admission - being discharged today, diagnosis of mood disorder, substance use  Protective Factors: no current suicidal ideation, ability to adapt to change, ability to manage anger well, ability to make plans for the future, no current suicidal plan or intent, outpatient psychiatric follow up established, being   Weapons/Firearms: none. The following steps have been taken to ensure weapons are properly secured: not applicable  Based on today's assessmentPortillo presents the following risk of harm to self: low    Risk of Harm to Others:  The following ratings are based on  assessment at the time of the interview  Nursing Homicide Risk Assessment: Violence Risk to Others: Yes- Within the last 6 months  Demographic Risk Factors include: male  Historical Risk Factors include: drug abuse  Current Specific Risk Factors include: recent difficulty with impulse control, recent episode of mood instability, recent substance use  Protective Factors: improved impulse control, no current psychotic symptoms, compliant with medications, compliant with mental health treatment, willing to continue psychiatric treatment, outpatient psychiatric follow up established, ability to adapt to change, able to manage anger well, stable living environment, personal beliefs, Spiritism beliefs  Weapons/Firearms: none. The following steps have been taken to ensure weapons are properly secured: not applicable  Based on today's assessmentPortillo  presents the following risk of harm to others: low    The following interventions are recommended: outpatient follow up with a psychiatrist, outpatient follow up with a therapist      Lab Results: I have reviewed the following results:  Results from the past 24 hours: No results found for this or any previous visit (from the past 24 hours).  Most Recent Labs:   Lab Results   Component Value Date    WBC 5.04 07/05/2025    RBC 3.90 07/05/2025    HGB 12.9 07/05/2025    HCT 36.5 07/05/2025     (L) 07/05/2025    RDW 12.9 07/05/2025    NEUTROABS 1.84 (L) 07/05/2025    SODIUM 138 07/05/2025    K 4.0 07/05/2025     07/05/2025    CO2 31 07/05/2025    BUN 13 07/05/2025    CREATININE 0.64 07/05/2025    GLUC 96 07/05/2025    CALCIUM 9.2 07/05/2025    AST 47 (H) 07/05/2025    ALT 47 07/05/2025    ALKPHOS 43 07/05/2025    TP 6.4 07/05/2025    ALB 3.8 07/05/2025    TBILI 0.79 07/05/2025    CHOLESTEROL 145 07/05/2025    HDL 58 07/05/2025    TRIG 82 07/05/2025    LDLCALC 71 07/05/2025    NONHDLC 87 07/05/2025    VALPROICTOT <3 (L) 12/15/2022    LRR6JJFVWTGG 2.051 07/05/2025    T3FREE 173.0 (H) 01/31/2023    TREPONEMAPA Non-reactive 07/04/2025    HGBA1C 5.7 (H) 06/26/2025     06/26/2025       Discharge Medications:    Home Medications After Discharge    Scheduled   ARIPiprazole (ABILIFY) 10 mg tablet, Take 1 tablet (10 mg total) by mouth daily   diazepam (VALIUM) 5 mg tablet, Take 1 tablet (5 mg total) by mouth daily at bedtime for 10 days   lisinopril (ZESTRIL) 10 mg tablet, Take 1 tablet (10 mg total) by mouth daily    Discharge instructions/Information to patient and family:   See After Visit Summary for information provided to patient and family.      Provisions for Follow-Up Care:  See after visit summary for information related to follow-up care and any pertinent home health orders.      Discharge Statement:    I have spent a total time of 45 minutes in caring for this patient on the day of the  "visit/encounter.   >30 minutes of time was spent on: Diagnostic results, Risks and benefits of tx options, Instructions for management, Patient and family education, Importance of tx compliance, Risk factor reductions, Counseling / Coordination of care, Documenting in the medical record, Reviewing / ordering tests, medicine, procedures  , and Communicating with other healthcare professionals .  I reviewed with Portillo importance of compliance with medications and outpatient treatment after discharge.  I discussed the medication regimen and possible side effects of the medications with Portillo prior to discharge. At the time of discharge he was tolerating psychiatric medications.  I discussed outpatient follow up with Portillo.  I reviewed with Portillo crisis plan and safety plan upon discharge.  I discussed with Portillo recommendation to follow up with outpatient drug and alcohol counseling and AA meetings.  Portillo was competent to understand risks and benefits of withholding information and risks and benefits of his actions.    Discharge on Two Antipsychotic Medications: No    Portions of the record may have been created with voice recognition software. Occasional wrong word or \"sound a like\" substitutions may have occurred due to the inherent limitations of voice recognition software. Read the chart carefully and recognize, using context, where substitutions have occurred.    LESLY Miranda 07/07/25          [1]   Past Medical History:  Diagnosis Date    DJD (degenerative joint disease)     Dyslipidemia     History of hepatitis C     Hypertension     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HCC)     Toxoplasmosis chorioretinitis of left eye    [2]   Past Surgical History:  Procedure Laterality Date    CARPAL TUNNEL RELEASE Left     CATARACT EXTRACTION Left     COLONOSCOPY      HERNIA REPAIR      PLANTAR FASCIA SURGERY Left      "

## 2025-07-07 NOTE — PROGRESS NOTES
Treatment Plan Meeting:  Diagnosis of  Discussed short term goals of  Pt is on a 302 commitment with an exam done on 7/3 at 0725.  At this time, discharge is tentative for Monday.  All parties in agreement & treatment plan was signed.     07/04/25 0900   Team Meeting   Meeting Type Tx Team Meeting   Initial Conference Date 07/04/25   Next Conference Date 07/31/25   Team Members Present   Team Members Present Physician;Nurse;   Physician Team Member Dr. Booth   Nursing Team Member Shaylee   Care Management Team Member Mary   Patient/Family Present   Patient Present No  (Pt declined to meet with Treatment Team)   Patient's Family Present No

## 2025-07-07 NOTE — PROGRESS NOTES
Pt requested something to help him sleep due to yelling on unit from another patient. Stated that Trazodone was not effective. PRN Atarax 100mg given at 0235. Medication was effective

## 2025-07-08 ENCOUNTER — TELEPHONE (OUTPATIENT)
Age: 60
End: 2025-07-08

## 2025-07-08 NOTE — TELEPHONE ENCOUNTER
Called patient to let him know about his C appointment with Keith next Monday, July 14th at 1:30. Patient said he is currently driving and would call back. Was unable to let him know the appointment time and date.     If patient calls back, please relay information.

## 2025-07-09 ENCOUNTER — TELEPHONE (OUTPATIENT)
Age: 60
End: 2025-07-09

## 2025-07-09 NOTE — TELEPHONE ENCOUNTER
Patient called, he has an appointment with Primary Care Provider  Dr. Budinetz, Wednesday 8/13 @ 9am.  Patient advised  his service dog, Adali will also be attending the appointment.    Prior to the appointment the patient would like to have a full panel of lab work done.  No active lab orders at this time.  Most recent lab results 7/3/2025?  Is asking if Primary Care Provider  Can place an order.  Please advise.  Call back# [phone]

## 2025-07-09 NOTE — TELEPHONE ENCOUNTER
Patient's  called in to inquire if patient's appointment today, 7/9/2025, at 1:00PM with Bret Hicks could be switched to virtual, as patient may not be able to make it in person today.    Please confirm whether or not this appointment can be switched to virtual. Writer or office can contact patient to confirm if this switch is possible. Thank you.    Writer confirmed there is a completed release of information form on file to speak with patient's  (Service Access).

## 2025-07-12 ENCOUNTER — HOSPITAL ENCOUNTER (EMERGENCY)
Facility: HOSPITAL | Age: 60
Discharge: HOME | End: 2025-07-12
Attending: EMERGENCY MEDICINE | Admitting: EMERGENCY MEDICINE
Payer: COMMERCIAL

## 2025-07-12 VITALS
OXYGEN SATURATION: 99 % | SYSTOLIC BLOOD PRESSURE: 174 MMHG | HEIGHT: 69 IN | WEIGHT: 139.33 LBS | HEART RATE: 51 BPM | TEMPERATURE: 98.1 F | BODY MASS INDEX: 20.64 KG/M2 | DIASTOLIC BLOOD PRESSURE: 82 MMHG | RESPIRATION RATE: 22 BRPM

## 2025-07-12 DIAGNOSIS — T14.8XXA PUNCTURE WOUND: Primary | ICD-10-CM

## 2025-07-12 PROCEDURE — 99284 EMERGENCY DEPT VISIT MOD MDM: CPT | Performed by: EMERGENCY MEDICINE

## 2025-07-12 PROCEDURE — 90471 IMMUNIZATION ADMIN: CPT

## 2025-07-12 PROCEDURE — 99283 EMERGENCY DEPT VISIT LOW MDM: CPT

## 2025-07-12 PROCEDURE — 90715 TDAP VACCINE 7 YRS/> IM: CPT | Performed by: EMERGENCY MEDICINE

## 2025-07-12 PROCEDURE — 96372 THER/PROPH/DIAG INJ SC/IM: CPT

## 2025-07-12 RX ORDER — BACITRACIN, NEOMYCIN, POLYMYXIN B 400; 3.5; 5 [USP'U]/G; MG/G; [USP'U]/G
1 OINTMENT TOPICAL ONCE
Status: COMPLETED | OUTPATIENT
Start: 2025-07-12 | End: 2025-07-12

## 2025-07-12 RX ADMIN — TETANUS TOXOID, REDUCED DIPHTHERIA TOXOID AND ACELLULAR PERTUSSIS VACCINE, ADSORBED 0.5 ML: 5; 2.5; 8; 8; 2.5 SUSPENSION INTRAMUSCULAR at 06:30

## 2025-07-12 RX ADMIN — BACITRACIN ZINC, NEOMYCIN, POLYMYXIN B 1 SMALL APPLICATION: 400; 3.5; 5 OINTMENT TOPICAL at 06:27

## 2025-07-12 RX ADMIN — AMOXICILLIN AND CLAVULANATE POTASSIUM 1 TABLET: 875; 125 TABLET, FILM COATED ORAL at 06:26

## 2025-07-12 NOTE — DISCHARGE INSTRUCTIONS
"Patient Education     Taking care of cuts, scrapes, and puncture wounds   The Basics   Written by the doctors and editors at [facility]   Does my cut need stitches? -- If your cut does not go all the way through the skin, it does not need stitches (figure 1). If your cut is wide, jagged, or does go all the way through the skin, you will most likely need stitches.  If you are not sure if your cut needs stitches, check with your doctor or nurse. Sometimes they will use special staples instead of stitches. Doctors can also use a special type of skin glue to close certain types of cuts.  This article is about caring for minor wounds (like small cuts and scrapes) that do not need to be closed with stitches, staples, or skin glue. If you got stitches, staples, or glue, your doctor or nurse will tell you how to care for yourself.  What if I have a puncture wound? -- A \"puncture wound\" is a type of cut that is made when a sharp object, like a nail, goes through the skin and into the tissue underneath. This type of wound can also be caused by animal or human bites. Puncture wounds are more likely than other minor wounds to get infected.  If you were bitten by an animal or human, see your doctor or nurse. Bite wounds need special care.  How do I take care of a minor wound on my own? -- To take care of your wound, follow these basic first aid guidelines:   Clean the wound - Wash it well with soap and water. If there is dirt, glass, or another object in your cut that you can't get out after you wash it, call your doctor or nurse.   Stop the bleeding - If your wound is bleeding, press a clean cloth or bandage firmly on the area for 20 minutes. You can also help slow the bleeding by holding the wound above the level of your heart, if possible. If the bleeding doesn't stop after 20 minutes, call your doctor or nurse.   Put a thin layer of antibiotic ointment on the wound   Cover the wound with a bandage or gauze. Keep the bandage " clean and dry. Change the bandage 1 to 2 times every day until your wound heals.   Do not swim or soak your wound in water until it has healed. Ask your doctor or nurse if you have any questions.   Each time you change the bandage, look at your skin to check for signs of infection. These include redness that is getting worse or spreading, swelling, or warmth in the area. You might see some thin clear or yellow fluid as the wound heals, which is normal.  Most minor wounds heal on their own within 7 to 10 days. As your wound heals, a scab will form. Do not pick at the scab or scratch the skin around it.  When should I call the doctor or nurse? -- Call the doctor or nurse if you have any signs of an infection. Signs of an infection include:   Fever   Redness, swelling, warmth, or increased pain around the wound   A bad smell coming from the wound   Pus (thick yellow, green, or gray fluid) draining from the wound   Red streaks on the skin around the wound, or red streaks going up your arm or leg  Will I need a tetanus shot? -- Maybe. It depends on how old you are and when your last tetanus shot was. Tetanus is a serious infection that can cause muscle stiffness and spasms, and even lead to death. It is caused by bacteria (germs) that live in the dirt.  Most children get a tetanus vaccine as part of their routine check-ups. Vaccines can prevent certain serious or deadly infections. Many adults also get a tetanus vaccine as part of their routine check-ups. Getting all your vaccines is important, since it's possible to get tetanus even from a small wound.  If your skin is cut or punctured, and especially if the cut is dirty or deep, ask your doctor or nurse if you need a tetanus shot.  All topics are updated as new evidence becomes available and our peer review process is complete.  This topic retrieved from [employer] on: Mar 20, 2024.  Topic 14359 Version 11.0  Release: 32.2.4 - C32.78  © 2024 [employer] and/or its  "affiliates. All rights reserved.  figure 1: Minor cuts and scrapes     Picture A shows a scrape (also called an \"abrasion\"). The scrape doesn't go all the way through the skin, so it does not need stitches. Picture B shows a cut that does go all the way through the skin. This cut is deep, so it needs stitches.  Graphic 15548 Version 4.0  Consumer Information Use and Disclaimer   Disclaimer: This generalized information is a limited summary of diagnosis, treatment, and/or medication information. It is not meant to be comprehensive and should be used as a tool to help the user understand and/or assess potential diagnostic and treatment options. It does NOT include all information about conditions, treatments, medications, side effects, or risks that may apply to a specific patient. It is not intended to be medical advice or a substitute for the medical advice, diagnosis, or treatment of a health care provider based on the health care provider's examination and assessment of a patient's specific and unique circumstances. Patients must speak with a health care provider for complete information about their health, medical questions, and treatment options, including any risks or benefits regarding use of medications. This information does not endorse any treatments or medications as safe, effective, or approved for treating a specific patient. Up[employer]Date, Inc[employer] and its affiliates disclaim any warranty or liability relating to this information or the use thereof.The use of this information is governed by the Terms of Use, available at https://www.[employer].com/en/know/clinical-effectiveness-terms. 2024© Up[employer]Date, Inc. and its affiliates and/or licensors. All rights reserved.  Copyright   © 2024 [employer]Date, Inc. and/or its affiliates. All rights reserved.    "

## 2025-07-12 NOTE — ED PROVIDER NOTES
Time reflects when diagnosis was documented in both MDM as applicable and the Disposition within this note       Time User Action Codes Description Comment    7/12/2025  6:21 AM Terrell Vargas Add [T14.8XXA] Puncture wound     7/12/2025  6:21 AM Terrell Vargas Modify [T14.8XXA] Puncture wound Left foot          ED Disposition       ED Disposition   Discharge    Condition   Stable    Date/Time   Sat Jul 12, 2025  6:20 AM    Comment   Portillo Flaherty discharge to home/self care.                   Assessment & Plan       Medical Decision Making           Medications   tetanus-diphtheria-acellular pertussis (BOOSTRIX) IM injection 0.5 mL (has no administration in time range)   neomycin-bacitracin-polymyxin b (NEOSPORIN) ointment 1 small application (has no administration in time range)   amoxicillin-clavulanate (AUGMENTIN) 875-125 mg per tablet 1 tablet (has no administration in time range)       ED Risk Strat Scores                    No data recorded                            History of Present Illness       Chief Complaint   Patient presents with    Foot Pain     Pt c/o stepping on drywall screw a half hour ago, when walking dog. Left foot is causing pain.       Past Medical History[1]   Past Surgical History[2]   Family History[3]   Social History[4]   E-Cigarette/Vaping    E-Cigarette Use Never User       E-Cigarette/Vaping Substances    Nicotine No     THC No     CBD No     Flavoring No     Other No     Unknown No       I have reviewed and agree with the history as documented.     Stepped on kei nail this morning.  Tetanus status unknown.  Not diabetic.  Happen on left foot.  Was barefoot.      History provided by:  Patient   used: No    Leg Pain  Location:  Foot  Time since incident:  1 hour  Injury: yes    Mechanism of injury comment:  Puncture wound  Foot location:  L foot  Pain details:     Quality:  Aching    Radiates to:  Does not radiate    Severity:  Mild    Onset quality:   Sudden    Duration:  1 day    Timing:  Constant  Chronicity:  New  Prior injury to area:  No  Relieved by:  Nothing  Worsened by:  Nothing  Ineffective treatments:  None tried  Associated symptoms: no back pain, no decreased ROM, no fever, no neck pain, no numbness and no tingling        Review of Systems   Constitutional:  Negative for chills and fever.   HENT:  Negative for ear pain, hearing loss, sore throat, trouble swallowing and voice change.    Eyes:  Negative for pain and discharge.   Respiratory:  Negative for cough, shortness of breath and wheezing.    Cardiovascular:  Negative for chest pain and palpitations.   Gastrointestinal:  Negative for abdominal pain, blood in stool, constipation, diarrhea, nausea and vomiting.   Genitourinary:  Negative for dysuria, flank pain, frequency and hematuria.   Musculoskeletal:  Negative for back pain, joint swelling, neck pain and neck stiffness.   Skin:  Negative for rash and wound.   Neurological:  Negative for dizziness, seizures, syncope, facial asymmetry and headaches.   Psychiatric/Behavioral:  Negative for hallucinations, self-injury and suicidal ideas.    All other systems reviewed and are negative.          Objective       ED Triage Vitals [07/12/25 0619]   Temperature Pulse Blood Pressure Respirations SpO2 Patient Position - Orthostatic VS   98.1 °F (36.7 °C) (!) 51 (!) 174/82 22 99 % Lying      Temp Source Heart Rate Source BP Location FiO2 (%) Pain Score    Temporal Monitor Right arm -- 5      Vitals      Date and Time Temp Pulse SpO2 Resp BP Pain Score FACES Pain Rating User   07/12/25 0619 98.1 °F (36.7 °C) 51 99 % 22 174/82 5 -- MD            Physical Exam  Constitutional:       General: He is not in acute distress.     Appearance: Normal appearance. He is not ill-appearing.   HENT:      Head: Normocephalic and atraumatic.      Right Ear: External ear normal.      Left Ear: External ear normal.      Nose: Nose normal.      Mouth/Throat:      Mouth: Mucous  membranes are moist.     Eyes:      Extraocular Movements: Extraocular movements intact.      Pupils: Pupils are equal, round, and reactive to light.       Cardiovascular:      Rate and Rhythm: Normal rate and regular rhythm.   Pulmonary:      Effort: Pulmonary effort is normal. No respiratory distress.      Breath sounds: Normal breath sounds.   Abdominal:      General: Abdomen is flat. Bowel sounds are normal. There is no distension.      Palpations: Abdomen is soft.      Tenderness: There is no abdominal tenderness.     Musculoskeletal:         General: Tenderness present. No swelling.      Cervical back: Normal range of motion and neck supple.      Comments: Small superficial laceration to the medial aspect of the foot just distal to the heel.  No bleeding.  No fluctuance.  No palpable foreign body.  Minimally tender.     Skin:     General: Skin is warm and dry.      Capillary Refill: Capillary refill takes less than 2 seconds.     Neurological:      General: No focal deficit present.      Mental Status: He is alert and oriented to person, place, and time.     Psychiatric:         Mood and Affect: Mood normal.         Behavior: Behavior normal.         Results Reviewed       None            No orders to display       Procedures    ED Medication and Procedure Management   Prior to Admission Medications   Prescriptions Last Dose Informant Patient Reported? Taking?   ARIPiprazole (ABILIFY) 10 mg tablet   No No   Sig: Take 1 tablet (10 mg total) by mouth daily   diazepam (VALIUM) 5 mg tablet   No No   Sig: Take 1 tablet (5 mg total) by mouth daily at bedtime for 10 days   lisinopril (ZESTRIL) 10 mg tablet   No No   Sig: Take 1 tablet (10 mg total) by mouth daily      Facility-Administered Medications: None     Current Discharge Medication List        START taking these medications    Details   amoxicillin-clavulanate (AUGMENTIN) 875-125 mg per tablet Take 1 tablet by mouth every 12 (twelve) hours for 10 days  Qty:  20 tablet, Refills: 0    Associated Diagnoses: Puncture wound           CONTINUE these medications which have NOT CHANGED    Details   ARIPiprazole (ABILIFY) 10 mg tablet Take 1 tablet (10 mg total) by mouth daily  Qty: 30 tablet, Refills: 0    Associated Diagnoses: Bipolar affective disorder, current episode manic with psychotic symptoms (HCC)      diazepam (VALIUM) 5 mg tablet Take 1 tablet (5 mg total) by mouth daily at bedtime for 10 days  Qty: 10 tablet, Refills: 0    Associated Diagnoses: Bipolar affective disorder, current episode manic with psychotic symptoms (HCC)      lisinopril (ZESTRIL) 10 mg tablet Take 1 tablet (10 mg total) by mouth daily  Qty: 30 tablet, Refills: 0    Associated Diagnoses: Primary hypertension           No discharge procedures on file.  ED SEPSIS DOCUMENTATION   Time reflects when diagnosis was documented in both MDM as applicable and the Disposition within this note       Time User Action Codes Description Comment    7/12/2025  6:21 AM Terrell Vargas Add [T14.8XXA] Puncture wound     7/12/2025  6:21 AM Terrell Vargas Modify [T14.8XXA] Puncture wound Left foot                   [1]   Past Medical History:  Diagnosis Date    DJD (degenerative joint disease)     Dyslipidemia     History of hepatitis C     Hypertension     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HCC)     Toxoplasmosis chorioretinitis of left eye    [2]   Past Surgical History:  Procedure Laterality Date    CARPAL TUNNEL RELEASE Left     CATARACT EXTRACTION Left     COLONOSCOPY      HERNIA REPAIR      PLANTAR FASCIA SURGERY Left    [3]   Family History  Problem Relation Name Age of Onset    Lung cancer Mother      Kidney disease Mother          drug induced    COPD Mother      Dementia Father     [4]   Social History  Tobacco Use    Smoking status: Every Day     Types: Cigarettes     Start date: 01/2010     Passive exposure: Never    Smokeless tobacco: Never    Tobacco comments:     Uninterested in quitting.   Vaping Use    Vaping  status: Never Used   Substance Use Topics    Alcohol use: Not Currently     Comment: Reports no alcohol consumption since 2006.    Drug use: Yes     Types: Marijuana, Cocaine, Heroin, Fentanyl        Terrell Vargas MD  07/12/25 0622

## 2025-07-16 ENCOUNTER — TELEPHONE (OUTPATIENT)
Age: 60
End: 2025-07-16

## 2025-07-16 NOTE — TELEPHONE ENCOUNTER
Patient's case manger from [facility] called and requested to schedule appt w/provider. Writer transferred her to resident  for assistance.    Requesting medication refill.  Please approve or deny this request.    Rx requested:  Requested Prescriptions     Pending Prescriptions Disp Refills    gabapentin (NEURONTIN) 100 MG capsule [Pharmacy Med Name: Gabapentin 100 MG Oral Capsule] 90 capsule 0     Sig: TAKE 1 CAPSULE BY MOUTH THREE TIMES DAILY       Last Office Visit:   5/4/2021    Last Filled:      Last Labs:      Next Visit Date:  Future Appointments   Date Time Provider Department Center   7/14/2021 11:30 AM Jessa Ramirez MD [facility]   8/4/2021  8:40 AM [address], [place] [address]   11/2/2021  9:30 AM Celine Tadeo MD [address]   11/16/2021  9:15 AM Fredrick Westbrook MD [facility]   5/4/2022  9:00 AM [address], [place] [address]

## 2025-07-18 ENCOUNTER — TELEPHONE (OUTPATIENT)
Age: 60
End: 2025-07-18

## 2025-07-18 NOTE — TELEPHONE ENCOUNTER
Outreach call placed in an attempt to reschedule missed [facility] appt from 7/6/25. Pt rescheduled, 8/6/25 at 8:00 AM with Bret Hicks in [place].

## 2025-07-28 ENCOUNTER — OFFICE VISIT (OUTPATIENT)
Age: 60
End: 2025-07-28
Payer: COMMERCIAL

## 2025-07-28 DIAGNOSIS — F14.10 COCAINE ABUSE (HCC): ICD-10-CM

## 2025-07-28 DIAGNOSIS — F12.10 CANNABIS ABUSE: ICD-10-CM

## 2025-07-28 DIAGNOSIS — F31.12 BIPOLAR DISORDER, CURR EPISODE MANIC W/O PSYCHOTIC FEATURES, MODERATE (HCC): Primary | ICD-10-CM

## 2025-07-28 DIAGNOSIS — F11.10 OPIOID ABUSE (HCC): ICD-10-CM

## 2025-07-28 DIAGNOSIS — F31.2 BIPOLAR AFFECTIVE DISORDER, CURRENT EPISODE MANIC WITH PSYCHOTIC SYMPTOMS (HCC): ICD-10-CM

## 2025-07-28 PROCEDURE — 99215 OFFICE O/P EST HI 40 MIN: CPT | Performed by: PHYSICIAN ASSISTANT

## 2025-07-28 RX ORDER — ARIPIPRAZOLE 10 MG/1
10 TABLET ORAL DAILY
Qty: 90 TABLET | Refills: 1 | Status: SHIPPED | OUTPATIENT
Start: 2025-07-28 | End: 2026-01-24

## 2025-07-30 DIAGNOSIS — I10 PRIMARY HYPERTENSION: ICD-10-CM

## 2025-07-31 RX ORDER — LISINOPRIL 10 MG/1
10 TABLET ORAL DAILY
Qty: 30 TABLET | Refills: 0 | Status: SHIPPED | OUTPATIENT
Start: 2025-07-31

## 2025-08-05 ENCOUNTER — TELEPHONE (OUTPATIENT)
Age: 60
End: 2025-08-05

## 2025-08-06 ENCOUNTER — TELEMEDICINE (OUTPATIENT)
Dept: BEHAVIORAL/MENTAL HEALTH CLINIC | Facility: CLINIC | Age: 60
End: 2025-08-06
Attending: STUDENT IN AN ORGANIZED HEALTH CARE EDUCATION/TRAINING PROGRAM
Payer: COMMERCIAL

## 2025-08-06 DIAGNOSIS — F31.12 BIPOLAR DISORDER, CURR EPISODE MANIC W/O PSYCHOTIC FEATURES, MODERATE (HCC): Primary | ICD-10-CM

## 2025-08-06 PROCEDURE — 90791 PSYCH DIAGNOSTIC EVALUATION: CPT | Performed by: SOCIAL WORKER

## 2025-08-13 ENCOUNTER — OFFICE VISIT (OUTPATIENT)
Dept: FAMILY MEDICINE CLINIC | Facility: CLINIC | Age: 60
End: 2025-08-13
Payer: COMMERCIAL